# Patient Record
Sex: MALE | Race: WHITE | NOT HISPANIC OR LATINO | Employment: OTHER | URBAN - METROPOLITAN AREA
[De-identification: names, ages, dates, MRNs, and addresses within clinical notes are randomized per-mention and may not be internally consistent; named-entity substitution may affect disease eponyms.]

---

## 2017-03-08 ENCOUNTER — TELEPHONE (OUTPATIENT)
Dept: INTERNAL MEDICINE | Facility: CLINIC | Age: 62
End: 2017-03-08

## 2017-03-08 NOTE — TELEPHONE ENCOUNTER
----- Message from Kerri Wilson sent at 3/7/2017  3:18 PM EST -----  pts wife was diagnosed with type A flu today and pt would like an rx sent for Tamiflu. He is not currently having symptoms but would like to have it anyway. Pharmacy: [facility].       [phone], pt

## 2017-04-24 ENCOUNTER — OFFICE VISIT (OUTPATIENT)
Dept: INTERNAL MEDICINE | Facility: CLINIC | Age: 62
End: 2017-04-24

## 2017-04-24 VITALS
DIASTOLIC BLOOD PRESSURE: 70 MMHG | RESPIRATION RATE: 17 BRPM | HEART RATE: 64 BPM | WEIGHT: 152 LBS | SYSTOLIC BLOOD PRESSURE: 126 MMHG | BODY MASS INDEX: 21.81 KG/M2

## 2017-04-24 DIAGNOSIS — E78.2 MIXED HYPERLIPIDEMIA: Primary | ICD-10-CM

## 2017-04-24 DIAGNOSIS — Z79.899 DRUG THERAPY: ICD-10-CM

## 2017-04-24 DIAGNOSIS — Z00.00 HEALTH MAINTENANCE EXAMINATION: ICD-10-CM

## 2017-04-24 DIAGNOSIS — E11.9 CONTROLLED TYPE 2 DIABETES MELLITUS WITHOUT COMPLICATION, WITHOUT LONG-TERM CURRENT USE OF INSULIN (HCC): ICD-10-CM

## 2017-04-24 DIAGNOSIS — Z12.5 SCREENING FOR PROSTATE CANCER: ICD-10-CM

## 2017-04-24 LAB
A/C: NORMAL
ALBUMIN SERPL-MCNC: 4.4 G/DL (ref 3.2–4.8)
ALBUMIN/GLOB SERPL: 1.8 G/DL (ref 1.5–2.5)
ALP SERPL-CCNC: 45 U/L (ref 25–100)
ALT SERPL W P-5'-P-CCNC: 17 U/L (ref 7–40)
ANION GAP SERPL CALCULATED.3IONS-SCNC: 6 MMOL/L (ref 3–11)
ARTICHOKE IGE QN: 77 MG/DL (ref 0–130)
AST SERPL-CCNC: 28 U/L (ref 0–33)
BILIRUB SERPL-MCNC: 0.7 MG/DL (ref 0.3–1.2)
BUN BLD-MCNC: 20 MG/DL (ref 9–23)
BUN/CREAT SERPL: 14.3 (ref 7–25)
CALCIUM SPEC-SCNC: 9.9 MG/DL (ref 8.7–10.4)
CHLORIDE SERPL-SCNC: 102 MMOL/L (ref 99–109)
CHOLEST SERPL-MCNC: 165 MG/DL (ref 0–200)
CO2 SERPL-SCNC: 31 MMOL/L (ref 20–31)
CREAT BLD-MCNC: 1.4 MG/DL (ref 0.6–1.3)
DEPRECATED RDW RBC AUTO: 46.7 FL (ref 37–54)
ERYTHROCYTE [DISTWIDTH] IN BLOOD BY AUTOMATED COUNT: 13.6 % (ref 11.3–14.5)
EXPIRATION DATE: NORMAL
EXPIRATION DATE: NORMAL
GFR SERPL CREATININE-BSD FRML MDRD: 51 ML/MIN/1.73
GLOBULIN UR ELPH-MCNC: 2.5 GM/DL
GLUCOSE BLD-MCNC: 113 MG/DL (ref 70–100)
HBA1C MFR BLD: 6.1 %
HCT VFR BLD AUTO: 41.5 % (ref 38.9–50.9)
HDLC SERPL-MCNC: 66 MG/DL (ref 40–60)
HGB BLD-MCNC: 13.6 G/DL (ref 13.1–17.5)
Lab: NORMAL
Lab: NORMAL
MCH RBC QN AUTO: 30.9 PG (ref 27–31)
MCHC RBC AUTO-ENTMCNC: 32.8 G/DL (ref 32–36)
MCV RBC AUTO: 94.3 FL (ref 80–99)
PLATELET # BLD AUTO: 257 10*3/MM3 (ref 150–450)
PMV BLD AUTO: 10.7 FL (ref 6–12)
POC CREATININE URINE: 50
POC MICROALBUMIN URINE: 10
POTASSIUM BLD-SCNC: 4.1 MMOL/L (ref 3.5–5.5)
PROT SERPL-MCNC: 6.9 G/DL (ref 5.7–8.2)
PSA SERPL-MCNC: 1.69 NG/ML (ref 0–4)
RBC # BLD AUTO: 4.4 10*6/MM3 (ref 4.2–5.76)
SODIUM BLD-SCNC: 139 MMOL/L (ref 132–146)
TRIGL SERPL-MCNC: 73 MG/DL (ref 0–150)
WBC NRBC COR # BLD: 5.87 10*3/MM3 (ref 3.5–10.8)

## 2017-04-24 PROCEDURE — 83036 HEMOGLOBIN GLYCOSYLATED A1C: CPT | Performed by: INTERNAL MEDICINE

## 2017-04-24 PROCEDURE — 82044 UR ALBUMIN SEMIQUANTITATIVE: CPT | Performed by: INTERNAL MEDICINE

## 2017-04-24 PROCEDURE — 36415 COLL VENOUS BLD VENIPUNCTURE: CPT | Performed by: INTERNAL MEDICINE

## 2017-04-24 PROCEDURE — 80053 COMPREHEN METABOLIC PANEL: CPT | Performed by: INTERNAL MEDICINE

## 2017-04-24 PROCEDURE — 99396 PREV VISIT EST AGE 40-64: CPT | Performed by: INTERNAL MEDICINE

## 2017-04-24 PROCEDURE — 80061 LIPID PANEL: CPT | Performed by: INTERNAL MEDICINE

## 2017-04-24 PROCEDURE — 84153 ASSAY OF PSA TOTAL: CPT | Performed by: INTERNAL MEDICINE

## 2017-04-24 PROCEDURE — 85027 COMPLETE CBC AUTOMATED: CPT | Performed by: INTERNAL MEDICINE

## 2017-04-24 NOTE — PROGRESS NOTES
Subjective   Chente Rich is a 62 y.o. male.     History of Present Illness   For annual physical and follow up of  NIDDM no new sx.  Hyperlipidemia on meds no muscle aches.    The following portions of the patient's history were reviewed and updated as appropriate: allergies, current medications, past family history, past medical history, past social history, past surgical history and problem list.    Review of Systems   Constitutional: Negative for activity change, appetite change, fatigue and fever.   HENT: Negative for dental problem, ear pain, hearing loss, postnasal drip, rhinorrhea, sinus pressure, sore throat, trouble swallowing and voice change.    Eyes: Negative.    Respiratory: Negative for cough, chest tightness, shortness of breath and wheezing.    Cardiovascular: Negative for chest pain and leg swelling.   Gastrointestinal: Negative for abdominal distention, abdominal pain, blood in stool, constipation, diarrhea and nausea.   Endocrine: Negative for polydipsia and polyuria.   Genitourinary: Negative for decreased urine volume, dysuria, hematuria and urgency.   Musculoskeletal: Negative for arthralgias, back pain and neck pain.   Skin: Negative for pallor and rash.   Allergic/Immunologic: Negative.    Neurological: Negative for dizziness, tremors, speech difficulty, weakness, numbness and headaches.   Hematological: Negative for adenopathy.   Psychiatric/Behavioral: Negative for agitation, behavioral problems, confusion, dysphoric mood and sleep disturbance. The patient is not nervous/anxious and is not hyperactive.        Objective   Physical Exam   Constitutional: He is oriented to person, place, and time. He appears well-developed and well-nourished.   HENT:   Head: Normocephalic and atraumatic.   Right Ear: External ear normal.   Left Ear: External ear normal.   Nose: Nose normal.   Mouth/Throat: Oropharynx is clear and moist.   Eyes: Conjunctivae and EOM are normal. Pupils are equal, round, and  reactive to light.   Fundoscopic exam:       The right eye shows no AV nicking and no hemorrhage.        The left eye shows no AV nicking and no hemorrhage.   Neck: Normal range of motion. Neck supple. No thyromegaly present.   Cardiovascular: Normal rate, regular rhythm, normal heart sounds and intact distal pulses.    No murmur heard.  Carotids normal.   Pulmonary/Chest: Effort normal and breath sounds normal.   Abdominal: Soft. Bowel sounds are normal. He exhibits no distension and no mass. There is no tenderness. No hernia.   Genitourinary: Rectum normal, prostate normal, testes normal and penis normal.   Musculoskeletal: Normal range of motion.   Lymphadenopathy:     He has no cervical adenopathy.   Neurological: He is alert and oriented to person, place, and time. He has normal reflexes. No cranial nerve deficit.   Skin: Skin is warm and dry.   Psychiatric: He has a normal mood and affect. His behavior is normal. Judgment and thought content normal.   Nursing note and vitals reviewed.      Assessment/Plan   Chente was seen today for annual exam.    Diagnoses and all orders for this visit:    Mixed hyperlipidemia  -     Comprehensive Metabolic Panel  -     Lipid Panel    Controlled type 2 diabetes mellitus without complication, without long-term current use of insulin  -     POC Glycosylated Hemoglobin (Hb A1C)  -     POC Microalbumin    Screening for prostate cancer  -     PSA    Drug therapy  -     CBC (No Diff)    Health maintenance examination  -     Ambulatory Referral For Screening Colonoscopy    All problems are stable.  Labs as noted.  Counseled on diet and immunizations.  Health maintenance is up to date.  Same meds.  Recheck 6 months.

## 2017-05-18 RX ORDER — SIMVASTATIN 20 MG
TABLET ORAL
Qty: 30 TABLET | Refills: 8 | Status: SHIPPED | OUTPATIENT
Start: 2017-05-18 | End: 2018-02-11 | Stop reason: SDUPTHER

## 2017-07-19 ENCOUNTER — LAB REQUISITION (OUTPATIENT)
Dept: LAB | Facility: HOSPITAL | Age: 62
End: 2017-07-19

## 2017-07-19 ENCOUNTER — OUTSIDE FACILITY SERVICE (OUTPATIENT)
Dept: GASTROENTEROLOGY | Facility: CLINIC | Age: 62
End: 2017-07-19

## 2017-07-19 DIAGNOSIS — K63.9 DISEASE OF INTESTINE: ICD-10-CM

## 2017-07-19 PROCEDURE — 88305 TISSUE EXAM BY PATHOLOGIST: CPT | Performed by: INTERNAL MEDICINE

## 2017-07-19 PROCEDURE — 45385 COLONOSCOPY W/LESION REMOVAL: CPT | Performed by: INTERNAL MEDICINE

## 2017-07-20 LAB
CYTO UR: NORMAL
LAB AP CASE REPORT: NORMAL
LAB AP CLINICAL INFORMATION: NORMAL
Lab: NORMAL
PATH REPORT.FINAL DX SPEC: NORMAL
PATH REPORT.GROSS SPEC: NORMAL

## 2017-09-14 RX ORDER — METFORMIN HYDROCHLORIDE 500 MG/1
TABLET, EXTENDED RELEASE ORAL
Qty: 30 TABLET | Refills: 9 | Status: SHIPPED | OUTPATIENT
Start: 2017-09-14 | End: 2018-07-12 | Stop reason: SDUPTHER

## 2017-09-25 ENCOUNTER — TELEPHONE (OUTPATIENT)
Dept: INTERNAL MEDICINE | Facility: CLINIC | Age: 62
End: 2017-09-25

## 2017-09-25 DIAGNOSIS — E78.2 MIXED HYPERLIPIDEMIA: Primary | ICD-10-CM

## 2017-09-25 NOTE — TELEPHONE ENCOUNTER
----- Message from Kerri Wilson sent at 9/25/2017 11:27 AM EDT -----  Pt needs referral for Cardiac Calcium Score.     Patient: [phone]

## 2017-10-12 ENCOUNTER — HOSPITAL ENCOUNTER (OUTPATIENT)
Dept: CT IMAGING | Facility: HOSPITAL | Age: 62
Discharge: HOME | End: 2017-10-12
Attending: INTERNAL MEDICINE | Admitting: INTERNAL MEDICINE

## 2017-10-12 DIAGNOSIS — E78.2 MIXED HYPERLIPIDEMIA: ICD-10-CM

## 2017-10-12 PROCEDURE — 75571 CT HRT W/O DYE W/CA TEST: CPT

## 2017-10-30 ENCOUNTER — OFFICE VISIT (OUTPATIENT)
Dept: INTERNAL MEDICINE | Facility: CLINIC | Age: 62
End: 2017-10-30

## 2017-10-30 VITALS
BODY MASS INDEX: 22.38 KG/M2 | RESPIRATION RATE: 17 BRPM | SYSTOLIC BLOOD PRESSURE: 122 MMHG | DIASTOLIC BLOOD PRESSURE: 74 MMHG | HEART RATE: 62 BPM | WEIGHT: 156 LBS

## 2017-10-30 DIAGNOSIS — I25.10 CORONARY ARTERY DISEASE INVOLVING NATIVE CORONARY ARTERY OF NATIVE HEART WITHOUT ANGINA PECTORIS: ICD-10-CM

## 2017-10-30 DIAGNOSIS — E11.9 CONTROLLED TYPE 2 DIABETES MELLITUS WITHOUT COMPLICATION, WITHOUT LONG-TERM CURRENT USE OF INSULIN (HCC): ICD-10-CM

## 2017-10-30 DIAGNOSIS — Z23 NEED FOR INFLUENZA VACCINATION: Primary | ICD-10-CM

## 2017-10-30 DIAGNOSIS — E78.2 MIXED HYPERLIPIDEMIA: ICD-10-CM

## 2017-10-30 LAB
EXPIRATION DATE: NORMAL
HBA1C MFR BLD: 6.1 %
Lab: NORMAL

## 2017-10-30 PROCEDURE — 90471 IMMUNIZATION ADMIN: CPT | Performed by: INTERNAL MEDICINE

## 2017-10-30 PROCEDURE — 90686 IIV4 VACC NO PRSV 0.5 ML IM: CPT | Performed by: INTERNAL MEDICINE

## 2017-10-30 PROCEDURE — 83036 HEMOGLOBIN GLYCOSYLATED A1C: CPT | Performed by: INTERNAL MEDICINE

## 2017-10-30 PROCEDURE — 99214 OFFICE O/P EST MOD 30 MIN: CPT | Performed by: INTERNAL MEDICINE

## 2017-10-30 NOTE — PROGRESS NOTES
Subjective   Chente Rich is a 62 y.o. male.     History of Present Illness   For follow up of  NIDDM on metformin no new sx.  Hyperlipidemia on meds, no muscle aches.  CAD via scan no chest pain or sob.    The following portions of the patient's history were reviewed and updated as appropriate: allergies, current medications, past medical history and problem list.    Review of Systems   Constitutional: Negative.    Eyes: Negative.    Respiratory: Negative.  Negative for cough, chest tightness, shortness of breath and wheezing.    Cardiovascular: Negative.  Negative for chest pain, palpitations and leg swelling.   Gastrointestinal: Negative.  Negative for abdominal distention and abdominal pain.       Objective   Physical Exam   Constitutional: He appears well-developed.   Neck: Normal range of motion. Neck supple. No thyromegaly present.   Cardiovascular: Normal rate, regular rhythm and normal heart sounds.  Exam reveals no gallop and no friction rub.    No murmur heard.  Pulmonary/Chest: Effort normal and breath sounds normal. No respiratory distress. He has no wheezes. He has no rales. He exhibits no tenderness.   Abdominal: Soft. Bowel sounds are normal. He exhibits no distension and no mass. There is no tenderness.   Nursing note and vitals reviewed.      Assessment/Plan   Chente was seen today for hyperlipidemia.    Diagnoses and all orders for this visit:    Need for influenza vaccination  -     Flu Vaccine Quad PF 3YR+    Controlled type 2 diabetes mellitus without complication, without long-term current use of insulin  -     POC Glycosylated Hemoglobin (Hb A1C)    Mixed hyperlipidemia    Coronary artery disease involving native coronary artery of native heart without angina pectoris    A1C = 6.1.   All problems are stable.  Same meds.  Recheck 6 months.

## 2018-02-12 RX ORDER — SIMVASTATIN 20 MG
TABLET ORAL
Qty: 30 TABLET | Refills: 7 | Status: SHIPPED | OUTPATIENT
Start: 2018-02-12 | End: 2018-10-11 | Stop reason: SDUPTHER

## 2018-05-08 ENCOUNTER — OFFICE VISIT (OUTPATIENT)
Dept: INTERNAL MEDICINE | Facility: CLINIC | Age: 63
End: 2018-05-08

## 2018-05-08 VITALS
SYSTOLIC BLOOD PRESSURE: 120 MMHG | WEIGHT: 150 LBS | OXYGEN SATURATION: 99 % | HEART RATE: 54 BPM | BODY MASS INDEX: 21.47 KG/M2 | HEIGHT: 70 IN | DIASTOLIC BLOOD PRESSURE: 60 MMHG

## 2018-05-08 DIAGNOSIS — Z23 NEED FOR VACCINATION AGAINST STREPTOCOCCUS PNEUMONIAE: ICD-10-CM

## 2018-05-08 DIAGNOSIS — Z00.00 HEALTH MAINTENANCE EXAMINATION: ICD-10-CM

## 2018-05-08 DIAGNOSIS — I25.10 CORONARY ARTERY DISEASE INVOLVING NATIVE CORONARY ARTERY OF NATIVE HEART WITHOUT ANGINA PECTORIS: Primary | ICD-10-CM

## 2018-05-08 DIAGNOSIS — E11.9 CONTROLLED TYPE 2 DIABETES MELLITUS WITHOUT COMPLICATION, WITHOUT LONG-TERM CURRENT USE OF INSULIN (HCC): ICD-10-CM

## 2018-05-08 DIAGNOSIS — E78.2 MIXED HYPERLIPIDEMIA: ICD-10-CM

## 2018-05-08 LAB
ALBUMIN SERPL-MCNC: 4.4 G/DL (ref 3.2–4.8)
ALBUMIN/GLOB SERPL: 1.8 G/DL (ref 1.5–2.5)
ALP SERPL-CCNC: 44 U/L (ref 25–100)
ALT SERPL W P-5'-P-CCNC: 18 U/L (ref 7–40)
ANION GAP SERPL CALCULATED.3IONS-SCNC: 4 MMOL/L (ref 3–11)
ARTICHOKE IGE QN: 60 MG/DL (ref 0–130)
AST SERPL-CCNC: 24 U/L (ref 0–33)
BILIRUB SERPL-MCNC: 0.5 MG/DL (ref 0.3–1.2)
BUN BLD-MCNC: 20 MG/DL (ref 9–23)
BUN/CREAT SERPL: 16.7 (ref 7–25)
CALCIUM SPEC-SCNC: 9.2 MG/DL (ref 8.7–10.4)
CHLORIDE SERPL-SCNC: 102 MMOL/L (ref 99–109)
CHOLEST SERPL-MCNC: 144 MG/DL (ref 0–200)
CO2 SERPL-SCNC: 31 MMOL/L (ref 20–31)
CREAT BLD-MCNC: 1.2 MG/DL (ref 0.6–1.3)
DEPRECATED RDW RBC AUTO: 47.9 FL (ref 37–54)
ERYTHROCYTE [DISTWIDTH] IN BLOOD BY AUTOMATED COUNT: 13.9 % (ref 11.3–14.5)
GFR SERPL CREATININE-BSD FRML MDRD: 61 ML/MIN/1.73
GLOBULIN UR ELPH-MCNC: 2.4 GM/DL
GLUCOSE BLD-MCNC: 114 MG/DL (ref 70–100)
HBA1C MFR BLD: 6.7 % (ref 4.8–5.6)
HCT VFR BLD AUTO: 41.3 % (ref 38.9–50.9)
HDLC SERPL-MCNC: 63 MG/DL (ref 40–60)
HGB BLD-MCNC: 13.5 G/DL (ref 13.1–17.5)
MCH RBC QN AUTO: 30.5 PG (ref 27–31)
MCHC RBC AUTO-ENTMCNC: 32.7 G/DL (ref 32–36)
MCV RBC AUTO: 93.4 FL (ref 80–99)
PLATELET # BLD AUTO: 243 10*3/MM3 (ref 150–450)
PMV BLD AUTO: 10.8 FL (ref 6–12)
POTASSIUM BLD-SCNC: 4.1 MMOL/L (ref 3.5–5.5)
PROT SERPL-MCNC: 6.8 G/DL (ref 5.7–8.2)
PSA SERPL-MCNC: 1.9 NG/ML (ref 0–4)
RBC # BLD AUTO: 4.42 10*6/MM3 (ref 4.2–5.76)
SODIUM BLD-SCNC: 137 MMOL/L (ref 132–146)
TRIGL SERPL-MCNC: 82 MG/DL (ref 0–150)
WBC NRBC COR # BLD: 5.57 10*3/MM3 (ref 3.5–10.8)

## 2018-05-08 PROCEDURE — G0103 PSA SCREENING: HCPCS | Performed by: INTERNAL MEDICINE

## 2018-05-08 PROCEDURE — 36415 COLL VENOUS BLD VENIPUNCTURE: CPT | Performed by: INTERNAL MEDICINE

## 2018-05-08 PROCEDURE — 90471 IMMUNIZATION ADMIN: CPT | Performed by: INTERNAL MEDICINE

## 2018-05-08 PROCEDURE — 80061 LIPID PANEL: CPT | Performed by: INTERNAL MEDICINE

## 2018-05-08 PROCEDURE — 83036 HEMOGLOBIN GLYCOSYLATED A1C: CPT | Performed by: INTERNAL MEDICINE

## 2018-05-08 PROCEDURE — 80053 COMPREHEN METABOLIC PANEL: CPT | Performed by: INTERNAL MEDICINE

## 2018-05-08 PROCEDURE — 99397 PER PM REEVAL EST PAT 65+ YR: CPT | Performed by: INTERNAL MEDICINE

## 2018-05-08 PROCEDURE — 90670 PCV13 VACCINE IM: CPT | Performed by: INTERNAL MEDICINE

## 2018-05-08 PROCEDURE — 85027 COMPLETE CBC AUTOMATED: CPT | Performed by: INTERNAL MEDICINE

## 2018-05-08 RX ORDER — TRIAMCINOLONE ACETONIDE 1 MG/G
CREAM TOPICAL
COMMUNITY
Start: 2018-04-09 | End: 2020-05-28

## 2018-05-08 NOTE — PROGRESS NOTES
Subjective   Chente Rihc is a 63 y.o. male.     History of Present Illness   For annual physical and follow up of  NIDDM on meds, no new sx.  Hyperlipidemia on meds, no muscle aches.  CAD via calcium scan, no sx.    The following portions of the patient's history were reviewed and updated as appropriate: allergies, current medications, past family history, past medical history, past social history, past surgical history and problem list.    Review of Systems   Constitutional: Negative.  Negative for activity change, appetite change, fatigue and fever.   HENT: Negative.  Negative for dental problem, ear pain, hearing loss, postnasal drip, rhinorrhea, sinus pressure, sore throat, trouble swallowing and voice change.    Eyes: Negative.  Negative for redness and visual disturbance.   Respiratory: Negative.  Negative for cough, chest tightness, shortness of breath and wheezing.    Cardiovascular: Negative.  Negative for chest pain, palpitations and leg swelling.   Gastrointestinal: Negative.  Negative for abdominal distention, abdominal pain, blood in stool, constipation, diarrhea and nausea.   Endocrine: Negative.  Negative for polydipsia and polyuria.   Genitourinary: Negative.  Negative for decreased urine volume, dysuria, hematuria and urgency.   Musculoskeletal: Negative.  Negative for arthralgias, back pain and neck pain.   Skin: Negative.  Negative for pallor and rash.   Allergic/Immunologic: Negative.    Neurological: Negative.  Negative for dizziness, tremors, speech difficulty, weakness, numbness and confusion.   Hematological: Negative.  Negative for adenopathy.   Psychiatric/Behavioral: Negative.  Negative for agitation, behavioral problems, dysphoric mood, sleep disturbance, negative for hyperactivity and depressed mood. The patient is not nervous/anxious.        Objective   Physical Exam   Constitutional: He is oriented to person, place, and time. He appears well-developed and well-nourished.   HENT:    Head: Normocephalic and atraumatic.   Right Ear: External ear normal.   Left Ear: External ear normal.   Nose: Nose normal.   Mouth/Throat: Oropharynx is clear and moist.   Eyes: Conjunctivae and EOM are normal. Pupils are equal, round, and reactive to light.   Fundoscopic exam:       The right eye shows no AV nicking and no hemorrhage.        The left eye shows no AV nicking and no hemorrhage.   Neck: Normal range of motion. Neck supple. No thyromegaly present.   Cardiovascular: Normal rate, regular rhythm, normal heart sounds and intact distal pulses.  Exam reveals no gallop and no friction rub.    No murmur heard.  Carotids normal.   Pulmonary/Chest: Effort normal and breath sounds normal. No respiratory distress. He has no wheezes. He has no rales. He exhibits no tenderness.   Abdominal: Soft. Bowel sounds are normal. He exhibits no distension and no mass. There is no tenderness. No hernia.   Genitourinary: Rectum normal, prostate normal, testes normal and penis normal.   Musculoskeletal: Normal range of motion. He exhibits no edema.   Lymphadenopathy:     He has no cervical adenopathy.   Neurological: He is alert and oriented to person, place, and time. He has normal reflexes. No cranial nerve deficit.   Skin: Skin is warm and dry.   Psychiatric: He has a normal mood and affect. His behavior is normal. Judgment and thought content normal.   Nursing note and vitals reviewed.        Assessment/Plan   Chente was seen today for annual exam.    Diagnoses and all orders for this visit:    Coronary artery disease involving native coronary artery of native heart without angina pectoris    Mixed hyperlipidemia  -     Comprehensive Metabolic Panel  -     Cancel: Lithium Level  -     Lipid Panel    Controlled type 2 diabetes mellitus without complication, without long-term current use of insulin  -     Hemoglobin A1c    Health maintenance examination  -     PSA Screen  -     CBC (No Diff)    Need for vaccination against  Streptococcus pneumoniae  -     Pneumococcal Conjugate Vaccine 13-Valent All    All problems are stable.  Lab as ordered.  Counseled on diet and immunizations.  Health maintenance is up to date.  Same meds.  Recheck 6 months.

## 2018-07-12 RX ORDER — METFORMIN HYDROCHLORIDE 500 MG/1
TABLET, EXTENDED RELEASE ORAL
Qty: 90 TABLET | Refills: 2 | Status: SHIPPED | OUTPATIENT
Start: 2018-07-12 | End: 2019-04-11 | Stop reason: SDUPTHER

## 2018-10-11 RX ORDER — SIMVASTATIN 20 MG
TABLET ORAL
Qty: 30 TABLET | Refills: 6 | Status: SHIPPED | OUTPATIENT
Start: 2018-10-11 | End: 2019-05-12 | Stop reason: SDUPTHER

## 2018-11-08 ENCOUNTER — OFFICE VISIT (OUTPATIENT)
Dept: INTERNAL MEDICINE | Facility: CLINIC | Age: 63
End: 2018-11-08

## 2018-11-08 VITALS
WEIGHT: 151 LBS | DIASTOLIC BLOOD PRESSURE: 64 MMHG | BODY MASS INDEX: 21.67 KG/M2 | RESPIRATION RATE: 17 BRPM | HEART RATE: 64 BPM | SYSTOLIC BLOOD PRESSURE: 128 MMHG

## 2018-11-08 DIAGNOSIS — I25.10 CORONARY ARTERY DISEASE INVOLVING NATIVE CORONARY ARTERY OF NATIVE HEART WITHOUT ANGINA PECTORIS: ICD-10-CM

## 2018-11-08 DIAGNOSIS — Z91.89 AT RISK FOR HEPATITIS: ICD-10-CM

## 2018-11-08 DIAGNOSIS — E11.9 CONTROLLED TYPE 2 DIABETES MELLITUS WITHOUT COMPLICATION, WITHOUT LONG-TERM CURRENT USE OF INSULIN (HCC): ICD-10-CM

## 2018-11-08 DIAGNOSIS — E78.2 MIXED HYPERLIPIDEMIA: ICD-10-CM

## 2018-11-08 DIAGNOSIS — Z23 NEED FOR INFLUENZA VACCINATION: Primary | ICD-10-CM

## 2018-11-08 LAB
EXPIRATION DATE: NORMAL
HBA1C MFR BLD: 6.4 %
Lab: NORMAL

## 2018-11-08 PROCEDURE — 90662 IIV NO PRSV INCREASED AG IM: CPT | Performed by: INTERNAL MEDICINE

## 2018-11-08 PROCEDURE — 90632 HEPA VACCINE ADULT IM: CPT | Performed by: INTERNAL MEDICINE

## 2018-11-08 PROCEDURE — 83036 HEMOGLOBIN GLYCOSYLATED A1C: CPT | Performed by: INTERNAL MEDICINE

## 2018-11-08 PROCEDURE — 99214 OFFICE O/P EST MOD 30 MIN: CPT | Performed by: INTERNAL MEDICINE

## 2018-11-08 PROCEDURE — 90471 IMMUNIZATION ADMIN: CPT | Performed by: INTERNAL MEDICINE

## 2018-11-08 PROCEDURE — 90472 IMMUNIZATION ADMIN EACH ADD: CPT | Performed by: INTERNAL MEDICINE

## 2018-11-08 NOTE — PROGRESS NOTES
Subjective   Chente Rich is a 63 y.o. male.     History of Present Illness   For follow up of  CAD via scan, no chest pain, sob or palpitations.  Hyperlipidemia on meds,no muscle aches.  NIDDM on meds, no new sx.    The following portions of the patient's history were reviewed and updated as appropriate: allergies, current medications, past medical history and problem list.    Review of Systems   Constitutional: Negative.    Eyes: Negative.    Respiratory: Negative.  Negative for cough, chest tightness, shortness of breath and wheezing.    Cardiovascular: Negative.  Negative for chest pain, palpitations and leg swelling.   Gastrointestinal: Negative.  Negative for abdominal distention and abdominal pain.   Genitourinary: Negative.    Musculoskeletal: Negative.        Objective   Physical Exam   Constitutional: He appears well-developed and well-nourished.   Neck: Normal range of motion. Neck supple.   Cardiovascular: Normal rate, regular rhythm and normal heart sounds.  Exam reveals no gallop and no friction rub.    No murmur heard.  Pulmonary/Chest: Effort normal and breath sounds normal. No respiratory distress. He has no wheezes. He has no rales.   Abdominal: Soft. Bowel sounds are normal. He exhibits no distension. There is no tenderness.   Musculoskeletal: He exhibits no edema.   Nursing note and vitals reviewed.        Assessment/Plan   Chente was seen today for mixed hyperlipidemia.    Diagnoses and all orders for this visit:    Need for influenza vaccination  -     Fluzone High Dose =>65Years    Coronary artery disease involving native coronary artery of native heart without angina pectoris    Mixed hyperlipidemia    Controlled type 2 diabetes mellitus without complication, without long-term current use of insulin (CMS/[facility])  -     POC Glycosylated Hemoglobin (Hb A1C)    At risk for hepatitis  -     Hepatitis A Vaccine Adult IM    A1C = 6.4.  All problems are stable.  Same meds.  Recheck 6 months.

## 2019-04-11 RX ORDER — METFORMIN HYDROCHLORIDE 500 MG/1
TABLET, EXTENDED RELEASE ORAL
Qty: 30 TABLET | Refills: 1 | Status: SHIPPED | OUTPATIENT
Start: 2019-04-11 | End: 2019-06-11 | Stop reason: SDUPTHER

## 2019-04-22 ENCOUNTER — TELEPHONE (OUTPATIENT)
Dept: INTERNAL MEDICINE | Facility: CLINIC | Age: 64
End: 2019-04-22

## 2019-04-22 DIAGNOSIS — Z00.00 HEALTH MAINTENANCE EXAMINATION: ICD-10-CM

## 2019-04-22 DIAGNOSIS — E78.2 MIXED HYPERLIPIDEMIA: Primary | ICD-10-CM

## 2019-04-22 DIAGNOSIS — E11.9 CONTROLLED TYPE 2 DIABETES MELLITUS WITHOUT COMPLICATION, WITHOUT LONG-TERM CURRENT USE OF INSULIN (HCC): ICD-10-CM

## 2019-04-22 NOTE — TELEPHONE ENCOUNTER
----- Message from Francoise Beyer sent at 4/22/2019  9:01 AM EDT -----  Contact: SELF  JUAN M ROSA IS SCHEDULED TO COME IN FOR A PHYSICAL TOMORROW AT 1:45 AND WANTS TO KNOW IF HE CAN GET THE LABS ORDERS IN EARLIER SO HE CAN COME IN IN THE MORNING TO HAVE THAT DONE. JUAN M CAN BE REACHED AT  [phone]

## 2019-04-23 ENCOUNTER — OFFICE VISIT (OUTPATIENT)
Dept: INTERNAL MEDICINE | Facility: CLINIC | Age: 64
End: 2019-04-23

## 2019-04-23 VITALS
RESPIRATION RATE: 16 BRPM | SYSTOLIC BLOOD PRESSURE: 128 MMHG | WEIGHT: 150 LBS | HEART RATE: 66 BPM | HEIGHT: 70 IN | BODY MASS INDEX: 21.47 KG/M2 | DIASTOLIC BLOOD PRESSURE: 76 MMHG

## 2019-04-23 DIAGNOSIS — Z00.00 HEALTH MAINTENANCE EXAMINATION: ICD-10-CM

## 2019-04-23 DIAGNOSIS — E78.2 MIXED HYPERLIPIDEMIA: ICD-10-CM

## 2019-04-23 DIAGNOSIS — E11.9 CONTROLLED TYPE 2 DIABETES MELLITUS WITHOUT COMPLICATION, WITHOUT LONG-TERM CURRENT USE OF INSULIN (HCC): ICD-10-CM

## 2019-04-23 DIAGNOSIS — Z91.89 AT RISK FOR HEPATITIS: ICD-10-CM

## 2019-04-23 DIAGNOSIS — I25.10 CORONARY ARTERY DISEASE INVOLVING NATIVE CORONARY ARTERY OF NATIVE HEART WITHOUT ANGINA PECTORIS: Primary | ICD-10-CM

## 2019-04-23 LAB
ALBUMIN SERPL-MCNC: 4.2 G/DL (ref 3.5–5.2)
ALBUMIN/GLOB SERPL: 1.4 G/DL
ALP SERPL-CCNC: 43 U/L (ref 39–117)
ALT SERPL W P-5'-P-CCNC: 16 U/L (ref 1–41)
ANION GAP SERPL CALCULATED.3IONS-SCNC: 11.4 MMOL/L
AST SERPL-CCNC: 23 U/L (ref 1–40)
BILIRUB SERPL-MCNC: 0.5 MG/DL (ref 0.2–1.2)
BUN BLD-MCNC: 17 MG/DL (ref 8–23)
BUN/CREAT SERPL: 12.3 (ref 7–25)
CALCIUM SPEC-SCNC: 9.5 MG/DL (ref 8.6–10.5)
CHLORIDE SERPL-SCNC: 95 MMOL/L (ref 98–107)
CHOLEST SERPL-MCNC: 156 MG/DL (ref 0–200)
CO2 SERPL-SCNC: 28.6 MMOL/L (ref 22–29)
CREAT BLD-MCNC: 1.38 MG/DL (ref 0.76–1.27)
DEPRECATED RDW RBC AUTO: 53.8 FL (ref 37–54)
ERYTHROCYTE [DISTWIDTH] IN BLOOD BY AUTOMATED COUNT: 14.9 % (ref 12.3–15.4)
GFR SERPL CREATININE-BSD FRML MDRD: 52 ML/MIN/1.73
GLOBULIN UR ELPH-MCNC: 2.9 GM/DL
GLUCOSE BLD-MCNC: 114 MG/DL (ref 65–99)
HBA1C MFR BLD: 6.1 % (ref 4.8–5.6)
HCT VFR BLD AUTO: 41.9 % (ref 37.5–51)
HDLC SERPL-MCNC: 71 MG/DL (ref 40–60)
HGB BLD-MCNC: 13.1 G/DL (ref 13–17.7)
LDLC SERPL CALC-MCNC: 73 MG/DL (ref 0–100)
LDLC/HDLC SERPL: 1.03 {RATIO}
MCH RBC QN AUTO: 30.7 PG (ref 26.6–33)
MCHC RBC AUTO-ENTMCNC: 31.3 G/DL (ref 31.5–35.7)
MCV RBC AUTO: 98.1 FL (ref 79–97)
PLATELET # BLD AUTO: 249 10*3/MM3 (ref 140–450)
PMV BLD AUTO: 11.3 FL (ref 6–12)
POTASSIUM BLD-SCNC: 4.4 MMOL/L (ref 3.5–5.2)
PROT SERPL-MCNC: 7.1 G/DL (ref 6–8.5)
PSA SERPL-MCNC: 2.13 NG/ML (ref 0–4)
RBC # BLD AUTO: 4.27 10*6/MM3 (ref 4.14–5.8)
SODIUM BLD-SCNC: 135 MMOL/L (ref 136–145)
TRIGL SERPL-MCNC: 59 MG/DL (ref 0–150)
VLDLC SERPL-MCNC: 11.8 MG/DL (ref 5–40)
WBC NRBC COR # BLD: 5.75 10*3/MM3 (ref 3.4–10.8)

## 2019-04-23 PROCEDURE — 80053 COMPREHEN METABOLIC PANEL: CPT | Performed by: INTERNAL MEDICINE

## 2019-04-23 PROCEDURE — 80061 LIPID PANEL: CPT | Performed by: INTERNAL MEDICINE

## 2019-04-23 PROCEDURE — 99397 PER PM REEVAL EST PAT 65+ YR: CPT | Performed by: INTERNAL MEDICINE

## 2019-04-23 PROCEDURE — 83036 HEMOGLOBIN GLYCOSYLATED A1C: CPT | Performed by: INTERNAL MEDICINE

## 2019-04-23 PROCEDURE — 85027 COMPLETE CBC AUTOMATED: CPT | Performed by: INTERNAL MEDICINE

## 2019-04-23 PROCEDURE — G0103 PSA SCREENING: HCPCS | Performed by: INTERNAL MEDICINE

## 2019-04-23 NOTE — PROGRESS NOTES
Subjective   Chente Rich is a 64 y.o. male.     History of Present Illness   For annual physical and follow up of  NIDDM on meds, doing well.  CAD via scan no sx.  Hyperlipidemia on meds, no muscle aches.    The following portions of the patient's history were reviewed and updated as appropriate: allergies, current medications, past family history, past medical history, past social history, past surgical history and problem list.    Review of Systems   Constitutional: Negative.  Negative for activity change, appetite change, fatigue and fever.   HENT: Negative.  Negative for dental problem, ear pain, hearing loss, postnasal drip, rhinorrhea, sinus pressure, sore throat, trouble swallowing and voice change.    Eyes: Negative.  Negative for redness and visual disturbance.        Has cataracts.   Respiratory: Negative.  Negative for cough, chest tightness, shortness of breath and wheezing.    Cardiovascular: Negative.  Negative for chest pain, palpitations and leg swelling.   Gastrointestinal: Negative.  Negative for abdominal distention, abdominal pain, blood in stool, constipation, diarrhea and nausea.   Endocrine: Negative.  Negative for polydipsia and polyuria.   Genitourinary: Negative.  Negative for decreased urine volume, dysuria, hematuria and urgency.   Musculoskeletal: Negative.  Negative for arthralgias, back pain and neck pain.   Skin: Negative.  Negative for pallor and rash.   Allergic/Immunologic: Negative.    Neurological: Negative.  Negative for dizziness, tremors, speech difficulty, weakness, numbness and confusion.   Hematological: Negative.  Negative for adenopathy.   Psychiatric/Behavioral: Negative.  Negative for agitation, behavioral problems, dysphoric mood, sleep disturbance, negative for hyperactivity and depressed mood. The patient is not nervous/anxious.        Objective   Physical Exam   Constitutional: He is oriented to person, place, and time. He appears well-developed and  well-nourished.   HENT:   Head: Normocephalic and atraumatic.   Right Ear: External ear normal.   Left Ear: External ear normal.   Nose: Nose normal.   Mouth/Throat: Oropharynx is clear and moist.   Eyes: Conjunctivae and EOM are normal. Pupils are equal, round, and reactive to light.   Fundoscopic exam:       The right eye shows no AV nicking and no hemorrhage.        The left eye shows no AV nicking and no hemorrhage.   Neck: Normal range of motion. Neck supple. No thyromegaly present.   Cardiovascular: Normal rate, regular rhythm, normal heart sounds and intact distal pulses. Exam reveals no gallop and no friction rub.   No murmur heard.  Carotids normal.   Pulmonary/Chest: Effort normal and breath sounds normal. No respiratory distress. He has no wheezes. He has no rales. He exhibits no tenderness.   Abdominal: Soft. Bowel sounds are normal. He exhibits no distension and no mass. There is no tenderness. No hernia.   Genitourinary: Rectum normal, prostate normal, testes normal and penis normal.   Musculoskeletal: Normal range of motion. He exhibits no edema.   Lymphadenopathy:     He has no cervical adenopathy.   Neurological: He is alert and oriented to person, place, and time. He has normal reflexes. No cranial nerve deficit.   Skin: Skin is warm and dry.   Psychiatric: He has a normal mood and affect. His behavior is normal. Judgment and thought content normal.   Nursing note and vitals reviewed.        Assessment/Plan   Chente was seen today for coronary artery disease involving native coronary artery of .    Diagnoses and all orders for this visit:    Coronary artery disease involving native coronary artery of native heart without angina pectoris  Stable, no change.    Mixed hyperlipidemia  Stable no change.    Controlled type 2 diabetes mellitus without complication, without long-term current use of insulin (CMS/[facility])  Stable, no change.    Health maintenance examination  Doing well.    At risk for  hepatitis  -     Hepatitis A Vaccine Adult IM  All problems are stable.  Labs as ordered.  Counseled on diet and immunizations.  Health maintenance is up to date .  Same meds.  Recheck 6 months.

## 2019-05-13 RX ORDER — SIMVASTATIN 20 MG
TABLET ORAL
Qty: 30 TABLET | Refills: 5 | Status: SHIPPED | OUTPATIENT
Start: 2019-05-13 | End: 2019-11-08 | Stop reason: SDUPTHER

## 2019-06-11 RX ORDER — METFORMIN HYDROCHLORIDE 500 MG/1
TABLET, EXTENDED RELEASE ORAL
Qty: 30 TABLET | Refills: 0 | Status: SHIPPED | OUTPATIENT
Start: 2019-06-11 | End: 2019-07-10 | Stop reason: SDUPTHER

## 2019-07-10 RX ORDER — METFORMIN HYDROCHLORIDE 500 MG/1
TABLET, EXTENDED RELEASE ORAL
Qty: 30 TABLET | Refills: 0 | Status: SHIPPED | OUTPATIENT
Start: 2019-07-10 | End: 2019-08-08 | Stop reason: SDUPTHER

## 2019-08-08 RX ORDER — METFORMIN HYDROCHLORIDE 500 MG/1
TABLET, EXTENDED RELEASE ORAL
Qty: 30 TABLET | Refills: 0 | Status: SHIPPED | OUTPATIENT
Start: 2019-08-08 | End: 2019-09-07 | Stop reason: SDUPTHER

## 2019-09-09 RX ORDER — METFORMIN HYDROCHLORIDE 500 MG/1
TABLET, EXTENDED RELEASE ORAL
Qty: 30 TABLET | Refills: 0 | Status: SHIPPED | OUTPATIENT
Start: 2019-09-09 | End: 2019-10-09 | Stop reason: SDUPTHER

## 2019-10-09 RX ORDER — METFORMIN HYDROCHLORIDE 500 MG/1
TABLET, EXTENDED RELEASE ORAL
Qty: 30 TABLET | Refills: 0 | Status: SHIPPED | OUTPATIENT
Start: 2019-10-09 | End: 2019-11-06 | Stop reason: SDUPTHER

## 2019-10-28 ENCOUNTER — OFFICE VISIT (OUTPATIENT)
Dept: INTERNAL MEDICINE | Facility: CLINIC | Age: 64
End: 2019-10-28

## 2019-10-28 VITALS
RESPIRATION RATE: 18 BRPM | TEMPERATURE: 97.7 F | HEIGHT: 70 IN | DIASTOLIC BLOOD PRESSURE: 82 MMHG | SYSTOLIC BLOOD PRESSURE: 120 MMHG | BODY MASS INDEX: 21.47 KG/M2 | HEART RATE: 95 BPM | OXYGEN SATURATION: 96 % | WEIGHT: 150 LBS

## 2019-10-28 DIAGNOSIS — E11.9 CONTROLLED TYPE 2 DIABETES MELLITUS WITHOUT COMPLICATION, WITHOUT LONG-TERM CURRENT USE OF INSULIN (HCC): ICD-10-CM

## 2019-10-28 DIAGNOSIS — E78.2 MIXED HYPERLIPIDEMIA: ICD-10-CM

## 2019-10-28 DIAGNOSIS — I25.10 CORONARY ARTERY DISEASE INVOLVING NATIVE CORONARY ARTERY OF NATIVE HEART WITHOUT ANGINA PECTORIS: Primary | ICD-10-CM

## 2019-10-28 LAB
EXPIRATION DATE: NORMAL
HBA1C MFR BLD: 6.3 %
Lab: NORMAL

## 2019-10-28 PROCEDURE — 99214 OFFICE O/P EST MOD 30 MIN: CPT | Performed by: INTERNAL MEDICINE

## 2019-10-28 PROCEDURE — 83036 HEMOGLOBIN GLYCOSYLATED A1C: CPT | Performed by: INTERNAL MEDICINE

## 2019-10-28 NOTE — PROGRESS NOTES
Subjective   Chente Rich is a 64 y.o. male.     History of Present Illness   For follow up of  CAD via scan, no chest pain, sob or palpitations.  NIDDM on meds, no new sx.  Hyperlipidemia on meds, no muscle aches.    The following portions of the patient's history were reviewed and updated as appropriate: allergies, current medications, past medical history, past social history and problem list.    Review of Systems   Constitutional: Negative.  Negative for fatigue and fever.   HENT: Negative.  Negative for congestion.    Eyes: Negative.  Negative for visual disturbance.   Respiratory: Negative.  Negative for cough, chest tightness, shortness of breath and wheezing.    Cardiovascular: Negative.  Negative for chest pain, palpitations and leg swelling.   Gastrointestinal: Negative.  Negative for abdominal distention and abdominal pain.   Genitourinary: Negative.        Objective   Physical Exam   Constitutional: He appears well-developed and well-nourished.   Cardiovascular: Normal rate, regular rhythm and normal heart sounds. Exam reveals no gallop and no friction rub.   No murmur heard.  Pulmonary/Chest: Effort normal and breath sounds normal. No respiratory distress. He has no wheezes. He has no rales. He exhibits no tenderness.   Abdominal: Soft. Bowel sounds are normal. He exhibits no distension and no mass. There is no tenderness. There is no guarding.   Musculoskeletal: He exhibits no edema.   Neurological: He is alert.   Nursing note and vitals reviewed.        Assessment/Plan   Chente was seen today for follow-up.    Diagnoses and all orders for this visit:    Coronary artery disease involving native coronary artery of native heart without angina pectoris  Stable, no change.    Mixed hyperlipidemia  Stable, no change.    Controlled type 2 diabetes mellitus without complication, without long-term current use of insulin (CMS/[facility])  -     POC Glycosylated Hemoglobin (Hb A1C) = 6.3.  Stable, no change.    All  problems are stable.  Same meds.  Recheck 6 months.

## 2019-11-06 RX ORDER — METFORMIN HYDROCHLORIDE 500 MG/1
TABLET, EXTENDED RELEASE ORAL
Qty: 30 TABLET | Refills: 0 | Status: SHIPPED | OUTPATIENT
Start: 2019-11-06 | End: 2019-12-09 | Stop reason: SDUPTHER

## 2019-11-08 RX ORDER — SIMVASTATIN 20 MG
TABLET ORAL
Qty: 30 TABLET | Refills: 4 | Status: SHIPPED | OUTPATIENT
Start: 2019-11-08 | End: 2020-04-07

## 2019-11-21 ENCOUNTER — OFFICE VISIT (OUTPATIENT)
Dept: INTERNAL MEDICINE | Facility: CLINIC | Age: 64
End: 2019-11-21

## 2019-11-21 VITALS
BODY MASS INDEX: 21.3 KG/M2 | HEIGHT: 70 IN | RESPIRATION RATE: 18 BRPM | SYSTOLIC BLOOD PRESSURE: 110 MMHG | OXYGEN SATURATION: 95 % | HEART RATE: 115 BPM | DIASTOLIC BLOOD PRESSURE: 74 MMHG | TEMPERATURE: 101.5 F | WEIGHT: 148.8 LBS

## 2019-11-21 DIAGNOSIS — R52 BODY ACHES: ICD-10-CM

## 2019-11-21 DIAGNOSIS — J40 BRONCHITIS: Primary | ICD-10-CM

## 2019-11-21 LAB
EXPIRATION DATE: NORMAL
FLUAV AG NPH QL: NEGATIVE
FLUBV AG NPH QL: NEGATIVE
INTERNAL CONTROL: NORMAL
Lab: NORMAL

## 2019-11-21 PROCEDURE — 99214 OFFICE O/P EST MOD 30 MIN: CPT | Performed by: INTERNAL MEDICINE

## 2019-11-21 PROCEDURE — 87804 INFLUENZA ASSAY W/OPTIC: CPT | Performed by: INTERNAL MEDICINE

## 2019-11-21 RX ORDER — AZITHROMYCIN 250 MG/1
TABLET, FILM COATED ORAL
Qty: 6 TABLET | Refills: 0 | Status: SHIPPED | OUTPATIENT
Start: 2019-11-21 | End: 2020-05-28

## 2019-11-21 NOTE — PROGRESS NOTES
Subjective   Chente Rich is a 64 y.o. male.     History of Present Illness   One week history of headache, fever and aches. Now has a sore throat and is coughing up colored sputum.  Some decreased appetite and nausea.    The following portions of the patient's history were reviewed and updated as appropriate: allergies, current medications, past medical history, past social history and problem list.    Review of Systems   Constitutional: Positive for fatigue and fever.   HENT: Positive for congestion and sore throat.    Eyes: Negative.    Respiratory: Positive for cough. Negative for chest tightness, shortness of breath and wheezing.    Cardiovascular: Negative.  Negative for chest pain, palpitations and leg swelling.   Gastrointestinal: Negative.  Negative for abdominal distention and abdominal pain.   Genitourinary: Negative.        Objective   Physical Exam   Constitutional: He appears well-developed and well-nourished.   HENT:   Mouth/Throat: Oropharynx is clear and moist.   Cardiovascular: Normal rate, regular rhythm and normal heart sounds. Exam reveals no gallop and no friction rub.   No murmur heard.  Pulmonary/Chest: Effort normal. No respiratory distress. He has no wheezes. He has no rales. He exhibits no tenderness.   Few rhonchi.   Abdominal: Soft.   Lymphadenopathy:     He has no cervical adenopathy.   Nursing note and vitals reviewed.        Assessment/Plan   Chente was seen today for uri.    Diagnoses and all orders for this visit:    Bronchitis  -     azithromycin (ZITHROMAX Z-NIGEL) 250 MG tablet; Take 2 tablets the first day, then 1 tablet daily for 4 days.    Body aches  -     POCT Influenza A/B    His flu screen was negative.  Will treat for bronchitis with a zpac.  Will use tylenol or ibuprofen and otc cough meds.  He will call if not better.

## 2019-12-09 RX ORDER — METFORMIN HYDROCHLORIDE 500 MG/1
TABLET, EXTENDED RELEASE ORAL
Qty: 30 TABLET | Refills: 0 | Status: SHIPPED | OUTPATIENT
Start: 2019-12-09 | End: 2020-01-07

## 2020-01-07 RX ORDER — METFORMIN HYDROCHLORIDE 500 MG/1
TABLET, EXTENDED RELEASE ORAL
Qty: 30 TABLET | Refills: 5 | Status: SHIPPED | OUTPATIENT
Start: 2020-01-07 | End: 2020-07-06

## 2020-04-07 RX ORDER — SIMVASTATIN 20 MG
TABLET ORAL
Qty: 30 TABLET | Refills: 3 | Status: SHIPPED | OUTPATIENT
Start: 2020-04-07 | End: 2020-08-06

## 2020-05-28 ENCOUNTER — HOSPITAL ENCOUNTER (OUTPATIENT)
Dept: GENERAL RADIOLOGY | Facility: HOSPITAL | Age: 65
Discharge: HOME | End: 2020-05-28
Admitting: INTERNAL MEDICINE

## 2020-05-28 ENCOUNTER — OFFICE VISIT (OUTPATIENT)
Dept: INTERNAL MEDICINE | Facility: CLINIC | Age: 65
End: 2020-05-28

## 2020-05-28 VITALS
SYSTOLIC BLOOD PRESSURE: 124 MMHG | DIASTOLIC BLOOD PRESSURE: 70 MMHG | RESPIRATION RATE: 20 BRPM | BODY MASS INDEX: 21.62 KG/M2 | WEIGHT: 151 LBS | HEART RATE: 64 BPM | TEMPERATURE: 98.5 F | HEIGHT: 70 IN

## 2020-05-28 DIAGNOSIS — Z00.00 ENCOUNTER FOR HEALTH MAINTENANCE EXAMINATION IN ADULT: ICD-10-CM

## 2020-05-28 DIAGNOSIS — E78.49 OTHER HYPERLIPIDEMIA: ICD-10-CM

## 2020-05-28 DIAGNOSIS — I25.84 CORONARY ARTERY CALCIFICATION: ICD-10-CM

## 2020-05-28 DIAGNOSIS — E11.9 CONTROLLED TYPE 2 DIABETES MELLITUS WITHOUT COMPLICATION, WITHOUT LONG-TERM CURRENT USE OF INSULIN (HCC): ICD-10-CM

## 2020-05-28 DIAGNOSIS — M25.562 ACUTE PAIN OF LEFT KNEE: ICD-10-CM

## 2020-05-28 DIAGNOSIS — I25.10 CORONARY ARTERY CALCIFICATION: ICD-10-CM

## 2020-05-28 DIAGNOSIS — Z23 NEED FOR PNEUMOCOCCAL VACCINATION: ICD-10-CM

## 2020-05-28 DIAGNOSIS — Z00.00 MEDICARE ANNUAL WELLNESS VISIT, SUBSEQUENT: Primary | ICD-10-CM

## 2020-05-28 DIAGNOSIS — Z11.59 NEED FOR HEPATITIS C SCREENING TEST: ICD-10-CM

## 2020-05-28 DIAGNOSIS — Z12.5 SCREENING FOR PROSTATE CANCER: ICD-10-CM

## 2020-05-28 DIAGNOSIS — Z82.49 FAMILY HISTORY OF CORONARY ARTERY DISEASE IN FATHER: ICD-10-CM

## 2020-05-28 PROBLEM — K63.5 COLON POLYP: Status: ACTIVE | Noted: 2020-05-28

## 2020-05-28 PROBLEM — K57.90 DIVERTICULOSIS: Status: ACTIVE | Noted: 2020-05-28

## 2020-05-28 PROBLEM — C44.91 BCC (BASAL CELL CARCINOMA OF SKIN): Status: ACTIVE | Noted: 2020-05-28

## 2020-05-28 LAB
A/C: NORMAL
ALBUMIN SERPL-MCNC: 4.6 G/DL (ref 3.5–5.2)
ALBUMIN/GLOB SERPL: 1.8 G/DL
ALP SERPL-CCNC: 49 U/L (ref 39–117)
ALT SERPL W P-5'-P-CCNC: 22 U/L (ref 1–41)
ANION GAP SERPL CALCULATED.3IONS-SCNC: 12.8 MMOL/L (ref 5–15)
AST SERPL-CCNC: 31 U/L (ref 1–40)
BASOPHILS # BLD AUTO: 0.04 10*3/MM3 (ref 0–0.2)
BASOPHILS NFR BLD AUTO: 0.6 % (ref 0–1.5)
BILIRUB SERPL-MCNC: 0.5 MG/DL (ref 0.2–1.2)
BUN BLD-MCNC: 17 MG/DL (ref 8–23)
BUN/CREAT SERPL: 13.1 (ref 7–25)
CALCIUM SPEC-SCNC: 9.9 MG/DL (ref 8.6–10.5)
CHLORIDE SERPL-SCNC: 99 MMOL/L (ref 98–107)
CHOLEST SERPL-MCNC: 174 MG/DL (ref 0–200)
CLARITY, POC: CLEAR
CO2 SERPL-SCNC: 28.2 MMOL/L (ref 22–29)
COLOR UR: YELLOW
CREAT BLD-MCNC: 1.3 MG/DL (ref 0.76–1.27)
DEPRECATED RDW RBC AUTO: 43.4 FL (ref 37–54)
EOSINOPHIL # BLD AUTO: 0.26 10*3/MM3 (ref 0–0.4)
EOSINOPHIL NFR BLD AUTO: 4 % (ref 0.3–6.2)
ERYTHROCYTE [DISTWIDTH] IN BLOOD BY AUTOMATED COUNT: 12.6 % (ref 12.3–15.4)
EXPIRATION DATE: NORMAL
GFR SERPL CREATININE-BSD FRML MDRD: 55 ML/MIN/1.73
GLOBULIN UR ELPH-MCNC: 2.6 GM/DL
GLUCOSE BLD-MCNC: 112 MG/DL (ref 65–99)
GLUCOSE UR STRIP-MCNC: NEGATIVE MG/DL
HBA1C MFR BLD: 6.4 %
HCT VFR BLD AUTO: 41.5 % (ref 37.5–51)
HCV AB SER DONR QL: NORMAL
HDLC SERPL-MCNC: 64 MG/DL (ref 40–60)
HGB BLD-MCNC: 13.7 G/DL (ref 13–17.7)
IMM GRANULOCYTES # BLD AUTO: 0.01 10*3/MM3 (ref 0–0.05)
IMM GRANULOCYTES NFR BLD AUTO: 0.2 % (ref 0–0.5)
KETONES UR QL: NEGATIVE
LDLC SERPL CALC-MCNC: 87 MG/DL (ref 0–100)
LDLC/HDLC SERPL: 1.37 {RATIO}
LEUKOCYTE EST, POC: NEGATIVE
LYMPHOCYTES # BLD AUTO: 1.84 10*3/MM3 (ref 0.7–3.1)
LYMPHOCYTES NFR BLD AUTO: 28.3 % (ref 19.6–45.3)
Lab: 1051
Lab: NORMAL
Lab: NORMAL
MCH RBC QN AUTO: 30.9 PG (ref 26.6–33)
MCHC RBC AUTO-ENTMCNC: 33 G/DL (ref 31.5–35.7)
MCV RBC AUTO: 93.5 FL (ref 79–97)
MONOCYTES # BLD AUTO: 0.45 10*3/MM3 (ref 0.1–0.9)
MONOCYTES NFR BLD AUTO: 6.9 % (ref 5–12)
NEUTROPHILS # BLD AUTO: 3.91 10*3/MM3 (ref 1.7–7)
NEUTROPHILS NFR BLD AUTO: 60 % (ref 42.7–76)
NITRITE UR-MCNC: NEGATIVE MG/ML
NRBC BLD AUTO-RTO: 0 /100 WBC (ref 0–0.2)
PH UR: 6 [PH] (ref 5–8)
PLATELET # BLD AUTO: 265 10*3/MM3 (ref 140–450)
PMV BLD AUTO: 10.8 FL (ref 6–12)
POC CREATININE URINE: 50
POC MICROALBUMIN URINE: 10
POTASSIUM BLD-SCNC: 4.4 MMOL/L (ref 3.5–5.2)
PROT SERPL-MCNC: 7.2 G/DL (ref 6–8.5)
PROT UR STRIP-MCNC: NEGATIVE MG/DL
PROT/CREAT UR: 10 MG/G CREA (ref 0–200)
PSA SERPL-MCNC: 2.35 NG/ML (ref 0–4)
RBC # BLD AUTO: 4.44 10*6/MM3 (ref 4.14–5.8)
RBC # UR STRIP: NEGATIVE /UL
SODIUM BLD-SCNC: 140 MMOL/L (ref 136–145)
SP GR UR: 1.01 (ref 1–1.03)
TRIGL SERPL-MCNC: 113 MG/DL (ref 0–150)
TSH SERPL DL<=0.05 MIU/L-ACNC: 1.63 UIU/ML (ref 0.27–4.2)
VLDLC SERPL-MCNC: 22.6 MG/DL (ref 5–40)
WBC NRBC COR # BLD: 6.51 10*3/MM3 (ref 3.4–10.8)

## 2020-05-28 PROCEDURE — 36415 COLL VENOUS BLD VENIPUNCTURE: CPT | Performed by: INTERNAL MEDICINE

## 2020-05-28 PROCEDURE — 73560 X-RAY EXAM OF KNEE 1 OR 2: CPT

## 2020-05-28 PROCEDURE — 86803 HEPATITIS C AB TEST: CPT | Performed by: INTERNAL MEDICINE

## 2020-05-28 PROCEDURE — 82570 ASSAY OF URINE CREATININE: CPT | Performed by: INTERNAL MEDICINE

## 2020-05-28 PROCEDURE — 36416 COLLJ CAPILLARY BLOOD SPEC: CPT | Performed by: INTERNAL MEDICINE

## 2020-05-28 PROCEDURE — 80061 LIPID PANEL: CPT | Performed by: INTERNAL MEDICINE

## 2020-05-28 PROCEDURE — G0009 ADMIN PNEUMOCOCCAL VACCINE: HCPCS | Performed by: INTERNAL MEDICINE

## 2020-05-28 PROCEDURE — 83036 HEMOGLOBIN GLYCOSYLATED A1C: CPT | Performed by: INTERNAL MEDICINE

## 2020-05-28 PROCEDURE — G0103 PSA SCREENING: HCPCS | Performed by: INTERNAL MEDICINE

## 2020-05-28 PROCEDURE — G0439 PPPS, SUBSEQ VISIT: HCPCS | Performed by: INTERNAL MEDICINE

## 2020-05-28 PROCEDURE — 90732 PPSV23 VACC 2 YRS+ SUBQ/IM: CPT | Performed by: INTERNAL MEDICINE

## 2020-05-28 PROCEDURE — 80053 COMPREHEN METABOLIC PANEL: CPT | Performed by: INTERNAL MEDICINE

## 2020-05-28 PROCEDURE — 99397 PER PM REEVAL EST PAT 65+ YR: CPT | Performed by: INTERNAL MEDICINE

## 2020-05-28 PROCEDURE — 85025 COMPLETE CBC W/AUTO DIFF WBC: CPT | Performed by: INTERNAL MEDICINE

## 2020-05-28 PROCEDURE — 82044 UR ALBUMIN SEMIQUANTITATIVE: CPT | Performed by: INTERNAL MEDICINE

## 2020-05-28 PROCEDURE — 81003 URINALYSIS AUTO W/O SCOPE: CPT | Performed by: INTERNAL MEDICINE

## 2020-05-28 PROCEDURE — 84443 ASSAY THYROID STIM HORMONE: CPT | Performed by: INTERNAL MEDICINE

## 2020-05-28 RX ORDER — IBUPROFEN 200 MG
200 TABLET ORAL EVERY 6 HOURS PRN
COMMUNITY
End: 2020-12-02

## 2020-05-28 NOTE — PROGRESS NOTES
Subjective     Chief Complaint:  Physical Exam.    History of Present Illness    History obtained from the patient.    The patient is establishing [facility], former patient of Dr. Morales.    The patient is here for a follow-up visit.    The patient is complaining of left knee pain for the past 6 weeks.  This is a new problem.  There is no swelling or stiffness.  He denies injury or trauma.  However, he states he was doing a lot of squatting while pulling weeds in his yard.  He started takingIibuprofen last week, and it has helped.  He also has a history of right Plantar Fasciitis since January 2020.  He states inserts have helped, as has the Ibuprofen.    Cardiac Follow-Up:   His Diabetes has been stable.  Medication: Metformin.  His Hyperlipidemia has been stable.  LDL goal < 70.  Last on 4/22/2019, LDL 73  Medication: Simvastatin.  Medication side effects: None.    Interval Events: Last Hemoglobin A1c on 10/28/2019 was 6.3.  He does not check his blood sugar or blood pressure at home.  His last ophthalmology visit was on 1/29/2020, no retinopathy.  He does not check his feet daily.  The patient denies a history of Coronary Artery Disease, but he did have a Cardiac CT Scan on 10/12/17.  This showed a Calcium Score of 133.    Symptoms: Denies chest pain, shortness of breath, OLIVEIRA, orthopnea, PND, palpitations, syncope, lower extremity edema, claudication, lightheadedness, and dizziness.    Associated Symptoms: No significant weight change recently.  Denies fatigue, headache, polydipsia, polyuria, myalgias, arthralgias, visual impairment, memory loss, concentration issues, and focal neurological deficit.    Lifestyle: The patient follows a diverse and healthy diet.  Exercise: 5 times per week (running, pickleball, golf, and tennis).  Tobacco Use: Never a smoker.    Colon Polyp Follow-Up: The patient is here for follow-up of colon polyps, which have been stable.  Interval Events: Last Colonoscopy on 7/24/2017  showed an ascending colon tubular adenoma and pandiverticulosis.  Per report, preceding colonoscopies had been clear.    Symptoms: Denies abdominal pain, diarrhea, constipation, hematochezia, melena, and changes in stool.    Medication: None.    Chente Rich is a 65 y.o. male who presents for an Annual Physical.      PMH, PSH, SocHx, FamHx, Allergies, and Medications: Reviewed and updated.    Outpatient Medications Prior to Visit   Medication Sig Dispense Refill   • ibuprofen (ADVIL,MOTRIN) 200 MG tablet Take 200 mg by mouth Every 6 (Six) Hours As Needed for Mild Pain .     • metFORMIN ER (GLUCOPHAGE-XR) 500 MG 24 hr tablet TAKE ONE TABLET BY MOUTH DAILY 30 tablet 5   • simvastatin (ZOCOR) 20 MG tablet TAKE ONE TABLET BY MOUTH DAILY 30 tablet 3   • azithromycin (ZITHROMAX Z-NIGEL) 250 MG tablet Take 2 tablets the first day, then 1 tablet daily for 4 days. 6 tablet 0   • sildenafil (VIAGRA) 100 MG tablet Take  by mouth.     • triamcinolone (KENALOG) 0.1 % cream        No facility-administered medications prior to visit.        Immunization History   Administered Date(s) Administered   • Flu Vaccine Quad PF >18YRS 10/25/2016   • Flu Vaccine Quad PF >36MO 10/30/2017   • Fluzone High Dose =>65 Years ([employer] ONLY) 11/08/2018   • Hepatitis A 11/08/2018   • Pneumococcal Conjugate 13-Valent (PCV13) 05/08/2018   • Tdap 10/20/2017         Patient Active Problem List   Diagnosis   • Hyperlipidemia   • Controlled type 2 diabetes mellitus without complication, without long-term current use of insulin (CMS/HCC)   • Coronary artery calcification   • BCC (basal cell carcinoma of skin)   • Diverticulosis   • Colon polyp       Health Habits:  Dental Exam. up to date  Eye Exam. up to date  Hearing Loss:  No  Exercise: 5 times/week.  Current exercise activities include: running/ jogging and tennis, golf, and Pickleball  Diet: Healthy  Multivitamin: No    Safe Driving:  Yes  Seat Belt:  Yes  Bike Helmet:  N/A  Skin Screening:   Yes  Sunscreen: Yes  SBE / ARVIN: Yes  Sexual Activity:  Yes  Birth Control:  N/A  STD Prevention:  N/A    Last Pap: N/A  Last Mammogram:  N/A  Last DEXA Scan: N/A  Last Colonoscopy: 10/24/17-ascending colon polyp  Last PSA: 4/23/19 (2.13)     Social:    Social History     Socioeconomic History   • Marital status:      Spouse name: Not on file   • Number of children: 2   • Years of education: Not on file   • Highest education level: Not on file   Occupational History   • Occupation: Retired   Tobacco Use   • Smoking status: Never Smoker   • Smokeless tobacco: Never Used   Substance and Sexual Activity   • Alcohol use: Yes     Comment: 1 drink (beer) daily   • Drug use: Never   • Sexual activity: Yes     Partners: Female         Current Medical Providers:    Millie Loya MD (Internal Medicine / Pediatrics)    The [facility] providers who are involved in the care of this patient are listed above.         Review of Systems   Constitutional: Negative for appetite change, chills, fatigue, fever and unexpected weight change.         No night sweats.     HENT: Positive for postnasal drip, rhinorrhea (clear) and sneezing. Negative for congestion, ear pain, facial swelling, hearing loss, nosebleeds, sinus pressure, sinus pain, sore throat, tinnitus, trouble swallowing and voice change.         Denies snoring.   Eyes: Positive for discharge (watery) and itching. Negative for photophobia, pain, redness and visual disturbance.   Respiratory: Negative for cough, chest tightness, shortness of breath and wheezing.         No chest congestion.  No hemoptysis.    Cardiovascular: Negative for chest pain, palpitations and leg swelling.        No orthopnea, OLIVEIRA, or PND.  No claudication or syncope.   Gastrointestinal: Negative for abdominal distention, abdominal pain, blood in stool, constipation, diarrhea, nausea and vomiting.        No melena, heartburn, odynophagia, dysphagia, early satiety, belching, or bloating.  "  Endocrine: Negative for cold intolerance, heat intolerance, polydipsia, polyphagia and polyuria.        No hair loss or dry skin.    Genitourinary: Negative for decreased urine volume, difficulty urinating, discharge, dysuria, flank pain, frequency, hematuria, scrotal swelling, testicular pain and urgency.        No nocturia, incomplete emptying, or incontinence.  No erectile dysfunction.   Musculoskeletal: Negative for arthralgias, back pain, gait problem, joint swelling, myalgias, neck pain and neck stiffness.        No joint stiffness.   Skin: Negative for rash.        No new skin lesions or changes in skin lesions.     Allergic/Immunologic: Positive for environmental allergies.   Neurological: Negative for dizziness, tremors, speech difficulty, weakness, light-headedness, numbness and headaches.        No tingling. No memory loss. No decreased concentration.   Hematological: Negative for adenopathy. Does not bruise/bleed easily.   Psychiatric/Behavioral: Negative for confusion, sleep disturbance and suicidal ideas. The patient is not nervous/anxious.         No depression.           Objective     Vitals:    05/28/20 1036   BP: 124/70   BP Location: Right arm   Pulse: 64   Resp: 20   Temp: 98.5 °F (36.9 °C)   TempSrc: Temporal   Weight: 68.5 kg (151 lb)   Height: 176.5 cm (69.5\")   PainSc:   2   PainLoc: Knee  Comment: Left    RT foot pain       Body mass index is 21.98 kg/m².    Physical Exam   Constitutional: He appears well-developed and well-nourished.   HENT:   Head: Normocephalic and atraumatic.   Right Ear: Tympanic membrane, external ear and ear canal normal.   Left Ear: Tympanic membrane, external ear and ear canal normal.   Mouth/Throat: Oropharynx is clear and moist. No oral lesions.   Tonsils normal.   Eyes: Pupils are equal, round, and reactive to light. Conjunctivae and EOM are normal.   Neck: Normal range of motion. Neck supple. Carotid bruit is not present. No thyroid mass and no thyromegaly " present.   Cardiovascular: Normal rate, regular rhythm and intact distal pulses. Exam reveals no gallop and no friction rub.   No murmur heard.  No peripheral edema.   Pulmonary/Chest: Effort normal and breath sounds normal.   Abdominal: Soft. Bowel sounds are normal. He exhibits no distension, no abdominal bruit and no mass. There is no hepatosplenomegaly. There is no tenderness.   Genitourinary: Rectum normal, prostate normal and penis normal. Right testis shows no mass, no swelling and no tenderness. Left testis shows no mass, no swelling and no tenderness.   Musculoskeletal: Normal range of motion.   No erythema, edema, or warmth of the left knee.  No tenderness to palpation.  No instability.  There is no pain with flexion and extension of the left knee.    Chente had a diabetic foot exam performed today.   During the foot exam he had a monofilament test performed (see form).  Vascular Status -  His right foot exhibits normal foot vasculature . His left foot exhibits normal foot vasculature .  Skin Integrity  -  His right foot skin is intact.His left foot skin is intact..  Lymphadenopathy:     He has no cervical adenopathy.        Right: No inguinal and no supraclavicular adenopathy present.        Left: No inguinal and no supraclavicular adenopathy present.   Neurological: He is alert. He has normal reflexes. No cranial nerve deficit. Coordination and gait normal.   Skin: No lesion and no rash noted.   No atypical skin lesions.   Psychiatric: He has a normal mood and affect.   Nursing note and vitals reviewed.      PHQ-2 Depression Screening  Little interest or pleasure in doing things? 0   Feeling down, depressed, or hopeless? 0   PHQ-2 Total Score 0         Counseling was given to patient for the following topics:  appropriate exercise, healthy eating habits, disease prevention, risk factors for cancer, importance of self testicular exam, importance of immunizations, including risks and benefits, sun safety,  seatbelt use and safe driving. Also discussed the importance of regular dental and vision care, as well recommendation for a yearly screening skin exam after age 40.  Written information provided to patient on these topics and other health maintenance issues.    Results for orders placed or performed in visit on 05/28/20   POC Urinalysis Dipstick, Multipro   Result Value Ref Range    Color Yellow Yellow, Straw, Dark Yellow, Kerri    Clarity, UA Clear Clear    Glucose, UA Negative Negative, 1000 mg/dL (3+) mg/dL    Ketones, UA Negative Negative    Specific Gravity  1.010 1.005 - 1.030    Blood, UA Negative Negative    pH, Urine 6.0 5.0 - 8.0    Protein, POC Negative Negative mg/dL    Nitrite, UA Negative Negative    Leukocytes Negative Negative    Protein/Creatinine Ratio, Urine 10.0 0.0 - 200.0 mg/G Crea    Lot Number [id number]     Expiration Date 4-30-21    POC Microalbumin   Result Value Ref Range    Microalbumin, Urine 10     Creatinine, Urine 50     A/C 30-300mg/g ABNORMAL     Lot Number [id number]     Expiration Date 11-30-20    POC Glycosylated Hemoglobin (Hb A1C)   Result Value Ref Range    Hemoglobin A1C 6.4 %    Lot Number [id number]     Expiration Date 1-13-22        Assessment/Plan       Chente was seen today for medicare wellness-subsequent.    Diagnoses and all orders for this visit:    Medicare annual wellness visit, subsequent    Encounter for health maintenance examination in adult  -     Lipid Panel  -     Hepatitis C Antibody  -     PSA Screen  -     Comprehensive Metabolic Panel  -     TSH  -     CBC & Differential  -     POC Urinalysis Dipstick, Multipro  -     POC Microalbumin  -     POC Glycosylated Hemoglobin (Hb A1C)  -     CBC Auto Differential    Controlled type 2 diabetes mellitus without complication, without long-term current use of insulin (CMS/[facility])  -     Lipid Panel  -     Comprehensive Metabolic Panel  -     TSH  -     CBC & Differential  -     POC Urinalysis Dipstick, Multipro  -     POC  Microalbumin  -     POC Glycosylated Hemoglobin (Hb A1C)  -     CBC Auto Differential   Continue current medication(s) as noted in the history of present illness.    Other hyperlipidemia  -     Lipid Panel  -     Comprehensive Metabolic Panel  -     TSH  -     CBC & Differential  -     CBC Auto Differential   Continue current medication(s) as noted in the history of present illness.    Coronary artery calcification  -     Adult Stress Echo W/ Cont or Stress Agent if Necessary Per Protocol; Future    Acute pain of left knee  -     XR Knee 1 or 2 View Left; Future   Continue current medication(s) as noted in the history of present illness.    Screening for prostate cancer  -     PSA Screen    Family history of coronary artery disease in father  -     Adult Stress Echo W/ Cont or Stress Agent if Necessary Per Protocol; Future    Need for hepatitis C screening test  -     Hepatitis C Antibody    Need for pneumococcal vaccination  -     Pneumococcal Polysaccharide Vaccine 23-Valent Greater Than or Equal To 3yo Subcutaneous / IM          Return in about 6 months (around 11/28/2020) for Recheck Diabetes, fasting.

## 2020-05-28 NOTE — PATIENT INSTRUCTIONS
I recommend he start a daily aspirin 81 milligrams.      I recommend Shingrix (new Shingles vaccine) at the pharmacy.      Advance Care Planning and Advance Directives     You make decisions on a daily basis - decisions about where you want to live, your career, your home, your life. Perhaps one of the most important decisions you face is your choice for future medical care. Take time to talk with your family and your healthcare team and start planning today.  Advance Care Planning is a process that can help you:  · Understand possible future healthcare decisions in light of your own experiences  · Reflect on those decision in light of your goals and values  · Discuss your decisions with those closest to you and the healthcare professionals that care for you  · Make a plan by creating a document that reflects your wishes    Surrogate Decision Maker  In the event of a medical emergency, which has left you unable to communicate or to make your own decisions, you would need someone to make decisions for you.  It is important to discuss your preferences for medical treatment with this person while you are in good health.     Qualities of a surrogate decision maker:  • Willing to take on this role and responsibility  • Knows what you want for future medical care  • Willing to follow your wishes even if they don't agree with them  • Able to make difficult medical decisions under stressful circumstances    Advance Directives  These are legal documents you can create that will guide your healthcare team and decision maker(s) when needed. These documents can be stored in the electronic medical record.    · Living Will - a legal document to guide your care if you have a terminal condition or a serious illness and are unable to communicate. States vary by statute in document names/types, but most forms may include one or more of the following:        -  Directions regarding life-prolonging treatments        -  Directions  regarding artificially provided nutrition/hydration        -  Choosing a healthcare decision maker        -  Direction regarding organ/tissue donation    · Durable Power of  for Healthcare - this document names an -in-fact to make medical decisions for you, but it may also allow this person to make personal and financial decisions for you. Please seek the advice of an  if you need this type of document.    **Advance Directives are not required and no one may discriminate against you if you do not sign one.    Medical Orders  Many states allow specific forms/orders signed by your physician to record your wishes for medical treatment in your current state of health. This form, signed in personal communication with your physician, addresses resuscitation and other medical interventions that you may or may not want.      For more information or to schedule a time with a [facility] Advance Care Planning Facilitator contact: [email or web address] or call [phone] and someone will contact you directly.

## 2020-05-28 NOTE — PROGRESS NOTES
The ABCs of the Annual Wellness Visit  Subsequent Medicare Wellness Visit    Chief Complaint   Patient presents with   • Medicare Wellness-subsequent     physical  establish care  fasting        Subjective   History of Present Illness:  Chente Rich is a 65 y.o. male who presents for a Subsequent Medicare Wellness Visit.    HEALTH RISK ASSESSMENT    Recent Hospitalizations:  No hospitalization(s) within the last year.    Current Medical Providers:  Patient Care Team:  Millie Loya MD as PCP - General (Internal Medicine)  Tru Pop MD as Consulting Physician (Ophthalmology)  Shelley Soliman MD as Consulting Physician (Dermatology)    Smoking Status:  Social History     Tobacco Use   Smoking Status Never Smoker   Smokeless Tobacco Never Used       Alcohol Consumption:  Social History     Substance and Sexual Activity   Alcohol Use Yes    Comment: 1 drink (beer) daily       Depression Screen:   PHQ-2/PHQ-9 Depression Screening 5/28/2020   Little interest or pleasure in doing things 0   Feeling down, depressed, or hopeless 0   Total Score 0       Fall Risk Screen:  ARVINADI Fall Risk Assessment was completed, and patient is at LOW risk for falls.Assessment completed on:5/28/2020    Health Habits and Functional and Cognitive Screening:  Functional & Cognitive Status 5/28/2020   Do you have difficulty preparing food and eating? No   Do you have difficulty bathing yourself, getting dressed or grooming yourself? No   Do you have difficulty using the toilet? No   Do you have difficulty moving around from place to place? No   Do you have trouble with steps or getting out of a bed or a chair? No   Current Diet Well Balanced Diet   Dental Exam Up to date   Eye Exam Up to date   Exercise (times per week) 5 times per week   Current Exercise Activities Include Walking   Do you need help using the phone?  No   Are you deaf or do you have serious difficulty hearing?  No   Do you need help with  transportation? No   Do you need help shopping? No   Do you need help preparing meals?  No   Do you need help with housework?  No   Do you need help with laundry? No   Do you need help taking your medications? No   Do you need help managing money? No   Do you ever drive or ride in a car without wearing a seat belt? No   Have you felt unusual stress, anger or loneliness in the last month? No   Who do you live with? Spouse   If you need help, do you have trouble finding someone available to you? No   Have you been bothered in the last four weeks by sexual problems? No   Do you have difficulty concentrating, remembering or making decisions? No         Does the patient have evidence of cognitive impairment? No    Asprin use counseling:Start ASA 81 mg daily     Age-appropriate Screening Schedule:  Refer to the list below for future screening recommendations based on patient's age, sex and/or medical conditions. Orders for these recommended tests are listed in the plan section. The patient has been provided with a written plan.    Health Maintenance   Topic Date Due   • ZOSTER VACCINE (2 of 2) 02/26/2012   • URINE MICROALBUMIN  04/24/2018   • DIABETIC FOOT EXAM  04/23/2020   • LIPID PANEL  04/23/2020   • HEMOGLOBIN A1C  04/28/2020   • PROSTATE CANCER SCREENING  05/28/2020   • INFLUENZA VACCINE  08/01/2020   • DIABETIC EYE EXAM  01/29/2021   • COLONOSCOPY  07/24/2027   • TDAP/TD VACCINES (2 - Td) 10/20/2027          The following portions of the patient's history were reviewed and updated as appropriate: allergies, current medications, past family history, past medical history, past social history, past surgical history and problem list.    Outpatient Medications Prior to Visit   Medication Sig Dispense Refill   • ibuprofen (ADVIL,MOTRIN) 200 MG tablet Take 200 mg by mouth Every 6 (Six) Hours As Needed for Mild Pain .     • metFORMIN ER (GLUCOPHAGE-XR) 500 MG 24 hr tablet TAKE ONE TABLET BY MOUTH DAILY 30 tablet 5   •  "simvastatin (ZOCOR) 20 MG tablet TAKE ONE TABLET BY MOUTH DAILY 30 tablet 3   • azithromycin (ZITHROMAX Z-NIGEL) 250 MG tablet Take 2 tablets the first day, then 1 tablet daily for 4 days. 6 tablet 0   • sildenafil (VIAGRA) 100 MG tablet Take  by mouth.     • triamcinolone (KENALOG) 0.1 % cream        No facility-administered medications prior to visit.        Patient Active Problem List   Diagnosis   • Hyperlipidemia   • Controlled type 2 diabetes mellitus without complication, without long-term current use of insulin (CMS/[facility])   • Coronary artery calcification   • BCC (basal cell carcinoma of skin)   • Diverticulosis   • Colon polyp       Advanced Care Planning:  ACP discussion was held with the patient during this visit. Patient has an advance directive (not in EMR), copy requested.    Review of Systems    Compared to one year ago, the patient feels his physical health is the same.  Compared to one year ago, the patient feels his mental health is better.    Reviewed chart for potential of high risk medication in the elderly: yes  Reviewed chart for potential of harmful drug interactions in the elderly:yes    Objective         Vitals:    05/28/20 1036   BP: 124/70   BP Location: Right arm   Pulse: 64   Resp: 20   Temp: 98.5 °F (36.9 °C)   TempSrc: Temporal   Weight: 68.5 kg (151 lb)   Height: 176.5 cm (69.5\")   PainSc:   2   PainLoc: Knee  Comment: Left    RT foot pain       Body mass index is 21.98 kg/m².  Discussed the patient's BMI with him. The BMI is in the acceptable range.  Finger Rub Hearing{Test (right ear):passed  Finger Rub Hearing{Test (left ear):passed      Physical Exam    Lab Results   Component Value Date    HGBA1C 6.4 05/28/2020        Assessment/Plan   Medicare Risks and Personalized Health Plan  CMS Preventative Services Quick Reference  Advance Directive Discussion  Cardiovascular risk  Depression/Dysphoria  Diabetic Lab Screening   Fall Risk  Immunizations Discussed/Encouraged (specific " immunizations; Pneumococcal 23 and Shingrix )    The above risks/problems have been discussed with the patient.  Pertinent information has been shared with the patient in the After Visit Summary.  Follow up plans and orders are seen below in the Assessment/Plan Section.    Diagnoses and all orders for this visit:    1. Medicare annual wellness visit, subsequent (Primary)    2. Encounter for health maintenance examination in adult  -     Lipid Panel  -     Hepatitis C Antibody  -     PSA Screen  -     Comprehensive Metabolic Panel  -     TSH  -     CBC & Differential  -     POC Urinalysis Dipstick, Multipro  -     POC Microalbumin  -     POC Glycosylated Hemoglobin (Hb A1C)  -     CBC Auto Differential    3. Controlled type 2 diabetes mellitus without complication, without long-term current use of insulin (CMS/[facility])  -     Lipid Panel  -     Comprehensive Metabolic Panel  -     TSH  -     CBC & Differential  -     POC Urinalysis Dipstick, Multipro  -     POC Microalbumin  -     POC Glycosylated Hemoglobin (Hb A1C)  -     CBC Auto Differential    4. Other hyperlipidemia  -     Lipid Panel  -     Comprehensive Metabolic Panel  -     TSH  -     CBC & Differential  -     CBC Auto Differential    5. Coronary artery calcification  -     Adult Stress Echo W/ Cont or Stress Agent if Necessary Per Protocol; Future    6. Acute pain of left knee  -     XR Knee 1 or 2 View Left; Future    7. Screening for prostate cancer  -     PSA Screen    8. Family history of coronary artery disease in father  -     Adult Stress Echo W/ Cont or Stress Agent if Necessary Per Protocol; Future    9. Need for hepatitis C screening test  -     Hepatitis C Antibody    10. Need for pneumococcal vaccination  -     Pneumococcal Polysaccharide Vaccine 23-Valent Greater Than or Equal To 1yo Subcutaneous / IM      Follow Up:  Return in about 6 months (around 11/28/2020) for Recheck Diabetes, fasting.     An After Visit Summary and PPPS were given to the  patient.

## 2020-06-19 ENCOUNTER — HOSPITAL ENCOUNTER (OUTPATIENT)
Dept: CARDIOLOGY | Facility: HOSPITAL | Age: 65
Discharge: HOME | End: 2020-06-19
Admitting: INTERNAL MEDICINE

## 2020-06-19 VITALS
BODY MASS INDEX: 22.2 KG/M2 | HEIGHT: 69 IN | HEART RATE: 60 BPM | SYSTOLIC BLOOD PRESSURE: 154 MMHG | DIASTOLIC BLOOD PRESSURE: 80 MMHG | RESPIRATION RATE: 18 BRPM | WEIGHT: 149.91 LBS

## 2020-06-19 DIAGNOSIS — I25.10 CORONARY ARTERY CALCIFICATION: ICD-10-CM

## 2020-06-19 DIAGNOSIS — Z82.49 FAMILY HISTORY OF CORONARY ARTERY DISEASE IN FATHER: ICD-10-CM

## 2020-06-19 DIAGNOSIS — I25.84 CORONARY ARTERY CALCIFICATION: ICD-10-CM

## 2020-06-19 LAB
BH CV ECHO MEAS - AO MAX PG: 6.4 MMHG
BH CV ECHO MEAS - AO MEAN PG: 3 MMHG
BH CV ECHO MEAS - AO ROOT AREA (BSA CORRECTED): 1.7
BH CV ECHO MEAS - AO ROOT AREA: 8 CM^2
BH CV ECHO MEAS - AO ROOT DIAM: 3.2 CM
BH CV ECHO MEAS - AO V2 MAX: 126 CM/SEC
BH CV ECHO MEAS - AO V2 MEAN: 87.7 CM/SEC
BH CV ECHO MEAS - AO V2 VTI: 33.1 CM
BH CV ECHO MEAS - BSA(HAYCOCK): 1.8 M^2
BH CV ECHO MEAS - BSA: 1.9 M^2
BH CV ECHO MEAS - BZI_BMI: 21.7 KILOGRAMS/M^2
BH CV ECHO MEAS - BZI_METRIC_HEIGHT: 177.8 CM
BH CV ECHO MEAS - BZI_METRIC_WEIGHT: 68.5 KG
BH CV ECHO MEAS - EDV(CUBED): 94.2 ML
BH CV ECHO MEAS - EDV(MOD-SP2): 94 ML
BH CV ECHO MEAS - EDV(MOD-SP4): 118 ML
BH CV ECHO MEAS - EDV(TEICH): 94.9 ML
BH CV ECHO MEAS - EF(CUBED): 71.6 %
BH CV ECHO MEAS - EF(MOD-BP): 64 %
BH CV ECHO MEAS - EF(MOD-SP2): 61.7 %
BH CV ECHO MEAS - EF(MOD-SP4): 64.4 %
BH CV ECHO MEAS - EF(TEICH): 63.4 %
BH CV ECHO MEAS - EF{MOD-BP}: 63 %
BH CV ECHO MEAS - ESV(CUBED): 26.7 ML
BH CV ECHO MEAS - ESV(MOD-SP2): 36 ML
BH CV ECHO MEAS - ESV(MOD-SP4): 42 ML
BH CV ECHO MEAS - ESV(TEICH): 34.7 ML
BH CV ECHO MEAS - FS: 34.3 %
BH CV ECHO MEAS - IVS/LVPW: 1.1
BH CV ECHO MEAS - IVSD: 0.77 CM
BH CV ECHO MEAS - LA DIMENSION: 3.8 CM
BH CV ECHO MEAS - LA/AO: 1.2
BH CV ECHO MEAS - LV DIASTOLIC VOL/BSA (35-75): 63.7 ML/M^2
BH CV ECHO MEAS - LV MASS(C)D: 102.6 GRAMS
BH CV ECHO MEAS - LV MASS(C)DI: 55.4 GRAMS/M^2
BH CV ECHO MEAS - LV SYSTOLIC VOL/BSA (12-30): 22.7 ML/M^2
BH CV ECHO MEAS - LVIDD: 4.6 CM
BH CV ECHO MEAS - LVIDS: 3 CM
BH CV ECHO MEAS - LVLD AP2: 7.9 CM
BH CV ECHO MEAS - LVLD AP4: 7.9 CM
BH CV ECHO MEAS - LVLS AP2: 6.3 CM
BH CV ECHO MEAS - LVLS AP4: 6.4 CM
BH CV ECHO MEAS - LVOT AREA (M): 3.1 CM^2
BH CV ECHO MEAS - LVOT AREA: 3.1 CM^2
BH CV ECHO MEAS - LVOT DIAM: 2 CM
BH CV ECHO MEAS - LVPWD: 0.69 CM
BH CV ECHO MEAS - MV A MAX VEL: 64.1 CM/SEC
BH CV ECHO MEAS - MV DEC TIME: 0.25 SEC
BH CV ECHO MEAS - MV E MAX VEL: 70.6 CM/SEC
BH CV ECHO MEAS - MV E/A: 1.1
BH CV ECHO MEAS - RAP SYSTOLE: 3 MMHG
BH CV ECHO MEAS - RVSP: 21 MMHG
BH CV ECHO MEAS - SI(AO): 143.7 ML/M^2
BH CV ECHO MEAS - SI(CUBED): 36.4 ML/M^2
BH CV ECHO MEAS - SI(MOD-SP2): 31.3 ML/M^2
BH CV ECHO MEAS - SI(MOD-SP4): 41 ML/M^2
BH CV ECHO MEAS - SI(TEICH): 32.5 ML/M^2
BH CV ECHO MEAS - SV(AO): 266.2 ML
BH CV ECHO MEAS - SV(CUBED): 67.5 ML
BH CV ECHO MEAS - SV(MOD-SP2): 58 ML
BH CV ECHO MEAS - SV(MOD-SP4): 76 ML
BH CV ECHO MEAS - SV(TEICH): 60.2 ML
BH CV ECHO MEAS - TR MAX PG: 18 MMHG
BH CV ECHO MEAS - TR MAX VEL: 213 CM/SEC
BH CV STRESS BP STAGE 1: NORMAL
BH CV STRESS BP STAGE 2: NORMAL
BH CV STRESS BP STAGE 3: NORMAL
BH CV STRESS DURATION MIN STAGE 1: 3
BH CV STRESS DURATION MIN STAGE 2: 3
BH CV STRESS DURATION MIN STAGE 3: 3
BH CV STRESS DURATION MIN STAGE 4: 2
BH CV STRESS DURATION SEC STAGE 4: 32
BH CV STRESS GRADE STAGE 1: 10
BH CV STRESS GRADE STAGE 2: 12
BH CV STRESS GRADE STAGE 3: 14
BH CV STRESS GRADE STAGE 4: 16
BH CV STRESS HR STAGE 1: 83
BH CV STRESS HR STAGE 2: 103
BH CV STRESS HR STAGE 3: 136
BH CV STRESS HR STAGE 4: 142
BH CV STRESS METS STAGE 1: 5
BH CV STRESS METS STAGE 2: 7.5
BH CV STRESS METS STAGE 3: 10
BH CV STRESS METS STAGE 4: 13.5
BH CV STRESS O2 STAGE 1: 99
BH CV STRESS O2 STAGE 2: 100
BH CV STRESS O2 STAGE 3: 100
BH CV STRESS O2 STAGE 4: 98
BH CV STRESS PROTOCOL 1: NORMAL
BH CV STRESS RECOVERY BP: NORMAL MMHG
BH CV STRESS RECOVERY HR: 81 BPM
BH CV STRESS RECOVERY O2: 98 %
BH CV STRESS SPEED STAGE 1: 1.7
BH CV STRESS SPEED STAGE 2: 2.5
BH CV STRESS SPEED STAGE 3: 3.4
BH CV STRESS SPEED STAGE 4: 4.2
BH CV STRESS STAGE 1: 1
BH CV STRESS STAGE 2: 2
BH CV STRESS STAGE 3: 3
BH CV STRESS STAGE 4: 4
BH CV VAS BP RIGHT ARM: NORMAL MMHG
BH CV XLRA - RV BASE: 4.6 CM
BH CV XLRA - RV LENGTH: 6.5 CM
BH CV XLRA - RV MID: 2.8 CM
LV EF 2D ECHO EST: 60 %
PERCENT MAX PREDICTED HR: 98.04 %
STRESS BASELINE BP: NORMAL MMHG
STRESS BASELINE HR: 78 BPM
STRESS O2 SAT REST: 99 %
STRESS PERCENT HR: 115 %
STRESS POST ESTIMATED WORKLOAD: 13.4 METS
STRESS POST EXERCISE DUR MIN: 11 MIN
STRESS POST EXERCISE DUR SEC: 32 SEC
STRESS POST O2 SAT PEAK: 92 %
STRESS POST PEAK BP: NORMAL MMHG
STRESS POST PEAK HR: 150 BPM

## 2020-06-19 PROCEDURE — 93350 STRESS TTE ONLY: CPT | Performed by: INTERNAL MEDICINE

## 2020-06-19 PROCEDURE — 93350 STRESS TTE ONLY: CPT

## 2020-06-19 PROCEDURE — 93325 DOPPLER ECHO COLOR FLOW MAPG: CPT

## 2020-06-19 PROCEDURE — 93320 DOPPLER ECHO COMPLETE: CPT

## 2020-06-19 PROCEDURE — 93018 CV STRESS TEST I&R ONLY: CPT | Performed by: INTERNAL MEDICINE

## 2020-06-19 PROCEDURE — 93017 CV STRESS TEST TRACING ONLY: CPT

## 2020-06-19 RX ORDER — ASPIRIN 81 MG/1
81 TABLET ORAL DAILY
COMMUNITY
End: 2021-06-02

## 2020-07-06 RX ORDER — METFORMIN HYDROCHLORIDE 500 MG/1
TABLET, EXTENDED RELEASE ORAL
Qty: 30 TABLET | Refills: 3 | Status: SHIPPED | OUTPATIENT
Start: 2020-07-06 | End: 2020-11-10 | Stop reason: SDUPTHER

## 2020-08-06 RX ORDER — SIMVASTATIN 20 MG
TABLET ORAL
Qty: 30 TABLET | Refills: 2 | Status: SHIPPED | OUTPATIENT
Start: 2020-08-06 | End: 2020-09-17 | Stop reason: SDUPTHER

## 2020-08-12 ENCOUNTER — OFFICE VISIT (OUTPATIENT)
Dept: ORTHOPEDIC SURGERY | Facility: CLINIC | Age: 65
End: 2020-08-12

## 2020-08-12 VITALS — HEIGHT: 69 IN | HEART RATE: 57 BPM | OXYGEN SATURATION: 98 % | BODY MASS INDEX: 21.62 KG/M2 | WEIGHT: 146 LBS

## 2020-08-12 DIAGNOSIS — M76.61 ACHILLES TENDINITIS OF RIGHT LOWER EXTREMITY: ICD-10-CM

## 2020-08-12 DIAGNOSIS — M79.671 RIGHT FOOT PAIN: Primary | ICD-10-CM

## 2020-08-12 DIAGNOSIS — E11.9 CONTROLLED TYPE 2 DIABETES MELLITUS WITHOUT COMPLICATION, WITHOUT LONG-TERM CURRENT USE OF INSULIN (HCC): ICD-10-CM

## 2020-08-12 PROCEDURE — 99203 OFFICE O/P NEW LOW 30 MIN: CPT | Performed by: ORTHOPAEDIC SURGERY

## 2020-08-12 NOTE — PROGRESS NOTES
NEW PATIENT    Patient: Chente Rich  : 1955    Primary Care Provider: Millie Loya MD    Requesting Provider: As above    Pain of the Right Foot      History    Chief Complaint: right heel pain     History of Present Illness: This is an extremely pleasant 65-year-old gentleman who has had right heel pain since 2020.  He did not have any specific injury.  He is very active, he has been a runner, he did a lot of hiking in the winter and went to [employer] in February.  He has tried to stay very active he walks 6 miles yesterday.  He has had persistent pain in the right Achilles insertion, with swelling.  It has waxed and waned but has not resolved.  He has tried to do some stretching but the stretching is done take the heel below the plane of the foot and that is actually a stretch we do not recommend.  He rates the pain is 3 out of 10, worse with increased activity.  He has noted some swelling.  He has only been able to run 3 times since February.  He has a 10-year history of prediabetes but currently is on medication.  His most recent hemoglobin A1c was 6.3 or 6.4.  He does not smoke.  He is retired.    Current Outpatient Medications on File Prior to Visit   Medication Sig Dispense Refill   • aspirin 81 MG EC tablet Take 81 mg by mouth Daily.     • ibuprofen (ADVIL,MOTRIN) 200 MG tablet Take 200 mg by mouth Every 6 (Six) Hours As Needed for Mild Pain .     • metFORMIN ER (GLUCOPHAGE-XR) 500 MG 24 hr tablet TAKE ONE TABLET BY MOUTH DAILY 30 tablet 3   • simvastatin (ZOCOR) 20 MG tablet TAKE ONE TABLET BY MOUTH DAILY 30 tablet 2     No current facility-administered medications on file prior to visit.       Allergies   Allergen Reactions   • Penicillins       Past Medical History:   Diagnosis Date   • Abnormal screening cardiac CT 10/12/2017    Calcium Score 133   • BCC (basal cell carcinoma of skin)     Dx (initially)-.  Has occurred on head, chest, back, and right leg.   • Colon polyp   "   Dx 10/17- tubular adenoma ascending colon, no dysplasia   • Diabetes mellitus (CMS/HCC)    • Diverticulosis     Dx 10/17 by Colonoscopy- pancolonic   • History of cardiovascular stress test 2020    negative, EF 60%   • Hyperlipidemia      Past Surgical History:   Procedure Laterality Date   • WISDOM TOOTH EXTRACTION       Family History   Problem Relation Age of Onset   • Breast cancer Mother          from sepsis   • Hypertension Father    • Hyperlipidemia Father    • Prostate cancer Father    • Coronary artery disease Father         Angioplasty/CABG, no MI   • Tremor Sister    • Mental illness Paternal Grandmother    • Peripheral vascular disease Paternal Grandfather    • Dementia Paternal Grandfather         \"Vascular\"      Social History     Socioeconomic History   • Marital status:      Spouse name: Not on file   • Number of children: 2   • Years of education: Not on file   • Highest education level: Not on file   Occupational History   • Occupation: Retired   Tobacco Use   • Smoking status: Never Smoker   • Smokeless tobacco: Never Used   Substance and Sexual Activity   • Alcohol use: Yes     Comment: 1 drink (beer) daily   • Drug use: Never   • Sexual activity: Yes     Partners: Female        Review of Systems   Constitutional: Negative.    HENT: Negative.    Eyes: Negative.    Respiratory: Negative.    Cardiovascular: Negative.    Gastrointestinal: Negative.    Endocrine: Negative.    Genitourinary: Negative.    Musculoskeletal: Positive for arthralgias.   Skin: Negative.    Allergic/Immunologic: Negative.    Neurological: Negative.    Hematological: Negative.    Psychiatric/Behavioral: Negative.        The following portions of the patient's history were reviewed and updated as appropriate: allergies, current medications, past family history, past medical history, past social history, past surgical history and problem list.    Physical Exam:   Pulse 57   Ht 176 cm (69.29\")   Wt 66.2 kg " (146 lb)   SpO2 98%   BMI 21.38 kg/m²   GENERAL: Body habitus: normal weight for height    Lower extremity edema: Right: none; Left: none    Varicose veins:  Right: none; Left: none    Gait: antalgic with the first few steps     Mental Status:  awake and alert; oriented to person, place, and time    Voice:  clear  SKIN:  Lower extremity: Normal    Hair Growth(lower extremity):  Right:normal; Left:  normal  NAILS: Toenails: normal  HEENT: Head: Normocephalic, atraumatic,  without obvious abnormality.  eye: normal external eye, no icterus  ears:normal external ears  PULM:  Repiratory effort normal  CV:  Dorsalis Pedis:  Right: 2+; Left:2+    Posterior Tibial: Right:2+; Left:2+    Capillary Refill:  Brisk  MSK:  Hand:mild arthritis, Heberden's nodes      Tibia:  Right:  non tender; Left:  non tender      Ankle:  Right: Tender at the insertion of the Achilles with inflammation of the bursa, otherwise nontender, range of motion is full with pain at the extreme of dorsiflexion, the tendon is intact; Left:  non tender, ROM  normal and symmetric and motor function  normal      Foot:  Right:  non tender; Left:  non tender      NEURO: Heel Walking:  Right:  painful; Left:  normal    Toe Walking:  Right:  normal; Left:  normal     [place] 5.07 monofilament test: normal    Lower extremity sensation: intact     Reflexes:  Biceps:  Right:  not tested; Left:  not tested           Quads:  Right:  not tested; Left:  not tested           Ankle:  Right:  not tested; Left:  not tested      Calf Atrophy:none    Motor Function: all 5/5         Medical Decision Making    Data Review:   ordered and reviewed x-rays today    Assessment and Plan/ Diagnosis/Treatment options:   1. Achilles tendinitis of right lower extremity  He has insertional Achilles tendinitis, retrocalcaneal bursitis. I have explained the problem to the patient in detail.  It is generally thought to be an overuse syndrome or due to chronic aging change.  I  explained that it is NOT due to a “spur”.  The osteophyte (“spur”) often visible on x-ray is not the source of the problem: Many, many people have the same x-ray finding and do not have Achilles pain.  The problem causing the pain is the chronic tendinitis, inflammation, wear and tear of the tendon where it goes into the bone.  I explained it is very common in overweight people over 40.  I explained that if you do an MRI on everyone with this problem we will see tears and inflammation in the tendon, and likely some edema in the bone, this does not change treatment.    The bump on the heel NOT go away, the goal of the treatment is to have pain resolve.    Literature shows it is best treated with a 12 week course of physical therapy and night splinting.  I have shown the patient the 2 stretches to do at home (in addition to what PT shows them), and recommend they do them 5 reps, 6-8 times per day.  I explained that injections and orthotics will not help.  I wrote a prescription for physical therapy, and we explained where to obtain a night splint..He wants to continue to walk long distances and run, but I recommend he not do this right now.  I explained that he needs to decrease his activity now so that he can get back to activity after therapy.  We discussed low impact activity.  He wants to use a stair stepper and I would definitely not recommend doing that.      Follow-up in 4 months if not improved.    If not improved, I explained the next step is a short leg walking cast for 6 weeks.    Surgery is a last resort as it is only helpful in about 60% of the time in improving the pain from this problem.        - Ambulatory Referral to Physical Therapy    2. Controlled type 2 diabetes mellitus without complication, without long-term current use of insulin (CMS/[facility])  No signs of neuropathy              Radiology Ordered []  Radiology Reports Reviewed []      Radiology Images Reviewed []   Labs Reviewed []    Labs Ordered  []   PCP Records Reviewed []    Provider Records Reviewed []    ER Records Reviewed []    Hospital Records Reviewed []    History Obtained From Family []    Phone conversation with Provider []    Records Requested []

## 2020-08-13 ENCOUNTER — TELEPHONE (OUTPATIENT)
Dept: ORTHOPEDICS | Facility: OTHER | Age: 65
End: 2020-08-13

## 2020-08-13 ENCOUNTER — TREATMENT (OUTPATIENT)
Dept: PHYSICAL THERAPY | Facility: CLINIC | Age: 65
End: 2020-08-13

## 2020-08-13 DIAGNOSIS — M76.61 ACHILLES TENDINITIS OF RIGHT LOWER EXTREMITY: ICD-10-CM

## 2020-08-13 DIAGNOSIS — M79.671 RIGHT FOOT PAIN: ICD-10-CM

## 2020-08-13 PROCEDURE — 97161 PT EVAL LOW COMPLEX 20 MIN: CPT | Performed by: PHYSICAL THERAPIST

## 2020-08-13 PROCEDURE — 97110 THERAPEUTIC EXERCISES: CPT | Performed by: PHYSICAL THERAPIST

## 2020-08-13 NOTE — PROGRESS NOTES
Physical Therapy Initial Evaluation and Plan of Care      Patient: Chente Rich   : 1955  Diagnosis/ICD-10 Code:  There were no encounter diagnoses.   Referring practitioner: Yancy Lee MD  Date of Initial Visit: Type: THERAPY  Noted: 2020  Today's Date: 2020  Patient seen for 1 sessions         Chente Rich reports:  R heel pain at Achilles insertion  Subjective Questionnaire: LEFS: 67/80    Subjective Evaluation    History of Present Illness  Onset date: 2020.  Mechanism of injury: Pt recalls having done a lot of walking/hiking in [place], then went to [employer] and did a lot of standing and walking.      Subjective comment: R heel pain  Patient Occupation: Retired Pain  Current pain ratin  At best pain ratin  At worst pain ratin  Location: R Achilles insertion  Quality: sharp, needle-like and tight (R foot feels warmer)  Relieving factors: rest (eases as he moves about)  Aggravating factors: stairs and ambulation (running, tennis)  Progression: no change    Social Support  Lives in: multiple-level home    Treatments  No previous or current treatments  Patient Goals  Patient goals for therapy: decreased pain           Treatment                Objective          Observations     Additional Ankle/Foot Observation Details  Increased bony prominence at R Achilles insertion.    Tenderness     Right Ankle/Foot   Tenderness in the Achilles insertion.     Neurological Testing     Sensation     Ankle/Foot   Left Ankle/Foot   Intact: light touch    Right Ankle/Foot   Intact: light touch     Active Range of Motion   Left Ankle/Foot   Normal active range of motion    Right Ankle/Foot   Normal active range of motion    Strength/Myotome Testing     Left Ankle/Foot   Normal strength    Right Ankle/Foot   Normal strength     General Comments     Ankle/Foot Comments   Gastroc flexibility to approximately neutral bilaterally.  R side feels subjectively tighter to the patient.          Assessment & Plan     Assessment  Impairments: activity intolerance, lacks appropriate home exercise program and pain with function  Assessment details: Clinical presentation is consistent with insertional Achilles tendinitis.  Prognosis: good  Functional Limitations: walking and uncomfortable because of pain  Goals  Plan Goals: Pt. demonstrates independence and compliance with initial HEP.  Pt. reports reduction in pain intensity to no worse than 3/10 on NPRS.  Flexibility of R Achilles shows improvement over baseline measures.      Pt. demonstrates independence in advanced HEP for ongoing improvement.         Plan  Therapy options: will be seen for skilled physical therapy services  Planned modality interventions: cryotherapy and iontophoresis  Planned therapy interventions: manual therapy, soft tissue mobilization, stretching, strengthening, therapeutic activities, joint mobilization, home exercise program, functional ROM exercises and flexibility  Frequency: 2x week  Duration in weeks: 12  Treatment plan discussed with: patient  Plan details: MD artis recommends treatment 2x/week for 12 weeks.  No strengthening for the first 6 weeks.  Pt is to obtain night splint and avoid impact activities.        Timed:  Manual Therapy:         mins  74684;  Therapeutic Exercise:    10     mins  01397;     Neuromuscular Koffi:        mins  05772;    Therapeutic Activity:          mins  79400;     Gait Training:           mins  58967;     Ultrasound:          mins  80723;    Electrical Stimulation:         mins  70679 ( );    Untimed:  Electrical Stimulation:         mins  06437 ( );  Mechanical Traction:         mins  42075;     Timed Treatment:   10   mins   Total Treatment:     45   mins    PT SIGNATURE: Rochelle Otto, PT   DATE TREATMENT INITIATED: 8/13/2020    Initial Certification  Certification Period: 11/11/2020  I certify that the therapy services are furnished while this patient is under  my care.  The services outlined above are required by this patient, and will be reviewed every 90 days.     PHYSICIAN: Yancy Lee MD      DATE:     Please sign and return via fax to [phone].. Thank you, [facility].

## 2020-08-19 ENCOUNTER — TREATMENT (OUTPATIENT)
Dept: PHYSICAL THERAPY | Facility: CLINIC | Age: 65
End: 2020-08-19

## 2020-08-19 DIAGNOSIS — M76.61 ACHILLES TENDINITIS OF RIGHT LOWER EXTREMITY: ICD-10-CM

## 2020-08-19 DIAGNOSIS — M79.671 RIGHT FOOT PAIN: Primary | ICD-10-CM

## 2020-08-19 PROCEDURE — 97140 MANUAL THERAPY 1/> REGIONS: CPT | Performed by: PHYSICAL THERAPIST

## 2020-08-19 PROCEDURE — 97110 THERAPEUTIC EXERCISES: CPT | Performed by: PHYSICAL THERAPIST

## 2020-08-19 NOTE — PROGRESS NOTES
Physical Therapy Daily Progress Note  VISIT: 2      Chente Rich reports: he has gotten the night splint.  He wears it in the evenings, but has not slept in it yet.  He thinks he stretches are helpful.  They are not painful.  Going up stairs is easier.  Going down stairs is still uncomfortable.  He thinks he is having more time without pain, and it takes less time for pain to go away when he starts walking.    Subjective     Treatment  Pre-treatment pain:  0 (at rest)  Post-treatment pain:  0  Exercise 1  Exercise Name 1: HEP review  Exercise 2  Exercise Name 2: soleus stretch facing wall, knee toward wall, to assist with descending stairs    Manual Rx 1  Manual Rx 1 Location: R Achilles/calf region  Manual Rx 1 Type: Astym trial        Objective     Assessment & Plan     Assessment  Assessment details: Pt notices improvement since modifying activity and beginning prescribed stretches and management strategies.    Plan  Plan details: Continue PT.  Assess response to Astym.  Progress stretches as tolerated.               Timed:  Manual Therapy:    10     mins  37331;  Therapeutic Exercise:    15     mins  07780;     Neuromuscular Koffi:        mins  46310;    Therapeutic Activity:          mins  20606;     Gait Training:           mins  81659;     Ultrasound:          mins  82226;    Electrical Stimulation:         mins  91688 ( );    Untimed:  Electrical Stimulation:         mins  90024 ( );  Mechanical Traction:         mins  28094;     Timed Treatment:   25   mins   Total Treatment:     25   mins      Rochelle Otto, PT  Physical Therapist

## 2020-08-26 ENCOUNTER — TREATMENT (OUTPATIENT)
Dept: PHYSICAL THERAPY | Facility: CLINIC | Age: 65
End: 2020-08-26

## 2020-08-26 DIAGNOSIS — M76.61 ACHILLES TENDINITIS OF RIGHT LOWER EXTREMITY: Primary | ICD-10-CM

## 2020-08-26 PROCEDURE — 97110 THERAPEUTIC EXERCISES: CPT | Performed by: PHYSICAL THERAPIST

## 2020-08-26 PROCEDURE — 97140 MANUAL THERAPY 1/> REGIONS: CPT | Performed by: PHYSICAL THERAPIST

## 2020-08-26 NOTE — PROGRESS NOTES
Physical Therapy Daily Progress Note  VISIT: 3      Chente Rich reports: improvement in symptom intensity, compliant with HEP and activity modification.    Subjective     Treatment  Pre-treatment pain:  0  Post-treatment pain:  0  Exercise 1  Exercise Name 1: Active warm up prior to Astym  Exercise 2  Exercise Name 2: Active stretching after Astym    Manual Rx 1  Manual Rx 1 Location: R Achilles/calf region  Manual Rx 1 Type: Astym to R calf and Achilles in prone        Objective     Assessment & Plan     Assessment  Assessment details: Pt is pleased with improvement in symptoms.      Plan  Plan details: Continue per MD recommendations.               Timed:  Manual Therapy:    15     mins  74487;  Therapeutic Exercise:    10     mins  83436;     Neuromuscular Koffi:        mins  01383;    Therapeutic Activity:          mins  71925;     Gait Training:           mins  16941;     Ultrasound:          mins  57829;    Electrical Stimulation:         mins  26767 ( );    Untimed:  Electrical Stimulation:         mins  47739 ( );  Mechanical Traction:         mins  10614;     Timed Treatment:   25   mins   Total Treatment:     25   mins      Rochelle Otto, MAE  Physical Therapist

## 2020-08-31 ENCOUNTER — TREATMENT (OUTPATIENT)
Dept: PHYSICAL THERAPY | Facility: CLINIC | Age: 65
End: 2020-08-31

## 2020-08-31 DIAGNOSIS — M76.61 ACHILLES TENDINITIS OF RIGHT LOWER EXTREMITY: Primary | ICD-10-CM

## 2020-08-31 PROCEDURE — 97110 THERAPEUTIC EXERCISES: CPT | Performed by: PHYSICAL THERAPIST

## 2020-08-31 NOTE — PROGRESS NOTES
Physical Therapy Daily Progress Note  VISIT: 4      Chente Rich reports: no pain at time of treatment today, but says he is not feeling as well as he did at last session.  He mowed for about 20 minutes and walked for about 7 minutes this weekend.  He notices random episodes of pain about 2 days after Astym treatment.  He is not sure if there is any connection.    Subjective     Treatment  Pre-treatment pain:  0  Post-treatment pain:  0  Exercise 1  Exercise Name 1: Deferred Astym today.  Exercise 2  Exercise Name 2: Reviewed MD protocol.  Exercise 3  Exercise Name 3: Reviewed stretching.         Iontophoresis 14479  Dexamethasone used: Yes  Patch Type: (Activa long-wearing)  Other Treatment Provided  Dexamethasone used: Yes  Patch Type: (Activa long-wearing)   Objective     Assessment & Plan     Assessment  Assessment details: Pt indicates improvement with increased ease of descending steps and less pain when first getting up in the morning.    Plan  Plan details: Continue per MD protocol.               Timed:  Manual Therapy:         mins  15874;  Therapeutic Exercise:    15     mins  85849;     Neuromuscular Koffi:        mins  11566;    Therapeutic Activity:          mins  31599;     Gait Training:           mins  30571;     Ultrasound:          mins  06521;    Electrical Stimulation:         mins  13983 ( );  Iontophoresis   5  Untimed:  Electrical Stimulation:         mins  07006 ( );  Mechanical Traction:         mins  81927;     Timed Treatment:   20   mins   Total Treatment:     20   mins      Rochelle Otto PT  Physical Therapist

## 2020-09-03 ENCOUNTER — TREATMENT (OUTPATIENT)
Dept: PHYSICAL THERAPY | Facility: CLINIC | Age: 65
End: 2020-09-03

## 2020-09-03 DIAGNOSIS — M76.61 ACHILLES TENDINITIS OF RIGHT LOWER EXTREMITY: Primary | ICD-10-CM

## 2020-09-03 PROCEDURE — 97110 THERAPEUTIC EXERCISES: CPT | Performed by: PHYSICAL THERAPIST

## 2020-09-03 NOTE — PROGRESS NOTES
Physical Therapy Daily Progress Note  VISIT: 5      Chente Rich reports: no pain at time of treatment today.  He thought the patch (iontophoresis) was very helpful.  He says he is not getting much out of the stretch where he pulls his toes back.    Subjective     Treatment  Pre-treatment pain:  0  Post-treatment pain:  0  Exercise 1  Exercise Name 1: Progressed toe stretch by having patient gently distract calcaneus.  Exercise 2  Exercise Name 2: Plantar fascia stretch with towel under calf  and covering sole of foot and toes         Iontophoresis 56056  Dexamethasone used: Yes  Patch Type: (Activa long-wearing)  Other Treatment Provided  Dexamethasone used: Yes  Patch Type: (Activa long-wearing)   Objective     Assessment & Plan     Assessment  Assessment details: Good response to iontophoresis.  Symptoms overall remain mild.    Plan  Plan details: Continue per MD protocol.               Timed:  Manual Therapy:         mins  93919;  Therapeutic Exercise:    10     mins  99310;     Neuromuscular Koffi:        mins  05988;    Therapeutic Activity:          mins  19496;     Gait Training:           mins  22219;     Ultrasound:          mins  99767;    Electrical Stimulation:         mins  76037 ( );  Iontophoresis  5  Untimed:  Electrical Stimulation:         mins  72041 ( );  Mechanical Traction:         mins  11366;     Timed Treatment:   15   mins   Total Treatment:     15   mins      Rochelle Otot PT  Physical Therapist

## 2020-09-08 ENCOUNTER — TREATMENT (OUTPATIENT)
Dept: PHYSICAL THERAPY | Facility: CLINIC | Age: 65
End: 2020-09-08

## 2020-09-08 DIAGNOSIS — M76.61 ACHILLES TENDINITIS OF RIGHT LOWER EXTREMITY: Primary | ICD-10-CM

## 2020-09-08 PROCEDURE — 97110 THERAPEUTIC EXERCISES: CPT | Performed by: PHYSICAL THERAPIST

## 2020-09-08 NOTE — PROGRESS NOTES
Physical Therapy Daily Progress Note  VISIT: 6      Chente Rich reports: no pain upon arrival today.  He had some discomfort on Sunday after mowing on Saturday, but not as much as the last time he mowed.  He likes the new towel stretch.  Overall, he notices improvement.    Subjective     Treatment  Pre-treatment pain:  0  Post-treatment pain:  0  Exercise 1  Exercise Name 1: Review of symptoms.  Reinforcement of MD recommended protocol.         Iontophoresis 14558  Dexamethasone used: Yes  Patch Type: (Activa long-wearing)  Other Treatment Provided  Dexamethasone used: Yes  Patch Type: (Activa long-wearing)   Objective     Assessment & Plan     Assessment  Assessment details: Symptoms decreasing in frequency and intensity.  Pt has localized tenderness at medial Achilles insertion.    Plan  Plan details: Continue PT per MD protocol.  Stretching and modalities only for 2 more weeks, then progress to strengthening.               Timed:  Manual Therapy:         mins  99831;  Therapeutic Exercise:    8     mins  55314;     Neuromuscular Koffi:        mins  14297;    Therapeutic Activity:          mins  01449;     Gait Training:           mins  84558;     Ultrasound:          mins  57842;    Electrical Stimulation:         mins  02984 ( );  Iontophoresis  2  Untimed:  Electrical Stimulation:         mins  79021 ( );  Mechanical Traction:         mins  97551;     Timed Treatment:   10   mins   Total Treatment:     10   mins      Rochelle Otto PT  Physical Therapist

## 2020-09-10 ENCOUNTER — TREATMENT (OUTPATIENT)
Dept: PHYSICAL THERAPY | Facility: CLINIC | Age: 65
End: 2020-09-10

## 2020-09-10 DIAGNOSIS — M76.61 ACHILLES TENDINITIS OF RIGHT LOWER EXTREMITY: Primary | ICD-10-CM

## 2020-09-10 PROCEDURE — 97110 THERAPEUTIC EXERCISES: CPT | Performed by: PHYSICAL THERAPIST

## 2020-09-10 PROCEDURE — 97140 MANUAL THERAPY 1/> REGIONS: CPT | Performed by: PHYSICAL THERAPIST

## 2020-09-10 NOTE — PROGRESS NOTES
Physical Therapy Daily Progress Note  VISIT: 7      Chente Rich reports: no pain upon arrival today.  Moderate pain yesterday while doing errands.  He did not notice a difference with the iontophoresis patch the last time it was applied.    Subjective     Treatment  Pre-treatment pain:  0  Post-treatment pain:  0  Exercise 1  Exercise Name 1: Review of stretches, including weight-bearing stretches.  Cautioned against over-stretching.  Discussed possibility of using heel lifts in shoes to reduce Achilles strain.  We decided not to use this approach at this time.    Manual Rx 1  Manual Rx 1 Location: R Achilles/calf region  Manual Rx 1 Type: Astym to R calf and Achilles in prone        Objective     Assessment & Plan     Assessment  Assessment details: Pt notices overall improvement.  Symptoms fluctuate with activity level.    Plan  Plan details: Continue per MD protocol including no strengthening for 2 more weeks.               Timed:  Manual Therapy:    15     mins  37513;  Therapeutic Exercise:    8     mins  26036;     Neuromuscular Koffi:        mins  60916;    Therapeutic Activity:          mins  63674;     Gait Training:           mins  52087;     Ultrasound:          mins  82479;    Electrical Stimulation:         mins  78484 ( );    Untimed:  Electrical Stimulation:         mins  38043 ( );  Mechanical Traction:         mins  88848;     Timed Treatment:   23   mins   Total Treatment:     23   mins      Rochelle Otto, MAE  Physical Therapist

## 2020-09-14 ENCOUNTER — TREATMENT (OUTPATIENT)
Dept: PHYSICAL THERAPY | Facility: CLINIC | Age: 65
End: 2020-09-14

## 2020-09-14 DIAGNOSIS — M76.61 ACHILLES TENDINITIS OF RIGHT LOWER EXTREMITY: Primary | ICD-10-CM

## 2020-09-14 PROCEDURE — 97140 MANUAL THERAPY 1/> REGIONS: CPT | Performed by: PHYSICAL THERAPIST

## 2020-09-14 NOTE — PROGRESS NOTES
Physical Therapy Daily Progress Note  VISIT: 8      Chente Rich reports: no pain upon arrival, only a little discomfort early this morning.  He felt well during the weekend and he attended a soccer game, involving walking on uneven ground.  He thought the Astym treatment was helpful last time and would like to repeat it.    Subjective     Treatment  Pre-treatment pain:  0  Post-treatment pain:  0       Manual Rx 1  Manual Rx 1 Location: R Achilles/calf region  Manual Rx 1 Type: Astym and STM to R calf and Achilles in prone        Objective     Assessment & Plan     Assessment  Assessment details: Pt is responding to treatment and activity modification.      Plan  Plan details: Continue PT.  Introduce strengthening next week per MD protocol after 6 weeks of stretching and other modalities.               Timed:  Manual Therapy:    15     mins  52845;  Therapeutic Exercise:         mins  08751;     Neuromuscular Koffi:        mins  46448;    Therapeutic Activity:          mins  70766;     Gait Training:           mins  86797;     Ultrasound:          mins  76178;    Electrical Stimulation:         mins  25487 ( );    Untimed:  Electrical Stimulation:         mins  75621 ( );  Mechanical Traction:         mins  19836;     Timed Treatment:   15   mins   Total Treatment:     15   mins      Rochelle Otto, MAE  Physical Therapist

## 2020-09-17 ENCOUNTER — TREATMENT (OUTPATIENT)
Dept: PHYSICAL THERAPY | Facility: CLINIC | Age: 65
End: 2020-09-17

## 2020-09-17 DIAGNOSIS — M76.61 ACHILLES TENDINITIS OF RIGHT LOWER EXTREMITY: Primary | ICD-10-CM

## 2020-09-17 PROCEDURE — 97110 THERAPEUTIC EXERCISES: CPT | Performed by: PHYSICAL THERAPIST

## 2020-09-17 RX ORDER — SIMVASTATIN 20 MG
20 TABLET ORAL DAILY
Qty: 30 TABLET | Refills: 2 | Status: SHIPPED | OUTPATIENT
Start: 2020-09-17 | End: 2020-12-28 | Stop reason: SDUPTHER

## 2020-09-17 NOTE — PROGRESS NOTES
Physical Therapy Daily Progress Note  VISIT: 9      Chente Rich reports: symptoms have been more irritated the past couple of days.  He does not know if it was the Astym treatment, or mowing, or shopping, or a combination of all of them.    Subjective     Treatment  Pre-treatment pain:  0 at rest.  3-4 with walking.  Post-treatment pain:  0 at rest. 3-4 with walking.  Exercise 1  Exercise Name 1: HEP is going well.  Pt is independent in calf stretches.  Reinforced importance of balancing activity and rest, trying to avoid symptom irritation.         Iontophoresis 55562  Dexamethasone used: Yes  Patch Type: (Activa long-wearing)  Other Treatment Provided  Dexamethasone used: Yes  Patch Type: (Activa long-wearing)   Objective     Assessment & Plan     Assessment  Assessment details: Deferred Astym today.  Symptoms irritation is more likely due to mowing which has been consistently irritating.    Plan  Plan details: Continue next week.  Initiate calf strengthening at end of next week per MD protocol.               Timed:  Manual Therapy:         mins  67752;  Therapeutic Exercise:    8   mins  21619;     Neuromuscular Koffi:        mins  57488;    Therapeutic Activity:          mins  78245;     Gait Training:           mins  05572;     Ultrasound:          mins  90461;    Electrical Stimulation:         mins  63477 ( );  Iontophoresis  5  Untimed:  Electrical Stimulation:         mins  69909 ( );  Mechanical Traction:         mins  56355;     Timed Treatment:   13   mins   Total Treatment:     13   mins      Rochelle Otto PT  Physical Therapist

## 2020-09-21 ENCOUNTER — TREATMENT (OUTPATIENT)
Dept: PHYSICAL THERAPY | Facility: CLINIC | Age: 65
End: 2020-09-21

## 2020-09-21 DIAGNOSIS — M76.61 ACHILLES TENDINITIS OF RIGHT LOWER EXTREMITY: Primary | ICD-10-CM

## 2020-09-21 PROCEDURE — 97110 THERAPEUTIC EXERCISES: CPT | Performed by: PHYSICAL THERAPIST

## 2020-09-21 NOTE — PROGRESS NOTES
Physical Therapy Daily Progress Note  VISIT: 10      Chente Rich reports: no pain at rest, 0-2/10 pain with walking.  He says he had a good weekend without significant symptoms, but he also didn't do much.  Last week he tried ifrared therapy at a facility somewhere in the community.  He says that when he walked out he had no pain at all.    Subjective     Treatment  Pre-treatment pain:  0  Post-treatment pain:  0  Exercise 1  Exercise Name 1: Pt continues with stretching as prescribed.  He asked about whether he could do squats.  Advised shallow wall slide squats for now, if no aggravation of symptoms.           Iontophoresis 89968  Dexamethasone used: Yes  Patch Type: (Activa long-wearing)  Other Treatment Provided  Dexamethasone used: Yes  Patch Type: (Activa long-wearing)   Objective     Assessment & Plan     Assessment  Assessment details: Symptoms are very mild and often not present at all.      Plan  Plan details: Pt is allowed to begin strengthening at his next appointment per MD protocol.               Timed:  Manual Therapy:         mins  19603;  Therapeutic Exercise:    8     mins  47088;     Neuromuscular Koffi:        mins  54924;    Therapeutic Activity:          mins  94049;     Gait Training:           mins  67860;     Ultrasound:          mins  86678;    Electrical Stimulation:         mins  46540 ( );  Iontophoresis  2  Untimed:  Electrical Stimulation:         mins  31297 ( );  Mechanical Traction:         mins  24617;     Timed Treatment:  10    mins   Total Treatment:     10   mins      Rochelle Otto PT  Physical Therapist

## 2020-09-24 ENCOUNTER — TREATMENT (OUTPATIENT)
Dept: PHYSICAL THERAPY | Facility: CLINIC | Age: 65
End: 2020-09-24

## 2020-09-24 DIAGNOSIS — M76.61 ACHILLES TENDINITIS OF RIGHT LOWER EXTREMITY: Primary | ICD-10-CM

## 2020-09-24 PROCEDURE — 97110 THERAPEUTIC EXERCISES: CPT | Performed by: PHYSICAL THERAPIST

## 2020-09-24 NOTE — PROGRESS NOTES
Physical Therapy Daily Progress Note  VISIT: 11      Chente Rich reports: he is feeling well.  He is eager to begin strengthening exercises.    Subjective     Treatment  Pre-treatment pain:  0  Post-treatment pain:  0  Exercise 1  Exercise Name 1: Ankle inversion  Equipment/Resistance 1: red band  Exercise 2  Exercise Name 2: ankle eversion  Equipment/Resistance 2: red band  Exercise 3  Exercise Name 3: ankle plantar flexion  Equipment/Resistance 3: red band  Exercise 4  Exercise Name 4: single leg balance  Exercise 5  Exercise Name 5: reinforced stretching to be performed after strengthening exercises             Objective     Assessment & Plan     Assessment  Assessment details: Pt demonstrated understanding of new exercises.    Plan  Plan details: Continue weekly PT for 6 weeks to progress calf strengthening.               Timed:  Manual Therapy:         mins  53289;  Therapeutic Exercise:    25     mins  77605;     Neuromuscular Koffi:        mins  47438;    Therapeutic Activity:          mins  18675;     Gait Training:           mins  84990;     Ultrasound:          mins  27664;    Electrical Stimulation:         mins  42860 ( );    Untimed:  Electrical Stimulation:         mins  11081 ( );  Mechanical Traction:         mins  10286;     Timed Treatment:   25   mins   Total Treatment:     25   mins      Rochelle Otto, PT  Physical Therapist

## 2020-09-26 ENCOUNTER — FLU SHOT (OUTPATIENT)
Dept: INTERNAL MEDICINE | Facility: CLINIC | Age: 65
End: 2020-09-26

## 2020-09-26 DIAGNOSIS — Z23 NEED FOR INFLUENZA VACCINATION: ICD-10-CM

## 2020-09-26 PROCEDURE — G0008 ADMIN INFLUENZA VIRUS VAC: HCPCS | Performed by: INTERNAL MEDICINE

## 2020-09-26 PROCEDURE — 90694 VACC AIIV4 NO PRSRV 0.5ML IM: CPT | Performed by: INTERNAL MEDICINE

## 2020-09-28 ENCOUNTER — TREATMENT (OUTPATIENT)
Dept: PHYSICAL THERAPY | Facility: CLINIC | Age: 65
End: 2020-09-28

## 2020-09-28 DIAGNOSIS — M76.61 ACHILLES TENDINITIS OF RIGHT LOWER EXTREMITY: Primary | ICD-10-CM

## 2020-09-28 PROCEDURE — 97110 THERAPEUTIC EXERCISES: CPT | Performed by: PHYSICAL THERAPIST

## 2020-09-28 NOTE — PROGRESS NOTES
Physical Therapy Daily Progress Note  VISIT: 12      Chente Rich reports: mild R Achilles discomfort beginning late yesterday and continuing today.  Pt says he may have tried to play catch up with his stretches.  He does not have pain during performance of new stretching exercises.  He does have discomfort during stretching.    Subjective     Treatment  Pre-treatment pain:  2  Post-treatment pain:  2  Exercise 1  Exercise Name 1: HEP review  Exercise 2  Exercise Name 2: Exercise advice provided.  Pt is cautioned against progressing exercises too quickly.  He is cautioned against overstretching.         Iontophoresis 94306  Dexamethasone used: Yes  Patch Type: (Activa long-wearing)  Other Treatment Provided  Dexamethasone used: Yes  Patch Type: (Activa long-wearing)   Objective     Assessment & Plan     Assessment  Assessment details: Pt is discouraged that symptoms are noticeable today, whereas they had not been noticeable at rest last week.  He may have overstretched in an effort to make up for missed stretching sessions.    Plan  Plan details: Continue PT.  Progress calf strengthening gradually as tolerated.               Timed:  Manual Therapy:         mins  63440;  Therapeutic Exercise:    10     mins  37393;     Neuromuscular Koffi:        mins  24929;    Therapeutic Activity:          mins  78635;     Gait Training:           mins  16898;     Ultrasound:          mins  95991;    Electrical Stimulation:         mins  57729 ( );  Iontophoresis  5  Untimed:  Electrical Stimulation:         mins  24760 ( );  Mechanical Traction:         mins  88692;     Timed Treatment:   15   mins   Total Treatment:     15   mins      Rochelle Otto, PT  Physical Therapist

## 2020-10-05 ENCOUNTER — TREATMENT (OUTPATIENT)
Dept: PHYSICAL THERAPY | Facility: CLINIC | Age: 65
End: 2020-10-05

## 2020-10-05 DIAGNOSIS — M76.61 ACHILLES TENDINITIS OF RIGHT LOWER EXTREMITY: Primary | ICD-10-CM

## 2020-10-05 PROCEDURE — 97110 THERAPEUTIC EXERCISES: CPT | Performed by: PHYSICAL THERAPIST

## 2020-10-05 NOTE — PROGRESS NOTES
Physical Therapy Daily Progress Note  VISIT: 13      Chente Rich reports: no pain today.  He was active during the weekend.    Subjective     Treatment  Pre-treatment pain:  0  Post-treatment pain:  0  Exercise 1  Exercise Name 1: Provided next level of theraband (green) to progress HEP  Exercise 2  Exercise Name 2: Progressed to closed chain calf stretching with knee extended and with knee flexed.  Exercise 3  Exercise Name 3: Initiated eccentric calf strengthening in weight-bearing today.             Objective     Assessment & Plan     Assessment  Assessment details: Pt is tolerating exercise progression without increase in symptoms.      Plan  Plan details: Continue strengthening per MD protocol.               Timed:  Manual Therapy:         mins  93849;  Therapeutic Exercise:    20     mins  97830;     Neuromuscular Koffi:        mins  39307;    Therapeutic Activity:          mins  94397;     Gait Training:           mins  57899;     Ultrasound:          mins  41948;    Electrical Stimulation:         mins  10186 ( );    Untimed:  Electrical Stimulation:         mins  39706 ( );  Mechanical Traction:         mins  89469;     Timed Treatment:   20   mins   Total Treatment:     20   mins      Rochelle Otto, PT  Physical Therapist

## 2020-10-12 ENCOUNTER — TREATMENT (OUTPATIENT)
Dept: PHYSICAL THERAPY | Facility: CLINIC | Age: 65
End: 2020-10-12

## 2020-10-12 DIAGNOSIS — M76.61 ACHILLES TENDINITIS OF RIGHT LOWER EXTREMITY: Primary | ICD-10-CM

## 2020-10-12 PROCEDURE — 97110 THERAPEUTIC EXERCISES: CPT | Performed by: PHYSICAL THERAPIST

## 2020-10-12 NOTE — PROGRESS NOTES
Physical Therapy Daily Progress Note  VISIT: 14      Chente Rich reports: no pain upon arrival today.  He is up to 30 reps of band exercises.  He is ready to progress level of resistance.    Subjective     Treatment  Pre-treatment pain:  0  Post-treatment pain:  0  Exercise 1  Exercise Name 1: Provided next level of theraband (blue) to progress HEP  Exercise 2  Exercise Name 2: elliptical  Equipment/Resistance 2: L1  Time 2: 5'  Exercise 3  Exercise Name 3: foam square balance  Exercise 4  Exercise Name 4: standing gastroc and soleus stretch  Exercise 5  Exercise Name 5: mini-trampoline low impact heel raises-fast  Exercise 6  Exercise Name 6: Addressed patient's questions regarding resuming walking.             Objective     Assessment & Plan     Assessment  Assessment details: Pt demonstrates steady progress without exacerbation of symptoms.    Plan  Plan details: Continue PT per MD protocol, progressing exercises as tolerated.               Timed:  Manual Therapy:         mins  46615;  Therapeutic Exercise:    24     mins  46178;     Neuromuscular Koffi:        mins  36367;    Therapeutic Activity:          mins  99184;     Gait Training:           mins  87954;     Ultrasound:          mins  04577;    Electrical Stimulation:         mins  08874 ( );    Untimed:  Electrical Stimulation:         mins  83635 ( );  Mechanical Traction:         mins  20682;     Timed Treatment:  24    mins   Total Treatment: 24        mins      Rochelle Otto PT  Physical Therapist

## 2020-10-21 NOTE — PROGRESS NOTES
"    [facility] Note        Subjective     Pain of the Left Knee      HPI    Chente Rich is a 65 y.o. male who presents with new problem of: left knee arthritis.  Onset: atraumatic and gradual in nature. The issue has been ongoing for 7 month(s). Pain is a 4/10 on the pain scale. Pain is described as stabbing. Associated symptoms include pain and popping. The pain is worse with walking and climbing stairs; resting improve the pain. Previous treatments have included: nothing.    I have reviewed the following portions of the patient's history:History of Present Illnessand review of systems.    Patient is here today for left knee pain that began approximately 2020.  He was doing a lot of walking and exercising.  He has been seeing Dr. Lee for a right foot issue.  He has not exercised in 2 months.  He has medial sided knee pain.  He has difficulty going up and down steps.          Past Medical History:   Diagnosis Date   • Abnormal screening cardiac CT 10/12/2017    Calcium Score 133   • BCC (basal cell carcinoma of skin)     Dx (initially)-.  Has occurred on head, chest, back, and right leg.   • Colon polyp     Dx 10/17- tubular adenoma ascending colon, no dysplasia   • Diabetes mellitus (CMS/HCC)    • Diverticulosis     Dx 10/17 by Colonoscopy- pancolonic   • History of cardiovascular stress test 2020    negative, EF 60%   • Hyperlipidemia       Past Surgical History:   Procedure Laterality Date   • WISDOM TOOTH EXTRACTION        Family History   Problem Relation Age of Onset   • Breast cancer Mother          from sepsis   • Hypertension Father    • Hyperlipidemia Father    • Prostate cancer Father    • Coronary artery disease Father         Angioplasty/CABG, no MI   • Tremor Sister    • Mental illness Paternal Grandmother    • Peripheral vascular disease Paternal Grandfather    • Dementia Paternal Grandfather         \"Vascular\"     Social History     Socioeconomic " "History   • Marital status:      Spouse name: Not on file   • Number of children: 2   • Years of education: Not on file   • Highest education level: Not on file   Occupational History   • Occupation: Retired   Tobacco Use   • Smoking status: Never Smoker   • Smokeless tobacco: Never Used   Substance and Sexual Activity   • Alcohol use: Yes     Comment: 1 drink (beer) daily   • Drug use: Never   • Sexual activity: Yes     Partners: Female      Current Outpatient Medications on File Prior to Visit   Medication Sig Dispense Refill   • aspirin 81 MG EC tablet Take 81 mg by mouth Daily.     • ibuprofen (ADVIL,MOTRIN) 200 MG tablet Take 200 mg by mouth Every 6 (Six) Hours As Needed for Mild Pain .     • metFORMIN ER (GLUCOPHAGE-XR) 500 MG 24 hr tablet TAKE ONE TABLET BY MOUTH DAILY 30 tablet 3   • simvastatin (ZOCOR) 20 MG tablet Take 1 tablet by mouth Daily. 30 tablet 2     No current facility-administered medications on file prior to visit.       Allergies   Allergen Reactions   • Penicillins           Review of Systems   Constitutional: Negative.    HENT: Negative.    Eyes: Negative.    Respiratory: Negative.    Cardiovascular: Negative.    Gastrointestinal: Negative.    Endocrine: Negative.    Genitourinary: Negative.    Musculoskeletal: Positive for arthralgias.   Skin: Negative.    Allergic/Immunologic: Negative.    Neurological: Negative.    Hematological: Negative.    Psychiatric/Behavioral: Negative.         I reviewed the patient's chief complaint, history of present illness, review of systems, past medical history, surgical history, family history, social history, medications and allergy list.        Objective      Physical Exam  Pulse 69   Ht 176 cm (69.29\")   Wt 68.9 kg (152 lb)   SpO2 99%   BMI 22.26 kg/m²     Body mass index is 22.26 kg/m².    General  Mental Status - alert  General Appearance - cooperative, well groomed, not in acute distress  Orientation - Oriented X3  Build & Nutrition - well " developed and well nourished  Posture - normal posture  Gait - normal gait     Integumentary  Global Assessment  Examination of related systems reveals - no lymphadenopathy  Ears:  No abnormality  Nose:  No mucous drainage  General Characteristics  Overall examination of the patient's skin reveals - no rashes, no evidence of scars, no suspicious lesions and no bruises.  Color - normal coloration of skin.  Vascular: Brisk capillary refill in all extremities    Ortho Exam  Peripheral Vascular:    Upper Extremity:   Inspection:  Left--no cyanotic nail beds Right--no cyanotic nail beds   Bilateral:  Pink nail beds with brisk capillary refill   Palpation:  Bilateral radial pulse normal    Musculoskeletal:  Global Assessment:  Overall assessment of Lower Extremity Muscle Strength and Tone:  Left quadriceps--5/5   Left hamstrings--5/5       Left tibialis anterior--5/5  Left gastroc-soleus--5/5  Left EHL--5/5      Lower Extremity:  Knee/Patella:  No digital clubbing or cyanosis.    Examination of left knee reveals:  Normal deep tendon reflexes, coordination, strength, tone, sensation.  No known fractures or deformities.    Inspection and Palpation:    Left knee:  Tenderness:  Over medial joint line  Effusion:  1+  Crepitus:  none  Pulses:  2+  Ecchymosis:  None  Warmth:  None       ROM:  Right:  Extension:0    Flexion:135  Left:  Extension:0     Flexion:135    Instability:    Left:  Lachman Test:  Negative, Varus stress test negative, Valgus stress test negative   Anterior Drawer Test:  Negative, Posterior Drawer Test:  Negative      Deformities/Malalignments/Discrepancies:    Left:  none  Right:  none    Functional Testing:    Left:  Orlando's test:  Positive  Patella grind test:  Negative  Q-angle:  Normal  Apprehension Sign:  Negative      Imaging/Studies  Imaging Results (Last 24 Hours)     ** No results found for the last 24 hours. **      Reviewed an x-ray of the patient's left knee from 5/28/2020 from [facility].  We have  reviewed the report as well.  Patient appears to have only mild degenerative changes in the medial, lateral, and patellofemoral compartments.      Assessment    Assessment:  1. Chronic pain of left knee    2. Primary osteoarthritis of left knee        Plan:  1. Continue over-the-counter medication as needed for discomfort  2. Chronic left knee pain in the face of only mild arthritis--patient has slight genu varum.  He does have direct medial joint line tenderness.  I am concerned about a meniscal tear.  His x-rays look better than anticipated for someone 65.  Plan will be for an MRI of his knee to look for meniscal pathology and to further evaluate the condition of the articular cartilage in the medial compartment.  I will see him back to review that study.        Rey Worthy MD  10/22/20  08:48 EDT    Dragon disclaimer:  Much of this encounter note is an electronic transcription/translation of spoken language to printed text. The electronic translation of spoken language may permit erroneous, or at times, nonsensical words or phrases to be inadvertently transcribed; Although I have reviewed the note for such errors, some may still exist.

## 2020-10-22 ENCOUNTER — OFFICE VISIT (OUTPATIENT)
Dept: ORTHOPEDIC SURGERY | Facility: CLINIC | Age: 65
End: 2020-10-22

## 2020-10-22 VITALS — BODY MASS INDEX: 22.51 KG/M2 | OXYGEN SATURATION: 99 % | WEIGHT: 152 LBS | HEIGHT: 69 IN | HEART RATE: 69 BPM

## 2020-10-22 DIAGNOSIS — G89.29 CHRONIC PAIN OF LEFT KNEE: Primary | ICD-10-CM

## 2020-10-22 DIAGNOSIS — M25.562 CHRONIC PAIN OF LEFT KNEE: Primary | ICD-10-CM

## 2020-10-22 DIAGNOSIS — M17.12 PRIMARY OSTEOARTHRITIS OF LEFT KNEE: ICD-10-CM

## 2020-10-22 PROCEDURE — 99214 OFFICE O/P EST MOD 30 MIN: CPT | Performed by: ORTHOPAEDIC SURGERY

## 2020-10-26 ENCOUNTER — TREATMENT (OUTPATIENT)
Dept: PHYSICAL THERAPY | Facility: CLINIC | Age: 65
End: 2020-10-26

## 2020-10-26 DIAGNOSIS — M76.61 ACHILLES TENDINITIS OF RIGHT LOWER EXTREMITY: Primary | ICD-10-CM

## 2020-10-26 PROCEDURE — 97110 THERAPEUTIC EXERCISES: CPT | Performed by: PHYSICAL THERAPIST

## 2020-10-26 NOTE — PROGRESS NOTES
Physical Therapy Daily Progress Note  VISIT: 15      Chente PRESSLEY Rich reports: no pain at time of treatment today.  Mild discomfort when mowing uphill and after stretching.  Walking tolerance is improving.    Subjective     Treatment  Pre-treatment pain:  0  Post-treatment pain:  0  Exercise 1  Exercise Name 1: Progressed calf strengthening to single leg heel raises.  Exercise 2  Exercise Name 2: Modified mountain climbers added to HEP.  Exercise 3  Exercise Name 3: Advised against overstretching calf mm.       R calf flexibility:  Gastroc +10 degrees  Soleus +15 degrees      Objective     Assessment & Plan     Assessment  Assessment details: Pt continues to progress well with increasing activity tolerance reported.    Plan  Plan details: Pt will continue to progress strengthening exercises and will stretch to tolerance, but will avoid pain when stretching.               Timed:  Manual Therapy:         mins  00948;  Therapeutic Exercise:    25     mins  93202;     Neuromuscular Koffi:        mins  92258;    Therapeutic Activity:          mins  20962;     Gait Training:           mins  22191;     Ultrasound:          mins  55391;    Electrical Stimulation:         mins  75886 ( );    Untimed:  Electrical Stimulation:         mins  70768 ( );  Mechanical Traction:         mins  94023;     Timed Treatment:   25   mins   Total Treatment:     25   mins      Rochelle Otto, PT  Physical Therapist

## 2020-10-27 DIAGNOSIS — M25.562 CHRONIC PAIN OF LEFT KNEE: Primary | ICD-10-CM

## 2020-10-27 DIAGNOSIS — M17.12 PRIMARY OSTEOARTHRITIS OF LEFT KNEE: ICD-10-CM

## 2020-10-27 DIAGNOSIS — G89.29 CHRONIC PAIN OF LEFT KNEE: Primary | ICD-10-CM

## 2020-11-09 ENCOUNTER — TREATMENT (OUTPATIENT)
Dept: PHYSICAL THERAPY | Facility: CLINIC | Age: 65
End: 2020-11-09

## 2020-11-09 DIAGNOSIS — M76.61 ACHILLES TENDINITIS OF RIGHT LOWER EXTREMITY: Primary | ICD-10-CM

## 2020-11-09 PROCEDURE — 97110 THERAPEUTIC EXERCISES: CPT | Performed by: PHYSICAL THERAPIST

## 2020-11-09 NOTE — PROGRESS NOTES
"   Physical Therapy Daily Progress Note  VISIT: 16      Chente Rich reports: he is doing well.  He went to the beach last week.  He was able to play golf and walk on the beach.  He has figured out how to mow his lawn without hurting his Achilles tendon.    Subjective     Treatment  Pre-treatment pain:  0  Post-treatment pain:  0    Exercise 1  Exercise Name 1: mini-trampoline light bounce with feet remaining on surface  Exercise 2  Exercise Name 2: mini-trampoline weight shifting, low impact \"jogging\" motion with feet staying on surface  Exercise 3  Exercise Name 3: foam square single leg balance             Objective     Assessment & Plan     Assessment  Assessment details: All goals have been met.    Plan  Plan details: Pt will see MD next week.  No additional PT visits are planned at this time for this diagnosis.  Await MD follow up before closing this episode.    Pt. demonstrates independence and compliance with initial HEP.  Pt. reports reduction in pain intensity to no worse than 3/10 on NPRS.  Flexibility of R Achilles shows improvement over baseline measures.      Pt. demonstrates independence in advanced HEP for ongoing improvement.       Timed:  Manual Therapy:         mins  34978;  Therapeutic Exercise:    20     mins  78518;     Neuromuscular Koffi:        mins  70697;    Therapeutic Activity:          mins  14594;     Gait Training:           mins  01024;     Ultrasound:          mins  56384;    Electrical Stimulation:         mins  70638 ( );    Untimed:  Electrical Stimulation:         mins  48696 ( );  Mechanical Traction:         mins  31482;     Timed Treatment:   20   mins   Total Treatment:     20   mins      Rochelle Otto, PT  Physical Therapist                    "

## 2020-11-10 ENCOUNTER — HOSPITAL ENCOUNTER (OUTPATIENT)
Dept: MRI IMAGING | Facility: HOSPITAL | Age: 65
Discharge: HOME | End: 2020-11-10
Admitting: ORTHOPAEDIC SURGERY

## 2020-11-10 DIAGNOSIS — G89.29 CHRONIC PAIN OF LEFT KNEE: ICD-10-CM

## 2020-11-10 DIAGNOSIS — M25.562 CHRONIC PAIN OF LEFT KNEE: ICD-10-CM

## 2020-11-10 DIAGNOSIS — M17.12 PRIMARY OSTEOARTHRITIS OF LEFT KNEE: ICD-10-CM

## 2020-11-10 DIAGNOSIS — E11.9 CONTROLLED TYPE 2 DIABETES MELLITUS WITHOUT COMPLICATION, WITHOUT LONG-TERM CURRENT USE OF INSULIN (HCC): Primary | ICD-10-CM

## 2020-11-10 PROCEDURE — 73721 MRI JNT OF LWR EXTRE W/O DYE: CPT

## 2020-11-10 RX ORDER — METFORMIN HYDROCHLORIDE 500 MG/1
500 TABLET, EXTENDED RELEASE ORAL DAILY
Qty: 30 TABLET | Refills: 5 | Status: SHIPPED | OUTPATIENT
Start: 2020-11-10 | End: 2021-05-10

## 2020-11-10 NOTE — TELEPHONE ENCOUNTER
Caller: Chente Rich    Relationship: Self    Best call back number: [phone]    Medication needed:   Requested Prescriptions     Pending Prescriptions Disp Refills   • metFORMIN ER (GLUCOPHAGE-XR) 500 MG 24 hr tablet 30 tablet 3     Sig: Take 1 tablet by mouth Daily.       When do you need the refill by: TODAY    What details did the patient provide when requesting the medication: HE IS COMPLETELY OUT OF MEDICATION    Does the patient have less than a 3 day supply:  [x] Yes  [] No    What is the patient's preferred pharmacy: [facility] [address] [place] [address] DRIVE AT [facility] [address] - [phone]  - [phone] FX

## 2020-11-11 NOTE — PROGRESS NOTES
"    [facility] Note        Subjective     CC: Follow-up (Post MRI 11/10/2020 - Chronic pain of left knee )      AIMEE Rich is a 65 y.o. male.  Patient returns for follow-up of the MRI of his left knee from 11/10/2020.    Overall, patient's symptoms are under control currently.    ROS:    Constiutional:Pt denies fever, chills, nausea, or vomiting.  MSK:as above        Objective      Past Medical History  Past Medical History:   Diagnosis Date   • Abnormal screening cardiac CT 10/12/2017    Calcium Score 133   • BCC (basal cell carcinoma of skin)     Dx (initially)-2010.  Has occurred on head, chest, back, and right leg.   • Colon polyp     Dx 10/17- tubular adenoma ascending colon, no dysplasia   • Diabetes mellitus (CMS/HCC)    • Diverticulosis     Dx 10/17 by Colonoscopy- pancolonic   • History of cardiovascular stress test 06/19/2020    negative, EF 60%   • Hyperlipidemia          Physical Exam  Pulse 57   Ht 177.8 cm (70\")   Wt 67.1 kg (147 lb 14.9 oz)   SpO2 99%   BMI 21.23 kg/m²     Body mass index is 21.23 kg/m².    Patient is well nourished and well developed.        Ortho Exam  Direct medial joint line tenderness  Negative [place]  No effusion  Mild genu varum    Imaging/Labs/EMG Reviewed:  Imaging Results (Last 24 Hours)     ** No results found for the last 24 hours. **          MRI Knee Left Without Contrast  Narrative: EXAMINATION: MRI KNEE LEFT  WO CONTRAST-      INDICATION: Mechanical knee symptoms: locking, catching, snapping,  crepitus; M25.562-Pain in left knee; G89.29-Other chronic pain;  M17.12-Unilateral primary osteoarthritis, left knee     TECHNIQUE: Multiplanar multisequence MRI of the left knee performed  without IV contrast     COMPARISON: NONE     FINDINGS: The extensor mechanism is intact with normal-appearing  quadriceps and patellar tendons. There is some minimal marrow edema  involving the inferior patellar pole with some adjacent edema of " Hoffa's  fat pad. No additional T1 marrow replacing lesion or evidence of acute  fracture. ACL and PCL are intact. The medial and lateral collateral  ligamentous complexes are intact. The patella is well-seated in the  trochlear groove with intact retinacula bilaterally. Grade 2 medial  compartment chondromalacia is present, without acute meniscus tear. No  significant joint effusion.     Impression: Medial compartment degenerative change with grade II  chondromalacia.     Possible component of patellofemoral friction syndrome with some edema  of the inferior patellar pole and adjacent Hoffa's fat pad.     Otherwise no acute internal derangement.        This report was finalized on 11/10/2020 12:50 PM by Robles Melendez.     We reviewed the images and report as well.  Patient does not have clear evidence of any meniscal tear.  There is an irregularity there.  He has some edema at the posterior aspect of the medial femoral condyle as well.    Assessment    Assessment:  1. Chronic pain of left knee    2. Primary osteoarthritis of left knee        Plan:  1. Recommend over the counter anti-inflammatories for pain and/or swelling  2. Chronic pain left knee in the face of mild medial compartment osteoarthritis--patient may have an irregular medial meniscus.  There is no obvious evidence on the MRI today.  His symptoms are under control currently.  I recommended he continue exercising and we have given him some home exercises to specifically strengthen his quadriceps.  I will see him back as needed going forward.      Rey Worthy MD  11/12/20  09:28 EST      Dragon disclaimer:  Much of this encounter note is an electronic transcription/translation of spoken language to printed text. The electronic translation of spoken language may permit erroneous, or at times, nonsensical words or phrases to be inadvertently transcribed; Although I have reviewed the note for such errors, some may still exist.

## 2020-11-12 ENCOUNTER — OFFICE VISIT (OUTPATIENT)
Dept: ORTHOPEDIC SURGERY | Facility: CLINIC | Age: 65
End: 2020-11-12

## 2020-11-12 VITALS — HEART RATE: 57 BPM | WEIGHT: 147.93 LBS | BODY MASS INDEX: 21.18 KG/M2 | OXYGEN SATURATION: 99 % | HEIGHT: 70 IN

## 2020-11-12 DIAGNOSIS — M17.12 PRIMARY OSTEOARTHRITIS OF LEFT KNEE: ICD-10-CM

## 2020-11-12 DIAGNOSIS — M25.562 CHRONIC PAIN OF LEFT KNEE: Primary | ICD-10-CM

## 2020-11-12 DIAGNOSIS — G89.29 CHRONIC PAIN OF LEFT KNEE: Primary | ICD-10-CM

## 2020-11-12 PROCEDURE — 99213 OFFICE O/P EST LOW 20 MIN: CPT | Performed by: ORTHOPAEDIC SURGERY

## 2020-11-18 ENCOUNTER — DOCUMENTATION (OUTPATIENT)
Dept: PHYSICAL THERAPY | Facility: CLINIC | Age: 65
End: 2020-11-18

## 2020-11-18 ENCOUNTER — OFFICE VISIT (OUTPATIENT)
Dept: ORTHOPEDIC SURGERY | Facility: CLINIC | Age: 65
End: 2020-11-18

## 2020-11-18 VITALS — BODY MASS INDEX: 21.18 KG/M2 | HEART RATE: 58 BPM | HEIGHT: 70 IN | WEIGHT: 147.93 LBS | OXYGEN SATURATION: 98 %

## 2020-11-18 DIAGNOSIS — M76.61 ACHILLES TENDINITIS OF RIGHT LOWER EXTREMITY: Primary | ICD-10-CM

## 2020-11-18 DIAGNOSIS — E11.9 CONTROLLED TYPE 2 DIABETES MELLITUS WITHOUT COMPLICATION, WITHOUT LONG-TERM CURRENT USE OF INSULIN (HCC): ICD-10-CM

## 2020-11-18 PROCEDURE — 99213 OFFICE O/P EST LOW 20 MIN: CPT | Performed by: ORTHOPAEDIC SURGERY

## 2020-11-18 NOTE — PROGRESS NOTES
ESTABLISHED PATIENT    Patient: Chente Rich  : 1955    Primary Care Provider: Millie Loya MD    Requesting Provider: As above    Follow-up (Achilles tendinitis of right lower extremity follow up from 20)      History    Chief Complaint: Right Achilles tendinitis    History of Present Illness: He returns for follow-up of his right Achilles tendinitis, well-controlled diabetes.  He reports he is significantly improved.  He has not had an A1c since I last saw him, he reports he is going to have one done in December.  The Achilles tendinitis is almost completely resolved.  He notes only occasional soreness.  He thinks therapy has helped significantly.    Current Outpatient Medications on File Prior to Visit   Medication Sig Dispense Refill   • aspirin 81 MG EC tablet Take 81 mg by mouth Daily.     • ibuprofen (ADVIL,MOTRIN) 200 MG tablet Take 200 mg by mouth Every 6 (Six) Hours As Needed for Mild Pain .     • metFORMIN ER (GLUCOPHAGE-XR) 500 MG 24 hr tablet Take 1 tablet by mouth Daily. 30 tablet 5   • simvastatin (ZOCOR) 20 MG tablet Take 1 tablet by mouth Daily. 30 tablet 2     No current facility-administered medications on file prior to visit.       Allergies   Allergen Reactions   • Penicillins       Past Medical History:   Diagnosis Date   • Abnormal screening cardiac CT 10/12/2017    Calcium Score 133   • BCC (basal cell carcinoma of skin)     Dx (initially)-.  Has occurred on head, chest, back, and right leg.   • Colon polyp     Dx 10/17- tubular adenoma ascending colon, no dysplasia   • Diabetes mellitus (CMS/HCC)    • Diverticulosis     Dx 10/17 by Colonoscopy- pancolonic   • History of cardiovascular stress test 2020    negative, EF 60%   • Hyperlipidemia      Past Surgical History:   Procedure Laterality Date   • WISDOM TOOTH EXTRACTION       Family History   Problem Relation Age of Onset   • Breast cancer Mother          from sepsis   • Hypertension Father    •  "Hyperlipidemia Father    • Prostate cancer Father    • Coronary artery disease Father         Angioplasty/CABG, no MI   • Tremor Sister    • Mental illness Paternal Grandmother    • Peripheral vascular disease Paternal Grandfather    • Dementia Paternal Grandfather         \"Vascular\"      Social History     Socioeconomic History   • Marital status:      Spouse name: Not on file   • Number of children: 2   • Years of education: Not on file   • Highest education level: Not on file   Occupational History   • Occupation: Retired   Tobacco Use   • Smoking status: Never Smoker   • Smokeless tobacco: Never Used   Substance and Sexual Activity   • Alcohol use: Yes     Comment: 1 drink (beer) daily   • Drug use: Never   • Sexual activity: Yes     Partners: Female        Review of Systems   Constitutional: Negative.    HENT: Negative.    Eyes: Negative.    Respiratory: Negative.    Cardiovascular: Negative.    Gastrointestinal: Negative.    Endocrine: Negative.    Genitourinary: Negative.    Musculoskeletal: Positive for arthralgias.   Skin: Negative.    Allergic/Immunologic: Negative.    Neurological: Negative.    Hematological: Negative.    Psychiatric/Behavioral: Negative.        The following portions of the patient's history were reviewed and updated as appropriate: allergies, current medications, past family history, past medical history, past social history, past surgical history and problem list.    Physical Exam:   Pulse 58   Ht 177.8 cm (70\")   Wt 67.1 kg (147 lb 14.9 oz)   SpO2 98%   BMI 21.23 kg/m²   GENERAL: Body habitus: normal weight for height    Lower extremity edema: Left: none; Right: none      Gait: normal     Mental Status:  awake and alert; oriented to person, place, and time      MSK:  Tibia:  Right:  non tender;           Ankle:  Right: Nontender at the insertion of the Achilles only very slightly tender more proximally, no swelling;          Foot:  Right:  non tender;       Medical Decision " Making    Data Review:   none    Assessment/Plan/Diagnosis/Treatment Options:   1. Achilles tendinitis of right lower extremity  He is significantly improved.  He should continue his stretching and eccentric strengthening.  He wants to run again and we talked about how to start very slowly and how to cross train.  I explained that he needs to manage the Achilles, he needs to not cause more damage by overuse.  I will be happy to see him anytime    2. Controlled type 2 diabetes mellitus without complication, without long-term current use of insulin (CMS/[facility])  As above he reports he is going to have an A1c in December

## 2020-11-18 NOTE — PROGRESS NOTES
Discharge Summary  Discharge Summary from Physical Therapy Report      Patient: Chente Rich   : 1955  Diagnosis/ICD-10 Code:  There were no encounter diagnoses.   Referring practitioner: No ref. provider found  Date of Initial Visit: No linked episodes  Today's Date: 2020  Date of Last Visit: 2020     Number of Visits: 15    Discharge Status of Patient: See MD Note dated: 2020    Goals: All Met    Discharge Plan: Continue with current home exercise program as instructed      Date of Discharge: 2020        Rochelle Otto, PT

## 2020-12-02 ENCOUNTER — OFFICE VISIT (OUTPATIENT)
Dept: INTERNAL MEDICINE | Facility: CLINIC | Age: 65
End: 2020-12-02

## 2020-12-02 VITALS
TEMPERATURE: 98.7 F | DIASTOLIC BLOOD PRESSURE: 70 MMHG | WEIGHT: 154.13 LBS | BODY MASS INDEX: 22.11 KG/M2 | SYSTOLIC BLOOD PRESSURE: 132 MMHG | RESPIRATION RATE: 20 BRPM | HEART RATE: 72 BPM

## 2020-12-02 DIAGNOSIS — E78.49 OTHER HYPERLIPIDEMIA: ICD-10-CM

## 2020-12-02 DIAGNOSIS — K63.5 POLYP OF COLON, UNSPECIFIED PART OF COLON, UNSPECIFIED TYPE: ICD-10-CM

## 2020-12-02 DIAGNOSIS — E11.9 CONTROLLED TYPE 2 DIABETES MELLITUS WITHOUT COMPLICATION, WITHOUT LONG-TERM CURRENT USE OF INSULIN (HCC): Primary | ICD-10-CM

## 2020-12-02 LAB
BASOPHILS # BLD AUTO: 0.04 10*3/MM3 (ref 0–0.2)
BASOPHILS NFR BLD AUTO: 0.6 % (ref 0–1.5)
DEPRECATED RDW RBC AUTO: 44.5 FL (ref 37–54)
EOSINOPHIL # BLD AUTO: 0.43 10*3/MM3 (ref 0–0.4)
EOSINOPHIL NFR BLD AUTO: 6.9 % (ref 0.3–6.2)
ERYTHROCYTE [DISTWIDTH] IN BLOOD BY AUTOMATED COUNT: 12.9 % (ref 12.3–15.4)
EXPIRATION DATE: NORMAL
HBA1C MFR BLD: 6 %
HCT VFR BLD AUTO: 40.3 % (ref 37.5–51)
HGB BLD-MCNC: 13.6 G/DL (ref 13–17.7)
IMM GRANULOCYTES # BLD AUTO: 0.02 10*3/MM3 (ref 0–0.05)
IMM GRANULOCYTES NFR BLD AUTO: 0.3 % (ref 0–0.5)
LYMPHOCYTES # BLD AUTO: 1.73 10*3/MM3 (ref 0.7–3.1)
LYMPHOCYTES NFR BLD AUTO: 27.9 % (ref 19.6–45.3)
Lab: NORMAL
MCH RBC QN AUTO: 31.7 PG (ref 26.6–33)
MCHC RBC AUTO-ENTMCNC: 33.7 G/DL (ref 31.5–35.7)
MCV RBC AUTO: 93.9 FL (ref 79–97)
MONOCYTES # BLD AUTO: 0.61 10*3/MM3 (ref 0.1–0.9)
MONOCYTES NFR BLD AUTO: 9.9 % (ref 5–12)
NEUTROPHILS NFR BLD AUTO: 3.36 10*3/MM3 (ref 1.7–7)
NEUTROPHILS NFR BLD AUTO: 54.4 % (ref 42.7–76)
NRBC BLD AUTO-RTO: 0 /100 WBC (ref 0–0.2)
PLATELET # BLD AUTO: 277 10*3/MM3 (ref 140–450)
PMV BLD AUTO: 10.5 FL (ref 6–12)
RBC # BLD AUTO: 4.29 10*6/MM3 (ref 4.14–5.8)
WBC # BLD AUTO: 6.19 10*3/MM3 (ref 3.4–10.8)

## 2020-12-02 PROCEDURE — 80061 LIPID PANEL: CPT | Performed by: INTERNAL MEDICINE

## 2020-12-02 PROCEDURE — 83036 HEMOGLOBIN GLYCOSYLATED A1C: CPT | Performed by: INTERNAL MEDICINE

## 2020-12-02 PROCEDURE — 99214 OFFICE O/P EST MOD 30 MIN: CPT | Performed by: INTERNAL MEDICINE

## 2020-12-02 PROCEDURE — 80053 COMPREHEN METABOLIC PANEL: CPT | Performed by: INTERNAL MEDICINE

## 2020-12-02 PROCEDURE — 85025 COMPLETE CBC W/AUTO DIFF WBC: CPT | Performed by: INTERNAL MEDICINE

## 2020-12-02 PROCEDURE — 36415 COLL VENOUS BLD VENIPUNCTURE: CPT | Performed by: INTERNAL MEDICINE

## 2020-12-02 NOTE — PROGRESS NOTES
Subjective       Chente Rich is a 65 y.o. male.     Chief Complaint   Patient presents with   • Diabetes     6 month follow up  non fasting        History obtained from the patient.      History of Present Illness     Patient has been seeing Orthopedics (Dr. Ma for left knee pain and Dr. Lee  for right Achilles tendinitis), with improvement.  He is off ibuprofen.    Cardiac Follow-Up:  The patient is here for a follow-up visit.  He is non-fasting.  His Diabetes has been stable.  Medication: Metformin and Simvastatin.  His Hyperlipidemia has been stable.  LDL goal < 70.  Last on 5/28/2020 was LDL 87.  Medication: Simvastatin.  Medication side effects: None.    Comorbid illness: Coronary Artery Calcification.  Takes Aspirin 81 mg daily.    Procedures: On 10/12/2017, Cardiac CT scan showed a Calcium Score of 133.    Interval Events: Last Hemoglobin A1c on 5/20/2020 was 6.4.  He does not check his blood sugar or blood pressure at home.  His last Ophthalmology visit was on 1/29/2020, no retinopathy.  He does not check his feet daily.       Symptoms: Denies chest pain, shortness of breath, OLIVEIRA, orthopnea, PND, palpitations, syncope, lower extremity edema, claudication, lightheadedness, and dizziness.    Associated Symptoms: Weight up 3 pounds since last visit.  He is having some blurred vision due to cataract issues.  He is also complaining of fatigue approximately 30 minutes after eating dinner every night for the past few months.   Denies  headache, polydipsia, polyuria, myalgias, arthralgias,  memory loss, concentration issues, and focal neurological deficit.     Lifestyle: The patient follows a diverse and healthy diet.  Exercise: He has not been exercising for the past 3 months due to his knee and ankle pain.  For the past 3 weeks he has walked approximately 30 minutes/day on average.    Tobacco Use: Never a smoker.     Colon Polyp Follow-Up: The patient is here for follow-up of colon polyps, which  have been stable.    Interval Events: Last Colonoscopy on 7/24/2017 showed an ascending colon tubular adenoma and pandiverticulosis.  Per report, preceding colonoscopies had been clear.    Symptoms: Denies abdominal pain, diarrhea, constipation, hematochezia, melena, and changes in stool.    Medication: None.    Current Outpatient Medications on File Prior to Visit   Medication Sig Dispense Refill   • aspirin 81 MG EC tablet Take 81 mg by mouth Daily.     • metFORMIN ER (GLUCOPHAGE-XR) 500 MG 24 hr tablet Take 1 tablet by mouth Daily. 30 tablet 5   • simvastatin (ZOCOR) 20 MG tablet Take 1 tablet by mouth Daily. 30 tablet 2     No current facility-administered medications on file prior to visit.        Current outpatient and discharge medications have been reconciled for the patient.  Reviewed by: Millie Loya MD        The following portions of the patient's history were reviewed and updated as appropriate: allergies, current medications, past family history, past medical history, past social history, past surgical history and problem list.    Review of Systems   Constitutional: Positive for fatigue. Negative for unexpected weight change.   Eyes: Positive for visual disturbance.   Respiratory: Negative for cough, shortness of breath and wheezing.    Cardiovascular: Negative for chest pain, palpitations and leg swelling.        No OLIVEIRA, orthopnea, or claudication.   Gastrointestinal: Negative for abdominal pain, blood in stool, constipation, diarrhea, nausea and vomiting.        Denies melena.   Endocrine: Negative for polydipsia and polyuria.   Musculoskeletal: Negative for arthralgias and myalgias.   Neurological: Negative for dizziness, syncope, light-headedness and headaches.        No memory issues.   Psychiatric/Behavioral: Negative for decreased concentration.         Objective       Blood pressure 132/70, pulse 72, temperature 98.7 °F (37.1 °C), temperature source Temporal, resp. rate 20, weight 69.9 kg  (154 lb 2 oz).      Physical Exam  Vitals signs and nursing note reviewed.   Constitutional:       Appearance: He is well-developed and normal weight.   Neck:      Musculoskeletal: Normal range of motion and neck supple.      Thyroid: No thyroid mass or thyromegaly.      Vascular: No carotid bruit.   Cardiovascular:      Rate and Rhythm: Normal rate and regular rhythm.      Pulses: Normal pulses.      Heart sounds: Normal heart sounds. No murmur. No friction rub. No gallop.    Pulmonary:      Effort: Pulmonary effort is normal.      Breath sounds: Normal breath sounds.   Abdominal:      General: Bowel sounds are normal. There is no distension or abdominal bruit.      Palpations: Abdomen is soft. There is no hepatomegaly, splenomegaly or mass.      Tenderness: There is no abdominal tenderness.   Musculoskeletal:      Right lower leg: No edema.      Left lower leg: No edema.   Neurological:      Mental Status: He is alert.   Psychiatric:         Mood and Affect: Mood normal.       Results for orders placed or performed in visit on 12/02/20   POC Glycosylated Hemoglobin (Hb A1C)    Specimen: Blood   Result Value Ref Range    Hemoglobin A1C 6.0 %    Lot Number [id number]     Expiration Date 9-1-22        Assessment / Plan:  Diagnoses and all orders for this visit:    1. Controlled type 2 diabetes mellitus without complication, without long-term current use of insulin (CMS/[facility]) (Primary)  -     Lipid Panel  -     Comprehensive Metabolic Panel  -     CBC & Differential  -     POC Glycosylated Hemoglobin (Hb A1C)  -     CBC Auto Differential   Continue current medication(s) as noted in the history of present illness.    2. Other hyperlipidemia  -     Lipid Panel  -     Comprehensive Metabolic Panel  -     CBC & Differential  -     CBC Auto Differential   Continue current medication(s) as noted in the history of present illness.    3. Polyp of colon, unspecified part of colon, unspecified type    Colonoscopy  up-to-date.      Recommended Shingrix (new Shingles vaccine) at the pharmacy.      Return in about 6 months (around 6/2/2021) for Annual physical, fasting, after 5/28/21, and schedule subseq Medicare Wellness Exam same appt.

## 2020-12-03 LAB
ALBUMIN SERPL-MCNC: 4.4 G/DL (ref 3.5–5.2)
ALBUMIN/GLOB SERPL: 1.6 G/DL
ALP SERPL-CCNC: 55 U/L (ref 39–117)
ALT SERPL W P-5'-P-CCNC: 18 U/L (ref 1–41)
ANION GAP SERPL CALCULATED.3IONS-SCNC: 6.1 MMOL/L (ref 5–15)
AST SERPL-CCNC: 24 U/L (ref 1–40)
BILIRUB SERPL-MCNC: 0.3 MG/DL (ref 0–1.2)
BUN SERPL-MCNC: 21 MG/DL (ref 8–23)
BUN/CREAT SERPL: 17.9 (ref 7–25)
CALCIUM SPEC-SCNC: 9.7 MG/DL (ref 8.6–10.5)
CHLORIDE SERPL-SCNC: 100 MMOL/L (ref 98–107)
CHOLEST SERPL-MCNC: 172 MG/DL (ref 0–200)
CO2 SERPL-SCNC: 31.9 MMOL/L (ref 22–29)
CREAT SERPL-MCNC: 1.17 MG/DL (ref 0.76–1.27)
GFR SERPL CREATININE-BSD FRML MDRD: 62 ML/MIN/1.73
GLOBULIN UR ELPH-MCNC: 2.7 GM/DL
GLUCOSE SERPL-MCNC: 113 MG/DL (ref 65–99)
HDLC SERPL-MCNC: 68 MG/DL (ref 40–60)
LDLC SERPL CALC-MCNC: 77 MG/DL (ref 0–100)
LDLC/HDLC SERPL: 1.06 {RATIO}
POTASSIUM SERPL-SCNC: 4.3 MMOL/L (ref 3.5–5.2)
PROT SERPL-MCNC: 7.1 G/DL (ref 6–8.5)
SODIUM SERPL-SCNC: 138 MMOL/L (ref 136–145)
TRIGL SERPL-MCNC: 159 MG/DL (ref 0–150)
VLDLC SERPL-MCNC: 27 MG/DL (ref 5–40)

## 2020-12-31 RX ORDER — SIMVASTATIN 20 MG
20 TABLET ORAL DAILY
Qty: 90 TABLET | Refills: 3 | Status: SHIPPED | OUTPATIENT
Start: 2020-12-31 | End: 2022-01-03

## 2021-02-24 ENCOUNTER — IMMUNIZATION (OUTPATIENT)
Dept: VACCINE CLINIC | Facility: HOSPITAL | Age: 66
End: 2021-02-24

## 2021-02-24 PROCEDURE — 91300 HC SARSCOV02 VAC 30MCG/0.3ML IM: CPT | Performed by: INTERNAL MEDICINE

## 2021-02-24 PROCEDURE — 0001A: CPT | Performed by: INTERNAL MEDICINE

## 2021-03-17 ENCOUNTER — IMMUNIZATION (OUTPATIENT)
Dept: VACCINE CLINIC | Facility: HOSPITAL | Age: 66
End: 2021-03-17

## 2021-03-17 PROCEDURE — 91300 HC SARSCOV02 VAC 30MCG/0.3ML IM: CPT | Performed by: INTERNAL MEDICINE

## 2021-03-17 PROCEDURE — 0002A: CPT | Performed by: INTERNAL MEDICINE

## 2021-05-08 DIAGNOSIS — E11.9 CONTROLLED TYPE 2 DIABETES MELLITUS WITHOUT COMPLICATION, WITHOUT LONG-TERM CURRENT USE OF INSULIN (HCC): ICD-10-CM

## 2021-05-10 RX ORDER — METFORMIN HYDROCHLORIDE 500 MG/1
TABLET, EXTENDED RELEASE ORAL
Qty: 30 TABLET | Refills: 5 | Status: SHIPPED | OUTPATIENT
Start: 2021-05-10 | End: 2021-07-23

## 2021-06-02 ENCOUNTER — OFFICE VISIT (OUTPATIENT)
Dept: INTERNAL MEDICINE | Facility: CLINIC | Age: 66
End: 2021-06-02

## 2021-06-02 VITALS
DIASTOLIC BLOOD PRESSURE: 73 MMHG | HEIGHT: 70 IN | RESPIRATION RATE: 20 BRPM | HEART RATE: 49 BPM | WEIGHT: 152 LBS | BODY MASS INDEX: 21.76 KG/M2 | TEMPERATURE: 97.1 F | SYSTOLIC BLOOD PRESSURE: 141 MMHG

## 2021-06-02 DIAGNOSIS — Z00.00 MEDICARE ANNUAL WELLNESS VISIT, SUBSEQUENT: Primary | ICD-10-CM

## 2021-06-02 DIAGNOSIS — I25.10 CORONARY ARTERY CALCIFICATION: ICD-10-CM

## 2021-06-02 DIAGNOSIS — E78.49 OTHER HYPERLIPIDEMIA: ICD-10-CM

## 2021-06-02 DIAGNOSIS — Z00.00 ENCOUNTER FOR HEALTH MAINTENANCE EXAMINATION IN ADULT: ICD-10-CM

## 2021-06-02 DIAGNOSIS — E11.9 CONTROLLED TYPE 2 DIABETES MELLITUS WITHOUT COMPLICATION, WITHOUT LONG-TERM CURRENT USE OF INSULIN (HCC): ICD-10-CM

## 2021-06-02 DIAGNOSIS — Z12.5 SCREENING FOR PROSTATE CANCER: ICD-10-CM

## 2021-06-02 DIAGNOSIS — I25.84 CORONARY ARTERY CALCIFICATION: ICD-10-CM

## 2021-06-02 DIAGNOSIS — K63.5 POLYP OF COLON, UNSPECIFIED PART OF COLON, UNSPECIFIED TYPE: ICD-10-CM

## 2021-06-02 LAB
A/C: NORMAL
ALBUMIN SERPL-MCNC: 4.4 G/DL (ref 3.5–5.2)
ALBUMIN/GLOB SERPL: 1.8 G/DL
ALP SERPL-CCNC: 53 U/L (ref 39–117)
ALT SERPL W P-5'-P-CCNC: 14 U/L (ref 1–41)
ANION GAP SERPL CALCULATED.3IONS-SCNC: 7.8 MMOL/L (ref 5–15)
AST SERPL-CCNC: 19 U/L (ref 1–40)
BASOPHILS # BLD AUTO: 0.05 10*3/MM3 (ref 0–0.2)
BASOPHILS NFR BLD AUTO: 0.8 % (ref 0–1.5)
BILIRUB BLD-MCNC: NEGATIVE MG/DL
BILIRUB SERPL-MCNC: 0.2 MG/DL (ref 0–1.2)
BUN SERPL-MCNC: 23 MG/DL (ref 8–23)
BUN/CREAT SERPL: 16.3 (ref 7–25)
CALCIUM SPEC-SCNC: 9.2 MG/DL (ref 8.6–10.5)
CHLORIDE SERPL-SCNC: 101 MMOL/L (ref 98–107)
CHOLEST SERPL-MCNC: 169 MG/DL (ref 0–200)
CLARITY, POC: CLEAR
CO2 SERPL-SCNC: 28.2 MMOL/L (ref 22–29)
COLOR UR: YELLOW
CREAT SERPL-MCNC: 1.41 MG/DL (ref 0.76–1.27)
DEPRECATED RDW RBC AUTO: 46 FL (ref 37–54)
EOSINOPHIL # BLD AUTO: 0.45 10*3/MM3 (ref 0–0.4)
EOSINOPHIL NFR BLD AUTO: 7 % (ref 0.3–6.2)
ERYTHROCYTE [DISTWIDTH] IN BLOOD BY AUTOMATED COUNT: 12.8 % (ref 12.3–15.4)
EXPIRATION DATE: NORMAL
GFR SERPL CREATININE-BSD FRML MDRD: 50 ML/MIN/1.73
GLOBULIN UR ELPH-MCNC: 2.4 GM/DL
GLUCOSE SERPL-MCNC: 118 MG/DL (ref 65–99)
GLUCOSE UR STRIP-MCNC: NEGATIVE MG/DL
HBA1C MFR BLD: 6 %
HCT VFR BLD AUTO: 43 % (ref 37.5–51)
HDLC SERPL-MCNC: 77 MG/DL (ref 40–60)
HGB BLD-MCNC: 14.3 G/DL (ref 13–17.7)
IMM GRANULOCYTES # BLD AUTO: 0.01 10*3/MM3 (ref 0–0.05)
IMM GRANULOCYTES NFR BLD AUTO: 0.2 % (ref 0–0.5)
KETONES UR QL: NEGATIVE
LDLC SERPL CALC-MCNC: 80 MG/DL (ref 0–100)
LDLC/HDLC SERPL: 1.03 {RATIO}
LEUKOCYTE EST, POC: NEGATIVE
LYMPHOCYTES # BLD AUTO: 2.12 10*3/MM3 (ref 0.7–3.1)
LYMPHOCYTES NFR BLD AUTO: 32.9 % (ref 19.6–45.3)
Lab: NORMAL
MCH RBC QN AUTO: 32 PG (ref 26.6–33)
MCHC RBC AUTO-ENTMCNC: 33.3 G/DL (ref 31.5–35.7)
MCV RBC AUTO: 96.2 FL (ref 79–97)
MONOCYTES # BLD AUTO: 0.55 10*3/MM3 (ref 0.1–0.9)
MONOCYTES NFR BLD AUTO: 8.5 % (ref 5–12)
NEUTROPHILS NFR BLD AUTO: 3.26 10*3/MM3 (ref 1.7–7)
NEUTROPHILS NFR BLD AUTO: 50.6 % (ref 42.7–76)
NITRITE UR-MCNC: NEGATIVE MG/ML
NRBC BLD AUTO-RTO: 0 /100 WBC (ref 0–0.2)
PH UR: 5 [PH] (ref 5–8)
PLATELET # BLD AUTO: 276 10*3/MM3 (ref 140–450)
PMV BLD AUTO: 10.6 FL (ref 6–12)
POC CREATININE URINE: 10
POC MICROALBUMIN URINE: 10
POTASSIUM SERPL-SCNC: 4.1 MMOL/L (ref 3.5–5.2)
PROT SERPL-MCNC: 6.8 G/DL (ref 6–8.5)
PROT UR STRIP-MCNC: NEGATIVE MG/DL
PSA SERPL-MCNC: 2.35 NG/ML (ref 0–4)
RBC # BLD AUTO: 4.47 10*6/MM3 (ref 4.14–5.8)
RBC # UR STRIP: NEGATIVE /UL
SODIUM SERPL-SCNC: 137 MMOL/L (ref 136–145)
SP GR UR: 1 (ref 1–1.03)
TRIGL SERPL-MCNC: 63 MG/DL (ref 0–150)
TSH SERPL DL<=0.05 MIU/L-ACNC: 1.71 UIU/ML (ref 0.27–4.2)
UROBILINOGEN UR QL: NORMAL
VLDLC SERPL-MCNC: 12 MG/DL (ref 5–40)
WBC # BLD AUTO: 6.44 10*3/MM3 (ref 3.4–10.8)

## 2021-06-02 PROCEDURE — 99214 OFFICE O/P EST MOD 30 MIN: CPT | Performed by: INTERNAL MEDICINE

## 2021-06-02 PROCEDURE — 81003 URINALYSIS AUTO W/O SCOPE: CPT | Performed by: INTERNAL MEDICINE

## 2021-06-02 PROCEDURE — 36415 COLL VENOUS BLD VENIPUNCTURE: CPT | Performed by: INTERNAL MEDICINE

## 2021-06-02 PROCEDURE — 82044 UR ALBUMIN SEMIQUANTITATIVE: CPT | Performed by: INTERNAL MEDICINE

## 2021-06-02 PROCEDURE — 83036 HEMOGLOBIN GLYCOSYLATED A1C: CPT | Performed by: INTERNAL MEDICINE

## 2021-06-02 PROCEDURE — 80053 COMPREHEN METABOLIC PANEL: CPT | Performed by: INTERNAL MEDICINE

## 2021-06-02 PROCEDURE — G0103 PSA SCREENING: HCPCS | Performed by: INTERNAL MEDICINE

## 2021-06-02 PROCEDURE — 84443 ASSAY THYROID STIM HORMONE: CPT | Performed by: INTERNAL MEDICINE

## 2021-06-02 PROCEDURE — G0439 PPPS, SUBSEQ VISIT: HCPCS | Performed by: INTERNAL MEDICINE

## 2021-06-02 PROCEDURE — 1159F MED LIST DOCD IN RCRD: CPT | Performed by: INTERNAL MEDICINE

## 2021-06-02 PROCEDURE — 85025 COMPLETE CBC W/AUTO DIFF WBC: CPT | Performed by: INTERNAL MEDICINE

## 2021-06-02 PROCEDURE — 80061 LIPID PANEL: CPT | Performed by: INTERNAL MEDICINE

## 2021-06-02 RX ORDER — ASPIRIN 81 MG/1
81 TABLET ORAL DAILY
Start: 2021-06-02

## 2021-06-02 NOTE — PROGRESS NOTES
Subjective     Chief Complaint:  Physical Exam.    History of Present Illness    History obtained from the patient.    The patient had right cataract laser surgery in March 2021.    The patient had been seeing Orthopedics (Dr. Ma for left knee pain and Dr. Lee  for right Achilles tendinitis), with resolution.     Cardiac Follow-Up:  The patient is here for a follow-up visit.  His Diabetes has been stable.  Medication: Metformin and Simvastatin.  His Hyperlipidemia has been stable.  LDL goal < 70.  On 12/2/2020,  LDL was  77, .  Medication: Simvastatin.  Side Effects: None.     Comorbid illness: Coronary Artery Calcification.  Takes Aspirin 81 mg daily (stopped recently, not sure why).      Procedures: On 10/12/2017, Cardiac CT scan showed a Calcium Score of 133. On 6/19/20, he had a negative stress echo, EF 60%     Interval Events: Last Hemoglobin A1c on 12/2/2020 was 6.0.  He does not check his blood sugar or blood pressure at home.  His last Ophthalmology visit was on January 2021, no retinopathy per patient report.  He does check his feet daily.       Symptoms: Denies chest pain, shortness of breath, OLIVEIRA, orthopnea, PND, palpitations, syncope, lower extremity edema, claudication, lightheadedness, and dizziness.    Associated Symptoms: Weight down 2 pounds since last visit. Denies fatigue,  headache, polydipsia, polyuria, myalgias, arthralgias, visual impairment,  memory loss, concentration issues, and focal neurological deficit. No foot pain, numbness, tingling, or ulcer.     Lifestyle: The patient follows a diverse and healthy diet.  Exercise: He walks or runs, and lifts weights 5 times per week.  Tobacco Use: Never a smoker.     Colon Polyp Follow-Up: The patient is here for follow-up of colon polyps, which have been stable.    Interval Events: Last Colonoscopy on 7/24/2017 showed an ascending colon tubular adenoma and pandiverticulosis.  Per report, preceding colonoscopies had been  clear.    Symptoms: Denies abdominal pain, diarrhea, constipation, hematochezia, melena, and changes in stool.    Medication: None.         Chente Rich is a 66 y.o. male who presents for an Annual Physical.      PMH, PSH, SocHx, FamHx, Allergies, and Medications: Reviewed and updated.    Outpatient Medications Prior to Visit   Medication Sig Dispense Refill   • metFORMIN ER (GLUCOPHAGE-XR) 500 MG 24 hr tablet TAKE ONE TABLET BY MOUTH DAILY 30 tablet 5   • simvastatin (ZOCOR) 20 MG tablet Take 1 tablet by mouth Daily. 90 tablet 3   • aspirin 81 MG EC tablet Take 81 mg by mouth Daily.       No facility-administered medications prior to visit.       Immunization History   Administered Date(s) Administered   • COVID-19 (PFIZER) 02/24/2021, 03/17/2021   • Flu Vaccine Quad PF >18YRS 10/25/2016   • Flu Vaccine Quad PF >36MO 10/30/2017   • Fluad Quad 65+ 09/26/2020   • Fluzone High Dose =>65 Years ([employer] ONLY) 11/08/2018, 10/15/2020   • Hepatitis A 11/08/2018, 11/15/2019   • Pneumococcal Conjugate 13-Valent (PCV13) 05/08/2018   • Pneumococcal Polysaccharide (PPSV23) 05/28/2020   • Tdap 10/20/2017         Patient Active Problem List   Diagnosis   • Hyperlipidemia   • Controlled type 2 diabetes mellitus without complication, without long-term current use of insulin (CMS/HCC)   • Coronary artery calcification   • BCC (basal cell carcinoma of skin)   • Diverticulosis   • Colon polyp       Health Habits:  Dental Exam. up to date  Eye Exam. up to date  Hearing Loss:  No  Exercise: 5 times/week.  Current exercise activities include: running/ jogging, walking and weightlifting  Diet: Healthy  Multivitamin: No    Safe Driving:  Yes  Seat Belt:  Yes  Bike Helmet:  N/A  Skin Screening:  Yes  Sunscreen: Yes  SBE / ARVIN: Yes  Sexual Activity:  Yes  Birth Control:  Menopause  STD Prevention:  N/A    Last Pap: N/A  Last Mammogram:  N/A  Last DEXA Scan: N/A  Last Colonoscopy:  7/24/2017,  showed an ascending colon tubular adenoma and  pandiverticulosis.  Last PSA: 5/28/20 (2.35)    Social:    Social History     Socioeconomic History   • Marital status:      Spouse name: Not on file   • Number of children: 2   • Years of education: Not on file   • Highest education level: Not on file   Tobacco Use   • Smoking status: Never Smoker   • Smokeless tobacco: Never Used   Vaping Use   • Vaping Use: Never used   Substance and Sexual Activity   • Alcohol use: Yes     Comment: 1 drink (beer) daily   • Drug use: Never   • Sexual activity: Yes     Partners: Female         Current Medical Providers:    Millie Loya MD (Internal Medicine / Pediatrics)    The [facility] providers who are involved in the care of this patient are listed above.         Review of Systems   Constitutional: Negative for appetite change, chills, fatigue, fever and unexpected weight change.         No night sweats.     HENT: Positive for congestion (w/ allergies) and rhinorrhea (clear w/ allergies). Negative for ear discharge, ear pain, facial swelling, hearing loss, nosebleeds, postnasal drip, sinus pressure, sinus pain, sneezing, sore throat, tinnitus, trouble swallowing and voice change.         Denies snoring.   Eyes: Positive for discharge (watery w/ allergies) and itching (w/ allergies). Negative for photophobia, pain, redness and visual disturbance.   Respiratory: Negative for cough, chest tightness, shortness of breath and wheezing.         No chest congestion.  No hemoptysis.    Cardiovascular: Negative for chest pain, palpitations and leg swelling.        No orthopnea, OLIVEIRA, or PND.  No claudication or syncope.   Gastrointestinal: Negative for abdominal distention, abdominal pain, blood in stool, constipation, diarrhea, nausea and vomiting.        No melena, heartburn, odynophagia, dysphagia, early satiety, belching, or bloating.   Endocrine: Negative for cold intolerance, heat intolerance, polydipsia, polyphagia and polyuria.        No hair loss or dry skin.   "  Genitourinary: Negative for decreased urine volume, difficulty urinating, discharge, dysuria, flank pain, frequency, hematuria, scrotal swelling, testicular pain and urgency.        No nocturia, incomplete emptying, or incontinence.  No erectile dysfunction.   Musculoskeletal: Negative for arthralgias, back pain, gait problem, joint swelling, myalgias, neck pain and neck stiffness.        No joint stiffness.   Skin: Negative for rash.        No new skin lesions or changes in skin lesions.     Allergic/Immunologic: Positive for environmental allergies.   Neurological: Negative for dizziness, tremors, speech difficulty, weakness, light-headedness, numbness and headaches.        No tingling. No memory loss. No decreased concentration.   Hematological: Negative for adenopathy. Does not bruise/bleed easily.   Psychiatric/Behavioral: Negative for confusion, sleep disturbance and suicidal ideas. The patient is not nervous/anxious.         No depression.           Objective     Vitals:    06/02/21 0912   BP: 141/73   BP Location: Right arm   Pulse: (!) 49   Resp: 20   Temp: 97.1 °F (36.2 °C)   TempSrc: Temporal   Weight: 68.9 kg (152 lb)   Height: 177.2 cm (69.75\")       Body mass index is 21.97 kg/m².    Physical Exam  Vitals and nursing note reviewed.   Constitutional:       Appearance: Normal appearance. He is well-developed and normal weight.   HENT:      Head: Normocephalic and atraumatic.      Right Ear: Tympanic membrane, ear canal and external ear normal.      Left Ear: Tympanic membrane, ear canal and external ear normal.      Mouth/Throat:      Mouth: Mucous membranes are moist. No oral lesions.      Pharynx: Oropharynx is clear.      Comments: Tonsils normal.  Eyes:      Extraocular Movements: Extraocular movements intact.      Conjunctiva/sclera: Conjunctivae normal.      Pupils: Pupils are equal, round, and reactive to light.   Neck:      Thyroid: No thyroid mass or thyromegaly.      Vascular: No carotid " bruit.   Cardiovascular:      Rate and Rhythm: Normal rate and regular rhythm.      Pulses: Normal pulses.      Heart sounds: No murmur heard.   No friction rub. No gallop.    Pulmonary:      Effort: Pulmonary effort is normal.      Breath sounds: Normal breath sounds.   Abdominal:      General: Bowel sounds are normal. There is no distension or abdominal bruit.      Palpations: Abdomen is soft. There is no hepatomegaly, splenomegaly or mass.      Tenderness: There is no abdominal tenderness.   Genitourinary:     Penis: Normal.       Testes:         Right: Mass, tenderness or swelling not present.         Left: Mass, tenderness or swelling not present.      Prostate: Normal.      Rectum: Normal.   Musculoskeletal:         General: Normal range of motion.      Cervical back: Normal range of motion and neck supple.      Right lower leg: No edema.      Left lower leg: No edema.   Lymphadenopathy:      Cervical: No cervical adenopathy.      Upper Body:      Right upper body: No supraclavicular adenopathy.      Left upper body: No supraclavicular adenopathy.      Lower Body: No right inguinal adenopathy. No left inguinal adenopathy.   Skin:     Findings: No lesion or rash.      Comments: No atypical skin lesions.   Neurological:      Mental Status: He is alert.      Cranial Nerves: Cranial nerves are intact.      Motor: Motor function is intact. No abnormal muscle tone.      Coordination: Coordination normal.      Gait: Gait normal.      Deep Tendon Reflexes: Reflexes are normal and symmetric.   Psychiatric:         Mood and Affect: Mood normal.     Diabetic Foot Exam Performed  Monofilament Test Performed      PHQ-2 Depression Screening  Little interest or pleasure in doing things? 0   Feeling down, depressed, or hopeless? 0   PHQ-2 Total Score 0         Counseling was given to patient for the following topics:  appropriate exercise, healthy eating habits, disease prevention, risk factors for cancer, importance of self  testicular exam, importance of immunizations, including risks and benefits, sun safety, seatbelt use and safe driving. Also discussed the importance of regular dental and vision care, as well recommendation for a yearly screening skin exam after age 40.  Written information provided to patient on these topics and other health maintenance issues.    Results for orders placed or performed in visit on 06/02/21   POC Glycosylated Hemoglobin (Hb A1C)    Specimen: Blood   Result Value Ref Range    Hemoglobin A1C 6.0 %    Lot Number [id number]     Expiration Date 3-5-23    POC Microalbumin    Specimen: Urine   Result Value Ref Range    Microalbumin, Urine 10     Creatinine, Urine 10     A/C 30-300mg/g ABNORMAL     Lot Number [id number]     Expiration Date 7-31-22    POC Urinalysis Dipstick, Automated    Specimen: Urine   Result Value Ref Range    Color Yellow Yellow, Straw, Dark Yellow, Kerri    Clarity, UA Clear Clear    Specific Gravity  1.005 1.005 - 1.030    pH, Urine 5.0 5.0 - 8.0    Leukocytes Negative Negative    Nitrite, UA Negative Negative    Protein, POC Negative Negative mg/dL    Glucose, UA Negative Negative, 1000 mg/dL (3+) mg/dL    Ketones, UA Negative Negative    Urobilinogen, UA Normal Normal    Bilirubin Negative Negative    Blood, UA Negative Negative    Lot Number [id number]     Expiration Date 11-30-21        Assessment/Plan       Diagnoses and all orders for this visit:    1. Medicare annual wellness visit, subsequent (Primary)    2. Encounter for health maintenance examination in adult  -     POC Glycosylated Hemoglobin (Hb A1C)  -     POC Microalbumin  -     POC Urinalysis Dipstick, Automated  -     Lipid Panel  -     Comprehensive Metabolic Panel  -     CBC & Differential  -     TSH    3. Controlled type 2 diabetes mellitus without complication, without long-term current use of insulin (CMS/[facility])  -     POC Glycosylated Hemoglobin (Hb A1C)  -     POC Microalbumin  -     POC Urinalysis Dipstick,  Automated  -     Lipid Panel  -     Comprehensive Metabolic Panel  -     CBC & Differential  -     TSH   Continue current medication(s) as noted in the history of present illness.    4. Other hyperlipidemia  -     Lipid Panel  -     Comprehensive Metabolic Panel  -     CBC & Differential  -     TSH   Continue current medication(s) as noted in the history of present illness.    5. Coronary artery calcification  -     aspirin 81 MG EC tablet; Take 1 tablet by mouth Daily- RE-START.    6. Polyp of colon, unspecified part of colon, unspecified type   Colonoscopy up-to-date.    7. Screening for prostate cancer  -     PSA Screen        Patient's Body mass index is 21.97 kg/m². indicating that he is within normal range (BMI 18.5-24.9). No BMI management plan needed..      Recommended Shingrix (new Shingles vaccine) at the pharmacy.      Return in about 6 months (around 12/2/2021) for Recheck  Diabetes, fasting.

## 2021-06-02 NOTE — PATIENT INSTRUCTIONS
I recommend Shingrix (new Shingles vaccine) at the pharmacy.      Health Maintenance, Male  Adopting a healthy lifestyle and getting preventive care are important in promoting health and wellness. Ask your health care provider about:  · The right schedule for you to have regular tests and exams.  · Things you can do on your own to prevent diseases and keep yourself healthy.  What should I know about diet, weight, and exercise?  Eat a healthy diet    · Eat a diet that includes plenty of vegetables, fruits, low-fat dairy products, and lean protein.  · Do not eat a lot of foods that are high in solid fats, added sugars, or sodium.  Maintain a healthy weight  Body mass index (BMI) is a measurement that can be used to identify possible weight problems. It estimates body fat based on height and weight. Your health care provider can help determine your BMI and help you achieve or maintain a healthy weight.  Get regular exercise  Get regular exercise. This is one of the most important things you can do for your health. Most adults should:  · Exercise for at least 150 minutes each week. The exercise should increase your heart rate and make you sweat (moderate-intensity exercise).  · Do strengthening exercises at least twice a week. This is in addition to the moderate-intensity exercise.  · Spend less time sitting. Even light physical activity can be beneficial.  Watch cholesterol and blood lipids  Have your blood tested for lipids and cholesterol at 20 years of age, then have this test every 5 years.  You may need to have your cholesterol levels checked more often if:  · Your lipid or cholesterol levels are high.  · You are older than 40 years of age.  · You are at high risk for heart disease.  What should I know about cancer screening?  Many types of cancers can be detected early and may often be prevented. Depending on your health history and family history, you may need to have cancer screening at various ages. This may  include screening for:  · Colorectal cancer.  · Prostate cancer.  · Skin cancer.  · Lung cancer.  What should I know about heart disease, diabetes, and high blood pressure?  Blood pressure and heart disease  · High blood pressure causes heart disease and increases the risk of stroke. This is more likely to develop in people who have high blood pressure readings, are of  descent, or are overweight.  · Talk with your health care provider about your target blood pressure readings.  · Have your blood pressure checked:  ? Every 3-5 years if you are 18-39 years of age.  ? Every year if you are 40 years old or older.  · If you are between the ages of 65 and 75 and are a current or former smoker, ask your health care provider if you should have a one-time screening for abdominal aortic aneurysm (AAA).  Diabetes  Have regular diabetes screenings. This checks your fasting blood sugar level. Have the screening done:  · Once every three years after age 45 if you are at a normal weight and have a low risk for diabetes.  · More often and at a younger age if you are overweight or have a high risk for diabetes.  What should I know about preventing infection?  Hepatitis B  If you have a higher risk for hepatitis B, you should be screened for this virus. Talk with your health care provider to find out if you are at risk for hepatitis B infection.  Hepatitis C  Blood testing is recommended for:  · Everyone born from 1945 through 1965.  · Anyone with known risk factors for hepatitis C.  Sexually transmitted infections (STIs)  · You should be screened each year for STIs, including gonorrhea and chlamydia, if:  ? You are sexually active and are younger than 24 years of age.  ? You are older than 24 years of age and your health care provider tells you that you are at risk for this type of infection.  ? Your sexual activity has changed since you were last screened, and you are at increased risk for chlamydia or gonorrhea. Ask your  health care provider if you are at risk.  · Ask your health care provider about whether you are at high risk for HIV. Your health care provider may recommend a prescription medicine to help prevent HIV infection. If you choose to take medicine to prevent HIV, you should first get tested for HIV. You should then be tested every 3 months for as long as you are taking the medicine.  Follow these instructions at home:  Lifestyle  · Do not use any products that contain nicotine or tobacco, such as cigarettes, e-cigarettes, and chewing tobacco. If you need help quitting, ask your health care provider.  · Do not use street drugs.  · Do not share needles.  · Ask your health care provider for help if you need support or information about quitting drugs.  Alcohol use  · Do not drink alcohol if your health care provider tells you not to drink.  · If you drink alcohol:  ? Limit how much you have to 0-2 drinks a day.  ? Be aware of how much alcohol is in your drink. In the [place] one drink equals one 12 oz bottle of beer (355 mL), one 5 oz glass of wine (148 mL), or one 1½ oz glass of hard liquor (44 mL).  General instructions  · Schedule regular health, dental, and eye exams.  · Stay current with your vaccines.  · Tell your health care provider if:  ? You often feel depressed.  ? You have ever been abused or do not feel safe at home.  Summary  · Adopting a healthy lifestyle and getting preventive care are important in promoting health and wellness.  · Follow your health care provider's instructions about healthy diet, exercising, and getting tested or screened for diseases.  · Follow your health care provider's instructions on monitoring your cholesterol and blood pressure.  This information is not intended to replace advice given to you by your health care provider. Make sure you discuss any questions you have with your health care provider.  Document Revised: 12/11/2019 Document Reviewed: 12/11/2019  [employer] Patient Education  © 2021 [employer]

## 2021-06-02 NOTE — PROGRESS NOTES
The ABCs of the Annual Wellness Visit  Subsequent Medicare Wellness Visit    Chief Complaint   Patient presents with   • Medicare Wellness-subsequent       Subjective   History of Present Illness:  Chente Rich is a 66 y.o. male who presents for a Subsequent Medicare Wellness Visit.    HEALTH RISK ASSESSMENT    Recent Hospitalizations:  No hospitalization(s) within the last year.    Current Medical Providers:  Patient Care Team:  Millie Loya MD as PCP - General (Internal Medicine)  Tru Pop MD as Consulting Physician (Ophthalmology)  Shelley Soliman MD as Consulting Physician (Dermatology)    Smoking Status:  Social History     Tobacco Use   Smoking Status Never Smoker   Smokeless Tobacco Never Used       Alcohol Consumption:  Social History     Substance and Sexual Activity   Alcohol Use Yes    Comment: 1 drink (beer) daily       Depression Screen:   PHQ-2/PHQ-9 Depression Screening 6/2/2021   Little interest or pleasure in doing things 0   Feeling down, depressed, or hopeless 0   Total Score 0       Fall Risk Screen:  SARITHA Fall Risk Assessment was completed, and patient is at LOW risk for falls.Assessment completed on:6/2/2021    Health Habits and Functional and Cognitive Screening:  Functional & Cognitive Status 6/2/2021   Do you have difficulty preparing food and eating? No   Do you have difficulty bathing yourself, getting dressed or grooming yourself? No   Do you have difficulty using the toilet? No   Do you have difficulty moving around from place to place? No   Do you have trouble with steps or getting out of a bed or a chair? -   Current Diet Well Balanced Diet   Dental Exam Up to date   Eye Exam Up to date   Exercise (times per week) 5 times per week   Current Exercise Activities Include Weightlifting   Do you need help using the phone?  No   Are you deaf or do you have serious difficulty hearing?  No   Do you need help with transportation? No   Do you need help shopping? No    Do you need help preparing meals?  No   Do you need help with housework?  No   Do you need help with laundry? No   Do you need help taking your medications? No   Do you need help managing money? No   Do you ever drive or ride in a car without wearing a seat belt? No   Have you felt unusual stress, anger or loneliness in the last month? No   Who do you live with? Spouse   If you need help, do you have trouble finding someone available to you? No   Have you been bothered in the last four weeks by sexual problems? No   Do you have difficulty concentrating, remembering or making decisions? -         Does the patient have evidence of cognitive impairment? No    Asprin use counseling:Taking ASA appropriately as indicated, stopped recently    Age-appropriate Screening Schedule:  Refer to the list below for future screening recommendations based on patient's age, sex and/or medical conditions. Orders for these recommended tests are listed in the plan section. The patient has been provided with a written plan.    Health Maintenance   Topic Date Due   • ZOSTER VACCINE (2 of 2) 02/26/2012   • INFLUENZA VACCINE  08/01/2021   • HEMOGLOBIN A1C  12/02/2021   • DIABETIC EYE EXAM  01/01/2022   • PROSTATE CANCER SCREENING  06/02/2022   • LIPID PANEL  06/02/2022   • URINE MICROALBUMIN  06/02/2022   • DIABETIC FOOT EXAM  06/03/2022   • TDAP/TD VACCINES (2 - Td or Tdap) 10/20/2027          The following portions of the patient's history were reviewed and updated as appropriate: allergies, current medications, past family history, past medical history, past social history, past surgical history and problem list.    Outpatient Medications Prior to Visit   Medication Sig Dispense Refill   • metFORMIN ER (GLUCOPHAGE-XR) 500 MG 24 hr tablet TAKE ONE TABLET BY MOUTH DAILY 30 tablet 5   • simvastatin (ZOCOR) 20 MG tablet Take 1 tablet by mouth Daily. 90 tablet 3   • aspirin 81 MG EC tablet Take 81 mg by mouth Daily.       No  "facility-administered medications prior to visit.       Patient Active Problem List   Diagnosis   • Hyperlipidemia   • Controlled type 2 diabetes mellitus without complication, without long-term current use of insulin (CMS/[facility])   • Coronary artery calcification   • BCC (basal cell carcinoma of skin)   • Diverticulosis   • Colon polyp       Advanced Care Planning:  ACP discussion was held with the patient during this visit. Patient has an advance directive (not in EMR), copy requested.  ACP information provided on the AVS.    Review of Systems    Compared to one year ago, the patient feels his physical health is better.  Compared to one year ago, the patient feels his mental health is the same.    Reviewed chart for potential of high risk medication in the elderly: yes  Reviewed chart for potential of harmful drug interactions in the elderly:yes    Objective         Vitals:    06/02/21 0912   BP: 141/73   BP Location: Right arm   Pulse: (!) 49   Resp: 20   Temp: 97.1 °F (36.2 °C)   TempSrc: Temporal   Weight: 68.9 kg (152 lb)   Height: 177.2 cm (69.75\")     Finger Rub Hearing{Test (right ear):passed  Finger Rub Hearing{Test (left ear):passed    Body mass index is 21.97 kg/m².  Patient's Body mass index is 21.97 kg/m². indicating that he is within normal range (BMI 18.5-24.9). No BMI management plan needed..      Physical Exam    Lab Results   Component Value Date    TRIG 63 06/02/2021    HDL 77 (H) 06/02/2021    LDL 80 06/02/2021    VLDL 12 06/02/2021    HGBA1C 6.0 06/02/2021        Assessment/Plan   Medicare Risks and Personalized Health Plan  CMS Preventative Services Quick Reference  Advance Directive Discussion  Cardiovascular risk- Re-start Aspirin  Depression/Dysphoria- screening  Fall Risk- screening  Immunizations Discussed/Encouraged (specific immunizations; Shingrix )  Prostate Cancer Screening     The above risks/problems have been discussed with the patient.  Pertinent information has been shared with the " patient in the After Visit Summary.  Follow up plans and orders are seen below in the Assessment/Plan Section.    Diagnoses and all orders for this visit:    1. Medicare annual wellness visit, subsequent (Primary)    2. Encounter for health maintenance examination in adult  -     POC Glycosylated Hemoglobin (Hb A1C)  -     POC Microalbumin  -     POC Urinalysis Dipstick, Automated  -     Lipid Panel  -     Comprehensive Metabolic Panel  -     CBC & Differential  -     TSH    3. Controlled type 2 diabetes mellitus without complication, without long-term current use of insulin (CMS/[facility])  -     POC Glycosylated Hemoglobin (Hb A1C)  -     POC Microalbumin  -     POC Urinalysis Dipstick, Automated  -     Lipid Panel  -     Comprehensive Metabolic Panel  -     CBC & Differential  -     TSH    4. Other hyperlipidemia  -     Lipid Panel  -     Comprehensive Metabolic Panel  -     CBC & Differential  -     TSH    5. Coronary artery calcification  -     aspirin 81 MG EC tablet; Take 1 tablet by mouth Daily.    6. Polyp of colon, unspecified part of colon, unspecified type    7. Screening for prostate cancer  -     PSA Screen      Follow Up:  Return in about 6 months (around 12/2/2021) for Recheck  Diabetes, fasting.     An After Visit Summary and PPPS were given to the patient.

## 2021-06-06 DIAGNOSIS — R79.89 ELEVATED SERUM CREATININE: Primary | ICD-10-CM

## 2021-06-29 ENCOUNTER — LAB (OUTPATIENT)
Dept: INTERNAL MEDICINE | Facility: CLINIC | Age: 66
End: 2021-06-29

## 2021-06-29 DIAGNOSIS — R79.89 ELEVATED SERUM CREATININE: ICD-10-CM

## 2021-06-29 PROCEDURE — 82565 ASSAY OF CREATININE: CPT | Performed by: INTERNAL MEDICINE

## 2021-06-30 LAB
CREAT SERPL-MCNC: 1.55 MG/DL (ref 0.76–1.27)
GFR SERPL CREATININE-BSD FRML MDRD: 45 ML/MIN/1.73

## 2021-07-20 ENCOUNTER — TELEPHONE (OUTPATIENT)
Dept: INTERNAL MEDICINE | Facility: CLINIC | Age: 66
End: 2021-07-20

## 2021-07-20 NOTE — TELEPHONE ENCOUNTER
Would recommend he try Allegra and Flonase, or other similar allergy medications for 1 to 2 days.  If no better, he should be seen.  Please put him on the schedule with me this Thursday or Friday, and he can call and cancel the appointment if his symptoms resolved.

## 2021-07-20 NOTE — TELEPHONE ENCOUNTER
He states he started having dizziness 4 days ago after going for a walk .   He mowed the grass Saturday as he felt a little better and thought he may be dehydrated so he increased his fluids. Later that day  the dizziness got worse  he thought he was  going to fall down.  Today  the dizziness has got much worse   He has popping in his ears when he lays down.

## 2021-07-20 NOTE — TELEPHONE ENCOUNTER
Caller: Chente Rich    Relationship to patient: Self    Best call back number: [phone]    Chief complaint: DIZZINESS    Type of visit: ACUTE    Requested date: TODAY    Additional notes: PATIENT STATES THAT THE DIZZINESS HAS GONE ON FOR A FEW DAYS.

## 2021-07-20 NOTE — TELEPHONE ENCOUNTER
Pt notified of message from Dr. Loya. Would recommend he try Allegra and Flonase, or other similar allergy medications for 1 to 2 days.  If no better, he should be seen.  Please put him on the schedule with me this Thursday or Friday, and he can call and cancel the appointment if his symptoms resolved. Pt is scheduled for 07/23/2021

## 2021-07-23 ENCOUNTER — OFFICE VISIT (OUTPATIENT)
Dept: INTERNAL MEDICINE | Facility: CLINIC | Age: 66
End: 2021-07-23

## 2021-07-23 VITALS
HEART RATE: 71 BPM | BODY MASS INDEX: 21.39 KG/M2 | WEIGHT: 148 LBS | DIASTOLIC BLOOD PRESSURE: 80 MMHG | RESPIRATION RATE: 20 BRPM | TEMPERATURE: 97.2 F | SYSTOLIC BLOOD PRESSURE: 158 MMHG

## 2021-07-23 DIAGNOSIS — R42 DIZZINESS: Primary | ICD-10-CM

## 2021-07-23 PROCEDURE — 99214 OFFICE O/P EST MOD 30 MIN: CPT | Performed by: INTERNAL MEDICINE

## 2021-07-23 RX ORDER — METHYLPREDNISOLONE 4 MG/1
TABLET ORAL
Qty: 1 EACH | Refills: 0 | Status: SHIPPED | OUTPATIENT
Start: 2021-07-23 | End: 2021-09-07

## 2021-07-23 NOTE — PROGRESS NOTES
Subjective       Chente Rich is a 66 y.o. male.     Chief Complaint   Patient presents with   • Dizziness     improved with Flonase        History obtained from the patient.      Dizziness  This is a new problem. Episode onset: 7/16/21. The problem occurs intermittently. The problem has been gradually improving. Associated symptoms include congestion and fatigue (from Zyrtec). Pertinent negatives include no abdominal pain, arthralgias, chest pain, chills, coughing, fever, headaches, myalgias, nausea, numbness (and no tingling), rash, sore throat, swollen glands, urinary symptoms, vertigo, visual change, vomiting or weakness. Exacerbated by: certain movements. Treatments tried: Zyrtec and Flonase. The treatment provided significant relief.        The following portions of the patient's history were reviewed and updated as appropriate: allergies, current medications, past family history, past medical history, past social history, past surgical history and problem list.      Review of Systems   Constitutional: Positive for fatigue (from Zyrtec). Negative for chills and fever.   HENT: Positive for congestion and sinus pressure. Negative for ear pain, postnasal drip, rhinorrhea and sore throat.    Respiratory: Negative for cough and shortness of breath.    Cardiovascular: Negative for chest pain.   Gastrointestinal: Negative for abdominal pain, nausea and vomiting.   Musculoskeletal: Negative for arthralgias and myalgias.   Skin: Negative for rash.   Neurological: Positive for dizziness and light-headedness. Negative for vertigo, weakness, numbness (and no tingling) and headaches.   Hematological: Negative for adenopathy.           Objective     Blood pressure 158/80, pulse 71, temperature 97.2 °F (36.2 °C), temperature source Temporal, resp. rate 20, weight 67.1 kg (148 lb).    Physical Exam  Vitals and nursing note reviewed.   Constitutional:       Appearance: He is well-developed and normal weight.   HENT:       Head: Normocephalic and atraumatic.      Right Ear: Tympanic membrane, ear canal and external ear normal.      Left Ear: Tympanic membrane, ear canal and external ear normal.      Mouth/Throat:      Mouth: Mucous membranes are moist.      Pharynx: Oropharynx is clear.   Eyes:      Extraocular Movements: Extraocular movements intact.      Conjunctiva/sclera: Conjunctivae normal.      Pupils: Pupils are equal, round, and reactive to light.   Neck:      Vascular: No carotid bruit.   Cardiovascular:      Rate and Rhythm: Normal rate.      Heart sounds: Normal heart sounds. No murmur heard.     Pulmonary:      Effort: Pulmonary effort is normal.      Breath sounds: Normal breath sounds.   Musculoskeletal:         General: Normal range of motion.      Cervical back: Normal range of motion and neck supple.   Skin:     Findings: No rash.   Neurological:      Mental Status: He is alert and oriented to person, place, and time.      Cranial Nerves: Cranial nerves are intact.      Motor: Motor function is intact.      Gait: Gait normal.      Deep Tendon Reflexes: Reflexes are normal and symmetric.   Psychiatric:         Mood and Affect: Mood normal.           Assessment/Plan   Diagnoses and all orders for this visit:    1. Dizziness (Primary)  -     methylPREDNISolone (MEDROL) 4 MG dose pack; Take as directed on package instructions.  Dispense: 1 each; Refill: 0     Continue Zyrtec and Flonase.      Return for Next scheduled follow up.

## 2021-09-07 ENCOUNTER — OFFICE VISIT (OUTPATIENT)
Dept: INTERNAL MEDICINE | Facility: CLINIC | Age: 66
End: 2021-09-07

## 2021-09-07 ENCOUNTER — LAB (OUTPATIENT)
Dept: LAB | Facility: HOSPITAL | Age: 66
End: 2021-09-07

## 2021-09-07 VITALS
BODY MASS INDEX: 21.82 KG/M2 | WEIGHT: 151 LBS | TEMPERATURE: 97 F | HEART RATE: 74 BPM | SYSTOLIC BLOOD PRESSURE: 132 MMHG | DIASTOLIC BLOOD PRESSURE: 72 MMHG | RESPIRATION RATE: 20 BRPM

## 2021-09-07 DIAGNOSIS — E11.9 CONTROLLED TYPE 2 DIABETES MELLITUS WITHOUT COMPLICATION, WITHOUT LONG-TERM CURRENT USE OF INSULIN (HCC): Primary | ICD-10-CM

## 2021-09-07 LAB
ANION GAP SERPL CALCULATED.3IONS-SCNC: 10.1 MMOL/L (ref 5–15)
BUN SERPL-MCNC: 17 MG/DL (ref 8–23)
BUN/CREAT SERPL: 12.5 (ref 7–25)
CALCIUM SPEC-SCNC: 9.4 MG/DL (ref 8.6–10.5)
CHLORIDE SERPL-SCNC: 100 MMOL/L (ref 98–107)
CO2 SERPL-SCNC: 28.9 MMOL/L (ref 22–29)
CREAT SERPL-MCNC: 1.36 MG/DL (ref 0.76–1.27)
EXPIRATION DATE: NORMAL
GFR SERPL CREATININE-BSD FRML MDRD: 52 ML/MIN/1.73
GLUCOSE SERPL-MCNC: 114 MG/DL (ref 65–99)
HBA1C MFR BLD: 6 %
Lab: NORMAL
POTASSIUM SERPL-SCNC: 4.1 MMOL/L (ref 3.5–5.2)
SODIUM SERPL-SCNC: 139 MMOL/L (ref 136–145)

## 2021-09-07 PROCEDURE — 36415 COLL VENOUS BLD VENIPUNCTURE: CPT | Performed by: INTERNAL MEDICINE

## 2021-09-07 PROCEDURE — 99213 OFFICE O/P EST LOW 20 MIN: CPT | Performed by: INTERNAL MEDICINE

## 2021-09-07 PROCEDURE — 80048 BASIC METABOLIC PNL TOTAL CA: CPT | Performed by: INTERNAL MEDICINE

## 2021-09-07 NOTE — PROGRESS NOTES
Subjective       Chente Rich is a 66 y.o. male.     Chief Complaint   Patient presents with   • Diabetes       History obtained from the patient.      History of Present Illness     Cardiac Follow-Up:  The patient is here for a follow-up visit.  His Diabetes has been stable.  Medication: Simvastatin.  His Hyperlipidemia has been stable.  LDL goal < 70. Last  LDL was  80, TG 63.  Medication: Simvastatin.  Side Effects: None.     Comorbid Illness: Coronary Artery Calcification.  Takes Aspirin 81 mg daily.      Procedures: On 10/12/2017, Cardiac CT scan showed a Calcium Score of 133. On 6/19/20, he had a negative stress echo, EF 60%     Interval Events: Last Hemoglobin A1c on 6/2/2021 was 6.0.  Creatinine was 1.41.  He has been off his Metformin since 6/20/2021 due to a persistently  elevated Creatinine (1.55).  He does not check his blood sugar or blood pressure at home.  His last Ophthalmology visit was on January 2021, no retinopathy per patient report.  He does check his feet daily.       Symptoms: Denies chest pain, shortness of breath, OLIVEIRA, orthopnea, PND, palpitations, syncope, lower extremity edema, claudication, lightheadedness, and dizziness.    Associated Symptoms: No significant weight change since last visit. Denies fatigue, headache, polydipsia, polyuria, myalgias, arthralgias, visual impairment,  memory loss, concentration issues, and focal neurological deficit. No foot pain, numbness, tingling, or ulcer.     Lifestyle: The patient follows a diverse and healthy diet.  Exercise: He walks or runs, and lifts weights 5 times per week.  Tobacco Use: Never a smoker.    Current Outpatient Medications on File Prior to Visit   Medication Sig Dispense Refill   • aspirin 81 MG EC tablet Take 1 tablet by mouth Daily.     • simvastatin (ZOCOR) 20 MG tablet Take 1 tablet by mouth Daily. 90 tablet 3   • [DISCONTINUED] methylPREDNISolone (MEDROL) 4 MG dose pack Take as directed on package instructions. 1 each 0      No current facility-administered medications on file prior to visit.       Current outpatient and discharge medications have been reconciled for the patient.  Reviewed by: Millie Loya MD        The following portions of the patient's history were reviewed and updated as appropriate: allergies, current medications, past family history, past medical history, past social history, past surgical history and problem list.    Review of Systems   Constitutional: Negative for fatigue and unexpected weight change.   Eyes: Negative for visual disturbance.   Respiratory: Negative for cough, shortness of breath and wheezing.    Cardiovascular: Negative for chest pain, palpitations and leg swelling.        No OLIVEIRA, orthopnea, or claudication.   Gastrointestinal: Negative for abdominal pain, blood in stool, constipation, diarrhea, nausea and vomiting.        Denies melena.   Endocrine: Negative for polydipsia and polyuria.   Musculoskeletal: Negative for arthralgias and myalgias.   Neurological: Negative for dizziness, syncope, light-headedness and headaches.        No memory issues.   Psychiatric/Behavioral: Negative for decreased concentration.         Objective       Blood pressure 132/72, pulse 74, temperature 97 °F (36.1 °C), resp. rate 20, weight 68.5 kg (151 lb).  Body mass index is 21.82 kg/m².      Physical Exam  Vitals and nursing note reviewed.   Constitutional:       Appearance: He is normal weight.   Neck:      Vascular: No carotid bruit.   Cardiovascular:      Rate and Rhythm: Normal rate and regular rhythm.      Heart sounds: Normal heart sounds. No murmur heard.     Pulmonary:      Effort: Pulmonary effort is normal.      Breath sounds: Normal breath sounds.   Neurological:      Mental Status: He is alert.   Psychiatric:         Mood and Affect: Mood normal.       Results for orders placed or performed in visit on 09/07/21   POC Glycosylated Hemoglobin (Hb A1C)    Specimen: Blood   Result Value Ref Range     Hemoglobin A1C 6.0 %    Lot Number [id number]     Expiration Date 04/21/2023        Assessment / Plan:  Diagnoses and all orders for this visit:    1. Controlled type 2 diabetes mellitus without complication, without long-term current use of insulin (CMS/[facility]) (Primary)  -     Basic Metabolic Panel  -     POC Glycosylated Hemoglobin (Hb A1C)          Return in about 3 months (around 12/7/2021) for Recheck Diabetes, fasting.

## 2021-12-08 ENCOUNTER — LAB (OUTPATIENT)
Dept: LAB | Facility: HOSPITAL | Age: 66
End: 2021-12-08

## 2021-12-08 ENCOUNTER — OFFICE VISIT (OUTPATIENT)
Dept: INTERNAL MEDICINE | Facility: CLINIC | Age: 66
End: 2021-12-08

## 2021-12-08 VITALS
BODY MASS INDEX: 22.27 KG/M2 | TEMPERATURE: 96.9 F | WEIGHT: 154.13 LBS | HEART RATE: 56 BPM | DIASTOLIC BLOOD PRESSURE: 76 MMHG | RESPIRATION RATE: 20 BRPM | SYSTOLIC BLOOD PRESSURE: 143 MMHG

## 2021-12-08 DIAGNOSIS — E11.9 CONTROLLED TYPE 2 DIABETES MELLITUS WITHOUT COMPLICATION, WITHOUT LONG-TERM CURRENT USE OF INSULIN (HCC): Primary | ICD-10-CM

## 2021-12-08 DIAGNOSIS — E11.9 CONTROLLED TYPE 2 DIABETES MELLITUS WITHOUT COMPLICATION, WITHOUT LONG-TERM CURRENT USE OF INSULIN (HCC): ICD-10-CM

## 2021-12-08 DIAGNOSIS — K63.5 POLYP OF COLON, UNSPECIFIED PART OF COLON, UNSPECIFIED TYPE: ICD-10-CM

## 2021-12-08 DIAGNOSIS — E78.49 OTHER HYPERLIPIDEMIA: ICD-10-CM

## 2021-12-08 LAB
BASOPHILS # BLD AUTO: 0.05 10*3/MM3 (ref 0–0.2)
BASOPHILS NFR BLD AUTO: 0.8 % (ref 0–1.5)
DEPRECATED RDW RBC AUTO: 44.9 FL (ref 37–54)
EOSINOPHIL # BLD AUTO: 0.4 10*3/MM3 (ref 0–0.4)
EOSINOPHIL NFR BLD AUTO: 6.2 % (ref 0.3–6.2)
ERYTHROCYTE [DISTWIDTH] IN BLOOD BY AUTOMATED COUNT: 12.7 % (ref 12.3–15.4)
HBA1C MFR BLD: 6.57 % (ref 4.8–5.6)
HCT VFR BLD AUTO: 44.9 % (ref 37.5–51)
HGB BLD-MCNC: 14.8 G/DL (ref 13–17.7)
IMM GRANULOCYTES # BLD AUTO: 0.02 10*3/MM3 (ref 0–0.05)
IMM GRANULOCYTES NFR BLD AUTO: 0.3 % (ref 0–0.5)
LYMPHOCYTES # BLD AUTO: 2.14 10*3/MM3 (ref 0.7–3.1)
LYMPHOCYTES NFR BLD AUTO: 32.9 % (ref 19.6–45.3)
MCH RBC QN AUTO: 31.6 PG (ref 26.6–33)
MCHC RBC AUTO-ENTMCNC: 33 G/DL (ref 31.5–35.7)
MCV RBC AUTO: 95.9 FL (ref 79–97)
MONOCYTES # BLD AUTO: 0.68 10*3/MM3 (ref 0.1–0.9)
MONOCYTES NFR BLD AUTO: 10.5 % (ref 5–12)
NEUTROPHILS NFR BLD AUTO: 3.21 10*3/MM3 (ref 1.7–7)
NEUTROPHILS NFR BLD AUTO: 49.3 % (ref 42.7–76)
NRBC BLD AUTO-RTO: 0 /100 WBC (ref 0–0.2)
PLATELET # BLD AUTO: 283 10*3/MM3 (ref 140–450)
PMV BLD AUTO: 10.2 FL (ref 6–12)
RBC # BLD AUTO: 4.68 10*6/MM3 (ref 4.14–5.8)
WBC NRBC COR # BLD: 6.5 10*3/MM3 (ref 3.4–10.8)

## 2021-12-08 PROCEDURE — 85025 COMPLETE CBC W/AUTO DIFF WBC: CPT

## 2021-12-08 PROCEDURE — 99214 OFFICE O/P EST MOD 30 MIN: CPT | Performed by: INTERNAL MEDICINE

## 2021-12-08 PROCEDURE — 80061 LIPID PANEL: CPT

## 2021-12-08 PROCEDURE — 83036 HEMOGLOBIN GLYCOSYLATED A1C: CPT

## 2021-12-08 PROCEDURE — 80053 COMPREHEN METABOLIC PANEL: CPT

## 2021-12-08 NOTE — PROGRESS NOTES
Subjective       Chente Rich is a 66 y.o. male.     Chief Complaint   Patient presents with   • Diabetes     3 month follow up fasting       History obtained from the patient.      History of Present Illness        Cardiac Follow-Up:  The patient is here for a follow-up visit.  His Diabetes has been stable.  Medication: Simvastatin.  His Hyperlipidemia has been stable.  LDL goal < 70. Last  LDL was 80, TG 63.  Medication: Simvastatin.  Side Effects: None.     Comorbid Illness: Coronary Artery Calcification.  Takes Aspirin 81 mg daily.      Procedures: On 10/12/2017, Cardiac CT scan showed a Calcium Score of 133. On 6/19/20, he had a negative stress echo, EF 60%     Interval Events: Last Hemoglobin A1c on 9/7/2021 was 6.0.  Creatinine was 1.36.  He has been off his Metformin since 6/20/2021 due to a persistently elevated Creatinine (1.55).  He does not check his blood sugar or blood pressure at home.  His last Ophthalmology visit was in October 2021, no retinopathy per patient report.  He does check his feet daily.       Symptoms: Denies chest pain, shortness of breath, OLIVEIRA, orthopnea, PND, palpitations, syncope, lower extremity edema, claudication, lightheadedness, and dizziness.    Associated Symptoms: Weight up 3 pounds since last visit.  Reports fatigue for 15 minutes after eating dinner.  Denies headache, polydipsia, polyuria, myalgias, arthralgias, visual impairment, memory loss, concentration issues, and focal neurological deficit. No foot pain, numbness, tingling, or ulcer.     Lifestyle: The patient follows a diverse and healthy diet. Exercise: He walks 3-4 times per week and and does strength training to times per week.  Tobacco Use: Never a smoker.    Colon Polyp Follow-Up: The patient is here for follow-up of colon polyps, which have been stable.    Interval Events: Last Colonoscopy on 7/24/2017 showed an ascending colon tubular adenoma and pandiverticulosis.  Per report, preceding colonoscopies had  been clear.    Symptoms: Denies abdominal pain, diarrhea, constipation, hematochezia, melena, and changes in stool.    Medication: None.    Current Outpatient Medications on File Prior to Visit   Medication Sig Dispense Refill   • aspirin 81 MG EC tablet Take 1 tablet by mouth Daily.     • simvastatin (ZOCOR) 20 MG tablet Take 1 tablet by mouth Daily. 90 tablet 3     No current facility-administered medications on file prior to visit.       Current outpatient and discharge medications have been reconciled for the patient.  Reviewed by: Millie Loya MD        The following portions of the patient's history were reviewed and updated as appropriate: allergies, current medications, past family history, past medical history, past social history, past surgical history and problem list.    Review of Systems   Constitutional: Positive for fatigue. Negative for unexpected weight change.   Eyes: Negative for visual disturbance.   Respiratory: Negative for cough, shortness of breath and wheezing.    Cardiovascular: Negative for chest pain, palpitations and leg swelling.        No OLIVEIRA, orthopnea, or claudication.   Gastrointestinal: Negative for abdominal pain, blood in stool, constipation, diarrhea, nausea and vomiting.        Denies melena.   Endocrine: Negative for polydipsia and polyuria.   Musculoskeletal: Negative for arthralgias and myalgias.   Neurological: Negative for dizziness, syncope, light-headedness and headaches.        No memory issues.   Psychiatric/Behavioral: Negative for decreased concentration.         Objective       Blood pressure 143/76, pulse 56, temperature 96.9 °F (36.1 °C), temperature source Temporal, resp. rate 20, weight 69.9 kg (154 lb 2 oz).  Body mass index is 22.27 kg/m².      Physical Exam  Vitals and nursing note reviewed.   Constitutional:       Appearance: He is well-developed and normal weight.   Neck:      Thyroid: No thyroid mass or thyromegaly.      Vascular: No carotid bruit.    Cardiovascular:      Rate and Rhythm: Normal rate and regular rhythm.      Pulses: Normal pulses.      Heart sounds: Normal heart sounds. No murmur heard.  No friction rub. No gallop.    Pulmonary:      Effort: Pulmonary effort is normal.      Breath sounds: Normal breath sounds.   Musculoskeletal:      Right lower leg: No edema.      Left lower leg: No edema.   Neurological:      Mental Status: He is alert.   Psychiatric:         Mood and Affect: Mood normal.         Assessment / Plan:  Diagnoses and all orders for this visit:    1. Controlled type 2 diabetes mellitus without complication, without long-term current use of insulin (HCC) (Primary)  -     Lipid Panel; Future  -     Comprehensive Metabolic Panel; Future  -     Hemoglobin A1c; Future  -     CBC & Differential; Future    Continue current medication(s) as noted in the history of present illness.    2. Other hyperlipidemia  -     Lipid Panel; Future  -     Comprehensive Metabolic Panel; Future  -     CBC & Differential; Future   Continue current medication(s) as noted in the history of present illness.    3. Polyp of colon, unspecified part of colon, unspecified type   Colonoscopy up-to-date.      Return in about 6 months (around 6/8/2022) for Annual physical, fasting, and schedule subseq Medicare Wellness Exam (same appt)- after 6/2/22.

## 2021-12-09 LAB
ALBUMIN SERPL-MCNC: 4.7 G/DL (ref 3.5–5.2)
ALBUMIN/GLOB SERPL: 2 G/DL
ALP SERPL-CCNC: 53 U/L (ref 39–117)
ALT SERPL W P-5'-P-CCNC: 14 U/L (ref 1–41)
ANION GAP SERPL CALCULATED.3IONS-SCNC: 8.8 MMOL/L (ref 5–15)
AST SERPL-CCNC: 27 U/L (ref 1–40)
BILIRUB SERPL-MCNC: 0.5 MG/DL (ref 0–1.2)
BUN SERPL-MCNC: 19 MG/DL (ref 8–23)
BUN/CREAT SERPL: 14.8 (ref 7–25)
CALCIUM SPEC-SCNC: 10 MG/DL (ref 8.6–10.5)
CHLORIDE SERPL-SCNC: 100 MMOL/L (ref 98–107)
CHOLEST SERPL-MCNC: 176 MG/DL (ref 0–200)
CO2 SERPL-SCNC: 29.2 MMOL/L (ref 22–29)
CREAT SERPL-MCNC: 1.28 MG/DL (ref 0.76–1.27)
GFR SERPL CREATININE-BSD FRML MDRD: 56 ML/MIN/1.73
GLOBULIN UR ELPH-MCNC: 2.4 GM/DL
GLUCOSE SERPL-MCNC: 118 MG/DL (ref 65–99)
HDLC SERPL-MCNC: 77 MG/DL (ref 40–60)
LDLC SERPL CALC-MCNC: 86 MG/DL (ref 0–100)
LDLC/HDLC SERPL: 1.11 {RATIO}
POTASSIUM SERPL-SCNC: 4.8 MMOL/L (ref 3.5–5.2)
PROT SERPL-MCNC: 7.1 G/DL (ref 6–8.5)
SODIUM SERPL-SCNC: 138 MMOL/L (ref 136–145)
TRIGL SERPL-MCNC: 68 MG/DL (ref 0–150)
VLDLC SERPL-MCNC: 13 MG/DL (ref 5–40)

## 2021-12-27 ENCOUNTER — TELEPHONE (OUTPATIENT)
Dept: INTERNAL MEDICINE | Facility: CLINIC | Age: 66
End: 2021-12-27

## 2021-12-27 NOTE — TELEPHONE ENCOUNTER
Spoke to patient he stated that his medication was 500 dollars and that the farxiga is covered on his insurance. Patient is leaving out of town in one month and wont be back til March 1st.

## 2021-12-27 NOTE — TELEPHONE ENCOUNTER
I would prefer not to start any medication while he is out of town.  His Hemoglobin A1c is still reasonable, so we can hold off on medication for now.  I would like him to schedule a follow-up appointment with me after 3/8/2022 to recheck the hemoglobin A1c.  He does not need to fast.

## 2021-12-27 NOTE — TELEPHONE ENCOUNTER
Call patient please.  His kidney function continues to improve off the Metformin, but his Hemoglobin A1c has gone up to 6.57.  I would like to start him on Jardiance and see him back in 1 months, non-fasting.  If he is agreeable, I will send a prescription to the pharmacy.  I will also send a lab letter.

## 2021-12-27 NOTE — TELEPHONE ENCOUNTER
His kidney function continues to improve off the Metformin, but his Hemoglobin A1c has gone up to 6.57.  I would like to start him on Jardiance and see him back in 1 months, non-fasting.  If he is agreeable, I will send a prescription to the pharmacy.  I will also send a lab letter    HUB RELAYED MESSAGE.     PATIENT WOULD LIKE TO DISCUSS THE JARDIANCE, DOESN'T KNOW ANYTHING ABOUT IT.     PLEASE CALL [phone]

## 2022-01-03 RX ORDER — SIMVASTATIN 20 MG
TABLET ORAL
Qty: 30 TABLET | Refills: 0 | Status: SHIPPED | OUTPATIENT
Start: 2022-01-03 | End: 2022-01-10

## 2022-01-10 RX ORDER — SIMVASTATIN 20 MG
TABLET ORAL
Qty: 30 TABLET | Refills: 3 | Status: SHIPPED | OUTPATIENT
Start: 2022-01-10 | End: 2022-06-01

## 2022-03-16 ENCOUNTER — LAB (OUTPATIENT)
Dept: LAB | Facility: HOSPITAL | Age: 67
End: 2022-03-16

## 2022-03-16 ENCOUNTER — OFFICE VISIT (OUTPATIENT)
Dept: INTERNAL MEDICINE | Facility: CLINIC | Age: 67
End: 2022-03-16

## 2022-03-16 VITALS
DIASTOLIC BLOOD PRESSURE: 66 MMHG | HEART RATE: 50 BPM | BODY MASS INDEX: 22.13 KG/M2 | RESPIRATION RATE: 20 BRPM | TEMPERATURE: 97.3 F | WEIGHT: 153.13 LBS | SYSTOLIC BLOOD PRESSURE: 140 MMHG

## 2022-03-16 DIAGNOSIS — E78.49 OTHER HYPERLIPIDEMIA: ICD-10-CM

## 2022-03-16 DIAGNOSIS — E11.9 CONTROLLED TYPE 2 DIABETES MELLITUS WITHOUT COMPLICATION, WITHOUT LONG-TERM CURRENT USE OF INSULIN: Primary | ICD-10-CM

## 2022-03-16 LAB
ANION GAP SERPL CALCULATED.3IONS-SCNC: 10 MMOL/L (ref 5–15)
BUN SERPL-MCNC: 12 MG/DL (ref 8–23)
BUN/CREAT SERPL: 9.4 (ref 7–25)
CALCIUM SPEC-SCNC: 9.4 MG/DL (ref 8.6–10.5)
CHLORIDE SERPL-SCNC: 100 MMOL/L (ref 98–107)
CO2 SERPL-SCNC: 27 MMOL/L (ref 22–29)
CREAT SERPL-MCNC: 1.28 MG/DL (ref 0.76–1.27)
EGFRCR SERPLBLD CKD-EPI 2021: 60.6 ML/MIN/1.73
EXPIRATION DATE: NORMAL
GLUCOSE SERPL-MCNC: 125 MG/DL (ref 65–99)
HBA1C MFR BLD: 6.2 %
Lab: NORMAL
POTASSIUM SERPL-SCNC: 4.1 MMOL/L (ref 3.5–5.2)
SODIUM SERPL-SCNC: 137 MMOL/L (ref 136–145)

## 2022-03-16 PROCEDURE — 99214 OFFICE O/P EST MOD 30 MIN: CPT | Performed by: INTERNAL MEDICINE

## 2022-03-16 PROCEDURE — 3044F HG A1C LEVEL LT 7.0%: CPT | Performed by: INTERNAL MEDICINE

## 2022-03-16 PROCEDURE — 80048 BASIC METABOLIC PNL TOTAL CA: CPT | Performed by: INTERNAL MEDICINE

## 2022-03-16 PROCEDURE — 83036 HEMOGLOBIN GLYCOSYLATED A1C: CPT | Performed by: INTERNAL MEDICINE

## 2022-03-16 NOTE — PROGRESS NOTES
"Subjective       Chente Rich is a 67 y.o. male.     Chief Complaint   Patient presents with   • Diabetes     Diabetes fasting          History obtained from the patient.    The patient reports that while walking 1.5 hours 3 weeks ago he developed a \"buzzing bottom\".  He felt like there was a pulsing in his rectum, and actually looked up the name.  This sensation lasted 2 days and then resolved completely without recurrence.      History of Present Illness       Cardiac Follow-Up:  The patient is here for a follow-up visit.  His Diabetes has been stable.  Medication: Simvastatin.  His Hyperlipidemia has been stable.  LDL goal < 70. Last  LDL was 86, TG 68.  Medication: Simvastatin.  Side Effects: None.     Comorbid Illness: Coronary Artery Calcification.  Takes Aspirin 81 mg daily.      Procedures: On 10/12/2017, Cardiac CT scan showed a Calcium Score of 133. On 6/19/20, he had a negative stress echo, EF 60%     Interval Events: Last Hemoglobin A1c on 12/18/2021 was 6.57.  Creatinine was 1.  2 8 (improved.  He has been off his Metformin since 6/20/2021 due to the elevated Creatinine (initially 1.55).  He does not check his blood sugar or blood pressure at home.  His last Ophthalmology visit was in October 2021, no retinopathy per patient report.  He does check his feet daily.       Symptoms: Denies chest pain, shortness of breath, OLIVEIRA, orthopnea, PND, palpitations, syncope, lower extremity edema, claudication, lightheadedness, and dizziness.    Associated Symptoms: No significant weight change in the past 3 months.   Denies fatigue, headache, polydipsia, polyuria, myalgias, arthralgias, visual impairment, memory loss, concentration issues, and focal neurological deficit. No foot pain, numbness, tingling, or ulcer.     Lifestyle: The patient follows a diverse and healthy diet. Exercise: He exercises 5 times per week (walks, does strength training, and lifts weights).  Tobacco Use: Never a smoker.       Current " Outpatient Medications on File Prior to Visit   Medication Sig Dispense Refill   • aspirin 81 MG EC tablet Take 1 tablet by mouth Daily.     • simvastatin (ZOCOR) 20 MG tablet TAKE ONE TABLET BY MOUTH DAILY 30 tablet 3     No current facility-administered medications on file prior to visit.       Current outpatient and discharge medications have been reconciled for the patient.  Reviewed by: Millie Loya MD        The following portions of the patient's history were reviewed and updated as appropriate: allergies, current medications, past family history, past medical history, past social history, past surgical history and problem list.    Review of Systems   Constitutional: Negative for fatigue and unexpected weight change.   Eyes: Negative for visual disturbance.   Respiratory: Negative for cough, shortness of breath and wheezing.    Cardiovascular: Negative for chest pain, palpitations and leg swelling.        No OLIVEIRA, orthopnea, or claudication.   Gastrointestinal: Negative for abdominal pain, blood in stool, constipation, diarrhea, nausea and vomiting.        Denies melena.   Endocrine: Negative for polydipsia and polyuria.   Musculoskeletal: Negative for arthralgias and myalgias.   Neurological: Negative for dizziness, syncope, light-headedness and headaches.        No memory issues.   Psychiatric/Behavioral: Negative for decreased concentration.         Objective       Blood pressure 140/66, pulse 50, temperature 97.3 °F (36.3 °C), temperature source Temporal, resp. rate 20, weight 69.5 kg (153 lb 2 oz).  Body mass index is 22.13 kg/m².      Physical Exam  Vitals and nursing note reviewed.   Constitutional:       Appearance: He is well-developed and normal weight.   Neck:      Thyroid: No thyroid mass or thyromegaly.      Vascular: No carotid bruit.   Cardiovascular:      Rate and Rhythm: Normal rate and regular rhythm.      Pulses: Normal pulses.      Heart sounds: Normal heart sounds. No murmur heard.    No  friction rub. No gallop.   Pulmonary:      Effort: Pulmonary effort is normal.      Breath sounds: Normal breath sounds.   Musculoskeletal:      Right lower leg: No edema.      Left lower leg: No edema.   Neurological:      Mental Status: He is alert.   Psychiatric:         Mood and Affect: Mood normal.       Results for orders placed or performed in visit on 03/16/22   POC Glycosylated Hemoglobin (Hb A1C)    Specimen: Blood   Result Value Ref Range    Hemoglobin A1C 6.2 %    Lot Number [id number]     Expiration Date 10/19/2023        Assessment / Plan:  Diagnoses and all orders for this visit:    1. Controlled type 2 diabetes mellitus without complication, without long-term current use of insulin (HCC) (Primary)  -     POC Glycosylated Hemoglobin (Hb A1C)  -     Basic Metabolic Panel   Continue current medication(s) as noted in the history of present illness.    2. Other hyperlipidemia  -     Basic Metabolic Panel   Continue current medication(s) as noted in the history of present illness.      Return for Next scheduled follow up.

## 2022-04-19 ENCOUNTER — OFFICE VISIT (OUTPATIENT)
Dept: ORTHOPEDIC SURGERY | Facility: CLINIC | Age: 67
End: 2022-04-19

## 2022-04-19 VITALS
DIASTOLIC BLOOD PRESSURE: 64 MMHG | WEIGHT: 150 LBS | HEIGHT: 70 IN | SYSTOLIC BLOOD PRESSURE: 120 MMHG | BODY MASS INDEX: 21.47 KG/M2

## 2022-04-19 DIAGNOSIS — M17.12 PRIMARY OSTEOARTHRITIS OF LEFT KNEE: ICD-10-CM

## 2022-04-19 DIAGNOSIS — S89.92XA INJURY OF LEFT KNEE, INITIAL ENCOUNTER: Primary | ICD-10-CM

## 2022-04-19 DIAGNOSIS — S83.242D TEAR OF MEDIAL MENISCUS OF LEFT KNEE, CURRENT, UNSPECIFIED TEAR TYPE, SUBSEQUENT ENCOUNTER: ICD-10-CM

## 2022-04-19 PROCEDURE — 99213 OFFICE O/P EST LOW 20 MIN: CPT | Performed by: ORTHOPAEDIC SURGERY

## 2022-04-19 NOTE — PROGRESS NOTES
"    [facility] Note        Subjective     CC: Pain of the Left Knee      HPI    Chente Rich is a 67 y.o. male.  Patient here for a 1 to 2-week history of worsening left knee pain.  He was running and felt a pop in the back of his knee.  Since that time, he has not tried to run.  Denies locking.  Had no swelling at the time of injury.  He is hesitant about injections.  Avid athlete who runs and plays tennis.    Overall, patient's symptoms are as above.    ROS:    Constiutional:Pt denies fever, chills, nausea, or vomiting.  MSK:as above        Objective      Past Medical History  Past Medical History:   Diagnosis Date   • Abnormal screening cardiac CT 10/12/2017    Calcium Score 133   • BCC (basal cell carcinoma of skin)     Dx (initially)-2010.  Has occurred on head, chest, back, and right leg.   • Colon polyp     Dx 10/17- tubular adenoma ascending colon, no dysplasia   • Diabetes mellitus (HCC)    • Diverticulosis     Dx 10/17 by Colonoscopy- pancolonic   • History of cardiovascular stress test 06/19/2020    negative stress echo, EF 60%   • Hyperlipidemia          Physical Exam  /64   Ht 177.2 cm (69.76\")   Wt 68 kg (150 lb)   BMI 21.67 kg/m²     Body mass index is 21.67 kg/m².    Patient is well nourished and well developed.        Ortho Exam      Musculoskeletal:  Global Assessment:  Overall assessment of Lower Extremity Muscle Strength and Tone:  Left quadriceps--5/5   Left hamstrings--5/5       Left tibialis anterior--5/5  Left gastroc-soleus--5/5  Left EHL--5/5      Inspection and Palpation:    Left knee:  Tenderness:  Over medial joint line  Effusion:  1+  Crepitus:  none  Pulses:  2+  Ecchymosis:  None  Warmth:  None       ROM:  Left:  Extension:0     Flexion:130    Instability:    Left:    Lachman Test:  Negative  Varus stress test negative  Valgus stress test negative   Anterior Drawer Test:  Negative  Posterior Drawer Test:  Negative    Functional " Testing:    Left:  Orlando's test:  Positive  Patella grind test:  Negative  Q-angle:  Normal  Apprehension Sign:  Negative      Imaging/Labs/EMG Reviewed:  Imaging Results (Last 24 Hours)     Procedure Component Value Units Date/Time    XR Knee 4+ View Left [[id number]] Resulted: 04/19/22 1002     Updated: 04/19/22 1003    Narrative:      Knee X-Ray    Indication: Pain    Study:  Upright AP, Skiers, Lateral, and Sunrise views of Left knee(s)    Comparison: None    Findings:    Patient appears to have moderate degenerative changes in the medial   compartment.    There are mild degenerative changes in the lateral compartment.    There are moderate changes in the patellofemoral compartment.    Patient has overall varus alignment.    Kellgren-Capo stGstrstastdstest:st st1st Impression:   Moderate medial compartment and patellofemoral compartment degnerative   changes of the knee           We have also gone back and looked at the patient's MRI from November 2020 which shows a horizontal tear of the medial meniscus and an articular cartilage defect at the posterior aspect of the medial femoral condyle    Assessment    Assessment:  1. Injury of left knee, initial encounter    2. Primary osteoarthritis of left knee    3. Tear of medial meniscus of left knee, current, unspecified tear type, subsequent encounter        Plan:  1. Recommend over the counter anti-inflammatories for pain and/or swelling  2. Left knee arthritis and medial meniscal tear in the face of a recent left knee injury--we discussed treatment options and alternatives with the patient.  At this point, he would like to try a course of physical therapy.  We will send him to the [facility] facility with Delfino.  I will see him back in 4 to 6 weeks and if he is not a lot better, consider advanced imaging.  Again, he is not a fan of injections.      Rey Worthy MD  04/19/22  10:14 EDT      Dictated Utilizing Dragon Dictation.

## 2022-04-20 ENCOUNTER — TREATMENT (OUTPATIENT)
Dept: PHYSICAL THERAPY | Facility: CLINIC | Age: 67
End: 2022-04-20

## 2022-04-20 DIAGNOSIS — M25.562 ACUTE PAIN OF LEFT KNEE: Primary | ICD-10-CM

## 2022-04-20 PROCEDURE — 97110 THERAPEUTIC EXERCISES: CPT | Performed by: PHYSICAL THERAPIST

## 2022-04-20 PROCEDURE — 97161 PT EVAL LOW COMPLEX 20 MIN: CPT | Performed by: PHYSICAL THERAPIST

## 2022-04-20 NOTE — PROGRESS NOTES
Send me the name of your office mgr and I will start CC'ing her.  I do this for Gunnar at [place] and it seems to work well.      Greg tovar

## 2022-04-20 NOTE — PROGRESS NOTES
Physical Therapy Initial Evaluation and Plan of Care    Patient: Chente Rich   : 1955  Diagnosis/ICD-10 Code:  Acute pain of left knee [M25.562]  Referring practitioner: Rey Worthy,*  Date of Initial Visit: 2022  Today's Date: 2022  Patient seen for 1 session         Visit Diagnoses:    ICD-10-CM ICD-9-CM   1. Acute pain of left knee  M25.562 719.46         Subjective Questionnaire: LEFS: 60      Subjective Evaluation    History of Present Illness  Mechanism of injury: The pt stated that he was running last  when he felt a pop in the back of the L knee. He discontinued running and has not attempted it since. He denied swelling or instability. He has a 1 year history of pain in the anteromedial L knee but felt this was unrelated. He has withheld from long walks, jogging, or tennis, but has continued to exercise at the [employer] center.      Currently, pain is both in the anteromedial and posteromedial L knee and is worsened with hip abduction at the gym, walking, and weight bearing pivoting. Symptoms are improved with rest.     Pain  Current pain ratin  At best pain ratin  At worst pain ratin  Location: L knee  Quality: dull ache  Relieving factors: rest  Aggravating factors: stairs, squatting and ambulation  Progression: improved    Social Support  Lives in: multiple-level home  Lives with: spouse    Diagnostic Tests  X-ray: abnormal (mod medial compartment knee OA)    Treatments  No previous or current treatments  Patient Goals  Patient goals for therapy: decreased pain, increased strength and return to sport/leisure activities             Objective          Palpation   Left   No palpable tenderness to the distal biceps femoris, lateral gastrocnemius and medial gastrocnemius.   Tenderness of the distal semimembranosus and distal semitendinosus.     Right   No palpable tenderness to the distal biceps femoris, distal semimembranosus, distal semitendinosus, lateral  gastrocnemius and medial gastrocnemius.     Tenderness   Left Knee   Tenderness in the medial joint line (mild). No tenderness in the MCL (distal), MCL (proximal), medial patella, pes anserinus and popliteal fossa.     Active Range of Motion   Left Knee   Flexion: 130 degrees   Extension: 0 degrees     Right Knee   Flexion: 135 degrees   Extension: Right knee active extension: -7.     Passive Range of Motion   Left Knee   Flexion: 135 degrees with pain  Extension: Left knee passive extension: -5.     Strength/Myotome Testing     Left Hip   Planes of Motion   Flexion: 5  Extension: 5  Abduction: 5  External rotation: 5    Right Hip   Planes of Motion   Flexion: 5  Extension: 5  Abduction: 5  External rotation: 5    Left Knee   Flexion: 5  Prone flexion: 4  Extension: 5  Quadriceps contraction: good    Right Knee   Flexion: 5  Prone flexion: 5  Extension: 5  Quadriceps contraction: good    Tests     Left Knee   Negative anterior drawer, bounce home, lateral Orlando, medial Orlando, spring, Thessaly's test at 5 degrees, Thessaly's test at 20 degrees, valgus stress test at 0 degrees, valgus stress test at 30 degrees, varus stress test at 0 degrees and varus stress test at 30 degrees.     Ambulation     Comments   Normal gait with 80 foot assessment    Functional Assessment     Comments  Weight shift to R LE with squat past 90 degrees          Assessment & Plan     Assessment  Impairments: abnormal gait, abnormal muscle firing, abnormal or restricted ROM, activity intolerance, impaired balance, impaired physical strength, lacks appropriate home exercise program and pain with function  Functional Limitations: lifting, walking, uncomfortable because of pain, standing, stooping and unable to perform repetitive tasks  Assessment details: The patient is a 68 yo male who presented to PT with evolving characteristics of acute L knee pain with low complexity. Signs and symptoms are consistent with a hamstring strain with  potential medial meniscal irritation. Meniscal tests were largely negative but he did display TTP over the medial jt line and some discomfort with forced flexion. Hamstring strength was limited in prone flexion and reproduced mild pain. I prescribed isometric hamstring strengthening along with light stretching, as the L hamstrings were severely tight. He was advised to d/c running, deep squatting, and repeated stair use for the time being. He was encouraged to return to walking as tolerated. He seems to be doing better already and I expect the patient to make a timely recovery with skilled PT intervention.     Prognosis: good    Goals  Plan Goals: Short Term Goals (4 weeks):     1. The patient will be independent and compliant with initial HEP.     2. The patient will report pain at rest 0/10 or less and worst pain 3/10 or less.    3. The patient will display decreased TTP in the medial knee jt line and medial hamstring tendons and dec mm tension in the surrounding musculature.    4.  L knee AROM will improve to ext/flex -5/135 deg.    5. The patient will demonstrate inc strength evidenced by MMT knee flexion 5/5 in prone.    6. LEFS will improve by 9 points or more.         Long Term Goals (8 weeks):     1. The patient will be appropriate for independent management and compliant with progressed HEP.     2. The patient will report pain at rest 0/10 or less and worst pain 1/10 or less.    3. The patient will display L knee AROM -7/135.    4. The patient will demonstrate good VMO activation evidenced by performance of a SLR with 3# and no extensor lag.    5. The patient will return to work duties and/or ADLs with no limitations due to knee pain or dysfunction.    6. The patient will return to recreational and community activities with no limitations due to knee pain or dysfunction.      Plan  Therapy options: will be seen for skilled therapy services  Planned modality interventions: cryotherapy, electrical  stimulation/Russian stimulation, iontophoresis, TENS and thermotherapy (hydrocollator packs)  Planned therapy interventions: ADL retraining, body mechanics training, balance/weight-bearing training, flexibility, functional ROM exercises, gait training, home exercise program, joint mobilization, manual therapy, neuromuscular re-education, postural training, soft tissue mobilization, strengthening, stretching, therapeutic activities and transfer training  Frequency: 1x week  Duration in visits: 8  Duration in weeks: 8  Treatment plan discussed with: patient  Plan details: The pt will likely benefit from TE/TA/NMED to improve strength of the hips, quads, and hamstrings, to improve balance, and to restore normal proprioception. MT will be utilized to improve ROM and jt mobility. Modalities will be used as needed for pain modulation. Dry needling as indicated.        History # of Personal Factors and/or Comorbidities: LOW (0)  Examination of Body System(s): # of elements: LOW (1-2)  Clinical Presentation: EVOLVING  Clinical Decision Making: LOW       Timed:         Manual Therapy:    0     mins  92377;     Therapeutic Exercise:    25     mins  20463;     Neuromuscular Koffi:    0    mins  43078;    Therapeutic Activity:     0     mins  03625;     Gait Trainin     mins  56943;     Ultrasound:     0     mins  14417;    Ionto                               0    mins   75426  Self Care                       0     mins   86313  Canalith Repos    0     mins 59405      Un-Timed:  Electrical Stimulation:    0     mins  75491 ( );  Dry Needling     0     mins self-pay  Traction     0     mins 35535  Low Eval     20     Mins  60075  Mod Eval     0     Mins  12214  High Eval                       0     Mins  88305        Timed Treatment:   25   mins   Total Treatment:     45   mins          PT: Delfino Amin PT     License Number: [id number]  Electronically signed by Delfino Amin PT, 22, 3:08 PM EDT    Certification  Period: 4/20/2022 thru 7/18/2022  I certify that the therapy services are furnished while this patient is under my care.  The services outlined above are required by this patient, and will be reviewed every 90 days.         Physician Signature:__________________________________________________    PHYSICIAN: Rey Worthy MD  NPI: [id number]                                      DATE:      Please sign and return via fax to [email or web address] . Thank you, [facility].

## 2022-04-26 ENCOUNTER — TREATMENT (OUTPATIENT)
Dept: PHYSICAL THERAPY | Facility: CLINIC | Age: 67
End: 2022-04-26

## 2022-04-26 DIAGNOSIS — M79.671 RIGHT FOOT PAIN: ICD-10-CM

## 2022-04-26 DIAGNOSIS — M76.61 ACHILLES TENDINITIS OF RIGHT LOWER EXTREMITY: ICD-10-CM

## 2022-04-26 DIAGNOSIS — M25.562 ACUTE PAIN OF LEFT KNEE: Primary | ICD-10-CM

## 2022-04-26 PROCEDURE — 97110 THERAPEUTIC EXERCISES: CPT | Performed by: PHYSICAL THERAPIST

## 2022-04-26 PROCEDURE — 97112 NEUROMUSCULAR REEDUCATION: CPT | Performed by: PHYSICAL THERAPIST

## 2022-04-26 NOTE — PROGRESS NOTES
Physical Therapy Daily Treatment Note      Patient: Chente Rich   : 1955  Referring practitioner: Rey Worthy,*  Date of Initial Visit: Type: THERAPY  Noted: 2022  Today's Date: 2022  Patient seen for 2 sessions       Visit Diagnoses:    ICD-10-CM ICD-9-CM   1. Acute pain of left knee  M25.562 719.46   2. Achilles tendinitis of right lower extremity  M76.61 726.71   3. Right foot pain  M79.671 729.5       Subjective Evaluation    History of Present Illness  Mechanism of injury: The patient stated he was able to take a 30 minute walk on Friday without pain but had a large increase in pain on Saturday after walking 45 minutes, performing yardwork, and going to a festival. Pain reached 7/10 and he stated he was unable to walk up and down stairs. He has tried to take it easy since Saturday but stated he has remained compliant with his home exercise program with no increases in pain as a result.    Pain  Current pain ratin  Location: L knee           Objective          Tenderness   Left Knee   Tenderness in the medial joint line.     Tests     Left Knee   Positive bounce home and medial Orlando.       See Exercise, Manual, and Modality Logs for complete treatment.       Assessment & Plan     Assessment    Assessment details: The patient experienced an increase in medial knee pain with increased activity over the weekend. Several of the meniscal special tests were positive today and it is likely he irritated the meniscus with overuse. He was encouraged to rest for a few days and allow the inflammation to subside. He was educated on use of ice and NSAIDs as methods for reducing irritation. Quad setting activities and glute strengthening exercises were added to his HEP and were performed with no pain in the clinic. Following exercise performance he reported reduced pain and irritation.    Plan  Plan details: Assess response to rest and new H-E-P in progress exercises  accordingly.          Timed:         Manual Therapy:    0     mins  19530;     Therapeutic Exercise:    25     mins  07252;     Neuromuscular Koffi:    23  mins  52182;    Therapeutic Activity:     0     mins  62335;     Gait Trainin     mins  52700;     Ultrasound:     0     mins  58375;    Ionto                               0    mins   16249  Self Care                       0     mins   19713  Canalith Repos    0     mins 51490      Un-Timed:  Electrical Stimulation:    0     mins  97331 ( );  Dry Needling     0     mins self-pay  Traction     0     mins 16100      Timed Treatment:   48   mins   Total Treatment:     62   mins    Delfino Amin, PT  [place] License: [id number]

## 2022-05-03 ENCOUNTER — TREATMENT (OUTPATIENT)
Dept: PHYSICAL THERAPY | Facility: CLINIC | Age: 67
End: 2022-05-03

## 2022-05-03 DIAGNOSIS — M25.562 ACUTE PAIN OF LEFT KNEE: Primary | ICD-10-CM

## 2022-05-03 PROCEDURE — 97110 THERAPEUTIC EXERCISES: CPT | Performed by: PHYSICAL THERAPIST

## 2022-05-03 PROCEDURE — 97530 THERAPEUTIC ACTIVITIES: CPT | Performed by: PHYSICAL THERAPIST

## 2022-05-03 NOTE — PROGRESS NOTES
Physical Therapy Daily Treatment Note      Patient: Chente Rich   : 1955  Referring practitioner: Rey Worthy,*  Date of Initial Visit: Type: THERAPY  Noted: 2022  Today's Date: 5/3/2022  Patient seen for 3 sessions       Visit Diagnoses:    ICD-10-CM ICD-9-CM   1. Acute pain of left knee  M25.562 719.46       Subjective Evaluation    History of Present Illness  Mechanism of injury: The pt stated that his knee pain has been reduced this week but he stated that he has been far less active. He has been able to navigate the stairs reciprocally this week but notes pain with descent. He has taken 10-15 minute walks this week and has tolerated them well.     Pain  Current pain ratin  At worst pain ratin  Location: L knee           Objective   See Exercise, Manual, and Modality Logs for complete treatment.       Assessment & Plan     Assessment    Assessment details: Knee pain was reduced this week as compared to last and is likely attributable to activity modification and reduced stress to the meniscus. He demonstrated significantly improved quad activation today but required cuing for SLR to reduce lag. He continues to experience discomfort with prolonged activity and with end range motions and was advised avoid any activity that caused sharp or severe pain for the next week. He has been able to bike without pain so he was advised to continue this as well.     Plan  Plan details: Continue quad strengthening and stabilization exercises as tolerated. Resume functional exercises as able.           Timed:         Manual Therapy:    0     mins  13883;     Therapeutic Exercise:    50     mins  81275;     Neuromuscular Koffi:    0    mins  68434;    Therapeutic Activity:     10     mins  63821;     Gait Trainin     mins  33077;     Ultrasound:     0     mins  28942;    Ionto                               0    mins   68279  Self Care                       0     mins   35405  oRbbin  Repos    0     mins 19492      Un-Timed:  Electrical Stimulation:    0     mins  14897 ( );  Dry Needling     0     mins self-pay  Traction     0     mins 87088      Timed Treatment:   60   mins   Total Treatment:     60   mins    Delfino Amin, PT  [place] License: [id number]

## 2022-05-10 ENCOUNTER — TREATMENT (OUTPATIENT)
Dept: PHYSICAL THERAPY | Facility: CLINIC | Age: 67
End: 2022-05-10

## 2022-05-10 DIAGNOSIS — M25.562 ACUTE PAIN OF LEFT KNEE: Primary | ICD-10-CM

## 2022-05-10 PROCEDURE — 97112 NEUROMUSCULAR REEDUCATION: CPT | Performed by: PHYSICAL THERAPIST

## 2022-05-10 PROCEDURE — 97110 THERAPEUTIC EXERCISES: CPT | Performed by: PHYSICAL THERAPIST

## 2022-05-10 NOTE — PROGRESS NOTES
Physical Therapy Daily Treatment Note      Patient: Chente Rich   : 1955  Referring practitioner: Rey Worthy,*  Date of Initial Visit: Type: THERAPY  Noted: 2022  Today's Date: 5/10/2022  Patient seen for 4 sessions       Visit Diagnoses:    ICD-10-CM ICD-9-CM   1. Acute pain of left knee  M25.562 719.46       Subjective Evaluation    History of Present Illness  Mechanism of injury: The pt stated that his knee has been feeling much better and reported he was able to take several 1 hour walks, totaling about 4 miles each time. He has seen reduced pain with stair use and with walking up and down hills.     Pain  Current pain ratin  At worst pain rating: 3  Location: L knee           Objective   See Exercise, Manual, and Modality Logs for complete treatment.       Assessment & Plan     Assessment    Assessment details: The pt continues to respond well to PT interventions and saw a large reduction in pain this week and improved tolerance to activity. He was challenged with stabilization exercises for the L LE today and tolerated them well, though they proved very fatiguing in comparison to his previous groups of exercises. He was able to perform a lateral step down without pain for the first time, although he described it as very challenging. He was encouraged to increase reps of familiar exercises this week and was given permission to lunge and squat within his tolerance.     Plan  Plan details: Assess response to HEP progressions and consider adding lunge matrix and lateral step down.          Timed:         Manual Therapy:    0     mins  96686;     Therapeutic Exercise:    45     mins  63900;     Neuromuscular Koffi:    15    mins  21354;    Therapeutic Activity:     0     mins  65827;     Gait Trainin     mins  60590;     Ultrasound:     0     mins  51928;    Ionto                               0    mins   86963  Self Care                       0     mins   29228  Robbin  Repos    0     mins 95853      Un-Timed:  Electrical Stimulation:    0     mins  65134 ( );  Dry Needling     0     mins self-pay  Traction     0     mins 54749      Timed Treatment:   60   mins   Total Treatment:     60   mins    Delfino Amin, PT  [place] License: [id number]

## 2022-05-16 ENCOUNTER — TREATMENT (OUTPATIENT)
Dept: PHYSICAL THERAPY | Facility: CLINIC | Age: 67
End: 2022-05-16

## 2022-05-16 DIAGNOSIS — M25.562 ACUTE PAIN OF LEFT KNEE: Primary | ICD-10-CM

## 2022-05-16 PROCEDURE — 97110 THERAPEUTIC EXERCISES: CPT | Performed by: PHYSICAL THERAPIST

## 2022-05-16 NOTE — PROGRESS NOTES
Physical Therapy Daily Treatment Note      Patient: Chente Rich   : 1955  Referring practitioner: Rey Worthy,*  Date of Initial Visit: Type: THERAPY  Noted: 2022  Today's Date: 2022  Patient seen for 5 sessions       Visit Diagnoses:    ICD-10-CM ICD-9-CM   1. Acute pain of left knee  M25.562 719.46       Subjective Evaluation    History of Present Illness  Mechanism of injury: The pt stated that his knee was largely pain-free until yesterday, when he took a 3 hour walk and began to have medial knee joint line pain again, particularly with hills. He managed with ice and NSAIDs but continued to report soreness today. He was able to return to his recreational exercise class and reported that he tolerated squats and lunges well. He has been compliant with his HEP and feels it is appropriate. He is interested in returning to pickleball.     Pain  Current pain ratin  Location: L knee           Objective          Tenderness   Left Knee   Tenderness in the medial joint line. No tenderness in the lateral joint line.     Active Range of Motion   Left Knee   Flexion: 135 degrees   Extension: Left knee active extension: -5.     Strength/Myotome Testing     Left Knee   Prone flexion: 4+      See Exercise, Manual, and Modality Logs for complete treatment.       Assessment & Plan     Assessment    Assessment details: The pt continues to do well with rehab for what appears to be a meniscus injury, but he is very active and has had a few small setbacks due to overuse each week. Fortunately, his AROM is improving and his hamstring strength was normal today. Lateral step downs remain irritating and were withheld again today, but a lunge matrix and step matrix were each tolerated well. He will need to continue building local strength for stability but it will be important to reduce repeated irritation at the knee. His HEP frequency was reduced and he was advised to take shorter walks. He is not  ready to return to [facility].     Plan  Plan details: Continue quad and glute strengthening, stabilization training, and eccentric hamstring activity.          Timed:         Manual Therapy:    0     mins  79553;     Therapeutic Exercise:    53     mins  35424;     Neuromuscular Koffi:    0    mins  75598;    Therapeutic Activity:     0     mins  69215;     Gait Trainin     mins  21339;     Ultrasound:     0     mins  02336;    Ionto                               0    mins   22600  Self Care                       0     mins   85351  Canalith Repos    0     mins 32732      Un-Timed:  Electrical Stimulation:    0     mins  48552 ( );  Dry Needling     0     mins self-pay  Traction     0     mins 12596      Timed Treatment:   53   mins   Total Treatment:     63   mins    Delfino Amin, PT  [place] License: [id number]

## 2022-05-19 ENCOUNTER — OFFICE VISIT (OUTPATIENT)
Dept: ORTHOPEDIC SURGERY | Facility: CLINIC | Age: 67
End: 2022-05-19

## 2022-05-19 VITALS
SYSTOLIC BLOOD PRESSURE: 122 MMHG | DIASTOLIC BLOOD PRESSURE: 72 MMHG | WEIGHT: 154.8 LBS | BODY MASS INDEX: 22.16 KG/M2 | HEIGHT: 70 IN

## 2022-05-19 DIAGNOSIS — M17.12 PRIMARY OSTEOARTHRITIS OF LEFT KNEE: Primary | ICD-10-CM

## 2022-05-19 DIAGNOSIS — S83.242D TEAR OF MEDIAL MENISCUS OF LEFT KNEE, CURRENT, UNSPECIFIED TEAR TYPE, SUBSEQUENT ENCOUNTER: ICD-10-CM

## 2022-05-19 PROCEDURE — 99213 OFFICE O/P EST LOW 20 MIN: CPT | Performed by: ORTHOPAEDIC SURGERY

## 2022-05-19 NOTE — PROGRESS NOTES
"    [facility] Note        Subjective     CC: Follow-up (4 week f/u Injury of left knee)      HPI    Chente Rich is a 67 y.o. male.  Patient here today for follow-up of his left knee injury.  He has not yet gone back to every activity he would like to be doing.  He says overall he is getting better.  Ibuprofen is helping him.    Overall, patient's symptoms are improving overall    ROS:    Constiutional:Pt denies fever, chills, nausea, or vomiting.  MSK:as above        Objective      Past Medical History  Past Medical History:   Diagnosis Date   • Abnormal screening cardiac CT 10/12/2017    Calcium Score 133   • BCC (basal cell carcinoma of skin)     Dx (initially)-2010.  Has occurred on head, chest, back, and right leg.   • Colon polyp     Dx 10/17- tubular adenoma ascending colon, no dysplasia   • Diabetes mellitus (HCC)    • Diverticulosis     Dx 10/17 by Colonoscopy- pancolonic   • History of cardiovascular stress test 06/19/2020    negative stress echo, EF 60%   • Hyperlipidemia          Physical Exam  /72   Ht 177.2 cm (69.76\")   Wt 70.2 kg (154 lb 12.8 oz)   BMI 22.36 kg/m²     Body mass index is 22.36 kg/m².    Patient is well nourished and well developed.        Ortho Exam      Musculoskeletal:  Global Assessment:  Overall assessment of Lower Extremity Muscle Strength and Tone:  Left quadriceps--5/5   Left hamstrings--5/5       Left tibialis anterior--5/5  Left gastroc-soleus--5/5  Left EHL--5/5      Inspection and Palpation:    Left knee:  Tenderness:  Over medial joint line  Effusion:  1+  Crepitus:  none  Pulses:  2+  Ecchymosis:  None  Warmth:  None       ROM:  Left:  Extension:0     Flexion:130    Instability:    Left:    Lachman Test:  Negative  Varus stress test negative  Valgus stress test negative   Anterior Drawer Test:  Negative  Posterior Drawer Test:  Negative    Functional Testing:    Left:  Orlando's test:  Positive  Patella grind test:  Negative  Q-angle:  " Normal  Apprehension Sign:  Negative      Imaging/Labs/EMG Reviewed:  Imaging Results (Last 24 Hours)     ** No results found for the last 24 hours. **            Assessment    Assessment:  1. Primary osteoarthritis of left knee    2. Tear of medial meniscus of left knee, current, unspecified tear type, subsequent encounter        Plan:  1. Recommend over the counter anti-inflammatories for pain and/or swelling  2. Left knee arthritis and medial meniscal tear--at this point, patient has not had any type of intervention or modality.  I have told him to call back if he seeks that and told him to ease back into the activities he likes doing and see how his knee reacts.  Follow-up as needed going forward with me      Rey Worthy MD  05/19/22  11:50 EDT      Dictated Utilizing Dragon Dictation.

## 2022-05-23 ENCOUNTER — TREATMENT (OUTPATIENT)
Dept: PHYSICAL THERAPY | Facility: CLINIC | Age: 67
End: 2022-05-23

## 2022-05-23 DIAGNOSIS — M25.562 ACUTE PAIN OF LEFT KNEE: Primary | ICD-10-CM

## 2022-05-23 PROCEDURE — 97110 THERAPEUTIC EXERCISES: CPT | Performed by: PHYSICAL THERAPIST

## 2022-05-23 PROCEDURE — 97112 NEUROMUSCULAR REEDUCATION: CPT | Performed by: PHYSICAL THERAPIST

## 2022-05-23 NOTE — PROGRESS NOTES
Physical Therapy Re Certification Of Plan of Care  Patient: Chente Rich   : 1955  Diagnosis/ICD-10 Code:  Acute pain of left knee [M25.562]  Referring practitioner: Rey Worthy,*  Date of Initial Visit: Type: THERAPY  Noted: 2022  Today's Date: 2022  Patient seen for 6 sessions         Visit Diagnoses:    ICD-10-CM ICD-9-CM   1. Acute pain of left knee  M25.562 719.46       Subjective Questionnaire: LEFS: 56  Clinical Progress: improved  Home Program Compliance: Yes  Treatment has included: therapeutic exercise, neuromuscular re-education, manual therapy and therapeutic activity      Subjective Evaluation    History of Present Illness  Mechanism of injury: The pt stated that his knee was not bothering him when he saw Dr. Worthy and decided not to discuss an injection as a result. He was feeling well until Saturday when he walked 4 miles and began experiencing knee pain. He put on a compression sleeve and attempted to walk again but stated this increased pain. He had more constant pain and increased irritability of the joint yesterday and started a routine dose of NSAIDs. He attempted to walk on a treadmill this morning but stated he did not tolerate more than 10 minutes.    Pain  Current pain ratin  At worst pain ratin  Location: L knee             Objective          Palpation   Left   No palpable tenderness to the distal biceps femoris, distal semimembranosus, distal semitendinosus, lateral gastrocnemius and medial gastrocnemius.     Right   No palpable tenderness to the distal biceps femoris, distal semimembranosus, distal semitendinosus, lateral gastrocnemius and medial gastrocnemius.     Tenderness   Left Knee   Tenderness in the medial joint line (mild). No tenderness in the MCL (distal), MCL (proximal), medial patella, pes anserinus and popliteal fossa.     Active Range of Motion   Left Knee   Flexion: 135 degrees   Extension: Left knee active extension: -5.      Right Knee   Flexion: 135 degrees   Extension: Right knee active extension: -7.     Passive Range of Motion   Left Knee   Flexion: 135 degrees with pain  Extension: Left knee passive extension: -5.     Strength/Myotome Testing     Left Hip   Planes of Motion   Flexion: 5  Extension: 5  Abduction: 5  External rotation: 5    Right Hip   Planes of Motion   Flexion: 5  Extension: 5  Abduction: 5  External rotation: 5    Left Knee   Flexion: 5  Prone flexion: 4+  Extension: 5  Quadriceps contraction: good    Right Knee   Flexion: 5  Prone flexion: 5  Extension: 5  Quadriceps contraction: good    Tests     Left Knee   Negative anterior drawer, bounce home, lateral Orlando, medial Orlando, spring, Thessaly's test at 5 degrees, Thessaly's test at 20 degrees, valgus stress test at 0 degrees, valgus stress test at 30 degrees, varus stress test at 0 degrees and varus stress test at 30 degrees.     Ambulation     Comments   Normal gait with 80 foot assessment    Functional Assessment     Comments  Weight shift to R LE with squat past 90 degrees          Assessment & Plan     Assessment    Assessment details: The pt continues to experience fluctuating levels of knee pain that appears to be directly related to activity. He was advised to use his knee compression sleeve prophylactically rather than as a response to pain, as this can irritate an already inflamed knee. Quad stabilization exercises and high rep glute strengthening was continued today and was tolerated well with no complaints of pain. He was encouraged to remain active but to d/c painful activities.    Plan  Plan details: Continue high rep stabilization exercises for the quads and glutes.           Recommendations: Continue as planned  Timeframe: 1 month  Prognosis to achieve goals: good      Timed:         Manual Therapy:    0     mins  35244;     Therapeutic Exercise:    30     mins  60493;     Neuromuscular Koffi:    15    mins  13013;    Therapeutic Activity:      0     mins  52949;     Gait Trainin     mins  08009;     Ultrasound:     0     mins  74988;    Ionto                               0    mins   26718  Self Care                       0     mins   74783  Canalith Repos    0     mins 48540      Un-Timed:  Electrical Stimulation:    0     mins  83159 ( );  Dry Needling     0     mins self-pay  Traction     0     mins 69413  Re-Eval                           0    mins  26260      Timed Treatment:   45   mins   Total Treatment:     60   mins          PT: Delfino Amin PT     [place] License:  [id number]    Electronically signed by Delfino Amin PT, 22, 1:58 PM EDT    Certification Period: 2022 thru 2022  I certify that the therapy services are furnished while this patient is under my care.  The services outlined above are required by this patient, and will be reviewed every 90 days.         Physician Signature:__________________________________________________    PHYSICIAN: Rey Worthy MD  NPI: [id number]                                      DATE:  :     Please sign and return via fax to [email or web address] . Thank you, [facility]

## 2022-05-31 ENCOUNTER — TREATMENT (OUTPATIENT)
Dept: PHYSICAL THERAPY | Facility: CLINIC | Age: 67
End: 2022-05-31

## 2022-05-31 DIAGNOSIS — M25.562 ACUTE PAIN OF LEFT KNEE: Primary | ICD-10-CM

## 2022-05-31 PROCEDURE — 97110 THERAPEUTIC EXERCISES: CPT | Performed by: PHYSICAL THERAPIST

## 2022-06-01 RX ORDER — SIMVASTATIN 20 MG
TABLET ORAL
Qty: 30 TABLET | Refills: 3 | Status: SHIPPED | OUTPATIENT
Start: 2022-06-01 | End: 2022-09-28

## 2022-06-08 ENCOUNTER — OFFICE VISIT (OUTPATIENT)
Dept: INTERNAL MEDICINE | Facility: CLINIC | Age: 67
End: 2022-06-08

## 2022-06-08 VITALS
HEART RATE: 58 BPM | WEIGHT: 152.38 LBS | BODY MASS INDEX: 21.82 KG/M2 | HEIGHT: 70 IN | RESPIRATION RATE: 18 BRPM | DIASTOLIC BLOOD PRESSURE: 80 MMHG | TEMPERATURE: 97.5 F | SYSTOLIC BLOOD PRESSURE: 138 MMHG

## 2022-06-08 DIAGNOSIS — E78.49 OTHER HYPERLIPIDEMIA: ICD-10-CM

## 2022-06-08 DIAGNOSIS — E11.9 CONTROLLED TYPE 2 DIABETES MELLITUS WITHOUT COMPLICATION, WITHOUT LONG-TERM CURRENT USE OF INSULIN: ICD-10-CM

## 2022-06-08 DIAGNOSIS — I25.84 CORONARY ARTERY CALCIFICATION: ICD-10-CM

## 2022-06-08 DIAGNOSIS — Z00.00 MEDICARE ANNUAL WELLNESS VISIT, SUBSEQUENT: Primary | ICD-10-CM

## 2022-06-08 DIAGNOSIS — K63.5 POLYP OF COLON, UNSPECIFIED PART OF COLON, UNSPECIFIED TYPE: ICD-10-CM

## 2022-06-08 DIAGNOSIS — Z12.11 SCREENING FOR COLON CANCER: ICD-10-CM

## 2022-06-08 DIAGNOSIS — Z00.00 ENCOUNTER FOR HEALTH MAINTENANCE EXAMINATION IN ADULT: ICD-10-CM

## 2022-06-08 DIAGNOSIS — I25.10 CORONARY ARTERY CALCIFICATION: ICD-10-CM

## 2022-06-08 DIAGNOSIS — Z12.5 SCREENING FOR PROSTATE CANCER: ICD-10-CM

## 2022-06-08 PROCEDURE — 1170F FXNL STATUS ASSESSED: CPT | Performed by: INTERNAL MEDICINE

## 2022-06-08 PROCEDURE — 1159F MED LIST DOCD IN RCRD: CPT | Performed by: INTERNAL MEDICINE

## 2022-06-08 PROCEDURE — 99397 PER PM REEVAL EST PAT 65+ YR: CPT | Performed by: INTERNAL MEDICINE

## 2022-06-08 PROCEDURE — G0439 PPPS, SUBSEQ VISIT: HCPCS | Performed by: INTERNAL MEDICINE

## 2022-06-08 NOTE — PROGRESS NOTES
Subjective     Chief Complaint:  Physical Exam.    History of Present Illness    History obtained from the patient.    Left knee pain.  He saw Dr. Ma and he is going to PT.  He has had 1 episode of swelling, which resolved, and no stiffness.    Cardiac Follow-Up:  The patient is here for a follow-up visit.  His Diabetes has been stable.  Medication: Simvastatin.  His Hyperlipidemia has been stable.  LDL goal < 70. Last  LDL was 86, TG 68.  Medication: Simvastatin.  Side Effects: None.     Comorbid Illness: Coronary Artery Calcification.  Takes Aspirin 81 mg daily.      Procedures: On 10/12/2017, Cardiac CT scan showed a Calcium Score of 133. On 6/19/20, he had a negative stress echo, EF 60%     Interval Events: Last Hemoglobin A1c on 3/16/22  was 6.2  Creatinine was 1.28 (stable).  He has been off his Metformin since 6/20/2021 due to the elevated Creatinine (initially 1.55).  He does not check his blood sugar or blood pressure at home.  His last Ophthalmology visit was 5/3/22, no retinopathy.  He does check his feet daily.       Symptoms: Denies chest pain, shortness of breath, OLIVEIRA, orthopnea, PND, palpitations, syncope, lower extremity edema, claudication, lightheadedness, and dizziness.    Associated Symptoms: Weight decreased 3 pounds in the past 3 months.   Denies fatigue, headache, polydipsia, polyuria, myalgias, arthralgias, visual impairment, memory loss, concentration issues, and focal neurological deficit. No foot pain, numbness, tingling, or ulcer.     Lifestyle: The patient follows a diverse and healthy diet.  He exercises 5 times per week (walks, does strength training, and lifts weights).  Tobacco Use: Never a smoker.      Colon Polyp Follow-Up: The patient is here for follow-up of colon polyps, which have been stable.    Comorbid Illness: Diverticulosis.  Interval Events: Last Colonoscopy on 7/24/2017 showed an ascending colon tubular adenoma and pandiverticulosis.  Per report, preceding  colonoscopies had been clear.    Symptoms: Denies abdominal pain, diarrhea, constipation, hematochezia, melena, and changes in stool.    Medication: None.      Chente Rich is a 67 y.o. male who presents for an Annual Physical.      PMH, PSH, SocHx, FamHx, Allergies, and Medications: Reviewed and updated.    Outpatient Medications Prior to Visit   Medication Sig Dispense Refill   • aspirin 81 MG EC tablet Take 1 tablet by mouth Daily.     • simvastatin (ZOCOR) 20 MG tablet TAKE ONE TABLET BY MOUTH DAILY 30 tablet 3     No facility-administered medications prior to visit.       Immunization History   Administered Date(s) Administered   • COVID-19 (PFIZER) PURPLE CAP 02/24/2021, 03/17/2021, 09/18/2021   • Flu Vaccine Quad PF >36MO 10/30/2017   • Flu Vaccine Split Quad 09/27/2019   • Fluad Quad 65+ 09/26/2020   • Fluzone High Dose =>65 Years ([employer] ONLY) 11/08/2018, 10/15/2020   • Fluzone High-Dose 65+yrs 09/18/2021   • Fluzone Split Quad (Multi-dose) 10/25/2016   • Hepatitis A 11/08/2018, 11/15/2019   • Pneumococcal Conjugate 13-Valent (PCV13) 05/08/2018   • Pneumococcal Polysaccharide (PPSV23) 05/28/2020   • Shingrix 10/18/2021, 01/18/2022   • Tdap 10/20/2017         Patient Active Problem List   Diagnosis   • Hyperlipidemia   • Controlled type 2 diabetes mellitus without complication, without long-term current use of insulin (HCC)   • Coronary artery calcification   • BCC (basal cell carcinoma of skin)   • Diverticulosis   • Colon polyp       Health Habits:  Dental Exam. up to date  Eye Exam. up to date  Hearing Loss:  No  Exercise: 7 times/week.  Current exercise activities include: walking and strength training  Diet: Healthy  Multivitamin: No    Safe Driving:  Yes  Seat Belt:  Yes  Bike Helmet:  N/A  Skin Screening:  Yes  Sunscreen: Yes  SBE / ARVIN: Yes  Sexual Activity:  Yes  Birth Control:  Menopause  STD Prevention:  N/A    Last Pap: N/A  Last Mammogram:  N/A  Last DEXA Scan: N/A  Last Colonoscopy:  7/24/2017 showed an ascending colon tubular adenoma and pandiverticulosis.    Last PSA: 6/2/2021 (2.35)    Social:    Social History     Socioeconomic History   • Marital status:    • Number of children: 2   Tobacco Use   • Smoking status: Never Smoker   • Smokeless tobacco: Never Used   Vaping Use   • Vaping Use: Never used   Substance and Sexual Activity   • Alcohol use: Yes     Alcohol/week: 6.0 standard drinks     Types: 3 Cans of beer, 3 Drinks containing 0.5 oz of alcohol per week     Comment: 1 drink (beer) daily   • Drug use: Never   • Sexual activity: Yes     Partners: Female     Birth control/protection: Post-menopausal         Current Medical Providers:    Millie Loya MD (Internal Medicine / Pediatrics)    The [facility] providers who are involved in the care of this patient are listed above.         Review of Systems   Constitutional: Negative for appetite change, chills, fatigue, fever and unexpected weight change.         No night sweats.     HENT: Negative for congestion, ear discharge, ear pain, facial swelling, hearing loss, nosebleeds, postnasal drip, rhinorrhea, sinus pressure, sinus pain, sneezing, sore throat, tinnitus, trouble swallowing and voice change.         Denies snoring.   Eyes: Negative for photophobia, pain, discharge, redness, itching and visual disturbance.   Respiratory: Negative for cough, chest tightness, shortness of breath and wheezing.         No chest congestion.  No hemoptysis.    Cardiovascular: Negative for chest pain, palpitations and leg swelling.        No orthopnea, OLIVEIRA, or PND.  No claudication or syncope.   Gastrointestinal: Negative for abdominal distention, abdominal pain, blood in stool, constipation, diarrhea, nausea and vomiting.        No melena, heartburn, odynophagia, dysphagia, early satiety, belching, or bloating.   Endocrine: Negative for cold intolerance, heat intolerance, polydipsia, polyphagia and polyuria.        No hair loss or dry skin.  "   Genitourinary: Negative for decreased urine volume, difficulty urinating, dysuria, flank pain, frequency, hematuria, penile discharge, scrotal swelling, testicular pain and urgency.        No nocturia, incomplete emptying, or incontinence.  No erectile dysfunction.   Musculoskeletal: Positive for joint swelling. Negative for arthralgias, back pain, gait problem, myalgias, neck pain and neck stiffness.        No joint stiffness.   Skin: Negative for rash.        No new skin lesions or changes in skin lesions.     Neurological: Negative for dizziness, tremors, speech difficulty, weakness, light-headedness, numbness and headaches.        No tingling. No memory loss. No decreased concentration.   Hematological: Negative for adenopathy. Does not bruise/bleed easily.   Psychiatric/Behavioral: Negative for confusion, self-injury, sleep disturbance and suicidal ideas. The patient is not nervous/anxious.         No depression.           Objective     Vitals:    06/08/22 0837   BP: 138/80   BP Location: Right arm   Pulse: 58   Resp: 18   Temp: 97.5 °F (36.4 °C)   TempSrc: Temporal   Weight: 69.1 kg (152 lb 6 oz)   Height: 176.5 cm (69.5\")   PainSc: 0-No pain       Body mass index is 22.18 kg/m².    Physical Exam  Vitals and nursing note reviewed.   Constitutional:       Appearance: Normal appearance. He is well-developed and normal weight.   HENT:      Head: Normocephalic and atraumatic.      Right Ear: Tympanic membrane, ear canal and external ear normal.      Left Ear: Tympanic membrane, ear canal and external ear normal.      Mouth/Throat:      Mouth: Mucous membranes are moist. No oral lesions.      Pharynx: Oropharynx is clear.      Comments: Tonsils normal.  Eyes:      Extraocular Movements: Extraocular movements intact.      Conjunctiva/sclera: Conjunctivae normal.      Pupils: Pupils are equal, round, and reactive to light.   Neck:      Thyroid: No thyroid mass or thyromegaly.      Vascular: No carotid bruit. "   Cardiovascular:      Rate and Rhythm: Normal rate and regular rhythm.      Pulses: Normal pulses.      Heart sounds: No murmur heard.    No friction rub. No gallop.   Pulmonary:      Effort: Pulmonary effort is normal.      Breath sounds: Normal breath sounds.   Chest:   Breasts:      Right: No supraclavicular adenopathy.      Left: No supraclavicular adenopathy.       Abdominal:      General: Bowel sounds are normal. There is no distension or abdominal bruit.      Palpations: Abdomen is soft. There is no hepatomegaly, splenomegaly or mass.      Tenderness: There is no abdominal tenderness.   Genitourinary:     Penis: Normal.       Testes:         Right: Mass, tenderness or swelling not present.         Left: Mass, tenderness or swelling not present.      Prostate: Normal.      Rectum: Normal.   Musculoskeletal:         General: Normal range of motion.      Cervical back: Normal range of motion and neck supple.      Right lower leg: No edema.      Left lower leg: No edema.   Feet:      Right foot:      Skin integrity: No ulcer, blister, skin breakdown, callus or dry skin.      Left foot:      Skin integrity: No ulcer, blister, skin breakdown, callus or dry skin.   Lymphadenopathy:      Cervical: No cervical adenopathy.      Upper Body:      Right upper body: No supraclavicular adenopathy.      Left upper body: No supraclavicular adenopathy.      Lower Body: No right inguinal adenopathy. No left inguinal adenopathy.   Skin:     Findings: No lesion or rash.      Comments: No atypical skin lesions.   Neurological:      Mental Status: He is alert.      Cranial Nerves: Cranial nerves are intact.      Motor: Motor function is intact. No abnormal muscle tone.      Coordination: Coordination normal.      Gait: Gait normal.      Deep Tendon Reflexes: Reflexes are normal and symmetric.   Psychiatric:         Mood and Affect: Mood normal.     Diabetic Foot Exam Performed  Monofilament Test Performed      PHQ-2 Depression  Screening  Little interest or pleasure in doing things? 0-->not at all   Feeling down, depressed, or hopeless? 0-->not at all   PHQ-2 Total Score 0         Counseling was given to patient for the following topics: appropriate exercise, healthy eating habits, disease prevention, risk factors for cancer, importance of self testicular exam, importance of immunizations, including risks and benefits, sun safety, seatbelt use and safe driving. Also discussed the importance of regular dental and vision care, as well recommendation for a yearly screening skin exam after age 40.  Written information provided to patient on these topics and other health maintenance issues.        Diagnoses and all orders for this visit:    1. Medicare annual wellness visit, subsequent (Primary)    2. Encounter for health maintenance examination in adult  -     Lipid Panel; Future  -     Comprehensive Metabolic Panel; Future  -     TSH; Future  -     CBC & Differential; Future    3. Controlled type 2 diabetes mellitus without complication, without long-term current use of insulin (HCC)  -     Lipid Panel; Future  -     Comprehensive Metabolic Panel; Future  -     TSH; Future  -     CBC & Differential; Future  -     POC Glycosylated Hemoglobin (Hb A1C); Future  -     POC Microalbumin; Future  -     POC Urinalysis Dipstick, Automated; Future   Stable, no medication.    4. Other hyperlipidemia  -     Lipid Panel; Future  -     Comprehensive Metabolic Panel; Future  -     TSH; Future  -     CBC & Differential; Future   Continue current medication(s) as noted in the history of present illness.    5. Coronary artery calcification   Continue current medication(s) as noted in the history of present illness.    6. Polyp of colon, unspecified part of colon, unspecified type  -     Ambulatory Referral For Screening Colonoscopy    7. Screening for colon cancer  -     Ambulatory Referral For Screening Colonoscopy    8. Screening for prostate cancer  -      PSA Screen; Future      The patient agrees to return for fasting labs, after 6/16/2022 (too early today for A1c).    I recommended the COVID-19 second booster, but patient declined today preferring to wait until Fall.  Did discuss recent surge in COVID-19 cases.    BMI is within normal parameters. No other follow-up for BMI required.            Return in about 6 months (around 12/8/2022) for Recheck Diabetes, fasting .

## 2022-06-08 NOTE — PROGRESS NOTES
The ABCs of the Annual Wellness Visit  Subsequent Medicare Wellness Visit    Chief Complaint   Patient presents with   • Medicare Wellness-subsequent   • Annual Exam     Fasting       Subjective    History of Present Illness:  Chente Rich is a 67 y.o. male who presents for a Subsequent Medicare Wellness Visit.    The following portions of the patient's history were reviewed and   updated as appropriate: allergies, current medications, past family history, past medical history, past social history, past surgical history and problem list.    Compared to one year ago, the patient feels his physical   health is the same.    Compared to one year ago, the patient feels his mental   health is better.    Recent Hospitalizations:  He was not admitted to the hospital during the last year.       Current Medical Providers:  Patient Care Team:  Millie Loya MD as PCP - General (Internal Medicine)  Tru Pop MD as Consulting Physician (Ophthalmology)  Shelley Soliman MD as Consulting Physician (Dermatology)    Outpatient Medications Prior to Visit   Medication Sig Dispense Refill   • aspirin 81 MG EC tablet Take 1 tablet by mouth Daily.     • simvastatin (ZOCOR) 20 MG tablet TAKE ONE TABLET BY MOUTH DAILY 30 tablet 3     No facility-administered medications prior to visit.       No opioid medication identified on active medication list. I have reviewed chart for other potential  high risk medication/s and harmful drug interactions in the elderly.          Aspirin is on active medication list. Aspirin use is indicated based on review of current medical condition/s. Pros and cons of this therapy have been discussed today. Benefits of this medication outweigh potential harm.  Patient has been encouraged to continue taking this medication.  .      Patient Active Problem List   Diagnosis   • Hyperlipidemia   • Controlled type 2 diabetes mellitus without complication, without long-term current use of insulin  "(HCC)   • Coronary artery calcification   • BCC (basal cell carcinoma of skin)   • Diverticulosis   • Colon polyp     Advance Care Planning  Advance Directive is on file.  ACP discussion was held with the patient during this visit. Patient has an advance directive in EMR which is still valid.           Objective    Vitals:    06/08/22 0837   BP: 138/80   BP Location: Right arm   Pulse: 58   Resp: 18   Temp: 97.5 °F (36.4 °C)   TempSrc: Temporal   Weight: 69.1 kg (152 lb 6 oz)   Height: 176.5 cm (69.5\")   PainSc: 0-No pain     Estimated body mass index is 22.18 kg/m² as calculated from the following:    Height as of this encounter: 176.5 cm (69.5\").    Weight as of this encounter: 69.1 kg (152 lb 6 oz).  Finger Rub Hearing{Test (right ear):passed  Finger Rub Hearing{Test (left ear):passed      BMI is within normal parameters. No other follow-up for BMI required.      Does the patient have evidence of cognitive impairment? No    Physical Exam  Lab Results   Component Value Date    TRIG 61 06/17/2022    HDL 72 (H) 06/17/2022    LDL 58 06/17/2022    VLDL 13 06/17/2022    HGBA1C 6.2 06/17/2022            HEALTH RISK ASSESSMENT    Smoking Status:  Social History     Tobacco Use   Smoking Status Never Smoker   Smokeless Tobacco Never Used     Alcohol Consumption:  Social History     Substance and Sexual Activity   Alcohol Use Yes   • Alcohol/week: 6.0 standard drinks   • Types: 3 Cans of beer, 3 Drinks containing 0.5 oz of alcohol per week    Comment: 1 drink (beer) daily     Fall Risk Screen:    STEADI Fall Risk Assessment was completed, and patient is at LOW risk for falls.Assessment completed on:6/8/2022    Depression Screening:  PHQ-2/PHQ-9 Depression Screening 6/8/2022   Retired PHQ-9 Total Score -   Retired Total Score -   Little Interest or Pleasure in Doing Things 0-->not at all   Feeling Down, Depressed or Hopeless 0-->not at all   PHQ-9: Brief Depression Severity Measure Score 0       Health Habits and Functional " and Cognitive Screening:  Functional & Cognitive Status 6/8/2022   Do you have difficulty preparing food and eating? No   Do you have difficulty bathing yourself, getting dressed or grooming yourself? No   Do you have difficulty using the toilet? No   Do you have difficulty moving around from place to place? No   Do you have trouble with steps or getting out of a bed or a chair? No   Current Diet Well Balanced Diet   Dental Exam Up to date   Eye Exam Up to date   Exercise (times per week) 5 times per week   Current Exercises Include Walking        Exercise Comment strength classes 2 x weeks    Current Exercise Activities Include -   Do you need help using the phone?  No   Are you deaf or do you have serious difficulty hearing?  No   Do you need help with transportation? No   Do you need help shopping? No   Do you need help preparing meals?  No   Do you need help with housework?  No   Do you need help with laundry? No   Do you need help taking your medications? No   Do you need help managing money? No   Do you ever drive or ride in a car without wearing a seat belt? No   Have you felt unusual stress, anger or loneliness in the last month? No   Who do you live with? Spouse   If you need help, do you have trouble finding someone available to you? No   Have you been bothered in the last four weeks by sexual problems? No   Do you have difficulty concentrating, remembering or making decisions? -       Age-appropriate Screening Schedule:  Refer to the list below for future screening recommendations based on patient's age, sex and/or medical conditions. Orders for these recommended tests are listed in the plan section. The patient has been provided with a written plan.    Health Maintenance   Topic Date Due   • INFLUENZA VACCINE  10/01/2022   • HEMOGLOBIN A1C  12/17/2022   • DIABETIC EYE EXAM  05/03/2023   • PROSTATE CANCER SCREENING  06/17/2023   • LIPID PANEL  06/17/2023   • URINE MICROALBUMIN  06/17/2023   • DIABETIC  FOOT EXAM  06/18/2023   • TDAP/TD VACCINES (2 - Td or Tdap) 10/20/2027   • ZOSTER VACCINE  Completed              Assessment & Plan   CMS Preventative Services Quick Reference  Risk Factors Identified During Encounter  Cardiovascular Disease  Immunizations Discussed/Encouraged (specific Immunizations; COVID19  The above risks/problems have been discussed with the patient.  Follow up actions/plans if indicated are seen below in the Assessment/Plan Section.  Pertinent information has been shared with the patient in the After Visit Summary.    Diagnoses and all orders for this visit:    1. Medicare annual wellness visit, subsequent (Primary)    2. Encounter for health maintenance examination in adult  -     Lipid Panel; Future  -     Comprehensive Metabolic Panel; Future  -     TSH; Future  -     CBC & Differential; Future    3. Controlled type 2 diabetes mellitus without complication, without long-term current use of insulin (HCC)  -     Lipid Panel; Future  -     Comprehensive Metabolic Panel; Future  -     TSH; Future  -     CBC & Differential; Future  -     POC Glycosylated Hemoglobin (Hb A1C); Future  -     POC Microalbumin; Future  -     POC Urinalysis Dipstick, Automated; Future    4. Other hyperlipidemia  -     Lipid Panel; Future  -     Comprehensive Metabolic Panel; Future  -     TSH; Future  -     CBC & Differential; Future    5. Coronary artery calcification    6. Polyp of colon, unspecified part of colon, unspecified type  -     Ambulatory Referral For Screening Colonoscopy    7. Screening for colon cancer  -     Ambulatory Referral For Screening Colonoscopy    8. Screening for prostate cancer  -     PSA Screen; Future        Follow Up:   Return in about 6 months (around 12/8/2022) for Recheck Diabetes, fasting .     An After Visit Summary and PPPS were made available to the patient.

## 2022-06-08 NOTE — PATIENT INSTRUCTIONS
Please return for fasting labs, after 6/16/2022.    I recommend the COVID-19 second booster, as we discussed.    Health Maintenance, Male  Adopting a healthy lifestyle and getting preventive care are important in promoting health and wellness. Ask your health care provider about:  The right schedule for you to have regular tests and exams.  Things you can do on your own to prevent diseases and keep yourself healthy.  What should I know about diet, weight, and exercise?  Eat a healthy diet    Eat a diet that includes plenty of vegetables, fruits, low-fat dairy products, and lean protein.  Do not eat a lot of foods that are high in solid fats, added sugars, or sodium.    Maintain a healthy weight  Body mass index (BMI) is a measurement that can be used to identify possible weight problems. It estimates body fat based on height and weight. Your health care provider can help determine your BMI and help you achieve or maintain a healthy weight.  Get regular exercise  Get regular exercise. This is one of the most important things you can do for your health. Most adults should:  Exercise for at least 150 minutes each week. The exercise should increase your heart rate and make you sweat (moderate-intensity exercise).  Do strengthening exercises at least twice a week. This is in addition to the moderate-intensity exercise.  Spend less time sitting. Even light physical activity can be beneficial.  Watch cholesterol and blood lipids  Have your blood tested for lipids and cholesterol at 20 years of age, then have this test every 5 years.  You may need to have your cholesterol levels checked more often if:  Your lipid or cholesterol levels are high.  You are older than 40 years of age.  You are at high risk for heart disease.  What should I know about cancer screening?  Many types of cancers can be detected early and may often be prevented. Depending on your health history and family history, you may need to have cancer  screening at various ages. This may include screening for:  Colorectal cancer.  Prostate cancer.  Skin cancer.  Lung cancer.  What should I know about heart disease, diabetes, and high blood pressure?  Blood pressure and heart disease  High blood pressure causes heart disease and increases the risk of stroke. This is more likely to develop in people who have high blood pressure readings, are of  descent, or are overweight.  Talk with your health care provider about your target blood pressure readings.  Have your blood pressure checked:  Every 3-5 years if you are 18-39 years of age.  Every year if you are 40 years old or older.  If you are between the ages of 65 and 75 and are a current or former smoker, ask your health care provider if you should have a one-time screening for abdominal aortic aneurysm (AAA).  Diabetes  Have regular diabetes screenings. This checks your fasting blood sugar level. Have the screening done:  Once every three years after age 45 if you are at a normal weight and have a low risk for diabetes.  More often and at a younger age if you are overweight or have a high risk for diabetes.  What should I know about preventing infection?  Hepatitis B  If you have a higher risk for hepatitis B, you should be screened for this virus. Talk with your health care provider to find out if you are at risk for hepatitis B infection.  Hepatitis C  Blood testing is recommended for:  Everyone born from 1945 through 1965.  Anyone with known risk factors for hepatitis C.  Sexually transmitted infections (STIs)  You should be screened each year for STIs, including gonorrhea and chlamydia, if:  You are sexually active and are younger than 24 years of age.  You are older than 24 years of age and your health care provider tells you that you are at risk for this type of infection.  Your sexual activity has changed since you were last screened, and you are at increased risk for chlamydia or gonorrhea. Ask your  health care provider if you are at risk.  Ask your health care provider about whether you are at high risk for HIV. Your health care provider may recommend a prescription medicine to help prevent HIV infection. If you choose to take medicine to prevent HIV, you should first get tested for HIV. You should then be tested every 3 months for as long as you are taking the medicine.  Follow these instructions at home:  Lifestyle  Do not use any products that contain nicotine or tobacco, such as cigarettes, e-cigarettes, and chewing tobacco. If you need help quitting, ask your health care provider.  Do not use street drugs.  Do not share needles.  Ask your health care provider for help if you need support or information about quitting drugs.  Alcohol use  Do not drink alcohol if your health care provider tells you not to drink.  If you drink alcohol:  Limit how much you have to 0-2 drinks a day.  Be aware of how much alcohol is in your drink. In the [place] one drink equals one 12 oz bottle of beer (355 mL), one 5 oz glass of wine (148 mL), or one 1½ oz glass of hard liquor (44 mL).  General instructions  Schedule regular health, dental, and eye exams.  Stay current with your vaccines.  Tell your health care provider if:  You often feel depressed.  You have ever been abused or do not feel safe at home.  Summary  Adopting a healthy lifestyle and getting preventive care are important in promoting health and wellness.  Follow your health care provider's instructions about healthy diet, exercising, and getting tested or screened for diseases.  Follow your health care provider's instructions on monitoring your cholesterol and blood pressure.  This information is not intended to replace advice given to you by your health care provider. Make sure you discuss any questions you have with your health care provider.  Document Revised: 12/11/2019 Document Reviewed: 12/11/2019  [employer] Patient Education © 2021 [employer]

## 2022-06-13 ENCOUNTER — TREATMENT (OUTPATIENT)
Dept: PHYSICAL THERAPY | Facility: CLINIC | Age: 67
End: 2022-06-13

## 2022-06-13 DIAGNOSIS — M25.562 ACUTE PAIN OF LEFT KNEE: Primary | ICD-10-CM

## 2022-06-13 PROCEDURE — 97110 THERAPEUTIC EXERCISES: CPT | Performed by: PHYSICAL THERAPIST

## 2022-06-13 NOTE — PROGRESS NOTES
Physical Therapy Daily Treatment Note      Patient: Chente Rich   : 1955  Referring practitioner: Rey Worthy,*  Date of Initial Visit: Type: THERAPY  Noted: 2022  Today's Date: 2022  Patient seen for 8 sessions       Visit Diagnoses:    ICD-10-CM ICD-9-CM   1. Acute pain of left knee  M25.562 719.46       Subjective Evaluation    History of Present Illness  Mechanism of injury: The pt stated that his knee has been getting better but he continues to report pain with prolonged walking and with directional change. He has tolerated tennis fairly well but has had some increased pain later in the day. He is able to walk about an hour before onset of pain.    Pain  Current pain ratin  Location: L knee           Objective   See Exercise, Manual, and Modality Logs for complete treatment.       Assessment & Plan     Assessment    Assessment details: The pt continues to improve his tolerance to activity through exercise and was able to tolerate tennis and long walks this week with some limitation. Directional changes remain an issue and cause intermittent sharp pain so several lateral exercises were introduced and added to his HEP. These caused some discomfort in the clinic but were ultimately tolerated well. Jumping was introduced in PWB and should lead to improved loading tolerance. He remains weak and easily fatiguable in the quads and was advised to target squatting activities at the gym.    Plan  Plan details: Continue quad strengthening and progress plyometrics as tolerated.          Timed:         Manual Therapy:    0     mins  34597;     Therapeutic Exercise:    40     mins  03468;     Neuromuscular Koffi:    0    mins  96165;    Therapeutic Activity:     0     mins  09090;     Gait Trainin     mins  96499;     Ultrasound:     0     mins  60837;    Ionto                               0    mins   79276  Self Care                       0     mins   57555  [facility]    0      mins 36290      Un-Timed:  Electrical Stimulation:    0     mins  60175 ( );  Dry Needling     0     mins self-pay  Traction     0     mins 69916      Timed Treatment:   40   mins   Total Treatment:     53   mins    Delfino Amin, PT  [place] License: [id number]

## 2022-06-17 ENCOUNTER — LAB (OUTPATIENT)
Dept: LAB | Facility: HOSPITAL | Age: 67
End: 2022-06-17

## 2022-06-17 ENCOUNTER — LAB (OUTPATIENT)
Dept: INTERNAL MEDICINE | Facility: CLINIC | Age: 67
End: 2022-06-17

## 2022-06-17 DIAGNOSIS — Z00.00 ENCOUNTER FOR HEALTH MAINTENANCE EXAMINATION IN ADULT: ICD-10-CM

## 2022-06-17 DIAGNOSIS — Z12.5 SCREENING FOR PROSTATE CANCER: ICD-10-CM

## 2022-06-17 DIAGNOSIS — E11.9 CONTROLLED TYPE 2 DIABETES MELLITUS WITHOUT COMPLICATION, WITHOUT LONG-TERM CURRENT USE OF INSULIN: ICD-10-CM

## 2022-06-17 DIAGNOSIS — E78.49 OTHER HYPERLIPIDEMIA: ICD-10-CM

## 2022-06-17 LAB
A/C: ABNORMAL
ALBUMIN SERPL-MCNC: 4.4 G/DL (ref 3.5–5.2)
ALBUMIN/GLOB SERPL: 2 G/DL
ALP SERPL-CCNC: 46 U/L (ref 39–117)
ALT SERPL W P-5'-P-CCNC: 17 U/L (ref 1–41)
ANION GAP SERPL CALCULATED.3IONS-SCNC: 14.1 MMOL/L (ref 5–15)
AST SERPL-CCNC: 30 U/L (ref 1–40)
BASOPHILS # BLD AUTO: 0.06 10*3/MM3 (ref 0–0.2)
BASOPHILS NFR BLD AUTO: 0.9 % (ref 0–1.5)
BILIRUB BLD-MCNC: NEGATIVE MG/DL
BILIRUB SERPL-MCNC: 0.5 MG/DL (ref 0–1.2)
BUN SERPL-MCNC: 11 MG/DL (ref 8–23)
BUN/CREAT SERPL: 8.6 (ref 7–25)
CALCIUM SPEC-SCNC: 9.2 MG/DL (ref 8.6–10.5)
CHLORIDE SERPL-SCNC: 98 MMOL/L (ref 98–107)
CHOLEST SERPL-MCNC: 143 MG/DL (ref 0–200)
CLARITY, POC: CLEAR
CO2 SERPL-SCNC: 24.9 MMOL/L (ref 22–29)
COLOR UR: YELLOW
CREAT SERPL-MCNC: 1.28 MG/DL (ref 0.76–1.27)
DEPRECATED RDW RBC AUTO: 42.6 FL (ref 37–54)
EGFRCR SERPLBLD CKD-EPI 2021: 60.6 ML/MIN/1.73
EOSINOPHIL # BLD AUTO: 0.26 10*3/MM3 (ref 0–0.4)
EOSINOPHIL NFR BLD AUTO: 4 % (ref 0.3–6.2)
ERYTHROCYTE [DISTWIDTH] IN BLOOD BY AUTOMATED COUNT: 12.5 % (ref 12.3–15.4)
EXPIRATION DATE: ABNORMAL
EXPIRATION DATE: NORMAL
EXPIRATION DATE: NORMAL
GLOBULIN UR ELPH-MCNC: 2.2 GM/DL
GLUCOSE SERPL-MCNC: 106 MG/DL (ref 65–99)
GLUCOSE UR STRIP-MCNC: NEGATIVE MG/DL
HBA1C MFR BLD: 6.2 %
HCT VFR BLD AUTO: 40.6 % (ref 37.5–51)
HDLC SERPL-MCNC: 72 MG/DL (ref 40–60)
HGB BLD-MCNC: 13.4 G/DL (ref 13–17.7)
IMM GRANULOCYTES # BLD AUTO: 0.01 10*3/MM3 (ref 0–0.05)
IMM GRANULOCYTES NFR BLD AUTO: 0.2 % (ref 0–0.5)
KETONES UR QL: NEGATIVE
LDLC SERPL CALC-MCNC: 58 MG/DL (ref 0–100)
LDLC/HDLC SERPL: 0.82 {RATIO}
LEUKOCYTE EST, POC: NEGATIVE
LYMPHOCYTES # BLD AUTO: 1.99 10*3/MM3 (ref 0.7–3.1)
LYMPHOCYTES NFR BLD AUTO: 30.7 % (ref 19.6–45.3)
Lab: ABNORMAL
Lab: NORMAL
Lab: NORMAL
MCH RBC QN AUTO: 31.2 PG (ref 26.6–33)
MCHC RBC AUTO-ENTMCNC: 33 G/DL (ref 31.5–35.7)
MCV RBC AUTO: 94.4 FL (ref 79–97)
MONOCYTES # BLD AUTO: 0.54 10*3/MM3 (ref 0.1–0.9)
MONOCYTES NFR BLD AUTO: 8.3 % (ref 5–12)
NEUTROPHILS NFR BLD AUTO: 3.62 10*3/MM3 (ref 1.7–7)
NEUTROPHILS NFR BLD AUTO: 55.9 % (ref 42.7–76)
NITRITE UR-MCNC: NEGATIVE MG/ML
NRBC BLD AUTO-RTO: 0 /100 WBC (ref 0–0.2)
PH UR: 6.5 [PH] (ref 5–8)
PLATELET # BLD AUTO: 265 10*3/MM3 (ref 140–450)
PMV BLD AUTO: 10 FL (ref 6–12)
POC CREATININE URINE: ABNORMAL
POC MICROALBUMIN URINE: ABNORMAL
POTASSIUM SERPL-SCNC: 4.2 MMOL/L (ref 3.5–5.2)
PROT SERPL-MCNC: 6.6 G/DL (ref 6–8.5)
PROT UR STRIP-MCNC: NEGATIVE MG/DL
PSA SERPL-MCNC: 2.42 NG/ML (ref 0–4)
RBC # BLD AUTO: 4.3 10*6/MM3 (ref 4.14–5.8)
RBC # UR STRIP: NEGATIVE /UL
SODIUM SERPL-SCNC: 137 MMOL/L (ref 136–145)
SP GR UR: 1 (ref 1–1.03)
TRIGL SERPL-MCNC: 61 MG/DL (ref 0–150)
TSH SERPL DL<=0.05 MIU/L-ACNC: 1.37 UIU/ML (ref 0.27–4.2)
UROBILINOGEN UR QL: NORMAL
VLDLC SERPL-MCNC: 13 MG/DL (ref 5–40)
WBC NRBC COR # BLD: 6.48 10*3/MM3 (ref 3.4–10.8)

## 2022-06-17 PROCEDURE — 83036 HEMOGLOBIN GLYCOSYLATED A1C: CPT | Performed by: INTERNAL MEDICINE

## 2022-06-17 PROCEDURE — G0103 PSA SCREENING: HCPCS | Performed by: INTERNAL MEDICINE

## 2022-06-17 PROCEDURE — 3062F POS MACROALBUMINURIA REV: CPT | Performed by: INTERNAL MEDICINE

## 2022-06-17 PROCEDURE — 81003 URINALYSIS AUTO W/O SCOPE: CPT | Performed by: INTERNAL MEDICINE

## 2022-06-17 PROCEDURE — 85025 COMPLETE CBC W/AUTO DIFF WBC: CPT | Performed by: INTERNAL MEDICINE

## 2022-06-17 PROCEDURE — 84443 ASSAY THYROID STIM HORMONE: CPT | Performed by: INTERNAL MEDICINE

## 2022-06-17 PROCEDURE — 82044 UR ALBUMIN SEMIQUANTITATIVE: CPT | Performed by: INTERNAL MEDICINE

## 2022-06-17 PROCEDURE — 80053 COMPREHEN METABOLIC PANEL: CPT | Performed by: INTERNAL MEDICINE

## 2022-06-17 PROCEDURE — 80061 LIPID PANEL: CPT | Performed by: INTERNAL MEDICINE

## 2022-06-27 ENCOUNTER — TREATMENT (OUTPATIENT)
Dept: PHYSICAL THERAPY | Facility: CLINIC | Age: 67
End: 2022-06-27

## 2022-06-27 DIAGNOSIS — M25.562 ACUTE PAIN OF LEFT KNEE: Primary | ICD-10-CM

## 2022-06-27 PROCEDURE — 97110 THERAPEUTIC EXERCISES: CPT | Performed by: PHYSICAL THERAPIST

## 2022-06-27 NOTE — PROGRESS NOTES
Physical Therapy Re Certification Of Plan of Care  Patient: Chente Rich   : 1955  Diagnosis/ICD-10 Code:  Acute pain of left knee [M25.562]  Referring practitioner: Rey Worthy,*  Date of Initial Visit: Type: THERAPY  Noted: 2022  Today's Date: 2022  Patient seen for 9 sessions         Visit Diagnoses:    ICD-10-CM ICD-9-CM   1. Acute pain of left knee  M25.562 719.46       Subjective Questionnaire: LEFS: 58  Clinical Progress: unchanged  Home Program Compliance: Yes  Treatment has included: therapeutic exercise, neuromuscular re-education, manual therapy and therapeutic activity      Subjective Evaluation    History of Present Illness  Mechanism of injury: The pt stated that his knee was feeling well until last Monday when he went for an 8 mile walk. He tolerated the walk well but experienced an increase in pain and swelling later that day. Pain was severe and did not fully improve for 4 days. He attempted to jog this weekend but stated he had sharp pain. He is interested in getting an MRI.    Pain  Current pain ratin  At worst pain ratin  Location: L knee              Objective          Palpation   Left   No palpable tenderness to the distal biceps femoris, distal semimembranosus, distal semitendinosus, lateral gastrocnemius and medial gastrocnemius.     Right   No palpable tenderness to the distal biceps femoris, distal semimembranosus, distal semitendinosus, lateral gastrocnemius and medial gastrocnemius.     Tenderness   Left Knee   No tenderness in the MCL (distal), MCL (proximal), medial joint line (mild), medial patella, pes anserinus and popliteal fossa.     Active Range of Motion   Left Knee   Flexion: 135 degrees with pain  Extension: Left knee active extension: -5. with pain    Right Knee   Flexion: 135 degrees   Extension: Right knee active extension: -7.     Passive Range of Motion   Left Knee   Flexion: 135 degrees with pain  Extension: Left knee passive  extension: -5.     Strength/Myotome Testing     Left Hip   Planes of Motion   Flexion: 5  Extension: 5  Abduction: 5  External rotation: 5    Right Hip   Planes of Motion   Flexion: 5  Extension: 5  Abduction: 5  External rotation: 5    Left Knee   Flexion: 5  Prone flexion: 4+  Extension: 5  Quadriceps contraction: good    Right Knee   Flexion: 5  Prone flexion: 5  Extension: 5  Quadriceps contraction: good    Tests     Left Knee   Positive medial Orlando.   Negative anterior drawer, bounce home, lateral Orlando, spring, Thessaly's test at 5 degrees, Thessaly's test at 20 degrees, valgus stress test at 0 degrees, valgus stress test at 30 degrees, varus stress test at 0 degrees and varus stress test at 30 degrees.     Ambulation     Comments   Normal gait with 80 foot assessment    Functional Assessment     Comments  Weight shift to R LE with squat past 90 degrees          Assessment & Plan     Assessment  Impairments: abnormal gait, abnormal muscle firing, abnormal or restricted ROM, activity intolerance, impaired balance, impaired physical strength, lacks appropriate home exercise program and pain with function  Functional Limitations: lifting, walking, uncomfortable because of pain, standing, stooping and unable to perform repetitive tasks  Assessment details: The pt initially responded well to PT interventions for acute knee pain but has unfortunately experienced multiple exacerbations in the last month as he has attempted to maintain an active lifestyle. Signs and symptoms are consistent with a meniscus injury and he has been unable to consistently reduce irritation with activity modification, medication, compression, and specific exercise. He has remained diligent with his HEP throughout treatment but is likely overstressing the knee with continued independent exercise and may benefit from a period of rest. I feel he would also benefit from return to ortho for further assessment including imaging and  discussion of more invasive interventions as needed.   Prognosis: fair    Goals  Plan Goals: Short Term Goals (4 weeks):     1. The patient will be independent and compliant with initial HEP. Met     2. The patient will report pain at rest 0/10 or less and worst pain 3/10 or less. Ongoing     3. The patient will display decreased TTP in the medial knee jt line and medial hamstring tendons and dec mm tension in the surrounding musculature. Ongoing     4.  L knee AROM will improve to ext/flex -5/135 deg. Ongoing     5. The patient will demonstrate inc strength evidenced by MMT knee flexion 5/5 in prone. Met    6. LEFS will improve by 9 points or more. Met - regressed        Long Term Goals (8 weeks):     1. The patient will be appropriate for independent management and compliant with progressed HEP. Ongoing     2. The patient will report pain at rest 0/10 or less and worst pain 1/10 or less. Ongoing     3. The patient will display L knee AROM -7/135. Ongoing     4. The patient will demonstrate good VMO activation evidenced by performance of a SLR with 3# and no extensor lag. Met    5. The patient will return to work duties and/or ADLs with no limitations due to knee pain or dysfunction. Ongoing     6. The patient will return to recreational and community activities with no limitations due to knee pain or dysfunction. Ongoing       Plan  Therapy options: will be seen for skilled therapy services  Planned modality interventions: cryotherapy, electrical stimulation/Russian stimulation, iontophoresis, TENS and thermotherapy (hydrocollator packs)  Planned therapy interventions: ADL retraining, body mechanics training, balance/weight-bearing training, flexibility, functional ROM exercises, gait training, home exercise program, joint mobilization, manual therapy, neuromuscular re-education, postural training, soft tissue mobilization, strengthening, stretching, therapeutic activities and transfer training  Frequency: 1x  week  Duration in visits: 8  Duration in weeks: 8  Treatment plan discussed with: patient  Plan details: The pt will benefit from return to ortho for additional evaluation. If no invasive interventions are warranted, he may benefit from a period of rest before returning to PT.           Recommendations: Continue with recommendations return to ortho  Timeframe: 6 weeks  Prognosis to achieve goals: fair      Timed:         Manual Therapy:    0     mins  19918;     Therapeutic Exercise:    25     mins  73382;     Neuromuscular Koffi:    0    mins  86082;    Therapeutic Activity:     0     mins  21186;     Gait Trainin     mins  64044;     Ultrasound:     0     mins  24751;    Ionto                               0    mins   16564  Self Care                       0     mins   70501  [facility]    0     mins 70955      Un-Timed:  Electrical Stimulation:    0     mins  88693 (MC );  Dry Needling     0     mins self-pay  Traction     0     mins 19080  Re-Eval                           0    mins  55842      Timed Treatment:   25   mins   Total Treatment:     25   mins          PT: Delfino Amin PT     [place] License:  [id number]    Electronically signed by Delfino Amin PT, 22, 9:34 AM EDT    Certification Period: 2022 thru 2022  I certify that the therapy services are furnished while this patient is under my care.  The services outlined above are required by this patient, and will be reviewed every 90 days.         Physician Signature:__________________________________________________    PHYSICIAN: Rey Worthy MD  NPI: [id number]                                      DATE:  :     Please sign and return via fax to [email or web address] . Thank you, [facility]

## 2022-06-28 ENCOUNTER — TELEPHONE (OUTPATIENT)
Dept: ORTHOPEDIC SURGERY | Facility: CLINIC | Age: 67
End: 2022-06-28

## 2022-06-28 DIAGNOSIS — M17.12 PRIMARY OSTEOARTHRITIS OF LEFT KNEE: Primary | ICD-10-CM

## 2022-06-28 DIAGNOSIS — S83.242D TEAR OF MEDIAL MENISCUS OF LEFT KNEE, CURRENT, UNSPECIFIED TEAR TYPE, SUBSEQUENT ENCOUNTER: ICD-10-CM

## 2022-06-28 DIAGNOSIS — S89.92XA INJURY OF LEFT KNEE, INITIAL ENCOUNTER: ICD-10-CM

## 2022-06-28 NOTE — TELEPHONE ENCOUNTER
Can you put in a referral for MRI on Left knee.    Selma Mcginnis  
MRI left knee ordered.      Persistent pain following injury.  Has failed conservative measures to include PT, NSAIDs.  Exam in May--positive Orlando's.    Follow-up with Dr. Worthy after MRI completed to discuss results and treatment options.  
Provider: BAY  Caller: JUAN M  Phone Number: [phone]  Reason for Call: PT IS READY TO GET THE ORDER FOR THE MRI FOR HIS LEFT KNEE.    
Shaye

## 2022-06-30 ENCOUNTER — HOSPITAL ENCOUNTER (OUTPATIENT)
Dept: MRI IMAGING | Facility: HOSPITAL | Age: 67
Discharge: HOME | End: 2022-06-30
Admitting: PHYSICIAN ASSISTANT

## 2022-06-30 DIAGNOSIS — S83.242D TEAR OF MEDIAL MENISCUS OF LEFT KNEE, CURRENT, UNSPECIFIED TEAR TYPE, SUBSEQUENT ENCOUNTER: ICD-10-CM

## 2022-06-30 DIAGNOSIS — M17.12 PRIMARY OSTEOARTHRITIS OF LEFT KNEE: ICD-10-CM

## 2022-06-30 DIAGNOSIS — S89.92XA INJURY OF LEFT KNEE, INITIAL ENCOUNTER: ICD-10-CM

## 2022-06-30 PROCEDURE — 73721 MRI JNT OF LWR EXTRE W/O DYE: CPT

## 2022-07-12 ENCOUNTER — OFFICE VISIT (OUTPATIENT)
Dept: ORTHOPEDIC SURGERY | Facility: CLINIC | Age: 67
End: 2022-07-12

## 2022-07-12 VITALS
SYSTOLIC BLOOD PRESSURE: 130 MMHG | BODY MASS INDEX: 21.33 KG/M2 | WEIGHT: 144 LBS | DIASTOLIC BLOOD PRESSURE: 72 MMHG | HEIGHT: 69 IN

## 2022-07-12 DIAGNOSIS — M17.12 PRIMARY OSTEOARTHRITIS OF LEFT KNEE: Primary | ICD-10-CM

## 2022-07-12 DIAGNOSIS — S83.242D TEAR OF MEDIAL MENISCUS OF LEFT KNEE, CURRENT, UNSPECIFIED TEAR TYPE, SUBSEQUENT ENCOUNTER: ICD-10-CM

## 2022-07-12 PROCEDURE — 99214 OFFICE O/P EST MOD 30 MIN: CPT | Performed by: ORTHOPAEDIC SURGERY

## 2022-07-12 NOTE — PROGRESS NOTES
"    [facility] [place] [facility] Note        Subjective     CC: Follow-up (MRI (6/30/22) follow up; Primary osteoarthritis of left knee )      HPI    Chente Rich is a 67 y.o. male.  Patient is here today for follow-up after the MRI of his left knee on 6/30/2022.  Continues to have medial sided knee pain.    Overall, patient's symptoms are as above.    ROS:    Constiutional:Pt denies fever, chills, nausea, or vomiting.  MSK:as above        Objective      Past Medical History  Past Medical History:   Diagnosis Date   • Abnormal screening cardiac CT 10/12/2017    Calcium Score 133   • BCC (basal cell carcinoma of skin)     Dx (initially)-2010.  Has occurred on head, chest, back, and right leg.   • Colon polyp     Dx 10/17- tubular adenoma ascending colon, no dysplasia   • Diabetes mellitus (HCC)    • Diverticulosis     Dx 10/17 by Colonoscopy- pancolonic   • History of cardiovascular stress test 06/19/2020    negative stress echo, EF 60%   • Hyperlipidemia          Physical Exam  /72   Ht 176.5 cm (69.49\")   Wt 65.3 kg (144 lb)   BMI 20.97 kg/m²     Body mass index is 20.97 kg/m².    Patient is well nourished and well developed.        Ortho Exam      Musculoskeletal:  Global Assessment:  Overall assessment of Lower Extremity Muscle Strength and Tone:  Left quadriceps--5/5   Left hamstrings--5/5       Left tibialis anterior--5/5  Left gastroc-soleus--5/5  Left EHL--5/5      Inspection and Palpation:    Left knee:  Tenderness:  Over medial joint line  Effusion:  1+  Crepitus:  none  Pulses:  2+  Ecchymosis:  None  Warmth:  None       ROM:  Left:  Extension:0     Flexion:130    Instability:    Left:    Lachman Test:  Negative  Varus stress test negative  Valgus stress test negative   Anterior Drawer Test:  Negative  Posterior Drawer Test:  Negative    Functional Testing:    Left:  Orlando's test:  Positive  Patella grind test:  Negative  Q-angle:  Normal  Apprehension Sign:  " Negative      Imaging/Labs/EMG Reviewed:  Imaging Results (Last 24 Hours)     ** No results found for the last 24 hours. **        MRI Knee Left Without Contrast  Narrative: PROCEDURE: MRI KNEE LEFT  WO CONTRAST-     HISTORY: suspected medial meniscus tear; M17.12-Unilateral primary  osteoarthritis, left knee; S83.242D-Other tear of medial meniscus,  current injury, left knee, subsequent encounter; S89.92XA-Unspecified  injury of left lower leg, initial encounter        FINDINGS: Multiplanar MR imaging of the left knee was performed without  contrast. There is a tear of the posterior horn of the medial meniscus  extending to the posterior root. The lateral meniscus is intact. The  anterior and posterior cruciate ligaments are intact. The medial  collateral ligament and lateral ligamentous complex are intact.  The  distal quadriceps tendon and the patellar tendon are intact. There is no  evidence of fracture. There is severe chondromalacia of the medial  femoral condyle with osteochondral lesions present measuring up to 8 mm  transverse. Small osteochondral lesions are also noted of the medial  tibial plateau. A moderate joint effusion is seen. The musculature is  intact. There is a small popliteal cyst.            Impression: Tear of the posterior horn of the medial meniscus extending  to the posterior root.     Severe medial compartment chondromalacia with multiple osteochondral  lesions.     Moderate joint effusion and small popliteal cyst.     This report was signed and finalized on 7/1/2022 9:41 AM by Mike Lr MD.    We reviewed images and report of the MRI above.  Patient has an irregular appearing medial meniscus consistent with a tear.  There is some articular cartilage irregularity on the medial femoral condyle but only a mild amount of edema.    Assessment    Assessment:  1. Primary osteoarthritis of left knee    2. Tear of medial meniscus of left knee, current, unspecified tear type, subsequent  encounter        Plan:  1. Recommend over the counter anti-inflammatories for pain and/or swelling  2. Osteoarthritis left knee with new MRI in that shows a medial meniscal tear--we discussed treatment options alternatives with the patient.  At this point, we have agreed to proceed with arthroscopy of the left knee with partial medial meniscectomy.  We have told him that the benefit of this will also be the diagnostic portion of the test which will show us the condition of the articular cartilage of the medial, patellofemoral, and lateral compartments.  The risk, benefits, potential hazards, typical recovery and rehab as well as reasonable turns were discussed with him.  He had the opportunity questions and wished to proceed with scheduling.  No history of DVT in him or any first-degree relatives.  He will start physical therapy at the [facility] on [facility] a few days after surgery.  That referral has been placed today.      Rey Worthy MD  07/12/22  08:38 EDT      Dictated Utilizing Dragon Dictation.

## 2022-07-13 ENCOUNTER — IMMUNIZATION (OUTPATIENT)
Dept: INTERNAL MEDICINE | Facility: CLINIC | Age: 67
End: 2022-07-13

## 2022-07-13 DIAGNOSIS — Z23 NEED FOR COVID-19 VACCINE: Primary | ICD-10-CM

## 2022-07-13 PROCEDURE — 0054A COVID-19 (PFIZER) 12+ YRS: CPT | Performed by: INTERNAL MEDICINE

## 2022-07-13 PROCEDURE — 91305 COVID-19 (PFIZER) 12+ YRS: CPT | Performed by: INTERNAL MEDICINE

## 2022-07-21 DIAGNOSIS — Z12.11 SCREENING FOR COLON CANCER: Primary | ICD-10-CM

## 2022-07-27 ENCOUNTER — PRE-ADMISSION TESTING (OUTPATIENT)
Dept: PREADMISSION TESTING | Facility: HOSPITAL | Age: 67
End: 2022-07-27

## 2022-07-27 DIAGNOSIS — S83.242D TEAR OF MEDIAL MENISCUS OF LEFT KNEE, CURRENT, UNSPECIFIED TEAR TYPE, SUBSEQUENT ENCOUNTER: ICD-10-CM

## 2022-07-27 LAB — SARS-COV-2 RNA PNL SPEC NAA+PROBE: NOT DETECTED

## 2022-07-27 PROCEDURE — U0004 COV-19 TEST NON-CDC HGH THRU: HCPCS

## 2022-07-27 PROCEDURE — C9803 HOPD COVID-19 SPEC COLLECT: HCPCS

## 2022-07-29 DIAGNOSIS — Z98.890 S/P ARTHROSCOPIC KNEE SURGERY: ICD-10-CM

## 2022-07-29 DIAGNOSIS — S83.242D TEAR OF MEDIAL MENISCUS OF LEFT KNEE, CURRENT, UNSPECIFIED TEAR TYPE, SUBSEQUENT ENCOUNTER: Primary | ICD-10-CM

## 2022-07-29 RX ORDER — DOCUSATE SODIUM 100 MG/1
100 CAPSULE, LIQUID FILLED ORAL 2 TIMES DAILY PRN
Qty: 62 CAPSULE | Refills: 0 | Status: SHIPPED | OUTPATIENT
Start: 2022-07-29

## 2022-07-29 RX ORDER — SENNOSIDES 8.6 MG
650 CAPSULE ORAL EVERY 8 HOURS PRN
Qty: 30 TABLET | Refills: 0 | Status: SHIPPED | OUTPATIENT
Start: 2022-07-29

## 2022-07-29 RX ORDER — HYDROCODONE BITARTRATE AND ACETAMINOPHEN 5; 325 MG/1; MG/1
1 TABLET ORAL EVERY 6 HOURS PRN
Qty: 15 TABLET | Refills: 0 | Status: SHIPPED | OUTPATIENT
Start: 2022-07-29 | End: 2022-09-08

## 2022-07-29 RX ORDER — ONDANSETRON 4 MG/1
4 TABLET, FILM COATED ORAL EVERY 8 HOURS PRN
Qty: 10 TABLET | Refills: 1 | Status: SHIPPED | OUTPATIENT
Start: 2022-07-29 | End: 2022-09-08

## 2022-07-29 RX ORDER — MELOXICAM 7.5 MG/1
7.5 TABLET ORAL DAILY
Qty: 30 TABLET | Refills: 0 | Status: SHIPPED | OUTPATIENT
Start: 2022-07-29 | End: 2022-08-24

## 2022-08-02 ENCOUNTER — TELEPHONE (OUTPATIENT)
Dept: ORTHOPEDIC SURGERY | Facility: CLINIC | Age: 67
End: 2022-08-02

## 2022-08-02 ENCOUNTER — TREATMENT (OUTPATIENT)
Dept: PHYSICAL THERAPY | Facility: CLINIC | Age: 67
End: 2022-08-02

## 2022-08-02 DIAGNOSIS — G89.29 CHRONIC PAIN OF LEFT KNEE: Primary | ICD-10-CM

## 2022-08-02 DIAGNOSIS — R53.1 WEAKNESS: ICD-10-CM

## 2022-08-02 DIAGNOSIS — M25.562 CHRONIC PAIN OF LEFT KNEE: Primary | ICD-10-CM

## 2022-08-02 DIAGNOSIS — Z98.890 S/P LEFT KNEE ARTHROSCOPY: ICD-10-CM

## 2022-08-02 PROCEDURE — 97161 PT EVAL LOW COMPLEX 20 MIN: CPT | Performed by: PHYSICAL THERAPIST

## 2022-08-02 PROCEDURE — 97110 THERAPEUTIC EXERCISES: CPT | Performed by: PHYSICAL THERAPIST

## 2022-08-02 NOTE — PROGRESS NOTES
We are going to send word to the patient that he can come by and get the stitch removed and the area Steri-Stripped if he would like.  An alternative would be to leave the sutures in until the end of the week and then have the suture removed and then a Band-Aid placed over the area.    Thank you    CORINNA

## 2022-08-02 NOTE — PROGRESS NOTES
Physical Therapy Initial Evaluation and Plan of Care    Patient: Chente Rich   : 1955  Diagnosis/ICD-10 Code:  S/P left knee arthroscopy [Z98.890]  Referring practitioner: Rey Worthy,*  Date of Initial Visit: 2022  Today's Date: 2022  Patient seen for 1 session         Visit Diagnoses:    ICD-10-CM ICD-9-CM   1. S/P left knee arthroscopy  Z98.890 V45.89         Subjective Questionnaire: LEFS: 42      Subjective Evaluation    History of Present Illness  Mechanism of injury: The pt underwent a L knee arthroscopy with medial meniscus shaving on 22. He stated he has been feeling much better since surgery and is pleased with his results so far. He has minimal pain but experiences discomfort with prolonged walking and with stair use. He has been icing for swelling and pain management. He discontinued Mobic use yesterday. He hopes to return to recreational walking and tennis.     Pain  Current pain ratin  At worst pain ratin  Location: L knee  Quality: dull ache  Relieving factors: ice, medications and rest  Aggravating factors: ambulation, squatting and stairs  Progression: improved    Social Support  Lives in: multiple-level home  Lives with: spouse    Hand dominance: right    Treatments  Previous treatment: physical therapy and medication  Patient Goals  Patient goals for therapy: decreased pain, increased motion, return to sport/leisure activities and increased strength             Objective          Observations     Additional Knee Observation Details  Medial incision site partially opened - no signs of infection  Mild-mod swelling at the L knee joint line  Mod atrophy of the L quads    Thigh girth (measured 10 cm superior to superior patella):  R 42 cm, L 39 cm       Palpation     Additional Palpation Details  No TTP in the L knee musculature    Tenderness   Left Knee   No tenderness in the lateral joint line, medial joint line, patellar tendon and quadriceps tendon.      Active Range of Motion   Left Knee   Flexion: 127 degrees with pain  Extension: Left knee active extension: -2. with pain  Extensor la degrees     Right Knee   Flexion: 135 degrees   Extension: Right knee active extension: -7.   Extensor la degrees     Patellar Mobility   Left Knee Patellar tendons within functional limits include the medial, lateral, superior and inferior.     Right Knee Patellar tendons within functional limits include the medial, lateral, superior and inferior.     Strength/Myotome Testing     Left Knee   Flexion: 4  Extension: 4  Quadriceps contraction: fair    Right Knee   Flexion: 5  Extension: 5  Quadriceps contraction: good          Assessment & Plan     Assessment  Impairments: abnormal gait, abnormal muscle firing, abnormal or restricted ROM, activity intolerance, impaired balance, impaired physical strength, lacks appropriate home exercise program and pain with function  Functional Limitations: lifting, walking, uncomfortable because of pain, standing, stooping and unable to perform repetitive tasks  Assessment details: The patient is a 68 yo male who presented to PT with evolving characteristics of L knee pain, ROM deficits, and weakness with low complexity, S/P arthroscopy performed 22 by Dr. Worthy. His pain has improved but he has ongoing discomfort with prolonged standing and walking that is consistent with the operation at this time frame. He has excellent knee extension but has pain at end range. Flexion is functional but limited compared to the non operative side. He has moderate quad atrophy and will need to build both quad and glute strength to return to normal function. He has an opening of his medial incision site and was advised to contact his surgeon for repeat stitching. There are no signs of infection at this site. He is a very active individual and was advised to limit his activity for the first couple of weeks to minimize irritation. I expect the  patient to make a timely recovery with skilled PT intervention.     Prognosis: good    Goals  Plan Goals: Short Term Goals (4 weeks):     1. The patient will be independent and compliant with initial HEP.     2. The patient will report pain at rest 0/10 or less and worst pain 1/10 or less.    3. The patient will display decreased TTP in the medial jt line and dec mm tension in the surrounding musculature.    4.  L knee AROM will improve to ext/flex -5/135 deg.    5. The patient will demonstrate inc strength evidenced by MMT as follows: hip abd 5/5, hip ext 5/5, knee ext 5/5, and knee flex 5/5.    6. LEFS will improve by 9 points or more.     7. The pt will ambulate 200 feet with no AD and gait pattern free of apparent deviations.        Long Term Goals (8 weeks):     1. The patient will be appropriate for independent management and compliant with progressed HEP.     2. The patient will report pain at rest 0/10 or less and worst pain 0/10 or less.    3. The patient will display L knee AROM -7/135.    4. The patient will demonstrate good VMO activation evidenced by performance of a SLR with 3# and no extensor lag.    5. The patient will return to work duties and/or ADLs with no limitations due to knee pain or dysfunction.    6. The patient will return to recreational and community activities with no limitations due to knee pain or dysfunction.      Plan  Therapy options: will be seen for skilled therapy services  Planned modality interventions: cryotherapy, electrical stimulation/Russian stimulation, iontophoresis, TENS and thermotherapy (hydrocollator packs)  Planned therapy interventions: ADL retraining, body mechanics training, balance/weight-bearing training, flexibility, functional ROM exercises, gait training, home exercise program, joint mobilization, manual therapy, neuromuscular re-education, postural training, soft tissue mobilization, strengthening, stretching, therapeutic activities and transfer  training  Frequency: 1x week  Duration in visits: 8  Duration in weeks: 8  Treatment plan discussed with: patient  Plan details: The pt will likely benefit from TE/TA/NMED to improve strength of the hips, quads, and hamstrings, to improve balance, and to restore normal proprioception. MT will be utilized to improve ROM and jt mobility. Modalities will be used as needed for pain modulation. Dry needling as indicated.        History # of Personal Factors and/or Comorbidities: LOW (0)  Examination of Body System(s): # of elements: LOW (1-2)  Clinical Presentation: EVOLVING  Clinical Decision Making: LOW       Timed:         Manual Therapy:    0     mins  64296;     Therapeutic Exercise:    10     mins  37556;     Neuromuscular Koffi:    0    mins  11414;    Therapeutic Activity:     0     mins  22783;     Gait Trainin     mins  52726;     Ultrasound:     0     mins  24707;    Ionto                               0    mins   11818  Self Care                       0     mins   79995  Canalith Repos    0     mins 21809      Un-Timed:  Electrical Stimulation:    0     mins  33150 (MC );  Dry Needling     0     mins self-pay  Traction     0     mins 75222  Low Eval     20     Mins  13200  Mod Eval     0     Mins  17309  High Eval                       0     Mins  62486        Timed Treatment:   10   mins   Total Treatment:     30   mins          PT: Delfino Amin PT     License Number: [id number]  Electronically signed by Delfino Amin PT, 22, 10:05 AM EDT    Certification Period: 2022 thru 10/30/2022  I certify that the therapy services are furnished while this patient is under my care.  The services outlined above are required by this patient, and will be reviewed every 90 days.         Physician Signature:__________________________________________________    PHYSICIAN: Rey Worthy MD  NPI: [id number]                                      DATE:      Please sign and return via fax to  [email or web address] . Thank you, [facility].   PRINCIPAL PROCEDURE  Procedure: Radical nephrectomy with ureterotomy  Findings and Treatment:

## 2022-08-02 NOTE — TELEPHONE ENCOUNTER
PATIENT CALLED REGARDING MYCHART MESSAGE. HE SAID HE WOULD LIKE TO COME IN TO HAVE THE STITCHES EVALUATED. I SPOKE WITH SHY WHO AGREED AND  ADVISED TO PATIENT HE COULD COME IN TO HAVE THEM EVALUATED. PATIENT WILL BE COMING IN THIS AFTERNOON.

## 2022-08-09 ENCOUNTER — TREATMENT (OUTPATIENT)
Dept: PHYSICAL THERAPY | Facility: CLINIC | Age: 67
End: 2022-08-09

## 2022-08-09 DIAGNOSIS — M25.562 ACUTE PAIN OF LEFT KNEE: ICD-10-CM

## 2022-08-09 DIAGNOSIS — Z98.890 S/P LEFT KNEE ARTHROSCOPY: Primary | ICD-10-CM

## 2022-08-09 PROCEDURE — 97110 THERAPEUTIC EXERCISES: CPT | Performed by: PHYSICAL THERAPIST

## 2022-08-09 NOTE — PROGRESS NOTES
Physical Therapy Daily Treatment Note      Patient: Chente Rich   : 1955  Referring practitioner: Rey Worthy,*  Date of Initial Visit: Type: THERAPY  Noted: 2022  Today's Date: 2022  Patient seen for 2 sessions       Visit Diagnoses:    ICD-10-CM ICD-9-CM   1. S/P left knee arthroscopy  Z98.890 V45.89   2. Acute pain of left knee  M25.562 719.46       Subjective Evaluation    History of Present Illness  Mechanism of injury: The pt stated that his knee was more irritable last week than he expected and reported he took it easy for several days. He has been able to return to walking with minimal discomfort in the last few days and navigated the stairs without pain for the first time this morning. He has not been taking any medication. He has been icing regularly. He has been compliant with his HEP and stated it is going well, but reported some discomfort with active knee flexion. Stitches were removed last week.    Pain  Current pain ratin  Location: L knee           Objective   See Exercise, Manual, and Modality Logs for complete treatment.       Assessment & Plan     Assessment    Assessment details: The pt reported improving discomfort in the L knee this week and was again encouraged to take it easy for the next week as inflammation subsides. He demonstrated improved quad activation today but continued to have appx 3 degrees extension lag with SLR. The hamstrings remain tight and he was advised to focus on hamstring stretching and quad activation this week. Weight bearing exercises were introduced today via glute strengthening and standing TKE and were tolerated relatively well, but some joint line discomfort was noted. He will benefit from at least one more week of reduced activity levels before ramping up as his pain is controlled.    Plan  Plan details: Assess quad activation and use NMES as needed. Progress into standing positions for exercise as tolerated.          Timed:          Manual Therapy:    0     mins  87317;     Therapeutic Exercise:    38     mins  89261;     Neuromuscular Koffi:    0    mins  65273;    Therapeutic Activity:     0     mins  58514;     Gait Trainin     mins  18078;     Ultrasound:     0     mins  23923;    Ionto                               0    mins   03978  Self Care                       0     mins   17897  Canalith Repos    0     mins 44661      Un-Timed:  Electrical Stimulation:    0     mins  87334 ( );  Dry Needling     0     mins self-pay  Traction     0     mins 35803      Timed Treatment:   38   mins   Total Treatment:     54   mins    Delfino Amin PT  [place] License: [id number]

## 2022-08-11 ENCOUNTER — OUTSIDE FACILITY SERVICE (OUTPATIENT)
Dept: GASTROENTEROLOGY | Facility: CLINIC | Age: 67
End: 2022-08-11

## 2022-08-11 PROCEDURE — G0500 MOD SEDAT ENDO SERVICE >5YRS: HCPCS | Performed by: INTERNAL MEDICINE

## 2022-08-11 PROCEDURE — G0105 COLORECTAL SCRN; HI RISK IND: HCPCS | Performed by: INTERNAL MEDICINE

## 2022-08-16 ENCOUNTER — TREATMENT (OUTPATIENT)
Dept: PHYSICAL THERAPY | Facility: CLINIC | Age: 67
End: 2022-08-16

## 2022-08-16 DIAGNOSIS — Z98.890 S/P LEFT KNEE ARTHROSCOPY: ICD-10-CM

## 2022-08-16 DIAGNOSIS — M25.562 ACUTE PAIN OF LEFT KNEE: Primary | ICD-10-CM

## 2022-08-16 PROCEDURE — 97110 THERAPEUTIC EXERCISES: CPT | Performed by: PHYSICAL THERAPIST

## 2022-08-16 NOTE — PROGRESS NOTES
Physical Therapy Daily Treatment Note      Patient: Chente Rich   : 1955  Referring practitioner: Rey Worthy,*  Date of Initial Visit: Type: THERAPY  Noted: 2022  Today's Date: 2022  Patient seen for 3 sessions       Visit Diagnoses:    ICD-10-CM ICD-9-CM   1. Acute pain of left knee  M25.562 719.46   2. S/P left knee arthroscopy  Z98.890 V45.89       Subjective Evaluation    History of Present Illness  Mechanism of injury: The pt stated that his knee has been feeling much better and reported he was able to walk 3 miles yesterday without discomfort. He has continued to withhold from his exercise class and tennis. He has been compliant with his HEP and feels his quad activation has improved significantly.     Pain  Current pain ratin  Location: L knee           Objective   See Exercise, Manual, and Modality Logs for complete treatment.       Assessment & Plan     Assessment    Assessment details: The pt demonstrated a weighted SLR with no extensor lag today and his quad activation is improving nicely. Pain has reduced substantially and he has benefited from a period of relative rest following surgery. He was sore following his last visit so similar interventions were repeated today and added to his HEP for independent management. He was advised to walk no further than 3 miles at a time for at least one more week, but as long as his pain remains low next week, he should be fine to gradually ramp this up. He had functional shakiness of the L LE when in SLS today and will need to improve his quad and glute strength.    Plan  Plan details: Continue strengthening of the glutes and quads and progress exercise intensity as tolerated.          Timed:         Manual Therapy:    0     mins  59212;     Therapeutic Exercise:    40     mins  36037;     Neuromuscular Koffi:    0    mins  25119;    Therapeutic Activity:     0     mins  93733;     Gait Trainin     mins  94822;      Ultrasound:     0     mins  60482;    Ionto                               0    mins   18930  Self Care                       0     mins   06139  Canalith Repos    0     mins 74114      Un-Timed:  Electrical Stimulation:    0     mins  87878 ( );  Dry Needling     0     mins self-pay  Traction     0     mins 66883      Timed Treatment:   40   mins   Total Treatment:     50   mins    Delfino Amin, PT  [place] License: [id number]

## 2022-08-23 ENCOUNTER — TREATMENT (OUTPATIENT)
Dept: PHYSICAL THERAPY | Facility: CLINIC | Age: 67
End: 2022-08-23

## 2022-08-23 DIAGNOSIS — Z98.890 S/P LEFT KNEE ARTHROSCOPY: ICD-10-CM

## 2022-08-23 DIAGNOSIS — M25.562 ACUTE PAIN OF LEFT KNEE: Primary | ICD-10-CM

## 2022-08-23 PROCEDURE — 97110 THERAPEUTIC EXERCISES: CPT | Performed by: PHYSICAL THERAPIST

## 2022-08-23 NOTE — PROGRESS NOTES
Physical Therapy Daily Treatment Note      Patient: Chente Rich   : 1955  Referring practitioner: Rey Worthy,*  Date of Initial Visit: Type: THERAPY  Noted: 2022  Today's Date: 2022  Patient seen for 4 sessions       Visit Diagnoses:    ICD-10-CM ICD-9-CM   1. Acute pain of left knee  M25.562 719.46   2. S/P left knee arthroscopy  Z98.890 V45.89       Subjective Evaluation    History of Present Illness  Mechanism of injury: The pt stated that he experienced mild joint line pain the day following his last visit but reported resolution of symptoms that evening. He feels weak in the legs and reported feelings of instability with stairs this week. He has had mild pain with walking 2-3 miles at a time and has been apprehensive to progress.     Pain  Current pain ratin  Location: L knee           Objective          Active Range of Motion     Additional Active Range of Motion Details  No TTP in the L knee joint line      See Exercise, Manual, and Modality Logs for complete treatment.       Assessment & Plan     Assessment    Assessment details: The pt continues to experience mild discomfort in the knee with prolonged walking and more strenuous activity that is likely attributable to inflammation. Symptoms have been mild and he shows no signs of mechanical instability, so he was advised to continue walking within his tolerance and to use ice and NSAIDs as needed. SLS activities were very challenging last week but were more stable today so they were progressed and added to his HEP. A lateral step down was attempted but was not tolerated well. This was modified to lateral step up and was tolerated much better. He will need to continue getting stronger in the quads and glutes to improve functional stability and to reduce strain at the knee.     Plan  Plan details: Pt to return to exercise class and 4-5 mile walks as tolerated. Continue quad and glute strengthening.           Timed:          Manual Therapy:    0     mins  79872;     Therapeutic Exercise:    55     mins  56184;     Neuromuscular Koffi:    0    mins  89350;    Therapeutic Activity:     0     mins  89794;     Gait Trainin     mins  89310;     Ultrasound:     0     mins  34088;    Ionto                               0    mins   98669  Self Care                       0     mins   18726  Canalith Repos    0     mins 65951      Un-Timed:  Electrical Stimulation:    0     mins  08789 ( );  Dry Needling     0     mins self-pay  Traction     0     mins 99583      Timed Treatment:   55   mins   Total Treatment:     55   mins    Delfino Amin PT  [place] License: [id number]

## 2022-08-24 RX ORDER — MELOXICAM 7.5 MG/1
TABLET ORAL
Qty: 30 TABLET | Refills: 0 | Status: SHIPPED | OUTPATIENT
Start: 2022-08-24

## 2022-08-31 ENCOUNTER — TREATMENT (OUTPATIENT)
Dept: PHYSICAL THERAPY | Facility: CLINIC | Age: 67
End: 2022-08-31

## 2022-08-31 DIAGNOSIS — Z98.890 S/P LEFT KNEE ARTHROSCOPY: ICD-10-CM

## 2022-08-31 DIAGNOSIS — M25.562 ACUTE PAIN OF LEFT KNEE: Primary | ICD-10-CM

## 2022-08-31 PROCEDURE — 97110 THERAPEUTIC EXERCISES: CPT | Performed by: PHYSICAL THERAPIST

## 2022-08-31 NOTE — PROGRESS NOTES
Physical Therapy Re Certification Of Plan of Care  Patient: Chente Rich   : 1955  Diagnosis/ICD-10 Code:  Acute pain of left knee [M25.562]  Referring practitioner: Rey Worthy,*  Date of Initial Visit: Type: THERAPY  Noted: 2022  Today's Date: 2022  Patient seen for 5 sessions         Visit Diagnoses:    ICD-10-CM ICD-9-CM   1. Acute pain of left knee  M25.562 719.46   2. S/P left knee arthroscopy  Z98.890 V45.89       Subjective Questionnaire: LEFS: 66  Clinical Progress: improved  Home Program Compliance: Yes  Treatment has included: therapeutic exercise, neuromuscular re-education, manual therapy and therapeutic activity      Subjective Evaluation    History of Present Illness  Mechanism of injury: The pt stated that his knee has been feeling much better and reported minimal pain in the last week. He was able to walk as far as 6 miles at a time this week and reported no pain afterwards. He has not yet attempted to return to his exercise class but plans to tomorrow. He feels stairs are getting easier and noted reduced feelings of instability. He believes he is ready for HEP progressions. He feels he is 90% better.    Pain  Current pain ratin  Location: L knee             Objective          Observations     Additional Knee Observation Details  No apparent swelling at the L knee joint line  Mod atrophy of the L quads    Thigh girth (measured 10 cm superior to superior patella):  R 42 cm, L 39 cm       Palpation     Additional Palpation Details  No TTP in the L knee musculature    Tenderness   Left Knee   No tenderness in the lateral joint line, medial joint line, patellar tendon and quadriceps tendon.     Active Range of Motion   Left Knee   Flexion: 132 degrees   Extension: Left knee active extension: -5.   Extensor la degrees     Right Knee   Flexion: 135 degrees   Extension: Right knee active extension: -7.   Extensor la degrees     Patellar Mobility   Left Knee  Patellar tendons within functional limits include the medial, lateral, superior and inferior.     Right Knee Patellar tendons within functional limits include the medial, lateral, superior and inferior.     Strength/Myotome Testing     Left Knee   Flexion: 5  Extension: 5  Quadriceps contraction: good    Right Knee   Flexion: 5  Extension: 5  Quadriceps contraction: good          Assessment & Plan     Assessment  Impairments: abnormal gait, abnormal muscle firing, abnormal or restricted ROM, activity intolerance, impaired balance, impaired physical strength, lacks appropriate home exercise program and pain with function  Functional Limitations: lifting, walking, uncomfortable because of pain, standing, stooping and unable to perform repetitive tasks  Assessment details: The patient is 1 month S/P L knee arthroscopy and is recovering as expected. Pain is nearly resolved with daily activity but he continues to have joint line pain with stair descent and knee flexion in SLS. Swelling is absent and mm atrophy persists, but should improve with ongoing strengthening. Quad activation is normal and will allow for more functional exercise progressions. He was prescribed deep squats today and was encouraged to return to his strengthening program at the [facility]. He is doing well with recreational walking and was advised to progress within his tolerance. He is not yet ready to return to tennis and light bound were attempted in the clinic today with ongoing discomfort. He will need to build strength and improve tolerance to quick loading activities before returning to higher level activities.  Prognosis: good    Goals  Plan Goals: Short Term Goals (4 weeks):     1. The patient will be independent and compliant with initial HEP. Met     2. The patient will report pain at rest 0/10 or less and worst pain 1/10 or less. Partially met    3. The patient will display decreased TTP in the medial jt line and dec mm tension in the  surrounding musculature. Met    4.  L knee AROM will improve to ext/flex -5/135 deg. Partially met    5. The patient will demonstrate inc strength evidenced by MMT as follows: hip abd 5/5, hip ext 5/5, knee ext 5/5, and knee flex 5/5. Ongoing     6. LEFS will improve by 9 points or more. Met    7. The pt will ambulate 200 feet with no AD and gait pattern free of apparent deviations. Met        Long Term Goals (8 weeks):     1. The patient will be appropriate for independent management and compliant with progressed HEP. Ongoing     2. The patient will report pain at rest 0/10 or less and worst pain 0/10 or less. Ongoing     3. The patient will display L knee AROM -7/135. Ongoing     4. The patient will demonstrate good VMO activation evidenced by performance of a SLR with 3# and no extensor lag. Ongoing     5. The patient will return to work duties and/or ADLs with no limitations due to knee pain or dysfunction. Ongoing     6. The patient will return to recreational and community activities with no limitations due to knee pain or dysfunction. Ongoing       Plan  Therapy options: will be seen for skilled therapy services  Planned modality interventions: cryotherapy, electrical stimulation/Russian stimulation, iontophoresis, TENS and thermotherapy (hydrocollator packs)  Planned therapy interventions: ADL retraining, body mechanics training, balance/weight-bearing training, flexibility, functional ROM exercises, gait training, home exercise program, joint mobilization, manual therapy, neuromuscular re-education, postural training, soft tissue mobilization, strengthening, stretching, therapeutic activities and transfer training  Frequency: 1x week  Duration in visits: 8  Duration in weeks: 8  Treatment plan discussed with: patient  Plan details: Continue functional strength and stabilization exercise for the L LE.            Recommendations: Continue as planned  Timeframe: 2 months  Prognosis to achieve goals:  good      Timed:         Manual Therapy:    0     mins  11155;     Therapeutic Exercise:    45     mins  67638;     Neuromuscular Koffi:    0    mins  33214;    Therapeutic Activity:     0     mins  77131;     Gait Trainin     mins  07845;     Ultrasound:     0     mins  86363;    Ionto                               0    mins   71799  Self Care                       0     mins   72564  Canalith Repos    0     mins 00913      Un-Timed:  Electrical Stimulation:    0     mins  29045 (MC );  Dry Needling     0     mins self-pay  Traction     0     mins 59650  Re-Eval                           0    mins  90734      Timed Treatment:   45   mins   Total Treatment:     70   mins          PT: Delfino Amin PT     [place] License:  [id number]    Electronically signed by Delfino Amin PT, 22, 9:35 AM EDT    Certification Period: 2022 thru 2022  I certify that the therapy services are furnished while this patient is under my care.  The services outlined above are required by this patient, and will be reviewed every 90 days.         Physician Signature:__________________________________________________    PHYSICIAN: Rey Worthy MD  NPI: [id number]                                      DATE:  :     Please sign and return via fax to [email or web address] . Thank you, [facility]

## 2022-09-08 ENCOUNTER — OFFICE VISIT (OUTPATIENT)
Dept: ORTHOPEDIC SURGERY | Facility: CLINIC | Age: 67
End: 2022-09-08

## 2022-09-08 DIAGNOSIS — M17.12 PRIMARY OSTEOARTHRITIS OF LEFT KNEE: ICD-10-CM

## 2022-09-08 DIAGNOSIS — S83.242D TEAR OF MEDIAL MENISCUS OF LEFT KNEE, CURRENT, UNSPECIFIED TEAR TYPE, SUBSEQUENT ENCOUNTER: ICD-10-CM

## 2022-09-08 DIAGNOSIS — Z98.890 STATUS POST ARTHROSCOPY OF LEFT KNEE: Primary | ICD-10-CM

## 2022-09-08 PROCEDURE — 99024 POSTOP FOLLOW-UP VISIT: CPT | Performed by: PHYSICIAN ASSISTANT

## 2022-09-08 NOTE — PROGRESS NOTES
[facility] Note        Subjective     Post-op (6 weeks s/p (L) knee arthroscopy with partial medial menisectomy 07/29/2022)       HPI    Chente Rich is a 67 y.o. male.  Patient presents for their initial postop visit following left knee arthroscopy with partial medial meniscectomy and knee with osteoarthritis performed on the above date by Dr. Worthy.    Pain scale: 1/10.  Patient is working with formal PT.  He states he is much better than what he was prior to surgery.  He still has difficulty with stairs and inclines but better.  No reported numbness or tingling into the extremity.    Patient denies any fever, chills, night sweats or other constitutional symptoms.          Objective      Physical Exam  There were no vitals taken for this visit.    There is no height or weight on file to calculate BMI.        Ortho Exam  Peripheral Vascular:    Upper Extremity:   Inspection:  Left--no cyanotic nail beds Right--no cyanotic nail beds   Bilateral:  Pink nail beds with brisk capillary refill   Palpation:  Bilateral radial pulse normal    Musculoskeletal:  Global Assessment:  Overall assessment of Lower Extremity Muscle Strength and Tone:    Left quadriceps--4+/5      Lower Extremity:  Knee:      Inspection and Palpation:      Left knee:    Effusion: None  Crepitus:  none  Pulses:  2+  Ecchymosis:  None  Warmth:  None  Incision: Sutures are already out to his portals.  No redness, warmth, drainage or evidence of infection.  Portals are healing well.  Calf:  Non tender    ROM:  Left:  Extension:0    Flexion:120      Imaging Reviewed:  No new imaging today.      Assessment:  1. Status post arthroscopy of left knee    2. Tear of medial meniscus of left knee, current, unspecified tear type, subsequent encounter    3. Primary osteoarthritis of left knee        Plan:  1. Status post left knee arthroscopy with partial medial meniscectomy knee with known arthritis, stable.  2. Sutures of already  been removed prior to appointment.  3. Reviewed arthroscopic images with the patient.  4. Continue working with formal PT.  5. Recommend OTC NSAIDS/pain medication as needed.  6. At this point patient does not feel he needs any injections however if over the next few weeks if pain starts increasing will contact the clinic and we will place a preauthorization for viscosupplementation series.  7. Follow up with Dr. Worthy in 4 weeks.  8. Questions and concerns answered.      Renee Orellana PA-C  09/09/22  07:55 EDT      Dictated Utilizing Dragon Dictation.

## 2022-09-13 ENCOUNTER — TREATMENT (OUTPATIENT)
Dept: PHYSICAL THERAPY | Facility: CLINIC | Age: 67
End: 2022-09-13

## 2022-09-13 DIAGNOSIS — M25.562 ACUTE PAIN OF LEFT KNEE: Primary | ICD-10-CM

## 2022-09-13 DIAGNOSIS — Z98.890 S/P LEFT KNEE ARTHROSCOPY: ICD-10-CM

## 2022-09-13 PROCEDURE — 97110 THERAPEUTIC EXERCISES: CPT | Performed by: PHYSICAL THERAPIST

## 2022-09-13 NOTE — PROGRESS NOTES
Physical Therapy Daily Treatment Note      Patient: Chente Rich   : 1955  Referring practitioner: Rey Worthy,*  Date of Initial Visit: Type: THERAPY  Noted: 2022  Today's Date: 2022  Patient seen for 6 sessions       Visit Diagnoses:    ICD-10-CM ICD-9-CM   1. Acute pain of left knee  M25.562 719.46   2. S/P left knee arthroscopy  Z98.890 V45.89       Subjective Evaluation    History of Present Illness  Mechanism of injury: The pt stated that his knee pain has continued to improve and reported only mild discomfort with prolonged walking and with lunging. He has returned to exercise classes and reported that he is tolerating it well. He has walked as far as 8 miles at a time. He has not yet attempted to return to tennis but did try to jog last week and stated it was painful. He has been compliant with his HEP and feels it is getting easier. He has noticed improved stability and decreased pain on the stairs.    Pain  Current pain ratin  Location: L knee           Objective   See Exercise, Manual, and Modality Logs for complete treatment.       Assessment & Plan     Assessment    Assessment details: The pt continues to show improvement in L knee pain and function from visit to visit and has tolerated return to recreational walking and group exercise classes with minimal limitation. He continues to experience pain with lateral step downs and deep lunging, but this is improving and was tolerated well in short doses today. A TKE was added to his HEP today to work on functional quad stability and strength at high reps. Partial depth lunges were performed in the clinic with minimal discomfort and he was encouraged to continue these in his independent exercise. Light bounding exercises were introduced and were initially performed apprehensively, but this was improved with repetition and instruction to focus on heel to toe landing. He is doing very well and should continue to see  improvement with gradual increase in activity.    Plan  Plan details: Continue light plyometrics and strengthening of the quads and glutes in functional positions.          Timed:         Manual Therapy:    0     mins  16099;     Therapeutic Exercise:    38     mins  40981;     Neuromuscular Koffi:    0    mins  23743;    Therapeutic Activity:     0     mins  04192;     Gait Trainin     mins  41008;     Ultrasound:     0     mins  98885;    Ionto                               0    mins   30701  Self Care                       0     mins   86763  Canalith Repos    0     mins 86214      Un-Timed:  Electrical Stimulation:    0     mins  49732 ( );  Dry Needling     0     mins self-pay  Traction     0     mins 36511      Timed Treatment:   38   mins   Total Treatment:     50   mins    Delfino Amin, PT  [place] License: [id number]

## 2022-09-28 RX ORDER — SIMVASTATIN 20 MG
TABLET ORAL
Qty: 30 TABLET | Refills: 3 | Status: SHIPPED | OUTPATIENT
Start: 2022-09-28 | End: 2022-12-02 | Stop reason: SDUPTHER

## 2022-10-01 ENCOUNTER — FLU SHOT (OUTPATIENT)
Dept: INTERNAL MEDICINE | Facility: CLINIC | Age: 67
End: 2022-10-01

## 2022-10-01 DIAGNOSIS — Z23 NEED FOR INFLUENZA VACCINATION: Primary | ICD-10-CM

## 2022-10-01 PROCEDURE — G0008 ADMIN INFLUENZA VIRUS VAC: HCPCS | Performed by: INTERNAL MEDICINE

## 2022-10-01 PROCEDURE — 90662 IIV NO PRSV INCREASED AG IM: CPT | Performed by: INTERNAL MEDICINE

## 2022-10-04 ENCOUNTER — TREATMENT (OUTPATIENT)
Dept: PHYSICAL THERAPY | Facility: CLINIC | Age: 67
End: 2022-10-04

## 2022-10-04 DIAGNOSIS — M25.562 ACUTE PAIN OF LEFT KNEE: Primary | ICD-10-CM

## 2022-10-04 DIAGNOSIS — Z98.890 S/P LEFT KNEE ARTHROSCOPY: ICD-10-CM

## 2022-10-04 PROCEDURE — 97110 THERAPEUTIC EXERCISES: CPT | Performed by: PHYSICAL THERAPIST

## 2022-10-04 NOTE — PROGRESS NOTES
Physical Therapy Re Certification Of Plan of Care  Patient: Chente Rich   : 1955  Diagnosis/ICD-10 Code:  Acute pain of left knee [M25.562]  Referring practitioner: Rey Worthy,*  Date of Initial Visit: Type: THERAPY  Noted: 2022  Today's Date: 10/4/2022  Patient seen for 7 sessions         Visit Diagnoses:    ICD-10-CM ICD-9-CM   1. Acute pain of left knee  M25.562 719.46   2. S/P left knee arthroscopy  Z98.890 V45.89         Subjective Questionnaire: LEFS: 67  Clinical Progress: improved  Home Program Compliance: Yes  Treatment has included: therapeutic exercise, neuromuscular re-education, manual therapy, therapeutic activity, gait training and cryotherapy      Subjective Evaluation    History of Present Illness  Mechanism of injury: The patient stated that his knee pain has continued to fluctuate in the last few weeks but overall feels he is doing much better. He has been able to walk as far as 8 miles at a time. He has not attempted running. He would like to return to tennis but is apprehensive and also stated this is out of season. He has been consistent with his HEP and his exercise classes and stated he is tolerating most activities very well.    Pain  Current pain ratin  At worst pain ratin  Location: L knee             Objective       Assessment & Plan     Assessment    Assessment details: The patient continues to progress very well with rehab S/P left knee arthroscopy. He has ongoing quad atrophy and will need to improve his strength, endurance, and power in order to return to normalcy, but is highly motivated towards exercise and should see a full recovery. He saw minimal tenderness to palpation in the knee today and pain was only reproduce with lateral step downs, which has been an issue even prior to surgery. He remains apprehensive with light bounding exercises but is able to tolerate them well with practice. Overall, he has been able to return to a very active  lifestyle but I would like to see his quad strength improve so that he is able to return to higher level activities such as tennis. He has met all of the short term goals in his progressing nicely towards long-term goals.    Plan  Plan details: Patient to be seen two times per month for exercise progressions to build quad strength and improve tolerance to bounding.           Recommendations: Continue as planned  Timeframe: 2 months  Prognosis to achieve goals: good      Timed:         Manual Therapy:    0     mins  18183;     Therapeutic Exercise:    23     mins  80500;     Neuromuscular Koffi:    0    mins  44880;    Therapeutic Activity:     0     mins  64888;     Gait Trainin     mins  10114;     Ultrasound:     0     mins  69187;    Ionto                               0    mins   41212  Self Care                       0     mins   26376  Canalith Repos    0     mins 32258      Un-Timed:  Electrical Stimulation:    0     mins  53212 ( );  Dry Needling     0     mins self-pay  Traction     0     mins 50567  Re-Eval                           0    mins  13695      Timed Treatment:   23   mins   Total Treatment:     50   mins          PT: Delfino Amin PT     [place] License:  [id number]    Electronically signed by Delfino Amin PT, 10/04/22, 9:41 AM EDT    Certification Period: 10/4/2022 thru 2023  I certify that the therapy services are furnished while this patient is under my care.  The services outlined above are required by this patient, and will be reviewed every 90 days.         Physician Signature:__________________________________________________    PHYSICIAN: Rey Worthy MD  NPI: [id number]                                      DATE:  :     Please sign and return via fax to [email or web address] . Thank you, [facility]

## 2022-10-06 ENCOUNTER — OFFICE VISIT (OUTPATIENT)
Dept: ORTHOPEDIC SURGERY | Facility: CLINIC | Age: 67
End: 2022-10-06

## 2022-10-06 DIAGNOSIS — Z98.890 STATUS POST ARTHROSCOPY OF LEFT KNEE: Primary | ICD-10-CM

## 2022-10-06 DIAGNOSIS — M17.12 PRIMARY OSTEOARTHRITIS OF LEFT KNEE: ICD-10-CM

## 2022-10-06 PROCEDURE — 99024 POSTOP FOLLOW-UP VISIT: CPT | Performed by: ORTHOPAEDIC SURGERY

## 2022-10-18 ENCOUNTER — TREATMENT (OUTPATIENT)
Dept: PHYSICAL THERAPY | Facility: CLINIC | Age: 67
End: 2022-10-18

## 2022-10-18 DIAGNOSIS — M25.562 ACUTE PAIN OF LEFT KNEE: Primary | ICD-10-CM

## 2022-10-18 DIAGNOSIS — R53.1 WEAKNESS: ICD-10-CM

## 2022-10-18 DIAGNOSIS — Z98.890 S/P LEFT KNEE ARTHROSCOPY: ICD-10-CM

## 2022-10-18 PROCEDURE — 97110 THERAPEUTIC EXERCISES: CPT | Performed by: PHYSICAL THERAPIST

## 2022-10-18 NOTE — PROGRESS NOTES
Physical Therapy Daily Treatment Note  [facility] [place]  [address], [address]  [place] [address]      Patient: Chente Rich   : 1955  Referring practitioner: Rey Worthy,*  Date of Initial Visit: Type: THERAPY  Noted: 2022  Today's Date: 10/18/2022  Patient seen for 8 sessions       Visit Diagnoses:    ICD-10-CM ICD-9-CM   1. Acute pain of left knee  M25.562 719.46   2. S/P left knee arthroscopy  Z98.890 V45.89   3. Weakness  R53.1 780.79       Subjective Evaluation    History of Present Illness  Mechanism of injury: The pt stated that his knee has turned a corner and is feeling much better. He has nearly doubled his leg press weight at the gym and feels that improved quad strength has reduced his pain. He has tolerated walking well since his last visit and has walked as far as 8 miles at a time. He is having less discomfort with hills. He has not attempted to jog and has not tried many bounding activities.      Pain  Current pain ratin  At worst pain ratin  Location: L knee           Objective   See Exercise, Manual, and Modality Logs for complete treatment.       Assessment & Plan     Assessment    Assessment details: The pt reported a significant improvement in L knee pain this week and displayed confident loading and propulsion off the L LE. Apprehension has been a large limitation to bounding activities in the past but this was not observed this week. Lateral bounding was confident and pain-free and anterior/posterior bounding was only limited by excessive rotation of the pelvis due to limited glute activation. Several isolated glute strengthening exercises were prescribed to target this limitation and were tolerated well with limited signs of fatigue. He was encouraged to increase weight and reduce reps on quad exercises at the gym this week to build power.     Plan  Plan details: Pt to work on glute and quad strength/power for 2 weeks and return for HEP  progression and d/c planning.          Timed:         Manual Therapy:    0     mins  20280;     Therapeutic Exercise:    55     mins  19848;     Neuromuscular Koffi:    0    mins  27808;    Therapeutic Activity:     0     mins  54897;     Gait Trainin     mins  03313;     Ultrasound:     0     mins  72869;    Ionto                               0    mins   89622  Self Care                       0     mins   92420  Canalith Repos    0     mins 02282      Un-Timed:  Electrical Stimulation:    0     mins  91757 ( );  Dry Needling     0     mins self-pay  Traction     0     mins 45593      Timed Treatment:   55   mins   Total Treatment:     55   mins    Delfino Amin PT  [place] License: [id number]

## 2022-11-01 ENCOUNTER — TREATMENT (OUTPATIENT)
Dept: PHYSICAL THERAPY | Facility: CLINIC | Age: 67
End: 2022-11-01

## 2022-11-01 DIAGNOSIS — Z98.890 S/P LEFT KNEE ARTHROSCOPY: ICD-10-CM

## 2022-11-01 DIAGNOSIS — R53.1 WEAKNESS: ICD-10-CM

## 2022-11-01 DIAGNOSIS — M25.562 ACUTE PAIN OF LEFT KNEE: Primary | ICD-10-CM

## 2022-11-01 PROCEDURE — 97110 THERAPEUTIC EXERCISES: CPT | Performed by: PHYSICAL THERAPIST

## 2022-11-01 NOTE — PROGRESS NOTES
Physical Therapy Daily Treatment Note  [facility] [place]  [address] [address]      Patient: Chente Rich   : 1955  Referring practitioner: Rey Worthy,*  Date of Initial Visit: Type: THERAPY  Noted: 2022  Today's Date: 2022  Patient seen for 9 sessions       Visit Diagnoses:    ICD-10-CM ICD-9-CM   1. Acute pain of left knee  M25.562 719.46   2. S/P left knee arthroscopy  Z98.890 V45.89   3. Weakness  R53.1 780.79       Subjective Evaluation    History of Present Illness  Mechanism of injury: The patient stated that he has not had much knee pain at all since his last visit. He was able to run three times this week including one run for nearly 10 minutes with no knee pain. He climbed 100 flights of stairs on a stair climber without increased pain and has been increasing the weight of his leg presses. He has not returned to tennis but is not interested in doing so until the spring. He is pleased with his progress and is agreeable to discharge today.    Pain  Current pain ratin  Location: L knee           Objective          Observations     Additional Knee Observation Details  No apparent swelling at the L knee joint line  Mod atrophy of the L quads    Thigh girth (measured 10 cm superior to superior patella):  R 42 cm, L 39 cm       Palpation     Additional Palpation Details  No TTP in the L knee musculature    Tenderness   Left Knee   No tenderness in the lateral joint line, medial joint line, patellar tendon and quadriceps tendon.     Active Range of Motion   Left Knee   Flexion: 132 degrees   Extension: Left knee active extension: -5.   Extensor la degrees     Right Knee   Flexion: 135 degrees   Extension: Right knee active extension: -7.   Extensor la degrees     Patellar Mobility   Left Knee Patellar tendons within functional limits include the medial, lateral, superior and inferior.     Right Knee Patellar tendons within functional  limits include the medial, lateral, superior and inferior.     Strength/Myotome Testing     Left Knee   Flexion: 5  Extension: 5  Quadriceps contraction: good    Right Knee   Flexion: 5  Extension: 5  Quadriceps contraction: good      See Exercise, Manual, and Modality Logs for complete treatment.       Assessment & Plan     Assessment    Assessment details: The patient has returned to a high level of activity with little to no knee pain since his last visit, and is doing very well with independent exercise progressions. He participates in exercise classes in addition to independent strengthening and should continue to see improvements in pain and function with ongoing Intervention. He demonstrated pain-free lateral step downs for the first time since surgery and had appropriate technique with all other exercises from his advanced HEP. He remains somewhat apprehensive with rapid loading during plyometric exercises but this should get better with practice as he learns to trust his knee again. He has met all of his goals for PT and as appropriate for discharge today.    Goals  Plan Goals: Short Term Goals (4 weeks):     1. The patient will be independent and compliant with initial HEP. Met     2. The patient will report pain at rest 0/10 or less and worst pain 1/10 or less. Met    3. The patient will display decreased TTP in the medial jt line and dec mm tension in the surrounding musculature. Met    4.  L knee AROM will improve to ext/flex -5/135 deg. Partially met    5. The patient will demonstrate inc strength evidenced by MMT as follows: hip abd 5/5, hip ext 5/5, knee ext 5/5, and knee flex 5/5. Met    6. LEFS will improve by 9 points or more. Met    7. The pt will ambulate 200 feet with no AD and gait pattern free of apparent deviations. Met        Long Term Goals (8 weeks):     1. The patient will be appropriate for independent management and compliant with progressed HEP. Met    2. The patient will report pain  at rest 0/10 or less and worst pain 0/10 or less. Met    3. The patient will display L knee AROM -7/135. Not met     4. The patient will demonstrate good VMO activation evidenced by performance of a SLR with 3# and no extensor lag. Met     5. The patient will return to work duties and/or ADLs with no limitations due to knee pain or dysfunction. Met     6. The patient will return to recreational and community activities with no limitations due to knee pain or dysfunction. Met      Plan  Plan details: Patient to be discharged.          Timed:         Manual Therapy:    0     mins  00120;     Therapeutic Exercise:    25     mins  55718;     Neuromuscular Koffi:    0    mins  87344;    Therapeutic Activity:     0     mins  26198;     Gait Trainin     mins  59246;     Ultrasound:     0     mins  14436;    Ionto                               0    mins   29139  Self Care                       0     mins   71043  Canalith Repos    0     mins 72718      Un-Timed:  Electrical Stimulation:    0     mins  35912 (MC );  Dry Needling     0     mins self-pay  Traction     0     mins 05701      Timed Treatment:   25   mins   Total Treatment:     30   mins    Delfino Amin PT  [place] License: [id number]        Discharge Summary  Discharge Summary from Physical Therapy Report      Date of initial PT visit: 22    Number of Visits: 9     Goals: All Met    Discharge Plan: Continue with current home exercise program as instructed    Comments: see assessment    Date of Discharge: 22        Delfino Amin PT  Physical Therapist

## 2022-12-02 ENCOUNTER — TELEPHONE (OUTPATIENT)
Dept: INTERNAL MEDICINE | Facility: CLINIC | Age: 67
End: 2022-12-02

## 2022-12-02 RX ORDER — SIMVASTATIN 20 MG
20 TABLET ORAL DAILY
Qty: 90 TABLET | Refills: 1 | Status: SHIPPED | OUTPATIENT
Start: 2022-12-02

## 2022-12-12 ENCOUNTER — LAB (OUTPATIENT)
Dept: LAB | Facility: HOSPITAL | Age: 67
End: 2022-12-12

## 2022-12-12 ENCOUNTER — OFFICE VISIT (OUTPATIENT)
Dept: INTERNAL MEDICINE | Facility: CLINIC | Age: 67
End: 2022-12-12

## 2022-12-12 VITALS
RESPIRATION RATE: 18 BRPM | BODY MASS INDEX: 21.81 KG/M2 | WEIGHT: 149.8 LBS | OXYGEN SATURATION: 96 % | SYSTOLIC BLOOD PRESSURE: 128 MMHG | TEMPERATURE: 97.5 F | HEART RATE: 60 BPM | DIASTOLIC BLOOD PRESSURE: 76 MMHG

## 2022-12-12 DIAGNOSIS — E11.9 CONTROLLED TYPE 2 DIABETES MELLITUS WITHOUT COMPLICATION, WITHOUT LONG-TERM CURRENT USE OF INSULIN: ICD-10-CM

## 2022-12-12 DIAGNOSIS — E78.49 OTHER HYPERLIPIDEMIA: ICD-10-CM

## 2022-12-12 DIAGNOSIS — E11.9 CONTROLLED TYPE 2 DIABETES MELLITUS WITHOUT COMPLICATION, WITHOUT LONG-TERM CURRENT USE OF INSULIN: Primary | ICD-10-CM

## 2022-12-12 DIAGNOSIS — R20.2 PARESTHESIA: ICD-10-CM

## 2022-12-12 DIAGNOSIS — K63.5 POLYP OF COLON, UNSPECIFIED PART OF COLON, UNSPECIFIED TYPE: ICD-10-CM

## 2022-12-12 LAB
ALBUMIN SERPL-MCNC: 4.7 G/DL (ref 3.5–5.2)
ALBUMIN/GLOB SERPL: 2 G/DL
ALP SERPL-CCNC: 57 U/L (ref 39–117)
ALT SERPL W P-5'-P-CCNC: 21 U/L (ref 1–41)
ANION GAP SERPL CALCULATED.3IONS-SCNC: 9.4 MMOL/L (ref 5–15)
AST SERPL-CCNC: 25 U/L (ref 1–40)
BASOPHILS # BLD AUTO: 0.03 10*3/MM3 (ref 0–0.2)
BASOPHILS NFR BLD AUTO: 0.5 % (ref 0–1.5)
BILIRUB SERPL-MCNC: 0.5 MG/DL (ref 0–1.2)
BUN SERPL-MCNC: 20 MG/DL (ref 8–23)
BUN/CREAT SERPL: 14.5 (ref 7–25)
CALCIUM SPEC-SCNC: 9.8 MG/DL (ref 8.6–10.5)
CHLORIDE SERPL-SCNC: 98 MMOL/L (ref 98–107)
CHOLEST SERPL-MCNC: 175 MG/DL (ref 0–200)
CO2 SERPL-SCNC: 28.6 MMOL/L (ref 22–29)
CREAT SERPL-MCNC: 1.38 MG/DL (ref 0.76–1.27)
DEPRECATED RDW RBC AUTO: 41.2 FL (ref 37–54)
EGFRCR SERPLBLD CKD-EPI 2021: 55.4 ML/MIN/1.73
EOSINOPHIL # BLD AUTO: 0.29 10*3/MM3 (ref 0–0.4)
EOSINOPHIL NFR BLD AUTO: 4.7 % (ref 0.3–6.2)
ERYTHROCYTE [DISTWIDTH] IN BLOOD BY AUTOMATED COUNT: 12.2 % (ref 12.3–15.4)
EXPIRATION DATE: NORMAL
GLOBULIN UR ELPH-MCNC: 2.3 GM/DL
GLUCOSE SERPL-MCNC: 119 MG/DL (ref 65–99)
HBA1C MFR BLD: 6.2 %
HCT VFR BLD AUTO: 42.3 % (ref 37.5–51)
HDLC SERPL-MCNC: 73 MG/DL (ref 40–60)
HGB BLD-MCNC: 14.1 G/DL (ref 13–17.7)
IMM GRANULOCYTES # BLD AUTO: 0.01 10*3/MM3 (ref 0–0.05)
IMM GRANULOCYTES NFR BLD AUTO: 0.2 % (ref 0–0.5)
LDLC SERPL CALC-MCNC: 87 MG/DL (ref 0–100)
LDLC/HDLC SERPL: 1.18 {RATIO}
LYMPHOCYTES # BLD AUTO: 1.95 10*3/MM3 (ref 0.7–3.1)
LYMPHOCYTES NFR BLD AUTO: 31.5 % (ref 19.6–45.3)
Lab: NORMAL
MCH RBC QN AUTO: 30.9 PG (ref 26.6–33)
MCHC RBC AUTO-ENTMCNC: 33.3 G/DL (ref 31.5–35.7)
MCV RBC AUTO: 92.8 FL (ref 79–97)
MONOCYTES # BLD AUTO: 0.54 10*3/MM3 (ref 0.1–0.9)
MONOCYTES NFR BLD AUTO: 8.7 % (ref 5–12)
NEUTROPHILS NFR BLD AUTO: 3.37 10*3/MM3 (ref 1.7–7)
NEUTROPHILS NFR BLD AUTO: 54.4 % (ref 42.7–76)
NRBC BLD AUTO-RTO: 0 /100 WBC (ref 0–0.2)
PLATELET # BLD AUTO: 272 10*3/MM3 (ref 140–450)
PMV BLD AUTO: 10.4 FL (ref 6–12)
POTASSIUM SERPL-SCNC: 4.7 MMOL/L (ref 3.5–5.2)
PROT SERPL-MCNC: 7 G/DL (ref 6–8.5)
RBC # BLD AUTO: 4.56 10*6/MM3 (ref 4.14–5.8)
SODIUM SERPL-SCNC: 136 MMOL/L (ref 136–145)
TRIGL SERPL-MCNC: 80 MG/DL (ref 0–150)
VLDLC SERPL-MCNC: 15 MG/DL (ref 5–40)
WBC NRBC COR # BLD: 6.19 10*3/MM3 (ref 3.4–10.8)

## 2022-12-12 PROCEDURE — 83036 HEMOGLOBIN GLYCOSYLATED A1C: CPT | Performed by: INTERNAL MEDICINE

## 2022-12-12 PROCEDURE — 99214 OFFICE O/P EST MOD 30 MIN: CPT | Performed by: INTERNAL MEDICINE

## 2022-12-12 PROCEDURE — 85025 COMPLETE CBC W/AUTO DIFF WBC: CPT

## 2022-12-12 PROCEDURE — 80053 COMPREHEN METABOLIC PANEL: CPT

## 2022-12-12 PROCEDURE — 80061 LIPID PANEL: CPT

## 2022-12-12 PROCEDURE — 3044F HG A1C LEVEL LT 7.0%: CPT | Performed by: INTERNAL MEDICINE

## 2022-12-12 NOTE — PROGRESS NOTES
Subjective       Chente Rich is a 67 y.o. male.     Chief Complaint   Patient presents with   • Diabetes     Recheck, pt is fasting       History obtained from the patient.      History of Present Illness     Cardiac Follow-Up:  The patient is here for a follow-up visit.  His Diabetes has been stable.  Medication: Simvastatin.  His Hyperlipidemia has been stable.  LDL goal < 70. Last  LDL was 58, TG 61.  Medication: Simvastatin.  Side Effects: None.     Comorbid Illness: Coronary Artery Calcification.  Takes Aspirin 81 mg daily.      Procedures: On 10/12/2017, Cardiac CT scan showed a Calcium Score of 133. On 6/19/20, he had a negative stress echo, EF 60%     Interval Events: Last Hemoglobin A1c on 6/17/2022 was 6.2.  Creatinine was 1.28 (stable).  He has been off his Metformin since 6/20/2021 due to the elevated Creatinine (initially 1.55).  He does not check his blood sugar or blood pressure at home.  His last Ophthalmology visit was 5/3/22, no retinopathy.  He does check his feet daily.       Symptoms: Denies chest pain, shortness of breath, OLIVEIRA, orthopnea, PND, palpitations, syncope, lower extremity edema, claudication, lightheadedness, and dizziness.    Associated Symptoms: Weight decreased 3 pounds in the past 6 months.   Denies fatigue, headache, polydipsia, polyuria, myalgias, arthralgias, visual impairment, memory loss, concentration issues, and focal neurological deficit.  Reports a 6-month history of mild numbness, no tingling, of his right third through fifth toes.  No foot pain or ulcer.     Lifestyle: The patient follows a diverse and healthy diet.  He exercises 4-6 times per week (walks, does strength training, and lifts weights).  Tobacco Use: Never a smoker.      Colon Polyp Follow-Up: The patient is here for follow-up of colon polyps, which have been stable.    Comorbid Illness: Diverticulosis.  Interval Events: Last Colonoscopy on 8/11/2022 showed diverticulosis, but no polyps.    Symptoms:  Denies abdominal pain, diarrhea, constipation, hematochezia, melena, and changes in stool.    Medication: None.       Current Outpatient Medications on File Prior to Visit   Medication Sig Dispense Refill   • aspirin 81 MG EC tablet Take 1 tablet by mouth Daily.     • simvastatin (ZOCOR) 20 MG tablet Take 1 tablet by mouth Daily. 90 tablet 1   • acetaminophen (TYLENOL) 650 MG 8 hr tablet Take 1 tablet by mouth Every 8 (Eight) Hours As Needed for Mild Pain . 30 tablet 0   • docusate sodium (Colace) 100 MG capsule Take 1 capsule by mouth 2 (Two) Times a Day As Needed for Constipation. 62 capsule 0   • meloxicam (MOBIC) 7.5 MG tablet TAKE ONE TABLET BY MOUTH DAILY 30 tablet 0     No current facility-administered medications on file prior to visit.       Current outpatient and discharge medications have been reconciled for the patient.  Reviewed by: Millie Loya MD        The following portions of the patient's history were reviewed and updated as appropriate: allergies, current medications, past family history, past medical history, past social history, past surgical history and problem list.    Review of Systems   Constitutional: Negative for fatigue and unexpected weight change.   Eyes: Negative for visual disturbance.   Respiratory: Negative for cough, shortness of breath and wheezing.    Cardiovascular: Negative for chest pain, palpitations and leg swelling.        No OLIVEIRA, orthopnea, or claudication.   Gastrointestinal: Negative for abdominal pain, blood in stool, constipation, diarrhea, nausea and vomiting.        Denies melena.   Endocrine: Negative for polydipsia and polyuria.   Musculoskeletal: Negative for arthralgias and myalgias.   Neurological: Positive for numbness. Negative for dizziness, syncope, light-headedness and headaches.        No memory issues.   Psychiatric/Behavioral: Negative for decreased concentration.         Objective       Blood pressure 128/76, pulse 60, temperature 97.5 °F (36.4 °C),  temperature source Infrared, resp. rate 18, weight 67.9 kg (149 lb 12.8 oz), SpO2 96 %.  Body mass index is 21.81 kg/m².      Physical Exam  Vitals and nursing note reviewed.   Constitutional:       Appearance: He is well-developed and normal weight.   Neck:      Thyroid: No thyroid mass or thyromegaly.      Vascular: No carotid bruit.   Cardiovascular:      Rate and Rhythm: Normal rate and regular rhythm.      Pulses: Normal pulses.      Heart sounds: Normal heart sounds. No murmur heard.    No friction rub. No gallop.   Pulmonary:      Effort: Pulmonary effort is normal.      Breath sounds: Normal breath sounds.   Musculoskeletal:      Right lower leg: No edema.      Left lower leg: No edema.   Neurological:      Mental Status: He is alert.   Psychiatric:         Mood and Affect: Mood normal.       Results for orders placed or performed in visit on 12/12/22   POC Glycosylated Hemoglobin (Hb A1C)    Specimen: Blood   Result Value Ref Range    Hemoglobin A1C 6.2 %    Lot Number [id number]     Expiration Date 09/14/2024        Assessment / Plan:  Diagnoses and all orders for this visit:    1. Controlled type 2 diabetes mellitus without complication, without long-term current use of insulin (HCC) (Primary)  -     POC Glycosylated Hemoglobin (Hb A1C)  -     Lipid Panel; Future  -     Comprehensive Metabolic Panel; Future  -     CBC & Differential; Future   Continue current medication(s) as noted in the history of present illness.    2. Other hyperlipidemia  -     Lipid Panel; Future  -     Comprehensive Metabolic Panel; Future  -     CBC & Differential; Future   Continue current medication(s) as noted in the history of present illness.    3. Polyp of colon, unspecified part of colon, unspecified type   Colonoscopy up-to-date.    4. Paresthesia  -     EMG & Nerve Conduction Test; Future          BMI is within normal parameters. No other follow-up for BMI required.          Return in about 6 months (around 6/12/2023) for  Annual physical, fasting, and schedule subseq Medicare Wellness Exam (same appt) after 6/5/23.

## 2022-12-16 ENCOUNTER — PROCEDURE VISIT (OUTPATIENT)
Dept: NEUROLOGY | Facility: CLINIC | Age: 67
End: 2022-12-16

## 2022-12-16 DIAGNOSIS — R20.0 NUMBNESS: Primary | ICD-10-CM

## 2022-12-16 PROCEDURE — 95909 NRV CNDJ TST 5-6 STUDIES: CPT | Performed by: PSYCHIATRY & NEUROLOGY

## 2022-12-16 PROCEDURE — 95886 MUSC TEST DONE W/N TEST COMP: CPT | Performed by: PSYCHIATRY & NEUROLOGY

## 2022-12-16 NOTE — PROGRESS NOTES
[facility], [place], operated by [facility]   Electrodiagnostic Laboratory    Nerve Conduction & EMG Report        Patient:  Chente Rich   Patient ID: [id number]   YOB: 1955  Sex:  male      Exam Physician:  Miguel Warren MD  Refer Physician:  Millie Loya MD    Electromyogram and Nerve Conduction Velocity Procedure Note    Hx: 67 y.o. right handed male with complaint of numbness involving the right leg. Symptoms have been present for several months and were provoked by no clear event. Significant past medical history includes diabetes mellitus.  Family history no family history of nerve or muscle disease.    Exam: Motor power is normal. There is no atrophy. There are no fasciculations. Deep tendon reflexes are present and symmetrical. Sensory exam decreased pp and lt 3 - 5 digits R foot.      Edx studies of the R LE were performed to evaluate for peripheral neuropathy.      NCS Examination   For sensory nerve conduction studies, the amplitude is measured peak-to-peak, the latency reported is the distal peak latency, and the conduction velocity, if measured, is determined from onset latencies and is over the forearm.   For motor nerve conduction studies, the amplitude is measured baseline-to-peak, the latency reported is the distal onset latency, the conduction velocity is calculated over the forearm, and the F wave latency is the minimum latency.   Unless otherwise noted, the hand temperature was monitored continuously and remained between 32°C and 36°C during the performance of the NCSs.          Nerve Conduction Studies  Anti Sensory Summary Table     Stim Site NR Norm Peak (ms) O-P Amp (µV) Norm O-P Amp Onset (ms) Site1 Site2 Delta-0 (ms) Dist (cm) James (m/s) Norm James (m/s)   Right Sup Fibular Anti Sensory (Ant Lat Mall)   14 cm    <4.4 7.6 >5.0 2.6 14 cm Ant Lat Mall 2.6 14.0 54    Right Sural Anti Sensory (Lat Mall)   Calf    <4.0 38.4 >5.0 2.0 Calf Lat Mall 2.0 14.0 70      Motor Summary Table     Stim  Site NR Onset (ms) Norm Onset (ms) O-P Amp (mV) Norm O-P Amp Site1 Site2 Delta-0 (ms) Dist (cm) James (m/s) Norm James (m/s)   Right Fibular Motor (Ext Dig Brev)   Ankle    3.3 <6.1 3.7 >2.5 B Fib Ankle 8.2 34.0 41 >40   B Fib    11.5  3.2  Poplt B Fib 2.3 10.0 43 >40   Poplt    13.8  3.2          Right Tibial Motor (Abd Booth Brev)   Ankle    5.5 <6.1 6.0 >3.0 Knee Ankle 7.7 41.0 53 >40   Knee    13.2  4.0            F Wave Studies     NR F-Lat (ms) Lat Norm (ms) L-R F-Lat (ms) L-R Lat Norm   Right Fibular (Mrkrs) (EDB)      *63.91 <60  <5.1   Right Tibial (Mrkrs) (Abd Hallucis)      *65.00 <61  <5.7     H Reflex Studies     NR H-Lat (ms) L-R H-Lat (ms) L-R Lat Norm   Right Tibial (Mrkrs) (Gastroc)      39.69  <2.0     EMG Examination   The study was performed with a concentric needle electrode. Fibrillation and fasciculation activity is graded from none (0) to continuous (4+). The configuration and recruitment pattern of motor unit action potentials under voluntary control, if not normal, are described below       Side Muscle Nerve Root Ins Act Fibs Psw Amp Dur Poly Recrt Int Pat Comment   Right AntTibialis Dp Br Fibular L4-5 Nml Nml Nml Nml Nml 0 Nml Nml    Right Gastroc Tibial S1-2 Nml Nml Nml Nml Nml 0 Nml Nml    Right Fibularis Long Sup Br Fibular L5-S1 Nml Nml Nml Nml Nml 0 Nml Nml    Right BicepsFemL Sciatic L5-S2 Nml Nml Nml Nml Nml 0 Nml Nml    Right VastusMed Femoral L2-4 Nml Nml Nml Nml Nml 0 Nml Nml    Right GluteusMax InfGluteal L5-S2 Nml Nml Nml Nml Nml 0 Nml Nml    Right Iliopsoas Femoral L2-3 Nml Nml Nml Nml Nml 0 Nml Nml         Waveforms:                               NCV FINDINGS:  • All nerve conduction studies (as indicated in the following tables) were within normal limits.  • F Wave studies indicate that the right Fibular F wave has prolonged latency (63.91 ms).  • The right tibial F wave has prolonged latency (65.00 ms).      EMG FINDINGS:  • All examined muscles (as indicated in the following  table) showed no evidence of electrical instability.           Conclusion: This study showed neurophysiologic evidence of prolongation of F waves in fibular and tibia nerves suggested mild underlying neuropathy.            Instrument used:  Teca Synergy        Performed by:          Miguel Warren MD

## 2022-12-28 ENCOUNTER — TELEPHONE (OUTPATIENT)
Dept: INTERNAL MEDICINE | Facility: CLINIC | Age: 67
End: 2022-12-28

## 2022-12-28 DIAGNOSIS — G57.91 NEUROPATHY OF RIGHT LOWER EXTREMITY: Primary | ICD-10-CM

## 2022-12-29 PROBLEM — G57.91 NEUROPATHY OF RIGHT LOWER EXTREMITY: Status: ACTIVE | Noted: 2022-12-29

## 2022-12-29 NOTE — TELEPHONE ENCOUNTER
Call patient please.  His right lower extremity EMG showed a mild neuropathy, which would complain the numbness he is feeling in his toes.  I would recommend a referral to neurology to further evaluate the cause of this neuropathy.  If he is agreeable, I will enter an order.

## 2023-02-21 ENCOUNTER — OFFICE VISIT (OUTPATIENT)
Dept: ORTHOPEDIC SURGERY | Facility: CLINIC | Age: 68
End: 2023-02-21
Payer: MEDICARE

## 2023-02-21 VITALS
WEIGHT: 151 LBS | DIASTOLIC BLOOD PRESSURE: 80 MMHG | SYSTOLIC BLOOD PRESSURE: 130 MMHG | HEIGHT: 69 IN | BODY MASS INDEX: 22.36 KG/M2

## 2023-02-21 DIAGNOSIS — M17.12 PRIMARY OSTEOARTHRITIS OF LEFT KNEE: ICD-10-CM

## 2023-02-21 DIAGNOSIS — Z98.890 STATUS POST ARTHROSCOPY OF LEFT KNEE: Primary | ICD-10-CM

## 2023-02-21 PROCEDURE — 99214 OFFICE O/P EST MOD 30 MIN: CPT | Performed by: ORTHOPAEDIC SURGERY

## 2023-02-21 PROCEDURE — 20610 DRAIN/INJ JOINT/BURSA W/O US: CPT | Performed by: ORTHOPAEDIC SURGERY

## 2023-02-21 RX ORDER — LIDOCAINE HYDROCHLORIDE 10 MG/ML
3 INJECTION, SOLUTION EPIDURAL; INFILTRATION; INTRACAUDAL; PERINEURAL
Status: COMPLETED | OUTPATIENT
Start: 2023-02-21 | End: 2023-02-21

## 2023-02-21 RX ORDER — TRIAMCINOLONE ACETONIDE 40 MG/ML
40 INJECTION, SUSPENSION INTRA-ARTICULAR; INTRAMUSCULAR
Status: COMPLETED | OUTPATIENT
Start: 2023-02-21 | End: 2023-02-21

## 2023-02-21 RX ADMIN — TRIAMCINOLONE ACETONIDE 40 MG: 40 INJECTION, SUSPENSION INTRA-ARTICULAR; INTRAMUSCULAR at 10:36

## 2023-02-21 RX ADMIN — LIDOCAINE HYDROCHLORIDE 3 ML: 10 INJECTION, SOLUTION EPIDURAL; INFILTRATION; INTRACAUDAL; PERINEURAL at 10:36

## 2023-02-21 NOTE — PROGRESS NOTES
"    [facility] [place] Orthopaedic Surgery Clinic Note        Subjective     CC: Follow-up (4 month f/u; Status post arthroscopy of left knee)      HPI    Chente Rich is a 67 y.o. male.  Patient here today for follow-up of his left knee arthritis.  He is status post left knee arthroscopy with partial medial meniscectomy on 7/29/2022.  He tells me he played pickle ball 1 time he got the knee flared up.  He did of a lot of walking in [place].  He has had a couple of episodes where the knee hurts him bad enough that he cannot bear weight.  He says overall his knee is better after exercise.    Overall, patient's symptoms are as above.    ROS:    Constiutional:Pt denies fever, chills, nausea, or vomiting.  MSK:as above        Objective      Past Medical History  Past Medical History:   Diagnosis Date   • Abnormal screening cardiac CT 10/12/2017    Calcium Score 133   • BCC (basal cell carcinoma of skin)     Dx (initially)-2010.  Has occurred on head, chest, back, and right leg.   • Colon polyp     Dx 10/17- tubular adenoma ascending colon, no dysplasia   • Diabetes mellitus (HCC)    • Diverticulosis     Dx 10/17 by Colonoscopy- pancolonic   • History of cardiovascular stress test 06/19/2020    negative stress echo, EF 60%   • Hyperlipidemia    • Neuropathy of right lower extremity     Dx 12/2022- mild (by EMG)     Social History     Socioeconomic History   • Marital status:    • Number of children: 2   Tobacco Use   • Smoking status: Never   • Smokeless tobacco: Never   Vaping Use   • Vaping Use: Never used   Substance and Sexual Activity   • Alcohol use: Yes     Alcohol/week: 6.0 standard drinks     Types: 3 Cans of beer, 3 Drinks containing 0.5 oz of alcohol per week     Comment: 1 drink (beer) daily   • Drug use: Never   • Sexual activity: Yes     Partners: Female     Birth control/protection: Post-menopausal          Physical Exam  /80   Ht 176.5 cm (69.49\")   Wt 68.5 kg (151 lb)   BMI 21.99 kg/m²     Body mass " index is 21.99 kg/m².    Patient is well nourished and well developed.        Ortho Exam      Musculoskeletal:  Global Assessment:  Overall assessment of Lower Extremity Muscle Strength and Tone:  Left quadriceps--5/5   Left hamstrings--5/5       Left tibialis anterior--5/5  Left gastroc-soleus--5/5  Left EHL --5/5    Lower Extremity:    Inspection and Palpation:  Left knee:  Tenderness:  Over the medial joint line and moderate severity  Effusion:  1+  Crepitus:  Positive  Pulses:  2+  Ecchymosis:  None  Warmth:  None     ROM:  Left:  Extension: 5     Flexion:120    Instability:    Left:    Lachman Test:  Negative   Varus stress test negative  Valgus stress test negative    Deformities/Malalignments/Discrepancies:    Left:  Genu Varum     Functional Testing:  Orlando's test:  Negative  Patella grind test:  Positive  Q-angle:  normal      Imaging/Labs/EMG Reviewed:  Imaging Results (Last 24 Hours)     ** No results found for the last 24 hours. **            Assessment    Assessment:  1. Status post arthroscopy of left knee    2. Primary osteoarthritis of left knee        Plan:  1. Recommend over the counter anti-inflammatories for pain and/or swelling  2. Left knee arthritis status post left knee arthroscopy with partial meniscectomy--steroid injection will be given today.  Follow-up in 6 weeks.  If he is not a lot better, we will begin a course of viscosupplementation.  He is scheduled to go on a trip to [place] with the [employer] in about 3 months and wants to be in optimum shape.    Procedure Note:    I discussed with the patient the potential benefits of performing a therapeutic injections as well as potential risks including but not limited to infection, swelling, pain, bleeding, bruising, nerve/vessel damage, skin color changes, transient elevation in blood glucose levels, and fat atrophy. After informed consent and after the areas were prepped with chlorhexadine soap, ethyl chloride was used to numb  the skin. Via the anterolateral approach, 3mL of 1% lidocaine followed by 40mg of Kenalog were each injected into the left knee joint. The patient tolerated the procedure well. There were no complications. A sterile dressing was placed over the injection sites.        Rey Worthy MD  02/21/23  17:24 EST      Dictated Utilizing Dragon Dictation.

## 2023-02-21 NOTE — PROGRESS NOTES
Procedure   Large Joint Arthrocentesis: L knee  Date/Time: 2/21/2023 10:36 AM  Consent given by: patient  Site marked: site marked  Timeout: Immediately prior to procedure a time out was called to verify the correct patient, procedure, equipment, support staff and site/side marked as required   Supporting Documentation  Indications: pain   Procedure Details  Location: knee - L knee  Preparation: Patient was prepped and draped in the usual sterile fashion  Needle size: 23 G  Approach: anterolateral  Medications administered: 40 mg triamcinolone acetonide 40 MG/ML; 3 mL lidocaine PF 1% 1 %  Patient tolerance: patient tolerated the procedure well with no immediate complications

## 2023-03-01 NOTE — PROGRESS NOTES
[facility] [place] [facility] [place]        Subjective     Post-op (4 week recheck - status post (L) knee arthroscopy with partial medial menisectomy 07/29/2022)       AIMEE    Chente Rich is a 67 y.o. male.  Patient is now 2 and half months out from left knee arthroscopy with partial medial meniscectomy on 7/29/2022.  He is doing physical therapy once every other week.  He is doing well overall.  He continues to strengthen and is doing squats and lunges.  Has not tried to run.  Has walked 8 miles on a couple of occasions.          Objective      Physical Exam  There were no vitals taken for this visit.    There is no height or weight on file to calculate BMI.        Ortho Exam  No left knee effusion  Incisions have healed and are free of erythema or drainage  Calf soft nontender  Range of motion 5-125 left knee    Imaging Reviewed:  Imaging Results (Last 24 Hours)     ** No results found for the last 24 hours. **            Assessment    Assessment:  1. Status post arthroscopy of left knee    2. Primary osteoarthritis of left knee        Plan:  1. Status post left knee arthroscopy--this point, patient is doing quite well overall.  Continue to strengthen.  If he has pain down the road, consider modalities but he is leery of injections overall.  Follow-up as needed going forward.      Rey Worthy MD  10/06/22  13:13 EDT      Dictated Utilizing Dragon Dictation.  
How Severe Are They?: mild
Is This A New Presentation, Or A Follow-Up?: Skin Lesions

## 2023-03-08 ENCOUNTER — APPOINTMENT (OUTPATIENT)
Dept: NEUROLOGY | Facility: HOSPITAL | Age: 68
End: 2023-03-08

## 2023-03-09 ENCOUNTER — OFFICE VISIT (OUTPATIENT)
Dept: NEUROLOGY | Facility: CLINIC | Age: 68
End: 2023-03-09
Payer: MEDICARE

## 2023-03-09 VITALS
HEART RATE: 61 BPM | WEIGHT: 152 LBS | BODY MASS INDEX: 21.76 KG/M2 | DIASTOLIC BLOOD PRESSURE: 70 MMHG | OXYGEN SATURATION: 99 % | SYSTOLIC BLOOD PRESSURE: 120 MMHG | HEIGHT: 70 IN

## 2023-03-09 DIAGNOSIS — G57.91 NEUROPATHY OF RIGHT LOWER EXTREMITY: Primary | ICD-10-CM

## 2023-03-09 PROCEDURE — 99214 OFFICE O/P EST MOD 30 MIN: CPT | Performed by: PSYCHIATRY & NEUROLOGY

## 2023-03-09 PROCEDURE — 1160F RVW MEDS BY RX/DR IN RCRD: CPT | Performed by: PSYCHIATRY & NEUROLOGY

## 2023-03-09 PROCEDURE — 1159F MED LIST DOCD IN RCRD: CPT | Performed by: PSYCHIATRY & NEUROLOGY

## 2023-03-09 NOTE — PROGRESS NOTES
"Subjective   Patient ID: Chente Rich is a 67 y.o. male     Chief Complaint   Patient presents with   • Follow-up     Had a test done (emg) and needed to follow up with the results.        History of Present Illness    67 y.o. male returns in follow up.  Last visit on 22 performed EMG/NCS.    EMG/NCS - R LE prolongation of F waves in fibular and tibia nerves suggested mild underlying neuropathy.       A1C 6.2 Cr 1.38  TSH 1.37    R > L LE with N/T.  Waxing and waning.  Mild intensity.      Past Medical History:   Diagnosis Date   • Abnormal screening cardiac CT 10/12/2017    Calcium Score 133   • BCC (basal cell carcinoma of skin)     Dx (initially)-.  Has occurred on head, chest, back, and right leg.   • Colon polyp     Dx 10/17- tubular adenoma ascending colon, no dysplasia   • Diabetes mellitus (HCC)    • Diverticulosis     Dx 10/17 by Colonoscopy- pancolonic   • History of cardiovascular stress test 2020    negative stress echo, EF 60%   • Hyperlipidemia    • Neuropathy of right lower extremity     Dx 2022- mild (by EMG)     Family History   Problem Relation Age of Onset   • Breast cancer Mother          from sepsis   • Tremor Mother    • Hypertension Father    • Hyperlipidemia Father    • Prostate cancer Father    • Coronary artery disease Father         Angioplasty/CABG, no MI   • Tremor Sister    • Mental illness Paternal Grandmother    • Peripheral vascular disease Paternal Grandfather    • Dementia Paternal Grandfather         \"Vascular\"   • Aneurysm Neg Hx         AAA     Social History     Socioeconomic History   • Marital status:    • Number of children: 2   Tobacco Use   • Smoking status: Never   • Smokeless tobacco: Never   Vaping Use   • Vaping Use: Never used   Substance and Sexual Activity   • Alcohol use: Yes     Alcohol/week: 6.0 standard drinks     Types: 3 Cans of beer, 3 Drinks containing 0.5 oz of alcohol per week     Comment: 1 drink (beer) daily   • Drug use: " "Never   • Sexual activity: Yes     Partners: Female     Birth control/protection: Post-menopausal       Review of Systems   Constitutional: Negative for activity change, fatigue and unexpected weight change.   HENT: Negative for facial swelling, hearing loss, tinnitus, trouble swallowing and voice change.    Eyes: Negative for photophobia, pain and visual disturbance.   Respiratory: Negative for apnea, cough and choking.    Cardiovascular: Negative for chest pain.   Gastrointestinal: Negative for constipation, nausea and vomiting.   Endocrine: Negative for cold intolerance.   Genitourinary: Negative for difficulty urinating, frequency and urgency.   Musculoskeletal: Positive for arthralgias and myalgias. Negative for back pain, gait problem, neck pain and neck stiffness.   Skin: Negative for rash.   Allergic/Immunologic: Negative for immunocompromised state.   Neurological: Positive for numbness. Negative for dizziness, tremors, seizures, syncope, facial asymmetry, speech difficulty, weakness, light-headedness and headaches.   Hematological: Negative for adenopathy.   Psychiatric/Behavioral: Negative for confusion, decreased concentration, hallucinations and sleep disturbance. The patient is not nervous/anxious.        Objective     Vitals:    03/09/23 0953   BP: 120/70   BP Location: Left arm   Patient Position: Sitting   Cuff Size: Adult   Pulse: 61   SpO2: 99%   Weight: 68.9 kg (152 lb)   Height: 177.8 cm (70\")       Neurologic Exam     Mental Status   Oriented to person, place, and time.   Speech: speech is normal   Level of consciousness: alert  Knowledge: good and consistent with education.   Normal comprehension.     Cranial Nerves   Cranial nerves II through XII intact.     CN II   Visual fields full to confrontation.   Visual acuity: normal  Right visual field deficit: none  Left visual field deficit: none     CN III, IV, VI   Pupils are equal, round, and reactive to light.  Extraocular motions are normal. "   Nystagmus: none   Diplopia: none  Ophthalmoparesis: none  Upgaze: normal  Downgaze: normal  Conjugate gaze: present    CN V   Facial sensation intact.   Right corneal reflex: normal  Left corneal reflex: normal    CN VII   Right facial weakness: none  Left facial weakness: none    CN VIII   Hearing: intact    CN IX, X   Palate: symmetric  Right gag reflex: normal  Left gag reflex: normal    CN XI   Right sternocleidomastoid strength: normal  Left sternocleidomastoid strength: normal    CN XII   Tongue: not atrophic  Fasciculations: absent  Tongue deviation: none    Motor Exam   Muscle bulk: normal  Overall muscle tone: normal    Strength   Strength 5/5 throughout.     Sensory Exam   Light touch normal.     Gait, Coordination, and Reflexes     Gait  Gait: normal    Tremor   Resting tremor: absent  Intention tremor: absent  Action tremor: absent    Reflexes   Reflexes 2+ except as noted.       Physical Exam  Eyes:      Extraocular Movements: EOM normal.      Pupils: Pupils are equal, round, and reactive to light.   Neurological:      Mental Status: He is oriented to person, place, and time.      Cranial Nerves: Cranial nerves 2-12 are intact.      Motor: Motor strength is normal.      Gait: Gait is intact.   Psychiatric:         Speech: Speech normal.         Lab on 12/12/2022   Component Date Value Ref Range Status   • Total Cholesterol 12/12/2022 175  0 - 200 mg/dL Final   • Triglycerides 12/12/2022 80  0 - 150 mg/dL Final   • HDL Cholesterol 12/12/2022 73 (H)  40 - 60 mg/dL Final   • LDL Cholesterol  12/12/2022 87  0 - 100 mg/dL Final   • VLDL Cholesterol 12/12/2022 15  5 - 40 mg/dL Final   • LDL/HDL Ratio 12/12/2022 1.18   Final   • Glucose 12/12/2022 119 (H)  65 - 99 mg/dL Final   • BUN 12/12/2022 20  8 - 23 mg/dL Final   • Creatinine 12/12/2022 1.38 (H)  0.76 - 1.27 mg/dL Final   • Sodium 12/12/2022 136  136 - 145 mmol/L Final   • Potassium 12/12/2022 4.7  3.5 - 5.2 mmol/L Final   • Chloride 12/12/2022 98  98  - 107 mmol/L Final   • CO2 12/12/2022 28.6  22.0 - 29.0 mmol/L Final   • Calcium 12/12/2022 9.8  8.6 - 10.5 mg/dL Final   • Total Protein 12/12/2022 7.0  6.0 - 8.5 g/dL Final   • Albumin 12/12/2022 4.70  3.50 - 5.20 g/dL Final   • ALT (SGPT) 12/12/2022 21  1 - 41 U/L Final   • AST (SGOT) 12/12/2022 25  1 - 40 U/L Final   • Alkaline Phosphatase 12/12/2022 57  39 - 117 U/L Final   • Total Bilirubin 12/12/2022 0.5  0.0 - 1.2 mg/dL Final   • Globulin 12/12/2022 2.3  gm/dL Final   • A/G Ratio 12/12/2022 2.0  g/dL Final   • BUN/Creatinine Ratio 12/12/2022 14.5  7.0 - 25.0 Final   • Anion Gap 12/12/2022 9.4  5.0 - 15.0 mmol/L Final   • eGFR 12/12/2022 55.4 (L)  >60.0 mL/min/1.73 Final    National Kidney Foundation and American Society of Nephrology (ASN) Task Force recommended calculation based on the Chronic Kidney Disease Epidemiology Collaboration (CKD-EPI) equation refit without adjustment for race.   • WBC 12/12/2022 6.19  3.40 - 10.80 10*3/mm3 Final   • RBC 12/12/2022 4.56  4.14 - 5.80 10*6/mm3 Final   • Hemoglobin 12/12/2022 14.1  13.0 - 17.7 g/dL Final   • Hematocrit 12/12/2022 42.3  37.5 - 51.0 % Final   • MCV 12/12/2022 92.8  79.0 - 97.0 fL Final   • MCH 12/12/2022 30.9  26.6 - 33.0 pg Final   • MCHC 12/12/2022 33.3  31.5 - 35.7 g/dL Final   • RDW 12/12/2022 12.2 (L)  12.3 - 15.4 % Final   • RDW-SD 12/12/2022 41.2  37.0 - 54.0 fl Final   • MPV 12/12/2022 10.4  6.0 - 12.0 fL Final   • Platelets 12/12/2022 272  140 - 450 10*3/mm3 Final   • Neutrophil % 12/12/2022 54.4  42.7 - 76.0 % Final   • Lymphocyte % 12/12/2022 31.5  19.6 - 45.3 % Final   • Monocyte % 12/12/2022 8.7  5.0 - 12.0 % Final   • Eosinophil % 12/12/2022 4.7  0.3 - 6.2 % Final   • Basophil % 12/12/2022 0.5  0.0 - 1.5 % Final   • Immature Grans % 12/12/2022 0.2  0.0 - 0.5 % Final   • Neutrophils, Absolute 12/12/2022 3.37  1.70 - 7.00 10*3/mm3 Final   • Lymphocytes, Absolute 12/12/2022 1.95  0.70 - 3.10 10*3/mm3 Final   • Monocytes, Absolute 12/12/2022  0.54  0.10 - 0.90 10*3/mm3 Final   • Eosinophils, Absolute 12/12/2022 0.29  0.00 - 0.40 10*3/mm3 Final   • Basophils, Absolute 12/12/2022 0.03  0.00 - 0.20 10*3/mm3 Final   • Immature Grans, Absolute 12/12/2022 0.01  0.00 - 0.05 10*3/mm3 Final   • nRBC 12/12/2022 0.0  0.0 - 0.2 /100 WBC Final         Assessment & Plan     Problem List Items Addressed This Visit        Neuro    Neuropathy of right lower extremity - Primary    Overview     Dx 12/2022- mild (by EMG)         Current Assessment & Plan     Continue to manage blood glucose, stop ETOH               Return if symptoms worsen or fail to improve.

## 2023-04-11 ENCOUNTER — OFFICE VISIT (OUTPATIENT)
Dept: ORTHOPEDIC SURGERY | Facility: CLINIC | Age: 68
End: 2023-04-11
Payer: MEDICARE

## 2023-04-11 VITALS
WEIGHT: 147.8 LBS | SYSTOLIC BLOOD PRESSURE: 134 MMHG | HEIGHT: 70 IN | BODY MASS INDEX: 21.16 KG/M2 | DIASTOLIC BLOOD PRESSURE: 76 MMHG

## 2023-04-11 DIAGNOSIS — Z98.890 STATUS POST ARTHROSCOPY OF LEFT KNEE: Primary | ICD-10-CM

## 2023-04-11 DIAGNOSIS — M17.12 PRIMARY OSTEOARTHRITIS OF LEFT KNEE: ICD-10-CM

## 2023-04-11 RX ORDER — LIDOCAINE HYDROCHLORIDE 10 MG/ML
2 INJECTION, SOLUTION EPIDURAL; INFILTRATION; INTRACAUDAL; PERINEURAL
Status: COMPLETED | OUTPATIENT
Start: 2023-04-11 | End: 2023-04-11

## 2023-04-11 RX ADMIN — LIDOCAINE HYDROCHLORIDE 2 ML: 10 INJECTION, SOLUTION EPIDURAL; INFILTRATION; INTRACAUDAL; PERINEURAL at 10:05

## 2023-04-11 NOTE — PROGRESS NOTES
Procedure   - Large Joint Arthrocentesis: L knee on 4/11/2023 10:05 AM  Indications: pain  Details: 22 G needle, anterolateral approach  Medications: 8 mg/mL Hylan 16 MG/2ML; 2 mL lidocaine PF 1% 1 %  Outcome: tolerated well, no immediate complications  Procedure, treatment alternatives, risks and benefits explained, specific risks discussed. Consent was given by the patient. Immediately prior to procedure a time out was called to verify the correct patient, procedure, equipment, support staff and site/side marked as required. Patient was prepped and draped in the usual sterile fashion.

## 2023-04-11 NOTE — PROGRESS NOTES
"    [facility]        Subjective     CC: Follow-up (7 week follow up -- Status post arthroscopy of left knee)      HPI    Chente Rich is a 68 y.o. male.  Patient returns for follow-up of his left knee arthritis.  At his last visit on 2/20/2023, he was injected with steroid at his request and got about a month of solid relief but it is started to wear off.  Continues to have pain on the medial side of the knee.  He is not yet back to running.  He was able to walk 7 miles with some soreness medially.    Overall, patient's symptoms are as above.    ROS:    Constiutional:Pt denies fever, chills, nausea, or vomiting.  MSK:as above        Objective      Past Medical History  Past Medical History:   Diagnosis Date   • Abnormal screening cardiac CT 10/12/2017    Calcium Score 133   • BCC (basal cell carcinoma of skin)     Dx (initially)-2010.  Has occurred on head, chest, back, and right leg.   • Colon polyp     Dx 10/17- tubular adenoma ascending colon, no dysplasia   • Diabetes mellitus    • Diverticulosis     Dx 10/17 by Colonoscopy- pancolonic   • History of cardiovascular stress test 06/19/2020    negative stress echo, EF 60%   • Hyperlipidemia    • Neuropathy of right lower extremity     Dx 12/2022- mild (by [facility])     Social History     Socioeconomic History   • Marital status:    • Number of children: 2   Tobacco Use   • Smoking status: Never   • Smokeless tobacco: Never   Vaping Use   • Vaping Use: Never used   Substance and Sexual Activity   • Alcohol use: Yes     Alcohol/week: 6.0 standard drinks     Types: 3 Cans of beer, 3 Drinks containing 0.5 oz of alcohol per week     Comment: 1 drink (beer) daily   • Drug use: Never   • Sexual activity: Yes     Partners: Female     Birth control/protection: Post-menopausal          Physical Exam  /76   Ht 177.8 cm (70\")   Wt 67 kg (147 lb 12.8 oz)   BMI 21.21 kg/m²     Body mass index is 21.21 kg/m².    Patient is well nourished " and well developed.        Ortho Exam      Musculoskeletal:  Global Assessment:  Overall assessment of Lower Extremity Muscle Strength and Tone:  Left quadriceps--5/5   Left hamstrings--5/5       Left tibialis anterior--5/5  Left gastroc-soleus--5/5  Left EHL --5/5    Lower Extremity:    Inspection and Palpation:  Left knee:  Tenderness:  Over the medial joint line and moderate severity  Effusion:  1+  Crepitus:  Positive  Pulses:  2+  Ecchymosis:  None  Warmth:  None     ROM:  Left:  Extension: 5     Flexion:120    Instability:    Left:    Lachman Test:  Negative   Varus stress test negative  Valgus stress test negative    Deformities/Malalignments/Discrepancies:    Left:  Genu Varum     Functional Testing:  Orlando's test:  Negative  Patella grind test:  Positive  Q-angle:  normal      Imaging/Labs/EMG Reviewed:  Imaging Results (Last 24 Hours)     ** No results found for the last 24 hours. **            Assessment    Assessment:  1. Status post arthroscopy of left knee    2. Primary osteoarthritis of left knee        Plan:  1. Recommend over the counter anti-inflammatories for pain and/or swelling  2. Left knee arthritis status post arthroscopy--plan at this point given modest relief only from the steroid for about a month is to initiate a course of viscosupplementation.  We will inject him with Synvisc 3 #1 today and see him back in a week for Synvisc 3 #2.    Procedure Note:    I discussed with the patient the potential benefits of performing a therapeutic injections as well as potential risks including but not limited to infection, swelling, pain, bleeding, bruising, nerve/vessel damage, skin color changes, transient elevation in blood glucose levels, and fat atrophy. After informed consent and after the areas were prepped with chlorhexadine soap, ethyl chloride was used to numb the skin. Via the superiorlateral approach, 3mL of 1% lidocaine followed by Synvisc 3 #1 were each injected into the left knee  joint. The patient tolerated the procedure well. There were no complications. A sterile dressing was placed over the injection sites.        Rey Worthy MD  04/11/23  10:19 EDT      Dictated Utilizing Dragon Dictation.

## 2023-04-18 ENCOUNTER — CLINICAL SUPPORT (OUTPATIENT)
Dept: ORTHOPEDIC SURGERY | Facility: CLINIC | Age: 68
End: 2023-04-18
Payer: MEDICARE

## 2023-04-18 DIAGNOSIS — M17.12 PRIMARY OSTEOARTHRITIS OF LEFT KNEE: Primary | ICD-10-CM

## 2023-04-18 RX ORDER — LIDOCAINE HYDROCHLORIDE 10 MG/ML
3 INJECTION, SOLUTION EPIDURAL; INFILTRATION; INTRACAUDAL; PERINEURAL
Status: COMPLETED | OUTPATIENT
Start: 2023-04-18 | End: 2023-04-18

## 2023-04-18 RX ADMIN — LIDOCAINE HYDROCHLORIDE 3 ML: 10 INJECTION, SOLUTION EPIDURAL; INFILTRATION; INTRACAUDAL; PERINEURAL at 09:14

## 2023-04-18 NOTE — PROGRESS NOTES
Procedure Note:    I discussed with the patient the potential benefits of performing a therapeutic injections as well as potential risks including but not limited to infection, swelling, pain, bleeding, bruising, nerve/vessel damage, skin color changes, transient elevation in blood glucose levels, and fat atrophy. After informed consent and after the areas were prepped with chlorhexadine soap, ethyl chloride was used to numb the skin. Via the superiorlateral approach, 3mL of 1% lidocaine followed by Synvisc 3 #2 were each injected into the left knee joint. The patient tolerated the procedure well. There were no complications. A sterile dressing was placed over the injection sites.

## 2023-04-18 NOTE — PROGRESS NOTES
Procedure   - Large Joint Arthrocentesis: L knee on 4/18/2023 9:14 AM  Indications: pain  Details: 22 G needle, anterolateral approach  Medications: 8 mg/mL Hylan 16 MG/2ML; 3 mL lidocaine PF 1% 1 %  Outcome: tolerated well, no immediate complications  Consent was given by the patient. Immediately prior to procedure a time out was called to verify the correct patient, procedure, equipment, support staff and site/side marked as required. Patient was prepped and draped in the usual sterile fashion.

## 2023-04-25 ENCOUNTER — CLINICAL SUPPORT (OUTPATIENT)
Dept: ORTHOPEDIC SURGERY | Facility: CLINIC | Age: 68
End: 2023-04-25
Payer: MEDICARE

## 2023-04-25 DIAGNOSIS — M17.12 PRIMARY OSTEOARTHRITIS OF LEFT KNEE: Primary | ICD-10-CM

## 2023-04-25 RX ORDER — LIDOCAINE HYDROCHLORIDE 10 MG/ML
3 INJECTION, SOLUTION EPIDURAL; INFILTRATION; INTRACAUDAL; PERINEURAL
Status: COMPLETED | OUTPATIENT
Start: 2023-04-25 | End: 2023-04-25

## 2023-04-25 RX ADMIN — LIDOCAINE HYDROCHLORIDE 3 ML: 10 INJECTION, SOLUTION EPIDURAL; INFILTRATION; INTRACAUDAL; PERINEURAL at 10:20

## 2023-04-25 NOTE — PROGRESS NOTES
Procedure   Large Joint Arthrocentesis: L knee  Date/Time: 4/25/2023 10:20 AM  Consent given by: patient  Site marked: site marked  Timeout: Immediately prior to procedure a time out was called to verify the correct patient, procedure, equipment, support staff and site/side marked as required   Supporting Documentation  Indications: pain   Procedure Details  Location: knee - L knee  Preparation: Patient was prepped and draped in the usual sterile fashion  Needle gauge: 21g.  Approach: anterolateral  Medications administered: 8 mg/mL Hylan 16 MG/2ML; 3 mL lidocaine PF 1% 1 %  Patient tolerance: patient tolerated the procedure well with no immediate complications

## 2023-04-25 NOTE — PROGRESS NOTES
Procedure Note:    I discussed with the patient the potential benefits of performing a therapeutic injections as well as potential risks including but not limited to infection, swelling, pain, bleeding, bruising, nerve/vessel damage, skin color changes, transient elevation in blood glucose levels, and fat atrophy. After informed consent and after the areas were prepped with chlorhexadine soap, ethyl chloride was used to numb the skin. Via the superiorlateral approach, 3mL of 1% lidocaine followed by Synvisc 3 #3 were each injected into the left knee joint. The patient tolerated the procedure well. There were no complications. A sterile dressing was placed over the injection sites.      Appointment will be made for 5/18/2023 prior to a trip he is taking.  If he does not need the steroid injection he will skip it.

## 2023-05-18 ENCOUNTER — OFFICE VISIT (OUTPATIENT)
Dept: ORTHOPEDIC SURGERY | Facility: CLINIC | Age: 68
End: 2023-05-18
Payer: MEDICARE

## 2023-05-18 VITALS
DIASTOLIC BLOOD PRESSURE: 78 MMHG | BODY MASS INDEX: 21.53 KG/M2 | HEIGHT: 70 IN | WEIGHT: 150.4 LBS | SYSTOLIC BLOOD PRESSURE: 132 MMHG

## 2023-05-18 DIAGNOSIS — M17.12 PRIMARY OSTEOARTHRITIS OF LEFT KNEE: Primary | ICD-10-CM

## 2023-05-18 RX ORDER — TRIAMCINOLONE ACETONIDE 40 MG/ML
40 INJECTION, SUSPENSION INTRA-ARTICULAR; INTRAMUSCULAR
Status: COMPLETED | OUTPATIENT
Start: 2023-05-18 | End: 2023-05-18

## 2023-05-18 RX ORDER — LIDOCAINE HYDROCHLORIDE 10 MG/ML
3 INJECTION, SOLUTION EPIDURAL; INFILTRATION; INTRACAUDAL; PERINEURAL
Status: COMPLETED | OUTPATIENT
Start: 2023-05-18 | End: 2023-05-18

## 2023-05-18 RX ADMIN — LIDOCAINE HYDROCHLORIDE 3 ML: 10 INJECTION, SOLUTION EPIDURAL; INFILTRATION; INTRACAUDAL; PERINEURAL at 11:29

## 2023-05-18 RX ADMIN — TRIAMCINOLONE ACETONIDE 40 MG: 40 INJECTION, SUSPENSION INTRA-ARTICULAR; INTRAMUSCULAR at 11:29

## 2023-05-18 NOTE — PROGRESS NOTES
[facility]        Subjective     CC: Follow-up (1 month follow up; Primary osteoarthritis of left knee)      AIMEE Rich is a 68 y.o. male.  Patient here for steroid injection prior to vacation.    Overall, patient's symptoms are improved after Synvisc 3 shot injection series completed on 4/25/2023.  He wants to make sure that his knee is as good as possible prior to his trip.    ROS:    Constiutional:Pt denies fever, chills, nausea, or vomiting.  MSK:as above        Objective      Past Medical History  Past Medical History:   Diagnosis Date   • Abnormal screening cardiac CT 10/12/2017    Calcium Score 133   • BCC (basal cell carcinoma of skin)     Dx (initially)-2010.  Has occurred on head, chest, back, and right leg.   • Colon polyp     Dx 10/17- tubular adenoma ascending colon, no dysplasia   • Diabetes mellitus    • Diverticulosis     Dx 10/17 by Colonoscopy- pancolonic   • History of cardiovascular stress test 06/19/2020    negative stress echo, EF 60%   • Hyperlipidemia    • Neuropathy of right lower extremity     Dx 12/2022- mild (by [facility])     Social History     Socioeconomic History   • Marital status:    • Number of children: 2   Tobacco Use   • Smoking status: Never   • Smokeless tobacco: Never   Vaping Use   • Vaping Use: Never used   Substance and Sexual Activity   • Alcohol use: Yes     Alcohol/week: 6.0 standard drinks     Types: 3 Cans of beer, 3 Drinks containing 0.5 oz of alcohol per week     Comment: 1 drink (beer) daily   • Drug use: Never   • Sexual activity: Yes     Partners: Female     Birth control/protection: Post-menopausal          Assessment    Assessment:  1. Primary osteoarthritis of left knee        Plan:  1. Recommend over the counter anti-inflammatories for pain and/or swelling  2. Left knee arthritis--steroid injection will be given today.  Follow-up as needed going forward for state he can have repeat Synvisc 3 series would be October  26    Procedure Note:    I discussed with the patient the potential benefits of performing a therapeutic injections as well as potential risks including but not limited to infection, swelling, pain, bleeding, bruising, nerve/vessel damage, skin color changes, transient elevation in blood glucose levels, and fat atrophy. After informed consent and after the areas were prepped with chlorhexadine soap, ethyl chloride was used to numb the skin. Via the anterolateral approach, 3mL of 1% lidocaine followed by 40mg of Kenalog were each injected into the left knee joint. The patient tolerated the procedure well. There were no complications. A sterile dressing was placed over the injection sites.        Rey Worthy MD  05/18/23  17:21 EDT      Dictated Utilizing Dragon Dictation.

## 2023-05-18 NOTE — PROGRESS NOTES
Procedure   - Large Joint Arthrocentesis: L knee on 5/18/2023 11:29 AM  Indications: pain  Details: 21 G needle, anterolateral approach  Medications: 3 mL lidocaine PF 1% 1 %; 40 mg triamcinolone acetonide 40 MG/ML  Outcome: tolerated well, no immediate complications  Procedure, treatment alternatives, risks and benefits explained, specific risks discussed. Consent was given by the patient. Immediately prior to procedure a time out was called to verify the correct patient, procedure, equipment, support staff and site/side marked as required. Patient was prepped and draped in the usual sterile fashion.

## 2023-10-07 ENCOUNTER — FLU SHOT (OUTPATIENT)
Dept: INTERNAL MEDICINE | Facility: CLINIC | Age: 68
End: 2023-10-07
Payer: MEDICARE

## 2023-10-07 DIAGNOSIS — Z23 NEED FOR INFLUENZA VACCINATION: Primary | ICD-10-CM

## 2023-10-07 PROCEDURE — 90662 IIV NO PRSV INCREASED AG IM: CPT | Performed by: INTERNAL MEDICINE

## 2023-10-07 PROCEDURE — G0008 ADMIN INFLUENZA VIRUS VAC: HCPCS | Performed by: INTERNAL MEDICINE

## 2023-10-10 ENCOUNTER — OFFICE VISIT (OUTPATIENT)
Dept: ORTHOPEDIC SURGERY | Facility: CLINIC | Age: 68
End: 2023-10-10
Payer: MEDICARE

## 2023-10-10 VITALS
SYSTOLIC BLOOD PRESSURE: 130 MMHG | WEIGHT: 145.8 LBS | BODY MASS INDEX: 20.87 KG/M2 | HEIGHT: 70 IN | DIASTOLIC BLOOD PRESSURE: 80 MMHG

## 2023-10-10 DIAGNOSIS — M17.12 PRIMARY OSTEOARTHRITIS OF LEFT KNEE: Primary | ICD-10-CM

## 2023-10-10 RX ORDER — TRIAMCINOLONE ACETONIDE 40 MG/ML
40 INJECTION, SUSPENSION INTRA-ARTICULAR; INTRAMUSCULAR
Status: COMPLETED | OUTPATIENT
Start: 2023-10-10 | End: 2023-10-10

## 2023-10-10 RX ORDER — LIDOCAINE HYDROCHLORIDE 10 MG/ML
3 INJECTION, SOLUTION EPIDURAL; INFILTRATION; INTRACAUDAL; PERINEURAL
Status: COMPLETED | OUTPATIENT
Start: 2023-10-10 | End: 2023-10-10

## 2023-10-10 RX ADMIN — LIDOCAINE HYDROCHLORIDE 3 ML: 10 INJECTION, SOLUTION EPIDURAL; INFILTRATION; INTRACAUDAL; PERINEURAL at 08:22

## 2023-10-10 RX ADMIN — TRIAMCINOLONE ACETONIDE 40 MG: 40 INJECTION, SUSPENSION INTRA-ARTICULAR; INTRAMUSCULAR at 08:22

## 2023-10-10 NOTE — PROGRESS NOTES
"    [facility]        Subjective     CC: Follow-up (5 month f/u - Primary osteoarthritis of left knee)      HPI    Chente Rich is a 68 y.o. male.  Patient is here today for follow-up of his left knee arthritis.  On 5/18/2023, he underwent a left knee steroid injection which is helped him more than the gel.  He had the steroid injection before a trip to [place].    Overall, patient's symptoms are as above.  Says he is having swelling after walking for 45 minutes or more.  He is able to play pickle ball however.    ROS:    Constiutional:Pt denies fever, chills, nausea, or vomiting.  MSK:as above        Objective      Past Medical History  Past Medical History:   Diagnosis Date    Abnormal screening cardiac CT 10/12/2017    Calcium Score 133    BCC (basal cell carcinoma of skin)     Dx (initially)-2010.  Has occurred on head, chest, back, and right leg.    Colon polyp     Dx 10/17- tubular adenoma ascending colon, no dysplasia    Diabetes mellitus     Diverticulosis     Dx 10/17 by Colonoscopy- pancolonic    History of cardiovascular stress test 06/19/2020    negative stress echo, EF 60%    Hyperlipidemia     Neuropathy of right lower extremity     Dx 12/2022- mild (by [facility])     Social History     Socioeconomic History    Marital status:     Number of children: 2   Tobacco Use    Smoking status: Never     Passive exposure: Never    Smokeless tobacco: Never   Vaping Use    Vaping Use: Never used   Substance and Sexual Activity    Alcohol use: Yes     Alcohol/week: 6.0 standard drinks of alcohol     Types: 3 Cans of beer, 3 Drinks containing 0.5 oz of alcohol per week     Comment: 1 drink (beer) daily    Drug use: Never    Sexual activity: Yes     Partners: Female     Birth control/protection: Post-menopausal          Physical Exam  /80   Ht 177.8 cm (70\")   Wt 66.1 kg (145 lb 12.8 oz)   BMI 20.92 kg/mý     Body mass index is 20.92 kg/mý.    Patient is well nourished and well " developed.        Ortho Exam      Musculoskeletal:  Global Assessment:  Overall assessment of Lower Extremity Muscle Strength and Tone:  Left quadriceps--5/5   Left hamstrings--5/5       Left tibialis anterior--5/5  Left gastroc-soleus--5/5  Left EHL --5/5    Lower Extremity:    Inspection and Palpation:  Left knee:  Tenderness:  Over the medial joint line and moderate severity  Effusion:  1+  Crepitus:  Positive  Pulses:  2+  Ecchymosis:  None  Warmth:  None     ROM:  Left:  Extension: 5     Flexion:120    Instability:    Left:    Lachman Test:  Negative   Varus stress test negative  Valgus stress test negative    Deformities/Malalignments/Discrepancies:    Left:  Genu Varum     Functional Testing:  Orlando's test:  Negative  Patella grind test:  Positive  Q-angle:  normal      Imaging/Labs/EMG Reviewed:  Imaging Results (Last 24 Hours)       ** No results found for the last 24 hours. **              Assessment    Assessment:  1. Primary osteoarthritis of left knee        Plan:  Recommend over the counter anti-inflammatories for pain and/or swelling  Left knee arthritis--repeat injection will be given today.  Follow-up in the last week of February with x-rays.  Can consider discussion about arthroplasty at that point.    Procedure Note:    I discussed with the patient the potential benefits of performing a therapeutic injections as well as potential risks including but not limited to infection, swelling, pain, bleeding, bruising, nerve/vessel damage, skin color changes, transient elevation in blood glucose levels, and fat atrophy. After informed consent and after the areas were prepped with chlorhexadine soap, ethyl chloride was used to numb the skin. Via the anterolateral approach, 3mL of 1% lidocaine followed by 40mg of Kenalog were each injected into the left knee joint. The patient tolerated the procedure well. There were no complications. A sterile dressing was placed over the injection sites.        Rey  Christian Worthy MD  10/10/23  08:33 EDT      Dictated Utilizing Dragon Dictation.

## 2023-10-10 NOTE — PROGRESS NOTES
Procedure   - Large Joint Arthrocentesis: L knee on 10/10/2023 8:22 AM  Indications: pain  Details: 21 G needle, anterolateral approach  Medications: 3 mL lidocaine PF 1% 1 %; 40 mg triamcinolone acetonide 40 MG/ML  Outcome: tolerated well, no immediate complications  Procedure, treatment alternatives, risks and benefits explained, specific risks discussed. Consent was given by the patient. Immediately prior to procedure a time out was called to verify the correct patient, procedure, equipment, support staff and site/side marked as required. Patient was prepped and draped in the usual sterile fashion.

## 2023-11-18 ENCOUNTER — HOSPITAL ENCOUNTER (OUTPATIENT)
Facility: HOSPITAL | Age: 68
Setting detail: OBSERVATION
Discharge: HOME | End: 2023-11-21
Attending: STUDENT IN AN ORGANIZED HEALTH CARE EDUCATION/TRAINING PROGRAM | Admitting: STUDENT IN AN ORGANIZED HEALTH CARE EDUCATION/TRAINING PROGRAM
Payer: MEDICARE

## 2023-11-18 DIAGNOSIS — K57.32 DIVERTICULITIS LARGE INTESTINE: ICD-10-CM

## 2023-11-18 DIAGNOSIS — R19.5 DARK STOOLS: ICD-10-CM

## 2023-11-18 DIAGNOSIS — K57.93 GASTROINTESTINAL HEMORRHAGE ASSOCIATED WITH INTESTINAL DIVERTICULITIS: Primary | ICD-10-CM

## 2023-11-18 DIAGNOSIS — N17.9 AKI (ACUTE KIDNEY INJURY): ICD-10-CM

## 2023-11-18 DIAGNOSIS — Z87.19 HISTORY OF GI DIVERTICULAR BLEED: ICD-10-CM

## 2023-11-18 DIAGNOSIS — D50.0 BLOOD LOSS ANEMIA: ICD-10-CM

## 2023-11-18 DIAGNOSIS — K57.90 DIVERTICULOSIS: ICD-10-CM

## 2023-11-18 DIAGNOSIS — K63.5 POLYP OF COLON, UNSPECIFIED PART OF COLON, UNSPECIFIED TYPE: ICD-10-CM

## 2023-11-18 DIAGNOSIS — K57.92 ACUTE DIVERTICULITIS: ICD-10-CM

## 2023-11-18 LAB
ALBUMIN SERPL-MCNC: 4.1 G/DL (ref 3.5–5.2)
ALBUMIN/GLOB SERPL: 1.6 G/DL
ALP SERPL-CCNC: 63 U/L (ref 39–117)
ALT SERPL W P-5'-P-CCNC: 17 U/L (ref 1–41)
ANION GAP SERPL CALCULATED.3IONS-SCNC: 9 MMOL/L (ref 5–15)
AST SERPL-CCNC: 24 U/L (ref 1–40)
BASOPHILS # BLD AUTO: 0.04 10*3/MM3 (ref 0–0.2)
BASOPHILS NFR BLD AUTO: 0.6 % (ref 0–1.5)
BILIRUB SERPL-MCNC: 0.2 MG/DL (ref 0–1.2)
BUN SERPL-MCNC: 17 MG/DL (ref 8–23)
BUN/CREAT SERPL: 9.9 (ref 7–25)
CALCIUM SPEC-SCNC: 9.4 MG/DL (ref 8.6–10.5)
CHLORIDE SERPL-SCNC: 102 MMOL/L (ref 98–107)
CO2 SERPL-SCNC: 29 MMOL/L (ref 22–29)
CREAT SERPL-MCNC: 1.71 MG/DL (ref 0.76–1.27)
CRP SERPL-MCNC: 1.15 MG/DL (ref 0–0.5)
D-LACTATE SERPL-SCNC: 1.7 MMOL/L (ref 0.5–2)
DEPRECATED RDW RBC AUTO: 44.2 FL (ref 37–54)
EGFRCR SERPLBLD CKD-EPI 2021: 42.5 ML/MIN/1.73
EOSINOPHIL # BLD AUTO: 0.34 10*3/MM3 (ref 0–0.4)
EOSINOPHIL NFR BLD AUTO: 4.9 % (ref 0.3–6.2)
ERYTHROCYTE [DISTWIDTH] IN BLOOD BY AUTOMATED COUNT: 12.5 % (ref 12.3–15.4)
FECAL OCCULT BLOOD SCREEN, POC: POSITIVE
GLOBULIN UR ELPH-MCNC: 2.5 GM/DL
GLUCOSE SERPL-MCNC: 127 MG/DL (ref 65–99)
HCT VFR BLD AUTO: 38.7 % (ref 37.5–51)
HGB BLD-MCNC: 12.9 G/DL (ref 13–17.7)
IMM GRANULOCYTES # BLD AUTO: 0.01 10*3/MM3 (ref 0–0.05)
IMM GRANULOCYTES NFR BLD AUTO: 0.1 % (ref 0–0.5)
LYMPHOCYTES # BLD AUTO: 2.27 10*3/MM3 (ref 0.7–3.1)
LYMPHOCYTES NFR BLD AUTO: 32.6 % (ref 19.6–45.3)
MAGNESIUM SERPL-MCNC: 2 MG/DL (ref 1.6–2.4)
MCH RBC QN AUTO: 31.9 PG (ref 26.6–33)
MCHC RBC AUTO-ENTMCNC: 33.3 G/DL (ref 31.5–35.7)
MCV RBC AUTO: 95.8 FL (ref 79–97)
MONOCYTES # BLD AUTO: 0.6 10*3/MM3 (ref 0.1–0.9)
MONOCYTES NFR BLD AUTO: 8.6 % (ref 5–12)
NEGATIVE CONTROL: ABNORMAL
NEUTROPHILS NFR BLD AUTO: 3.7 10*3/MM3 (ref 1.7–7)
NEUTROPHILS NFR BLD AUTO: 53.2 % (ref 42.7–76)
NRBC BLD AUTO-RTO: 0 /100 WBC (ref 0–0.2)
PLATELET # BLD AUTO: 344 10*3/MM3 (ref 140–450)
PMV BLD AUTO: 9.6 FL (ref 6–12)
POSITIVE CONTROL: ABNORMAL
POTASSIUM SERPL-SCNC: 4.2 MMOL/L (ref 3.5–5.2)
PROT SERPL-MCNC: 6.6 G/DL (ref 6–8.5)
RBC # BLD AUTO: 4.04 10*6/MM3 (ref 4.14–5.8)
SODIUM SERPL-SCNC: 140 MMOL/L (ref 136–145)
WBC NRBC COR # BLD AUTO: 6.96 10*3/MM3 (ref 3.4–10.8)

## 2023-11-18 PROCEDURE — 82272 OCCULT BLD FECES 1-3 TESTS: CPT | Performed by: STUDENT IN AN ORGANIZED HEALTH CARE EDUCATION/TRAINING PROGRAM

## 2023-11-18 PROCEDURE — 80053 COMPREHEN METABOLIC PANEL: CPT | Performed by: STUDENT IN AN ORGANIZED HEALTH CARE EDUCATION/TRAINING PROGRAM

## 2023-11-18 PROCEDURE — 87040 BLOOD CULTURE FOR BACTERIA: CPT | Performed by: STUDENT IN AN ORGANIZED HEALTH CARE EDUCATION/TRAINING PROGRAM

## 2023-11-18 PROCEDURE — 86901 BLOOD TYPING SEROLOGIC RH(D): CPT | Performed by: STUDENT IN AN ORGANIZED HEALTH CARE EDUCATION/TRAINING PROGRAM

## 2023-11-18 PROCEDURE — 86140 C-REACTIVE PROTEIN: CPT | Performed by: STUDENT IN AN ORGANIZED HEALTH CARE EDUCATION/TRAINING PROGRAM

## 2023-11-18 PROCEDURE — 85025 COMPLETE CBC W/AUTO DIFF WBC: CPT | Performed by: STUDENT IN AN ORGANIZED HEALTH CARE EDUCATION/TRAINING PROGRAM

## 2023-11-18 PROCEDURE — 99285 EMERGENCY DEPT VISIT HI MDM: CPT

## 2023-11-18 PROCEDURE — 83735 ASSAY OF MAGNESIUM: CPT | Performed by: STUDENT IN AN ORGANIZED HEALTH CARE EDUCATION/TRAINING PROGRAM

## 2023-11-18 PROCEDURE — 36415 COLL VENOUS BLD VENIPUNCTURE: CPT

## 2023-11-18 PROCEDURE — 86900 BLOOD TYPING SEROLOGIC ABO: CPT | Performed by: STUDENT IN AN ORGANIZED HEALTH CARE EDUCATION/TRAINING PROGRAM

## 2023-11-18 PROCEDURE — 86850 RBC ANTIBODY SCREEN: CPT | Performed by: STUDENT IN AN ORGANIZED HEALTH CARE EDUCATION/TRAINING PROGRAM

## 2023-11-18 PROCEDURE — 84145 PROCALCITONIN (PCT): CPT | Performed by: STUDENT IN AN ORGANIZED HEALTH CARE EDUCATION/TRAINING PROGRAM

## 2023-11-18 PROCEDURE — 82270 OCCULT BLOOD FECES: CPT | Performed by: STUDENT IN AN ORGANIZED HEALTH CARE EDUCATION/TRAINING PROGRAM

## 2023-11-18 PROCEDURE — 83605 ASSAY OF LACTIC ACID: CPT | Performed by: STUDENT IN AN ORGANIZED HEALTH CARE EDUCATION/TRAINING PROGRAM

## 2023-11-18 RX ORDER — PANTOPRAZOLE SODIUM 40 MG/10ML
80 INJECTION, POWDER, LYOPHILIZED, FOR SOLUTION INTRAVENOUS ONCE
Status: COMPLETED | OUTPATIENT
Start: 2023-11-18 | End: 2023-11-19

## 2023-11-18 RX ORDER — SODIUM CHLORIDE 0.9 % (FLUSH) 0.9 %
10 SYRINGE (ML) INJECTION AS NEEDED
Status: DISCONTINUED | OUTPATIENT
Start: 2023-11-18 | End: 2023-11-21 | Stop reason: HOSPADM

## 2023-11-18 NOTE — Clinical Note
Level of Care: Telemetry [5]   Diagnosis: Dark stools [[id number]]   Admitting Physician: SAMEERA LAST [[id number]]   Attending Physician: SAMEERA LAST [[id number]]

## 2023-11-19 ENCOUNTER — APPOINTMENT (OUTPATIENT)
Dept: CT IMAGING | Facility: HOSPITAL | Age: 68
End: 2023-11-19
Payer: MEDICARE

## 2023-11-19 PROBLEM — K57.92 ACUTE DIVERTICULITIS: Status: ACTIVE | Noted: 2023-11-19

## 2023-11-19 PROBLEM — R19.5 DARK STOOLS: Status: ACTIVE | Noted: 2023-11-19

## 2023-11-19 PROBLEM — N17.9 AKI (ACUTE KIDNEY INJURY): Status: ACTIVE | Noted: 2023-11-19

## 2023-11-19 LAB
ABO GROUP BLD: NORMAL
ABO GROUP BLD: NORMAL
ADV 40+41 DNA STL QL NAA+NON-PROBE: NOT DETECTED
ANION GAP SERPL CALCULATED.3IONS-SCNC: 7 MMOL/L (ref 5–15)
APTT PPP: 28.9 SECONDS (ref 60–90)
ASTRO TYP 1-8 RNA STL QL NAA+NON-PROBE: NOT DETECTED
BASOPHILS # BLD AUTO: 0.02 10*3/MM3 (ref 0–0.2)
BASOPHILS NFR BLD AUTO: 0.3 % (ref 0–1.5)
BLD GP AB SCN SERPL QL: NEGATIVE
BUN SERPL-MCNC: 17 MG/DL (ref 8–23)
BUN/CREAT SERPL: 13.6 (ref 7–25)
C CAYETANENSIS DNA STL QL NAA+NON-PROBE: NOT DETECTED
C COLI+JEJ+UPSA DNA STL QL NAA+NON-PROBE: NOT DETECTED
CALCIUM SPEC-SCNC: 8.5 MG/DL (ref 8.6–10.5)
CHLORIDE SERPL-SCNC: 107 MMOL/L (ref 98–107)
CO2 SERPL-SCNC: 26 MMOL/L (ref 22–29)
CREAT SERPL-MCNC: 1.25 MG/DL (ref 0.76–1.27)
CRYPTOSP DNA STL QL NAA+NON-PROBE: NOT DETECTED
DEPRECATED RDW RBC AUTO: 43.9 FL (ref 37–54)
E HISTOLYT DNA STL QL NAA+NON-PROBE: NOT DETECTED
EAEC PAA PLAS AGGR+AATA ST NAA+NON-PRB: NOT DETECTED
EC STX1+STX2 GENES STL QL NAA+NON-PROBE: NOT DETECTED
EGFRCR SERPLBLD CKD-EPI 2021: 61.9 ML/MIN/1.73
EOSINOPHIL # BLD AUTO: 0.25 10*3/MM3 (ref 0–0.4)
EOSINOPHIL NFR BLD AUTO: 4.1 % (ref 0.3–6.2)
EPEC EAE GENE STL QL NAA+NON-PROBE: NOT DETECTED
ERYTHROCYTE [DISTWIDTH] IN BLOOD BY AUTOMATED COUNT: 12.6 % (ref 12.3–15.4)
ETEC LTA+ST1A+ST1B TOX ST NAA+NON-PROBE: NOT DETECTED
G LAMBLIA DNA STL QL NAA+NON-PROBE: NOT DETECTED
GLUCOSE SERPL-MCNC: 134 MG/DL (ref 65–99)
HCT VFR BLD AUTO: 32 % (ref 37.5–51)
HCT VFR BLD AUTO: 32.5 % (ref 37.5–51)
HCT VFR BLD AUTO: 32.8 % (ref 37.5–51)
HEMOCCULT STL QL: POSITIVE
HGB BLD-MCNC: 10.5 G/DL (ref 13–17.7)
HGB BLD-MCNC: 10.5 G/DL (ref 13–17.7)
HGB BLD-MCNC: 10.9 G/DL (ref 13–17.7)
HOLD SPECIMEN: NORMAL
IMM GRANULOCYTES # BLD AUTO: 0.01 10*3/MM3 (ref 0–0.05)
IMM GRANULOCYTES NFR BLD AUTO: 0.2 % (ref 0–0.5)
INR PPP: 1.03 (ref 0.89–1.12)
LYMPHOCYTES # BLD AUTO: 2.17 10*3/MM3 (ref 0.7–3.1)
LYMPHOCYTES NFR BLD AUTO: 35.3 % (ref 19.6–45.3)
MAGNESIUM SERPL-MCNC: 2.1 MG/DL (ref 1.6–2.4)
MCH RBC QN AUTO: 30.8 PG (ref 26.6–33)
MCHC RBC AUTO-ENTMCNC: 32.3 G/DL (ref 31.5–35.7)
MCV RBC AUTO: 95.3 FL (ref 79–97)
MONOCYTES # BLD AUTO: 0.47 10*3/MM3 (ref 0.1–0.9)
MONOCYTES NFR BLD AUTO: 7.6 % (ref 5–12)
NEUTROPHILS NFR BLD AUTO: 3.23 10*3/MM3 (ref 1.7–7)
NEUTROPHILS NFR BLD AUTO: 52.5 % (ref 42.7–76)
NOROVIRUS GI+II RNA STL QL NAA+NON-PROBE: DETECTED
NRBC BLD AUTO-RTO: 0 /100 WBC (ref 0–0.2)
P SHIGELLOIDES DNA STL QL NAA+NON-PROBE: NOT DETECTED
PLATELET # BLD AUTO: 282 10*3/MM3 (ref 140–450)
PMV BLD AUTO: 9.5 FL (ref 6–12)
POTASSIUM SERPL-SCNC: 4.2 MMOL/L (ref 3.5–5.2)
PROCALCITONIN SERPL-MCNC: 0.04 NG/ML (ref 0–0.25)
PROTHROMBIN TIME: 13.6 SECONDS (ref 12.2–14.5)
RBC # BLD AUTO: 3.41 10*6/MM3 (ref 4.14–5.8)
RH BLD: POSITIVE
RH BLD: POSITIVE
RVA RNA STL QL NAA+NON-PROBE: NOT DETECTED
S ENT+BONG DNA STL QL NAA+NON-PROBE: NOT DETECTED
SAPO I+II+IV+V RNA STL QL NAA+NON-PROBE: NOT DETECTED
SHIGELLA SP+EIEC IPAH ST NAA+NON-PROBE: NOT DETECTED
SODIUM SERPL-SCNC: 140 MMOL/L (ref 136–145)
T&S EXPIRATION DATE: NORMAL
V CHOL+PARA+VUL DNA STL QL NAA+NON-PROBE: NOT DETECTED
V CHOLERAE DNA STL QL NAA+NON-PROBE: NOT DETECTED
WBC NRBC COR # BLD AUTO: 6.15 10*3/MM3 (ref 3.4–10.8)
WHOLE BLOOD HOLD COAG: NORMAL
WHOLE BLOOD HOLD SPECIMEN: NORMAL
Y ENTEROCOL DNA STL QL NAA+NON-PROBE: NOT DETECTED

## 2023-11-19 PROCEDURE — 25810000003 LACTATED RINGERS PER 1000 ML: Performed by: STUDENT IN AN ORGANIZED HEALTH CARE EDUCATION/TRAINING PROGRAM

## 2023-11-19 PROCEDURE — 80048 BASIC METABOLIC PNL TOTAL CA: CPT | Performed by: STUDENT IN AN ORGANIZED HEALTH CARE EDUCATION/TRAINING PROGRAM

## 2023-11-19 PROCEDURE — G0378 HOSPITAL OBSERVATION PER HR: HCPCS

## 2023-11-19 PROCEDURE — 99222 1ST HOSP IP/OBS MODERATE 55: CPT | Performed by: NURSE PRACTITIONER

## 2023-11-19 PROCEDURE — 96361 HYDRATE IV INFUSION ADD-ON: CPT

## 2023-11-19 PROCEDURE — 74178 CT ABD&PLV WO CNTR FLWD CNTR: CPT

## 2023-11-19 PROCEDURE — 85610 PROTHROMBIN TIME: CPT | Performed by: STUDENT IN AN ORGANIZED HEALTH CARE EDUCATION/TRAINING PROGRAM

## 2023-11-19 PROCEDURE — 85025 COMPLETE CBC W/AUTO DIFF WBC: CPT | Performed by: STUDENT IN AN ORGANIZED HEALTH CARE EDUCATION/TRAINING PROGRAM

## 2023-11-19 PROCEDURE — 99214 OFFICE O/P EST MOD 30 MIN: CPT | Performed by: NURSE PRACTITIONER

## 2023-11-19 PROCEDURE — 25010000002 METRONIDAZOLE 500 MG/100ML SOLUTION: Performed by: STUDENT IN AN ORGANIZED HEALTH CARE EDUCATION/TRAINING PROGRAM

## 2023-11-19 PROCEDURE — 85014 HEMATOCRIT: CPT | Performed by: FAMILY MEDICINE

## 2023-11-19 PROCEDURE — 86901 BLOOD TYPING SEROLOGIC RH(D): CPT

## 2023-11-19 PROCEDURE — 85730 THROMBOPLASTIN TIME PARTIAL: CPT | Performed by: STUDENT IN AN ORGANIZED HEALTH CARE EDUCATION/TRAINING PROGRAM

## 2023-11-19 PROCEDURE — 25810000003 SODIUM CHLORIDE 0.9 % SOLUTION: Performed by: STUDENT IN AN ORGANIZED HEALTH CARE EDUCATION/TRAINING PROGRAM

## 2023-11-19 PROCEDURE — 25010000002 CEFEPIME PER 500 MG: Performed by: STUDENT IN AN ORGANIZED HEALTH CARE EDUCATION/TRAINING PROGRAM

## 2023-11-19 PROCEDURE — 25810000003 LACTATED RINGERS PER 1000 ML: Performed by: FAMILY MEDICINE

## 2023-11-19 PROCEDURE — 25510000001 IOPAMIDOL PER 1 ML: Performed by: STUDENT IN AN ORGANIZED HEALTH CARE EDUCATION/TRAINING PROGRAM

## 2023-11-19 PROCEDURE — 25010000002 CEFTRIAXONE PER 250 MG: Performed by: STUDENT IN AN ORGANIZED HEALTH CARE EDUCATION/TRAINING PROGRAM

## 2023-11-19 PROCEDURE — 83735 ASSAY OF MAGNESIUM: CPT | Performed by: STUDENT IN AN ORGANIZED HEALTH CARE EDUCATION/TRAINING PROGRAM

## 2023-11-19 PROCEDURE — 85018 HEMOGLOBIN: CPT | Performed by: FAMILY MEDICINE

## 2023-11-19 PROCEDURE — 87507 IADNA-DNA/RNA PROBE TQ 12-25: CPT | Performed by: STUDENT IN AN ORGANIZED HEALTH CARE EDUCATION/TRAINING PROGRAM

## 2023-11-19 PROCEDURE — 96375 TX/PRO/DX INJ NEW DRUG ADDON: CPT

## 2023-11-19 PROCEDURE — 96376 TX/PRO/DX INJ SAME DRUG ADON: CPT

## 2023-11-19 PROCEDURE — 86900 BLOOD TYPING SEROLOGIC ABO: CPT

## 2023-11-19 PROCEDURE — 96365 THER/PROPH/DIAG IV INF INIT: CPT

## 2023-11-19 RX ORDER — ATORVASTATIN CALCIUM 10 MG/1
10 TABLET, FILM COATED ORAL NIGHTLY
Status: DISCONTINUED | OUTPATIENT
Start: 2023-11-19 | End: 2023-11-21 | Stop reason: HOSPADM

## 2023-11-19 RX ORDER — SODIUM CHLORIDE, SODIUM LACTATE, POTASSIUM CHLORIDE, CALCIUM CHLORIDE 600; 310; 30; 20 MG/100ML; MG/100ML; MG/100ML; MG/100ML
30 INJECTION, SOLUTION INTRAVENOUS CONTINUOUS PRN
Status: CANCELLED | OUTPATIENT
Start: 2023-11-19

## 2023-11-19 RX ORDER — POLYETHYLENE GLYCOL 3350 17 G/17G
17 POWDER, FOR SOLUTION ORAL DAILY PRN
Status: DISCONTINUED | OUTPATIENT
Start: 2023-11-19 | End: 2023-11-21 | Stop reason: HOSPADM

## 2023-11-19 RX ORDER — AMOXICILLIN 250 MG
2 CAPSULE ORAL 2 TIMES DAILY
Status: DISCONTINUED | OUTPATIENT
Start: 2023-11-19 | End: 2023-11-21 | Stop reason: HOSPADM

## 2023-11-19 RX ORDER — SODIUM CHLORIDE 9 MG/ML
40 INJECTION, SOLUTION INTRAVENOUS AS NEEDED
Status: DISCONTINUED | OUTPATIENT
Start: 2023-11-19 | End: 2023-11-21 | Stop reason: HOSPADM

## 2023-11-19 RX ORDER — BISACODYL 10 MG
10 SUPPOSITORY, RECTAL RECTAL DAILY PRN
Status: DISCONTINUED | OUTPATIENT
Start: 2023-11-19 | End: 2023-11-21 | Stop reason: HOSPADM

## 2023-11-19 RX ORDER — ACETAMINOPHEN 325 MG/1
650 TABLET ORAL EVERY 4 HOURS PRN
Status: DISCONTINUED | OUTPATIENT
Start: 2023-11-19 | End: 2023-11-21 | Stop reason: HOSPADM

## 2023-11-19 RX ORDER — PANTOPRAZOLE SODIUM 40 MG/10ML
40 INJECTION, POWDER, LYOPHILIZED, FOR SOLUTION INTRAVENOUS EVERY 12 HOURS SCHEDULED
Status: DISCONTINUED | OUTPATIENT
Start: 2023-11-19 | End: 2023-11-21 | Stop reason: HOSPADM

## 2023-11-19 RX ORDER — METRONIDAZOLE 500 MG/100ML
500 INJECTION, SOLUTION INTRAVENOUS EVERY 8 HOURS
Status: COMPLETED | OUTPATIENT
Start: 2023-11-19 | End: 2023-11-19

## 2023-11-19 RX ORDER — NALOXONE HCL 0.4 MG/ML
0.4 VIAL (ML) INJECTION
Status: DISCONTINUED | OUTPATIENT
Start: 2023-11-19 | End: 2023-11-21 | Stop reason: HOSPADM

## 2023-11-19 RX ORDER — ACETAMINOPHEN 650 MG/1
650 SUPPOSITORY RECTAL EVERY 4 HOURS PRN
Status: DISCONTINUED | OUTPATIENT
Start: 2023-11-19 | End: 2023-11-21 | Stop reason: HOSPADM

## 2023-11-19 RX ORDER — PEG-3350, SODIUM SULFATE, SODIUM CHLORIDE, POTASSIUM CHLORIDE, SODIUM ASCORBATE AND ASCORBIC ACID 7.5-2.691G
1000 KIT ORAL
Status: COMPLETED | OUTPATIENT
Start: 2023-11-20 | End: 2023-11-20

## 2023-11-19 RX ORDER — MORPHINE SULFATE 2 MG/ML
2 INJECTION, SOLUTION INTRAMUSCULAR; INTRAVENOUS EVERY 4 HOURS PRN
Status: DISCONTINUED | OUTPATIENT
Start: 2023-11-19 | End: 2023-11-21 | Stop reason: HOSPADM

## 2023-11-19 RX ORDER — SODIUM CHLORIDE 0.9 % (FLUSH) 0.9 %
10 SYRINGE (ML) INJECTION EVERY 12 HOURS SCHEDULED
Status: DISCONTINUED | OUTPATIENT
Start: 2023-11-19 | End: 2023-11-21 | Stop reason: HOSPADM

## 2023-11-19 RX ORDER — BISACODYL 5 MG/1
5 TABLET, DELAYED RELEASE ORAL DAILY PRN
Status: DISCONTINUED | OUTPATIENT
Start: 2023-11-19 | End: 2023-11-21 | Stop reason: HOSPADM

## 2023-11-19 RX ORDER — ASPIRIN 81 MG/1
81 TABLET ORAL DAILY
Status: DISCONTINUED | OUTPATIENT
Start: 2023-11-19 | End: 2023-11-20

## 2023-11-19 RX ORDER — ACETAMINOPHEN 160 MG/5ML
650 SOLUTION ORAL EVERY 4 HOURS PRN
Status: DISCONTINUED | OUTPATIENT
Start: 2023-11-19 | End: 2023-11-21 | Stop reason: HOSPADM

## 2023-11-19 RX ORDER — SODIUM CHLORIDE 0.9 % (FLUSH) 0.9 %
10 SYRINGE (ML) INJECTION AS NEEDED
Status: DISCONTINUED | OUTPATIENT
Start: 2023-11-19 | End: 2023-11-21 | Stop reason: HOSPADM

## 2023-11-19 RX ORDER — NITROGLYCERIN 0.4 MG/1
0.4 TABLET SUBLINGUAL
Status: DISCONTINUED | OUTPATIENT
Start: 2023-11-19 | End: 2023-11-21 | Stop reason: HOSPADM

## 2023-11-19 RX ORDER — SODIUM CHLORIDE, SODIUM LACTATE, POTASSIUM CHLORIDE, CALCIUM CHLORIDE 600; 310; 30; 20 MG/100ML; MG/100ML; MG/100ML; MG/100ML
100 INJECTION, SOLUTION INTRAVENOUS CONTINUOUS
Status: ACTIVE | OUTPATIENT
Start: 2023-11-19 | End: 2023-11-20

## 2023-11-19 RX ORDER — PEG-3350, SODIUM SULFATE, SODIUM CHLORIDE, POTASSIUM CHLORIDE, SODIUM ASCORBATE AND ASCORBIC ACID 7.5-2.691G
1000 KIT ORAL
Status: COMPLETED | OUTPATIENT
Start: 2023-11-19 | End: 2023-11-19

## 2023-11-19 RX ORDER — HYDROMORPHONE HYDROCHLORIDE 1 MG/ML
0.5 INJECTION, SOLUTION INTRAMUSCULAR; INTRAVENOUS; SUBCUTANEOUS
Status: DISCONTINUED | OUTPATIENT
Start: 2023-11-19 | End: 2023-11-19

## 2023-11-19 RX ORDER — METRONIDAZOLE 500 MG/100ML
500 INJECTION, SOLUTION INTRAVENOUS EVERY 8 HOURS
Qty: 1500 ML | Refills: 0 | Status: DISCONTINUED | OUTPATIENT
Start: 2023-11-19 | End: 2023-11-21 | Stop reason: HOSPADM

## 2023-11-19 RX ADMIN — METRONIDAZOLE 500 MG: 500 INJECTION, SOLUTION INTRAVENOUS at 11:11

## 2023-11-19 RX ADMIN — METRONIDAZOLE 500 MG: 500 INJECTION, SOLUTION INTRAVENOUS at 17:15

## 2023-11-19 RX ADMIN — PANTOPRAZOLE SODIUM 40 MG: 40 INJECTION, POWDER, LYOPHILIZED, FOR SOLUTION INTRAVENOUS at 09:07

## 2023-11-19 RX ADMIN — SODIUM CHLORIDE, POTASSIUM CHLORIDE, SODIUM LACTATE AND CALCIUM CHLORIDE 100 ML/HR: 600; 310; 30; 20 INJECTION, SOLUTION INTRAVENOUS at 14:12

## 2023-11-19 RX ADMIN — PANTOPRAZOLE SODIUM 40 MG: 40 INJECTION, POWDER, LYOPHILIZED, FOR SOLUTION INTRAVENOUS at 22:24

## 2023-11-19 RX ADMIN — SODIUM CHLORIDE, POTASSIUM CHLORIDE, SODIUM LACTATE AND CALCIUM CHLORIDE 100 ML/HR: 600; 310; 30; 20 INJECTION, SOLUTION INTRAVENOUS at 03:48

## 2023-11-19 RX ADMIN — IOPAMIDOL 100 ML: 755 INJECTION, SOLUTION INTRAVENOUS at 00:32

## 2023-11-19 RX ADMIN — Medication 10 ML: at 09:08

## 2023-11-19 RX ADMIN — CEFTRIAXONE SODIUM 2000 MG: 2 INJECTION, POWDER, FOR SOLUTION INTRAMUSCULAR; INTRAVENOUS at 09:10

## 2023-11-19 RX ADMIN — SODIUM CHLORIDE 1000 ML: 9 INJECTION, SOLUTION INTRAVENOUS at 00:45

## 2023-11-19 RX ADMIN — PEG-3350, SODIUM SULFATE, SODIUM CHLORIDE, POTASSIUM CHLORIDE, SODIUM ASCORBATE AND ASCORBIC ACID 1000 ML: KIT at 19:38

## 2023-11-19 RX ADMIN — PANTOPRAZOLE SODIUM 80 MG: 40 INJECTION, POWDER, LYOPHILIZED, FOR SOLUTION INTRAVENOUS at 00:46

## 2023-11-19 RX ADMIN — METRONIDAZOLE 500 MG: 500 INJECTION, SOLUTION INTRAVENOUS at 02:24

## 2023-11-19 RX ADMIN — Medication 10 ML: at 22:25

## 2023-11-19 RX ADMIN — CEFEPIME 2000 MG: 2 INJECTION, POWDER, FOR SOLUTION INTRAVENOUS at 01:51

## 2023-11-19 NOTE — CONSULTS
[facility]    Referring Provider: No ref. provider found    PCP: Millie Loya MD    Reason for Consultation: Diverticulitis, ?  Outpatient scope    Chief complaint: Bloody stools    History of present illness:    Chente Rich is a 68 y.o. male who is admitted with acute onset of bloody stool.  Patient notes that approximately a week ago he started having some mild left lower quadrant abdominal pain that began after period of constipation.  Overnight and into this morning, developed acute onset of bloody stools-last bloody stool greater than 12 hours ago.  No prior history gastrointestinal bleeding reported.  Last colonoscopy was completed by Dr. Berry in 2022 with diverticulosis throughout colon.  Patient is not anticoagulated but has been on ibuprofen several times weekly for musculoskeletal pain secondary to pickleball.  At time of exam, does not endorse any N/V/D, abdominal pain, SOB, CP, or F/C. Past medical, surgical, social, and family histories are reviewed for accuracy.  No documented alleviating or exacerbating factors.  Does not endorse pain at time of exam.    Allergies:  Penicillins    Scheduled Meds:  [Held by provider] aspirin, 81 mg, Oral, Daily  atorvastatin, 10 mg, Oral, Nightly  cefTRIAXone, 2,000 mg, Intravenous, Q24H  metroNIDAZOLE, 500 mg, Intravenous, Q8H  pantoprazole, 40 mg, Intravenous, Q12H  senna-docusate sodium, 2 tablet, Oral, BID  sodium chloride, 10 mL, Intravenous, Q12H         Infusions:  lactated ringers, 100 mL/hr, Last Rate: 100 mL/hr (11/19/23 1412)        PRN Meds:    acetaminophen **OR** acetaminophen **OR** acetaminophen    senna-docusate sodium **AND** polyethylene glycol **AND** bisacodyl **AND** bisacodyl    Morphine    [DISCONTINUED] HYDROmorphone **AND** naloxone    nitroglycerin    sodium chloride    sodium chloride    sodium chloride    Home Meds:  Medications Prior to Admission   Medication Sig Dispense Refill Last Dose    aspirin 81 MG EC  tablet Take 1 tablet by mouth Daily.   11/18/2023 at 0900    simvastatin (ZOCOR) 20 MG tablet Take 1 tablet by mouth Daily. 90 tablet 3 11/18/2023 at 0900       ROS: Review of Systems   Constitutional:  Positive for activity change and appetite change. Negative for chills, diaphoresis, fatigue, fever and unexpected weight change.   HENT:  Negative for sore throat, trouble swallowing and voice change.    Eyes: Negative.    Respiratory:  Negative for apnea, cough, choking, chest tightness, shortness of breath, wheezing and stridor.    Cardiovascular:  Negative for chest pain, palpitations and leg swelling.   Gastrointestinal:  Positive for abdominal pain, blood in stool and nausea. Negative for abdominal distention, anal bleeding, constipation, diarrhea, rectal pain and vomiting.   Endocrine: Negative.    Genitourinary: Negative.    Musculoskeletal: Negative.    Skin: Negative.    Allergic/Immunologic: Negative.    Neurological: Negative.    Hematological: Negative.    Psychiatric/Behavioral: Negative.     All other systems reviewed and are negative.      PAST MED HX:  Past Medical History:   Diagnosis Date    Abnormal screening cardiac CT 10/12/2017    Calcium Score 133    BCC (basal cell carcinoma of skin)     Dx (initially)-2010.  Has occurred on head, chest, back, and right leg.    Colon polyp     Dx 10/17- tubular adenoma ascending colon, no dysplasia    Diabetes mellitus     Diverticulosis     Dx 10/17 by Colonoscopy- pancolonic    History of cardiovascular stress test 06/19/2020    negative stress echo, EF 60%    Hyperlipidemia     Neuropathy of right lower extremity     Dx 12/2022- mild (by [facility])       PAST SURG HX:  Past Surgical History:   Procedure Laterality Date    CATARACT EXTRACTION Right 03/01/2021    laser (approx date)    KNEE SURGERY Left 07/29/2022    knee arthroscopy with partial medial menisectomy- JRT    WISDOM TOOTH EXTRACTION         FAM HX:  Family History   Problem Relation Age of Onset     "Breast cancer Mother          from sepsis    Tremor Mother     Hypertension Father     Hyperlipidemia Father     Prostate cancer Father     Coronary artery disease Father         Angioplasty/CABG, no MI    Tremor Sister     Cerebral aneurysm Sister     Mental illness Paternal Grandmother     Peripheral vascular disease Paternal Grandfather     Dementia Paternal Grandfather         \"Vascular\"    Aneurysm Neg Hx         AAA       SOC HX:  Social History     Socioeconomic History    Marital status:     Number of children: 2   Tobacco Use    Smoking status: Never     Passive exposure: Never    Smokeless tobacco: Never   Vaping Use    Vaping Use: Never used   Substance and Sexual Activity    Alcohol use: Yes     Alcohol/week: 10.0 standard drinks of alcohol     Types: 7 Cans of beer, 3 Drinks containing 0.5 oz of alcohol per week     Comment: 1 drink (beer) daily    Drug use: Never    Sexual activity: Yes     Partners: Female     Birth control/protection: Post-menopausal       PHYSICAL EXAM  /78 (BP Location: Left arm, Patient Position: Lying)   Pulse 54   Temp 98.3 °F (36.8 °C) (Oral)   Resp 16   Ht 177.8 cm (70\")   Wt 66.7 kg (147 lb)   SpO2 96%   BMI 21.09 kg/m²   Wt Readings from Last 3 Encounters:   23 66.7 kg (147 lb)   10/10/23 66.1 kg (145 lb 12.8 oz)   23 69.1 kg (152 lb 4 oz)   ,body mass index is 21.09 kg/m².  Physical Exam  Vitals and nursing note reviewed.   Constitutional:       General: He is not in acute distress.     Appearance: Normal appearance. He is normal weight. He is ill-appearing. He is not toxic-appearing.   HENT:      Head: Normocephalic and atraumatic.   Eyes:      General: No scleral icterus.     Extraocular Movements: Extraocular movements intact.      Conjunctiva/sclera: Conjunctivae normal.      Pupils: Pupils are equal, round, and reactive to light.   Cardiovascular:      Rate and Rhythm: Normal rate and regular rhythm.      Pulses: Normal pulses.      " Heart sounds: Normal heart sounds.   Pulmonary:      Effort: Pulmonary effort is normal. No respiratory distress.      Breath sounds: Normal breath sounds.   Abdominal:      General: Abdomen is flat. Bowel sounds are normal. There is no distension.      Palpations: Abdomen is soft. There is no mass.      Tenderness: There is abdominal tenderness. There is no guarding or rebound.      Hernia: No hernia is present.   Genitourinary:     Rectum: Guaiac result positive.   Musculoskeletal:         General: Normal range of motion.      Right lower leg: No edema.      Left lower leg: No edema.   Skin:     General: Skin is warm and dry.      Capillary Refill: Capillary refill takes less than 2 seconds.      Coloration: Skin is pale. Skin is not jaundiced.   Neurological:      General: No focal deficit present.      Mental Status: He is alert and oriented to person, place, and time.   Psychiatric:         Mood and Affect: Mood normal.         Behavior: Behavior normal.         Thought Content: Thought content normal.         Judgment: Judgment normal.         Results Review:   I reviewed the patient's new clinical results.  I reviewed the patient's new imaging results and agree with the interpretation.  I reviewed the patient's other test results and agree with the interpretation  I personally viewed and interpreted the patient's EKG/Telemetry data    Lab Results   Component Value Date    WBC 6.15 11/19/2023    HGB 10.5 (L) 11/19/2023    HGB 10.5 (L) 11/19/2023    HGB 12.9 (L) 11/18/2023    HCT 32.0 (L) 11/19/2023    MCV 95.3 11/19/2023     11/19/2023       Lab Results   Component Value Date    INR 1.03 11/19/2023       Lab Results   Component Value Date    GLUCOSE 134 (H) 11/19/2023    BUN 17 11/19/2023    CREATININE 1.25 11/19/2023    EGFRIFNONA 56 (L) 12/08/2021    BCR 13.6 11/19/2023     11/19/2023    K 4.2 11/19/2023    CO2 26.0 11/19/2023    CALCIUM 8.5 (L) 11/19/2023    ALBUMIN 4.1 11/18/2023    ALKPHOS 63  11/18/2023    BILITOT 0.2 11/18/2023    ALT 17 11/18/2023    AST 24 11/18/2023       CT Abdomen Pelvis With & Without Contrast    Result Date: 11/19/2023  CT ABDOMEN PELVIS W WO CONTRAST Date of Exam: 11/19/2023 12:17 AM EST Indication: 3 episodes of large-volume maroon, mucousy diarrhea with blood clots, left abdominal pain, diverticulosis. Comparison: None available. Technique: Axial CT images were obtained of the abdomen and pelvis before and after the uneventful intravenous administration of 100 mL Isovue 3 7. Sagittal and coronal reconstructions were performed.  Automated exposure control and iterative reconstruction methods were used. Findings: There is mild linear scarring in the lung bases. The heart size is normal. There are no acute findings in the superficial soft tissues. There is no acute osseous abnormality or destructive bone lesion. There is mild lumbar spondylosis. There is what appears to be a small outpouching at the dorsal aspect of the mid descending colon best seen on noncontrast axial image 66. This area contains a small amount of mildly hyperdense material on the noncontrast study and exhibits a blush of intense hyperdensity on the arterial phase and progressive increase in hyperdense material on subsequent phases of contrast, which would suggest an area of active bleeding. It is unclear if this represents a diverticulum. There is mild inflammation in the adjacent fat and this could represent acute diverticulitis. There are additional colonic diverticula elsewhere throughout the colon. There is no small bowel distention. There is mild colonic fecal retention. The prostate, urinary bladder, appendix, liver, gallbladder, bile ducts, pancreas, stomach, spleen and right adrenal gland appear within normal limits. There is a low-density left adrenal nodule measuring 10 mm, likely adenoma. There is a large simple cyst at the upper pole of the left kidney measuring 71 mm. There is a small cyst at the  inferior left kidney. The right kidney is normal. No hydronephrosis. The abdominal and pelvic vasculature appear within normal limits. There is no ascites, pneumoperitoneum or lymphadenopathy.     Impression: 1.A small outpouching at the dorsal aspect of the mid descending colon with a small amount of hyperdense material on the noncontrast study and progressive increase in hyperdense material on subsequent phases of contrast, which would suggest an area of active bleeding. It is unclear if this represents a diverticulum. There is mild inflammation in the adjacent fat and this could represent acute diverticulitis. 2.There are additional colonic diverticula elsewhere throughout the colon. 3.Mild colonic fecal retention. 4.71 mm simple cyst at the upper pole of the left kidney. 5.10 mm low-density left adrenal nodule, likely an adenoma. Electronically Signed: Chente Interiano MD  11/19/2023 12:56 AM EST  Workstation ID: [id number]      COVID19   Date Value Ref Range Status   07/27/2022 Not Detected Not Detected - Ref. Range Final     ASSESSMENTS/PLANS  1.  Acute diverticulitis with diverticular bleed  2.  Hematochezia, related to above  3.  Type 2 diabetes mellitus  4.  High risk NSAID use, ibuprofen  5.  Norovirus positive on GI PCR.    Chente Rich is a 68 y.o. male who presents to hospital with acute onset of bloody stools.  Patient reports that approximately week ago he had left lower quadrant abdominal pains but did not think much of it; over the following days, has since developed bloody stools.  CT imaging with contrast reviewed and shows evidence of contrast extravasation into a diverticulum in the descending colon consistent with acute diverticular bleed.  Given patient's high risk NSAID use and ongoing bleeding from diverticulum, recommend bidirectional endoscopy tomorrow for control of bleeding and evaluation of the GI tract.    As patient is not having any ongoing diarrhea, suspect old infection being  detected by GI PCR panel in regards to norovirus.    >>> N.p.o. at midnight; clear liquids until then  >>> Obtain informed consent for colonoscopy and esophagogastroduodenoscopy  >>> MoviPrep: Give 8 ounces every 15 minutes ×2 L starting at 4 PM.  Repeat dose at 5 AM tomorrow.  Must finish both doses in 2 hours.  Stool should look like lemonade when finished.  Please call ENDO at [phone] at 7:30am if not clear.  >>> IV PPI twice daily  >>> Trend H&H; transfuse for hemoglobin less than 7 or symptomatic anemia per protocol  >>> Instructed to abstain from any further use of NSAIDs  >>> IV antibiotics given concerns for diverticulitis    I discussed the patient's findings and my recommendations with patient, family, and primary care team    Rom Solano, TASIA  11/19/23  17:00 EST

## 2023-11-19 NOTE — PROGRESS NOTES
[facility]  PROGRESS NOTE    Patient Name: Chente Rich  : 1955  MRN: [id number]    Date of Admission: 2023  Primary Care Physician: Millie Loya MD    Subjective   Subjective     CC:  Bloody stools, mild abdominal pain     HPI:  Patient is a 69 yo M recently admitted overnight secondary to 1 week history of mild L sided abdominal pain and recent development of maroon/bloody stools. Patient reports he has a history of diverticuli on previous colonoscopy from a few years ago. He reports improvement in his symptoms since admission, only one bloody stool since admission, plans for GI evaluation later today. He denies any other new acute complaints at this time.        Objective   Objective     Vital Signs:   Temp:  [98 °F (36.7 °C)-98.2 °F (36.8 °C)] 98.2 °F (36.8 °C)  Heart Rate:  [54-98] 54  Resp:  [16] 16  BP: (110-173)/(66-90) 127/80     Physical Exam:  Constitutional: No acute distress, awake, alert, oriented x 3, resting comfortably in bed at this time  HENT: NCAT, nares patent, mucous membranes moist  Respiratory: Clear to auscultation bilaterally, respiratory effort normal   Cardiovascular: RRR, no murmurs, rubs, or gallops  Gastrointestinal: Positive bowel sounds, soft, nontender on moderate palpation, nondistended  Musculoskeletal: No bilateral ankle edema, no clubbing or cyanosis   Psychiatric: Appropriate affect, cooperative  Neurologic: Oriented x 3, strength symmetric in all extremities, Cranial Nerves grossly intact to confrontation, speech clear  Skin: No rashes      Results Reviewed:  LAB RESULTS:      Lab 23  0543 23  0045 23  2304   WBC 6.15  --  6.96   HEMOGLOBIN 10.5*  --  12.9*   HEMATOCRIT 32.5*  --  38.7   PLATELETS 282  --  344   NEUTROS ABS 3.23  --  3.70   IMMATURE GRANS (ABS) 0.01  --  0.01   LYMPHS ABS 2.17  --  2.27   MONOS ABS 0.47  --  0.60   EOS ABS 0.25  --  0.34   MCV 95.3  --  95.8   CRP  --   --   1.15*   PROCALCITONIN  --   --  0.04   LACTATE  --   --  1.7   PROTIME  --  13.6  --    APTT  --  28.9*  --          Lab 11/19/23  0543 11/18/23  2304   SODIUM 140 140   POTASSIUM 4.2 4.2   CHLORIDE 107 102   CO2 26.0 29.0   ANION GAP 7.0 9.0   BUN 17 17   CREATININE 1.25 1.71*   EGFR 61.9 42.5*   GLUCOSE 134* 127*   CALCIUM 8.5* 9.4   MAGNESIUM 2.1 2.0         Lab 11/18/23  2304   TOTAL PROTEIN 6.6   ALBUMIN 4.1   GLOBULIN 2.5   ALT (SGPT) 17   AST (SGOT) 24   BILIRUBIN 0.2   ALK PHOS 63         Lab 11/19/23  0045   PROTIME 13.6   INR 1.03             Lab 11/19/23  0021 11/18/23  2309   ABO TYPING A A   RH TYPING Positive Positive   ANTIBODY SCREEN  --  Negative         Brief Urine Lab Results  (Last result in the past 365 days)        Color   Clarity   Blood   Leuk Est   Nitrite   Protein   CREAT   Urine HCG        06/19/23 1016 Yellow   Clear   Negative   Negative   Negative   Negative                   Microbiology Results Abnormal       None            CT Abdomen Pelvis With & Without Contrast    Result Date: 11/19/2023  CT ABDOMEN PELVIS W WO CONTRAST Date of Exam: 11/19/2023 12:17 AM EST Indication: 3 episodes of large-volume maroon, mucousy diarrhea with blood clots, left abdominal pain, diverticulosis. Comparison: None available. Technique: Axial CT images were obtained of the abdomen and pelvis before and after the uneventful intravenous administration of 100 mL Isovue 3 7. Sagittal and coronal reconstructions were performed.  Automated exposure control and iterative reconstruction methods were used. Findings: There is mild linear scarring in the lung bases. The heart size is normal. There are no acute findings in the superficial soft tissues. There is no acute osseous abnormality or destructive bone lesion. There is mild lumbar spondylosis. There is what appears to be a small outpouching at the dorsal aspect of the mid descending colon best seen on noncontrast axial image 66. This area contains a small  amount of mildly hyperdense material on the noncontrast study and exhibits a blush of intense hyperdensity on the arterial phase and progressive increase in hyperdense material on subsequent phases of contrast, which would suggest an area of active bleeding. It is unclear if this represents a diverticulum. There is mild inflammation in the adjacent fat and this could represent acute diverticulitis. There are additional colonic diverticula elsewhere throughout the colon. There is no small bowel distention. There is mild colonic fecal retention. The prostate, urinary bladder, appendix, liver, gallbladder, bile ducts, pancreas, stomach, spleen and right adrenal gland appear within normal limits. There is a low-density left adrenal nodule measuring 10 mm, likely adenoma. There is a large simple cyst at the upper pole of the left kidney measuring 71 mm. There is a small cyst at the inferior left kidney. The right kidney is normal. No hydronephrosis. The abdominal and pelvic vasculature appear within normal limits. There is no ascites, pneumoperitoneum or lymphadenopathy.     Impression: Impression: 1.A small outpouching at the dorsal aspect of the mid descending colon with a small amount of hyperdense material on the noncontrast study and progressive increase in hyperdense material on subsequent phases of contrast, which would suggest an area of active bleeding. It is unclear if this represents a diverticulum. There is mild inflammation in the adjacent fat and this could represent acute diverticulitis. 2.There are additional colonic diverticula elsewhere throughout the colon. 3.Mild colonic fecal retention. 4.71 mm simple cyst at the upper pole of the left kidney. 5.10 mm low-density left adrenal nodule, likely an adenoma. Electronically Signed: Chente Interiano MD  11/19/2023 12:56 AM EST  Workstation ID: [id number]     Results for orders placed during the hospital encounter of 06/19/20    Adult Stress Echo W/ Cont or  Stress Agent if Necessary Per Protocol    Interpretation Summary  · Estimated EF = 60%.  · The cardiac valves are anatomically and functionally normal.  · Normal stress echo with no significant echocardiographic evidence for myocardial ischemia.      Current medications:  Scheduled Meds:aspirin, 81 mg, Oral, Daily  atorvastatin, 10 mg, Oral, Nightly  cefTRIAXone, 2,000 mg, Intravenous, Q24H  metroNIDAZOLE, 500 mg, Intravenous, Q8H  pantoprazole, 40 mg, Intravenous, Q12H  senna-docusate sodium, 2 tablet, Oral, BID  sodium chloride, 10 mL, Intravenous, Q12H      Continuous Infusions:lactated ringers, 100 mL/hr, Last Rate: 100 mL/hr (11/19/23 0600)      PRN Meds:.  acetaminophen **OR** acetaminophen **OR** acetaminophen    senna-docusate sodium **AND** polyethylene glycol **AND** bisacodyl **AND** bisacodyl    HYDROmorphone **AND** naloxone    nitroglycerin    sodium chloride    sodium chloride    sodium chloride    Assessment & Plan   Assessment & Plan     Active Hospital Problems    Diagnosis  POA    **Dark stools [R19.5]  Yes    NABIL (acute kidney injury) [N17.9]  Unknown    Acute diverticulitis [K57.92]  Unknown    Controlled type 2 diabetes mellitus without complication, without long-term current use of insulin [E11.9]  Yes    Hyperlipidemia [E78.5]  Yes      Resolved Hospital Problems   No resolved problems to display.        Brief Hospital Course to date:  Chente Rich is a 68 y.o. male admitted with about 1 week history of abdominal pain, he reports he has not ate well this week and had multiple foods he probably shouldn't have over the past few days. He was admitted with likely acute diverticulitis, diverticular bleed following multiple recently maroon/blood stools. Patient to be evaluated by GI, continuing to monitor his hemoglobin at this time and plans for continued IV antibiotics at this time.     Acute diverticulitis  Bloody stools  LGIB  - serial H/H  - type / screen  - protonix IV  - continue  cefepime, flagyl  - GI consult, previous colonoscopy showed diverticuli   - last colonoscopy 8/11/2022 w/ diverticulosis noted in the sigmoid colon and descending colon  - IV fluids  - pain control prn  - nausea control prn  - symptoms appear to be improving, will allow for CLD at lunch today if symptoms remain improved.   - Patient also found to have possible norovirus on GI panel from earlier this morning      NABIL  - avoid nephrotoxic agents  - fluids  - Improved      T2DM - diet controlled  - glucose 127 on BMP, A1c 6.0     HLD  - continue statin  - Hold Asa with the above         DVT prophylaxis:  SCDS  Expected Discharge Location and Transportation: Home  Expected Discharge 11/21  Expected Discharge Date: 11/21/2023; Expected Discharge Time:      DVT prophylaxis:  Mechanical DVT prophylaxis orders are present.     AM-PAC 6 Clicks Score (PT): 24 (11/19/23 0810)    CODE STATUS:   Code Status and Medical Interventions:   Ordered at: 11/19/23 0311     Code Status (Patient has no pulse and is not breathing):    CPR (Attempt to Resuscitate)     Medical Interventions (Patient has pulse or is breathing):    Full Support       MAIRA Grant,   11/19/23

## 2023-11-19 NOTE — H&P
[facility]  HISTORY AND PHYSICAL    Patient Name: Chente Rich  : 1955  MRN: [id number]  Primary Care Physician: Millie Loya MD  Date of admission: 2023    Subjective   Subjective     Chief Complaint:  Bloody stools    HPI:  Chente Rich is a 68 y.o. male with PMHx of DM, HLD, basal cell skin cancer, neuropathy RLE who presents to the ED for evaluation of bloody stools. Reports ~ 1 week hx of left sided abdominal pain with worsening pain tonight prior to bloody stools. Reports maroon stools x 5 prior to arrival, last episode in the ED ~ 1 hour ago and was much less than before per pt report. He does take daily aspirin and statin; no recent illness; had knee scope earlier in the year. No nausea, vomiting, fever or chills. He does have known history of diverticulosis diagnosed with routine colonoscopy, no history of GI bleed or diverticulitis.     CT abd/pelvis w/w/o contrast obtained in the ED w/ concern for acute diverticulitis and small area that could be suggested of an active bleed; also noted is a 71 mm simple cyst at the upper pole of the left kidney and a 10 mm left adrenal nodule, likely adenoma. Pertinent labs: creatinine 1.71, CRP 1.15, H/H 12.9/38.7. Fecal occult positive.    Review of Systems   Constitutional:  Negative for chills and fever.   Gastrointestinal:  Positive for abdominal pain and blood in stool. Negative for abdominal distention, constipation, diarrhea, nausea and vomiting.   All other systems reviewed and are negative.               Personal History     Past Medical History:   Diagnosis Date   • Abnormal screening cardiac CT 10/12/2017    Calcium Score 133   • BCC (basal cell carcinoma of skin)     Dx (initially)-.  Has occurred on head, chest, back, and right leg.   • Colon polyp     Dx 10/17- tubular adenoma ascending colon, no dysplasia   • Diabetes mellitus    • Diverticulosis     Dx 10/17 by Colonoscopy-  pancolonic   • History of cardiovascular stress test 06/19/2020    negative stress echo, EF 60%   • Hyperlipidemia    • Neuropathy of right lower extremity     Dx 12/2022- mild (by EMG)         Oncology Problem List:  BCC (basal cell carcinoma of skin) (05/28/2020; Status: Active)    Oncology/Hematology History       Past Surgical History:   Procedure Laterality Date   • CATARACT EXTRACTION Right 03/01/2021    laser (approx date)   • KNEE SURGERY Left 07/29/2022    knee arthroscopy with partial medial menisectomy- JRT   • WISDOM TOOTH EXTRACTION         Family History:  family history includes Breast cancer in his mother; Cerebral aneurysm in his sister; Coronary artery disease in his father; Dementia in his paternal grandfather; Hyperlipidemia in his father; Hypertension in his father; Mental illness in his paternal grandmother; Peripheral vascular disease in his paternal grandfather; Prostate cancer in his father; Tremor in his mother and sister.     Social History:  reports that he has never smoked. He has never been exposed to tobacco smoke. He has never used smokeless tobacco. He reports current alcohol use of about 10.0 standard drinks of alcohol per week. He reports that he does not use drugs.  Social History     Social History Narrative   • Not on file       Medications:  aspirin and simvastatin    Allergies   Allergen Reactions   • Penicillins Rash     Swelling skin turns purple        Objective   Objective     Vital Signs:   Temp:  [98 °F (36.7 °C)] 98 °F (36.7 °C)  Heart Rate:  [57-98] 57  Resp:  [16] 16  BP: (110-173)/(66-90) 129/74    Physical Exam  Constitutional:       General: He is not in acute distress.     Appearance: He is well-developed.   HENT:      Head: Normocephalic and atraumatic.      Nose: Nose normal.      Mouth/Throat:      Pharynx: Oropharynx is clear.   Eyes:      Extraocular Movements: Extraocular movements intact.      Conjunctiva/sclera: Conjunctivae normal.      Pupils: Pupils are  equal, round, and reactive to light.   Cardiovascular:      Rate and Rhythm: Normal rate and regular rhythm.      Pulses: Normal pulses.      Heart sounds: Normal heart sounds. No murmur heard.  Pulmonary:      Effort: Pulmonary effort is normal.      Breath sounds: Normal breath sounds.   Abdominal:      General: Bowel sounds are normal. There is no distension.      Palpations: Abdomen is soft.      Tenderness: There is abdominal tenderness (LUQ).   Musculoskeletal:         General: No swelling. Normal range of motion.      Cervical back: Normal range of motion and neck supple.   Skin:     General: Skin is warm and dry.      Capillary Refill: Capillary refill takes less than 2 seconds.      Findings: No rash.   Neurological:      Mental Status: He is alert and oriented to person, place, and time.      Cranial Nerves: No cranial nerve deficit.   Psychiatric:         Mood and Affect: Mood normal.         Behavior: Behavior normal.            Result Review:  I have personally reviewed the results from the time of this admission to 11/19/2023 03:27 EST and agree with these findings:  [x]  Laboratory list / accordion  []  Microbiology  [x]  Radiology  []  EKG/Telemetry   []  Cardiology/Vascular   []  Pathology  []  Old records  []  Other:  Most notable findings include:     LAB RESULTS:      Lab 11/19/23  0045 11/18/23  2304   WBC  --  6.96   HEMOGLOBIN  --  12.9*   HEMATOCRIT  --  38.7   PLATELETS  --  344   NEUTROS ABS  --  3.70   IMMATURE GRANS (ABS)  --  0.01   LYMPHS ABS  --  2.27   MONOS ABS  --  0.60   EOS ABS  --  0.34   MCV  --  95.8   CRP  --  1.15*   PROCALCITONIN  --  0.04   LACTATE  --  1.7   PROTIME 13.6  --    APTT 28.9*  --          Lab 11/18/23  2304   SODIUM 140   POTASSIUM 4.2   CHLORIDE 102   CO2 29.0   ANION GAP 9.0   BUN 17   CREATININE 1.71*   EGFR 42.5*   GLUCOSE 127*   CALCIUM 9.4   MAGNESIUM 2.0         Lab 11/18/23  2304   TOTAL PROTEIN 6.6   ALBUMIN 4.1   GLOBULIN 2.5   ALT (SGPT) 17   AST  (SGOT) 24   BILIRUBIN 0.2   ALK PHOS 63         Lab 11/19/23  0045   PROTIME 13.6   INR 1.03             Lab 11/18/23  2309   ABO TYPING A   RH TYPING Positive   ANTIBODY SCREEN Negative         Brief Urine Lab Results  (Last result in the past 365 days)        Color   Clarity   Blood   Leuk Est   Nitrite   Protein   CREAT   Urine HCG        06/19/23 1016 Yellow   Clear   Negative   Negative   Negative   Negative                 Microbiology Results (last 10 days)       ** No results found for the last 240 hours. **            CT Abdomen Pelvis With & Without Contrast    Result Date: 11/19/2023  CT ABDOMEN PELVIS W WO CONTRAST Date of Exam: 11/19/2023 12:17 AM EST Indication: 3 episodes of large-volume maroon, mucousy diarrhea with blood clots, left abdominal pain, diverticulosis. Comparison: None available. Technique: Axial CT images were obtained of the abdomen and pelvis before and after the uneventful intravenous administration of 100 mL Isovue 3 7. Sagittal and coronal reconstructions were performed.  Automated exposure control and iterative reconstruction methods were used. Findings: There is mild linear scarring in the lung bases. The heart size is normal. There are no acute findings in the superficial soft tissues. There is no acute osseous abnormality or destructive bone lesion. There is mild lumbar spondylosis. There is what appears to be a small outpouching at the dorsal aspect of the mid descending colon best seen on noncontrast axial image 66. This area contains a small amount of mildly hyperdense material on the noncontrast study and exhibits a blush of intense hyperdensity on the arterial phase and progressive increase in hyperdense material on subsequent phases of contrast, which would suggest an area of active bleeding. It is unclear if this represents a diverticulum. There is mild inflammation in the adjacent fat and this could represent acute diverticulitis. There are additional colonic  diverticula elsewhere throughout the colon. There is no small bowel distention. There is mild colonic fecal retention. The prostate, urinary bladder, appendix, liver, gallbladder, bile ducts, pancreas, stomach, spleen and right adrenal gland appear within normal limits. There is a low-density left adrenal nodule measuring 10 mm, likely adenoma. There is a large simple cyst at the upper pole of the left kidney measuring 71 mm. There is a small cyst at the inferior left kidney. The right kidney is normal. No hydronephrosis. The abdominal and pelvic vasculature appear within normal limits. There is no ascites, pneumoperitoneum or lymphadenopathy.     Impression: Impression: 1.A small outpouching at the dorsal aspect of the mid descending colon with a small amount of hyperdense material on the noncontrast study and progressive increase in hyperdense material on subsequent phases of contrast, which would suggest an area of active bleeding. It is unclear if this represents a diverticulum. There is mild inflammation in the adjacent fat and this could represent acute diverticulitis. 2.There are additional colonic diverticula elsewhere throughout the colon. 3.Mild colonic fecal retention. 4.71 mm simple cyst at the upper pole of the left kidney. 5.10 mm low-density left adrenal nodule, likely an adenoma. Electronically Signed: hCente Interiano MD  11/19/2023 12:56 AM EST  Workstation ID: [id number]     Results for orders placed during the hospital encounter of 06/19/20    Adult Stress Echo W/ Cont or Stress Agent if Necessary Per Protocol    Interpretation Summary  · Estimated EF = 60%.  · The cardiac valves are anatomically and functionally normal.  · Normal stress echo with no significant echocardiographic evidence for myocardial ischemia.      Assessment & Plan   Assessment & Plan       Dark stools    Hyperlipidemia    Controlled type 2 diabetes mellitus without complication, without long-term current use of insulin    NABIL  (acute kidney injury)    Acute diverticulitis    Acute diverticulitis  Bloody stools  - serial H/H  - type / screen  - protonix IV  - continue cefepime, flagyl  - consider colorectal evaluation inpatient vs outpatient  - last colonoscopy 8/11/2022 w/ diverticulosis noted in the sigmoid colon and descending colon  - IV fluids  - pain control prn  - nausea control prn  - NPO    NABIL  - avoid nephrotoxic agents  - repeat BMP in am  - creatiine 1.71, previously 1.21 6/19/23  - fluids    T2DM - diet controlled  - glucose 127 on BMP, A1c 6.0    HLD  - continue aspirin, statin      DVT prophylaxis:  SCDS    CODE STATUS:    Code Status (Patient has no pulse and is not breathing): CPR (Attempt to Resuscitate)  Medical Interventions (Patient has pulse or is breathing): Full Support      Expected Discharge    Expected Discharge Date: 11/21/2023; Expected Discharge Time:       Total time spent: 40 minutes  Time spent includes time reviewing chart, face-to-face time, counseling patient/family/caregiver, ordering medications/tests/procedures, communicating with other health care professionals, documenting clinical information in the electronic health record, and coordination of care.      Signature: Electronically signed by TASIA Feliz, 11/19/23, 3:27 AM EST

## 2023-11-19 NOTE — ED PROVIDER NOTES
EMERGENCY DEPARTMENT ENCOUNTER    Pt Name: Chente Rich  MRN: [id number]  Pt :   1955  Room Number:    Date of encounter:  2023  PCP: Millie Loya MD  ED Provider: Graeme Panchal MD    Historian: Patient, spouse      HPI:  Chief Complaint: GI bleeding, left-sided abdominal pain        Context: Chente Rich is a 68-year-old man with hyperlipidemia and previous diabetes no longer needing treatment who presents for evaluation of several episodes of maroon, bloody, mucousy stool that started this evening.  He says he had a 5-day period of left-sided abdominal pain but that resolved a couple of days ago.  He does have known diverticulosis.  He has not appreciated any fevers or systemic symptoms.  Denies pain with defecation.  No other complaints at this time.      PAST MEDICAL HISTORY  Past Medical History:   Diagnosis Date    Abnormal screening cardiac CT 10/12/2017    Calcium Score 133    BCC (basal cell carcinoma of skin)     Dx (initially)-.  Has occurred on head, chest, back, and right leg.    Colon polyp     Dx 10/17- tubular adenoma ascending colon, no dysplasia    Diabetes mellitus     Diverticulosis     Dx 10/17 by Colonoscopy- pancolonic    History of cardiovascular stress test 2020    negative stress echo, EF 60%    Hyperlipidemia     Neuropathy of right lower extremity     Dx 2022- mild (by EM[facility])         PAST SURGICAL HISTORY  Past Surgical History:   Procedure Laterality Date    CATARACT EXTRACTION Right 2021    laser (approx date)    KNEE SURGERY Left 2022    knee arthroscopy with partial medial menisectomy- JRT    WISDOM TOOTH EXTRACTION           FAMILY HISTORY  Family History   Problem Relation Age of Onset    Breast cancer Mother          from sepsis    Tremor Mother     Hypertension Father     Hyperlipidemia Father     Prostate cancer Father     Coronary artery disease Father         Angioplasty/CABG, no MI    Tremor Sister     Cerebral  "aneurysm Sister     Mental illness Paternal Grandmother     Peripheral vascular disease Paternal Grandfather     Dementia Paternal Grandfather         \"Vascular\"    Aneurysm Neg Hx         AAA         SOCIAL HISTORY  Social History     Socioeconomic History    Marital status:     Number of children: 2   Tobacco Use    Smoking status: Never     Passive exposure: Never    Smokeless tobacco: Never   Vaping Use    Vaping Use: Never used   Substance and Sexual Activity    Alcohol use: Yes     Alcohol/week: 10.0 standard drinks of alcohol     Types: 7 Cans of beer, 3 Drinks containing 0.5 oz of alcohol per week     Comment: 1 drink (beer) daily    Drug use: Never    Sexual activity: Yes     Partners: Female     Birth control/protection: Post-menopausal         ALLERGIES  Penicillins        REVIEW OF SYSTEMS  Review of Systems       All systems reviewed and negative except for those discussed in HPI.       PHYSICAL EXAM    I have reviewed the triage vital signs and nursing notes.    ED Triage Vitals [11/18/23 2211]   Temp Heart Rate Resp BP SpO2   98 °F (36.7 °C) 72 16 173/90 98 %      Temp src Heart Rate Source Patient Position BP Location FiO2 (%)   Oral Monitor;Left Sitting Right arm --       Physical Exam  GENERAL:   Appears in no acute distress.   HENT: Nares patent.  EYES: No scleral icterus.  CV: Regular rhythm, regular rate.  RESPIRATORY: Normal effort.  No audible wheezes, rales or rhonchi.  ABDOMEN: Soft, nontender  MUSCULOSKELETAL: No deformities.   NEURO: Alert, moves all extremities, follows commands.  SKIN: Warm, dry, no rash visualized.      LAB RESULTS  Recent Results (from the past 24 hour(s))   Comprehensive Metabolic Panel    Collection Time: 11/18/23 11:04 PM    Specimen: Blood   Result Value Ref Range    Glucose 127 (H) 65 - 99 mg/dL    BUN 17 8 - 23 mg/dL    Creatinine 1.71 (H) 0.76 - 1.27 mg/dL    Sodium 140 136 - 145 mmol/L    Potassium 4.2 3.5 - 5.2 mmol/L    Chloride 102 98 - 107 mmol/L    " CO2 29.0 22.0 - 29.0 mmol/L    Calcium 9.4 8.6 - 10.5 mg/dL    Total Protein 6.6 6.0 - 8.5 g/dL    Albumin 4.1 3.5 - 5.2 g/dL    ALT (SGPT) 17 1 - 41 U/L    AST (SGOT) 24 1 - 40 U/L    Alkaline Phosphatase 63 39 - 117 U/L    Total Bilirubin 0.2 0.0 - 1.2 mg/dL    Globulin 2.5 gm/dL    A/G Ratio 1.6 g/dL    BUN/Creatinine Ratio 9.9 7.0 - 25.0    Anion Gap 9.0 5.0 - 15.0 mmol/L    eGFR 42.5 (L) >60.0 mL/min/1.73   Lactic Acid, Plasma    Collection Time: 11/18/23 11:04 PM    Specimen: Blood   Result Value Ref Range    Lactate 1.7 0.5 - 2.0 mmol/L   POC Occult Blood Stool    Collection Time: 11/18/23 11:04 PM    Specimen: Stool   Result Value Ref Range    Fecal Occult Blood Positive (A)     Positive Control      Negative Control     Green Top (Gel)    Collection Time: 11/18/23 11:04 PM   Result Value Ref Range    Extra Tube Hold for add-ons.    Lavender Top    Collection Time: 11/18/23 11:04 PM   Result Value Ref Range    Extra Tube hold for add-on    Gold Top - SST    Collection Time: 11/18/23 11:04 PM   Result Value Ref Range    Extra Tube Hold for add-ons.    Light Blue Top    Collection Time: 11/18/23 11:04 PM   Result Value Ref Range    Extra Tube Hold for add-ons.    CBC Auto Differential    Collection Time: 11/18/23 11:04 PM    Specimen: Blood   Result Value Ref Range    WBC 6.96 3.40 - 10.80 10*3/mm3    RBC 4.04 (L) 4.14 - 5.80 10*6/mm3    Hemoglobin 12.9 (L) 13.0 - 17.7 g/dL    Hematocrit 38.7 37.5 - 51.0 %    MCV 95.8 79.0 - 97.0 fL    MCH 31.9 26.6 - 33.0 pg    MCHC 33.3 31.5 - 35.7 g/dL    RDW 12.5 12.3 - 15.4 %    RDW-SD 44.2 37.0 - 54.0 fl    MPV 9.6 6.0 - 12.0 fL    Platelets 344 140 - 450 10*3/mm3    Neutrophil % 53.2 42.7 - 76.0 %    Lymphocyte % 32.6 19.6 - 45.3 %    Monocyte % 8.6 5.0 - 12.0 %    Eosinophil % 4.9 0.3 - 6.2 %    Basophil % 0.6 0.0 - 1.5 %    Immature Grans % 0.1 0.0 - 0.5 %    Neutrophils, Absolute 3.70 1.70 - 7.00 10*3/mm3    Lymphocytes, Absolute 2.27 0.70 - 3.10 10*3/mm3     Monocytes, Absolute 0.60 0.10 - 0.90 10*3/mm3    Eosinophils, Absolute 0.34 0.00 - 0.40 10*3/mm3    Basophils, Absolute 0.04 0.00 - 0.20 10*3/mm3    Immature Grans, Absolute 0.01 0.00 - 0.05 10*3/mm3    nRBC 0.0 0.0 - 0.2 /100 WBC   Procalcitonin    Collection Time: 11/18/23 11:04 PM    Specimen: Blood   Result Value Ref Range    Procalcitonin 0.04 0.00 - 0.25 ng/mL   C-reactive Protein    Collection Time: 11/18/23 11:04 PM    Specimen: Blood   Result Value Ref Range    C-Reactive Protein 1.15 (H) 0.00 - 0.50 mg/dL   Magnesium    Collection Time: 11/18/23 11:04 PM    Specimen: Blood   Result Value Ref Range    Magnesium 2.0 1.6 - 2.4 mg/dL   Type & Screen    Collection Time: 11/18/23 11:09 PM    Specimen: Blood   Result Value Ref Range    ABO Type A     RH type Positive     Antibody Screen Negative     T&S Expiration Date 11/21/2023 11:59:59 PM    Protime-INR    Collection Time: 11/19/23 12:45 AM    Specimen: Blood   Result Value Ref Range    Protime 13.6 12.2 - 14.5 Seconds    INR 1.03 0.89 - 1.12   aPTT    Collection Time: 11/19/23 12:45 AM    Specimen: Blood   Result Value Ref Range    PTT 28.9 (L) 60.0 - 90.0 seconds       If labs were ordered, I independently reviewed the results and considered them in treating the patient.        RADIOLOGY  CT Abdomen Pelvis With & Without Contrast    Result Date: 11/19/2023  CT ABDOMEN PELVIS W WO CONTRAST Date of Exam: 11/19/2023 12:17 AM EST Indication: 3 episodes of large-volume maroon, mucousy diarrhea with blood clots, left abdominal pain, diverticulosis. Comparison: None available. Technique: Axial CT images were obtained of the abdomen and pelvis before and after the uneventful intravenous administration of 100 mL Isovue 3 7. Sagittal and coronal reconstructions were performed.  Automated exposure control and iterative reconstruction methods were used. Findings: There is mild linear scarring in the lung bases. The heart size is normal. There are no acute findings in  the superficial soft tissues. There is no acute osseous abnormality or destructive bone lesion. There is mild lumbar spondylosis. There is what appears to be a small outpouching at the dorsal aspect of the mid descending colon best seen on noncontrast axial image 66. This area contains a small amount of mildly hyperdense material on the noncontrast study and exhibits a blush of intense hyperdensity on the arterial phase and progressive increase in hyperdense material on subsequent phases of contrast, which would suggest an area of active bleeding. It is unclear if this represents a diverticulum. There is mild inflammation in the adjacent fat and this could represent acute diverticulitis. There are additional colonic diverticula elsewhere throughout the colon. There is no small bowel distention. There is mild colonic fecal retention. The prostate, urinary bladder, appendix, liver, gallbladder, bile ducts, pancreas, stomach, spleen and right adrenal gland appear within normal limits. There is a low-density left adrenal nodule measuring 10 mm, likely adenoma. There is a large simple cyst at the upper pole of the left kidney measuring 71 mm. There is a small cyst at the inferior left kidney. The right kidney is normal. No hydronephrosis. The abdominal and pelvic vasculature appear within normal limits. There is no ascites, pneumoperitoneum or lymphadenopathy.     Impression: 1.A small outpouching at the dorsal aspect of the mid descending colon with a small amount of hyperdense material on the noncontrast study and progressive increase in hyperdense material on subsequent phases of contrast, which would suggest an area of active bleeding. It is unclear if this represents a diverticulum. There is mild inflammation in the adjacent fat and this could represent acute diverticulitis. 2.There are additional colonic diverticula elsewhere throughout the colon. 3.Mild colonic fecal retention. 4.71 mm simple cyst at the upper  pole of the left kidney. 5.10 mm low-density left adrenal nodule, likely an adenoma. Electronically Signed: Chente Interiano MD  11/19/2023 12:56 AM EST  Workstation ID: [id number]     I ordered and independently reviewed the above noted radiographic studies.      I viewed images of CT scan of the abdomen pelvis which shows contrast in the descending colon and findings consistent with colitis or diverticulitis, formal over read suggest diverticulitis    See radiologist's dictation for official interpretation.        PROCEDURES    Procedures    No orders to display       MEDICATIONS GIVEN IN ER    Medications   sodium chloride 0.9 % flush 10 mL (has no administration in time range)   cefepime 2 gm IVPB in 100 ml NS (MBP) (2,000 mg Intravenous New Bag 11/19/23 0151)   metroNIDAZOLE (FLAGYL) IVPB 500 mg (has no administration in time range)   sodium chloride 0.9 % bolus 1,000 mL (0 mL Intravenous Stopped 11/19/23 0149)   pantoprazole (PROTONIX) injection 80 mg (80 mg Intravenous Given 11/19/23 0046)   iopamidol (ISOVUE-370) 76 % injection 100 mL (100 mL Intravenous Given 11/19/23 0032)         MEDICAL DECISION MAKING, PROGRESS, and CONSULTS    All labs, if obtained, have been independently reviewed by me.  All radiology studies, if obtained, have been reviewed by me and the radiologist dictating the report.  All EKG's, if obtained, have been independently viewed and interpreted by me/my attending physician.      Discussion below represents my analysis of pertinent findings related to patient's condition, differential diagnosis, treatment plan and final disposition.                         Differential diagnosis:    Hemorrhagic shock, acute blood loss anemia, massive GI bleed, colitis, diverticulitis, bacterial infection, sepsis, dehydration, electrolyte abnormality, coagulopathy      Additional sources:    - Discussed/ obtained information from independent historians: Spouse    - External (non-ED) record review: Outpatient  notes for controlled type 2 diabetes, arthritis, neuropathy    - Chronic or social conditions impacting care: None    - Shared decision making: Agreeable to hospital admission      Orders placed during this visit:  Orders Placed This Encounter   Procedures    Blood Culture - Blood,    Blood Culture - Blood,    Gastrointestinal Panel, PCR - Stool, Per Rectum    CT Abdomen Pelvis With & Without Contrast    [place] Draw    Comprehensive Metabolic Panel    Lactic Acid, Plasma    Occult Blood X 1, Stool - Stool, Per Rectum    CBC Auto Differential    Protime-INR    aPTT    Procalcitonin    C-reactive Protein    Magnesium    Hemoglobin & Hematocrit, Blood    NPO Diet NPO Type: Strict NPO    Undress & Gown    Measure Blood Pressure    Vital Signs    Orthostatic Blood Pressure    Pulse Oximetry    Oxygen Therapy- Nasal Cannula; Titrate 1-6 LPM Per SpO2; 90 - 95%    POC Occult Blood Stool    Type & Screen    ABO RH Specimen Verification    Insert Peripheral IV    CBC & Differential    Green Top (Gel)    Lavender Top    Gold Top - SST    Gray Top    Light Blue Top         Additional orders considered but not ordered:      ED Course:    Consultants:      ED Course as of 11/19/23 0205   Sat Nov 18, 2023   4098 In summary this is a very nice 68-year-old man with hyperlipidemia and previous diabetes no longer needing treatment who presents for evaluation of several episodes of maroon, bloody, mucousy stool that started this evening.  He says he had a 5-day period of left-sided abdominal pain but that resolved a couple of days ago.  He does have known diverticulosis.  He has not appreciated any fevers or systemic symptoms.  Denies pain with defecation.  No other complaints at this time. [CC]   Sun Nov 19, 2023   0154 He arrived awake and alert hypertensive at 173/90 otherwise vitals within normal limits.  Heart rate is 72 and he is not beta blocked.  Fecal occult was sent but is grossly bloody on appearance.  Started on  pantoprazole.  Has dry mucous membranes.  Started on IV fluids and have obtained coags type and screen H&H etc.  CT scan of the abdomen pelvis GI bleed protocol shows bleeding diverticulitis.  Started on cefepime and Flagyl due to a penicillin allergy.  Hemoglobin 12.9.  He had another bloody bowel movement after initial work-up and feels like it is decreasing in volume.  Metabolic panel concerning for acute kidney injury creatinine is 1.71 which shows a significant increase when compared with prior values.  Other labs generally reassuring and nonactionable.  Upon reevaluation he continues to rest comfortably heart rate is 61, I think he is stable for telemetry but would not be comfortable discharging continuing fluids and IV antibiotics we will continue to trend H&H.  Medicine team consulted for admission. [CC]      ED Course User Index  [CC] Graeme Panchal MD              Shared Decision Making:  After my consideration of clinical presentation and any laboratory/radiology studies obtained, I discussed the findings with the patient/patient representative who is in agreement with the treatment plan and the final disposition.   Risks and benefits of discharge and/or observation/admission were discussed.       AS OF 02:05 EST VITALS:    BP - 125/66  HR - 61  TEMP - 98 °F (36.7 °C) (Oral)  O2 SATS - 99%                  DIAGNOSIS  Final diagnoses:   Gastrointestinal hemorrhage associated with intestinal diverticulitis   NABIL (acute kidney injury)   Blood loss anemia         DISPOSITION  Admit      Please note that portions of this document were completed with voice recognition software.        Graeme Panchal MD  11/19/23 1867

## 2023-11-20 ENCOUNTER — ANESTHESIA EVENT (OUTPATIENT)
Dept: GASTROENTEROLOGY | Facility: HOSPITAL | Age: 68
End: 2023-11-20
Payer: MEDICARE

## 2023-11-20 ENCOUNTER — ANESTHESIA (OUTPATIENT)
Dept: GASTROENTEROLOGY | Facility: HOSPITAL | Age: 68
End: 2023-11-20
Payer: MEDICARE

## 2023-11-20 LAB
ALBUMIN SERPL-MCNC: 3.5 G/DL (ref 3.5–5.2)
ALBUMIN/GLOB SERPL: 1.5 G/DL
ALP SERPL-CCNC: 52 U/L (ref 39–117)
ALT SERPL W P-5'-P-CCNC: 11 U/L (ref 1–41)
ANION GAP SERPL CALCULATED.3IONS-SCNC: 8 MMOL/L (ref 5–15)
AST SERPL-CCNC: 20 U/L (ref 1–40)
BASOPHILS # BLD AUTO: 0.04 10*3/MM3 (ref 0–0.2)
BASOPHILS NFR BLD AUTO: 0.7 % (ref 0–1.5)
BILIRUB SERPL-MCNC: 0.3 MG/DL (ref 0–1.2)
BUN SERPL-MCNC: 7 MG/DL (ref 8–23)
BUN/CREAT SERPL: 6.4 (ref 7–25)
CALCIUM SPEC-SCNC: 8.8 MG/DL (ref 8.6–10.5)
CHLORIDE SERPL-SCNC: 106 MMOL/L (ref 98–107)
CO2 SERPL-SCNC: 25 MMOL/L (ref 22–29)
CREAT SERPL-MCNC: 1.1 MG/DL (ref 0.76–1.27)
DEPRECATED RDW RBC AUTO: 46.3 FL (ref 37–54)
EGFRCR SERPLBLD CKD-EPI 2021: 72.2 ML/MIN/1.73
EOSINOPHIL # BLD AUTO: 0.28 10*3/MM3 (ref 0–0.4)
EOSINOPHIL NFR BLD AUTO: 4.8 % (ref 0.3–6.2)
ERYTHROCYTE [DISTWIDTH] IN BLOOD BY AUTOMATED COUNT: 12.9 % (ref 12.3–15.4)
GLOBULIN UR ELPH-MCNC: 2.3 GM/DL
GLUCOSE BLDC GLUCOMTR-MCNC: 91 MG/DL (ref 70–130)
GLUCOSE SERPL-MCNC: 127 MG/DL (ref 65–99)
HCT VFR BLD AUTO: 35.1 % (ref 37.5–51)
HGB BLD-MCNC: 11.6 G/DL (ref 13–17.7)
IMM GRANULOCYTES # BLD AUTO: 0.03 10*3/MM3 (ref 0–0.05)
IMM GRANULOCYTES NFR BLD AUTO: 0.5 % (ref 0–0.5)
LYMPHOCYTES # BLD AUTO: 1.91 10*3/MM3 (ref 0.7–3.1)
LYMPHOCYTES NFR BLD AUTO: 32.8 % (ref 19.6–45.3)
MAGNESIUM SERPL-MCNC: 2.1 MG/DL (ref 1.6–2.4)
MCH RBC QN AUTO: 32.2 PG (ref 26.6–33)
MCHC RBC AUTO-ENTMCNC: 33 G/DL (ref 31.5–35.7)
MCV RBC AUTO: 97.5 FL (ref 79–97)
MONOCYTES # BLD AUTO: 0.55 10*3/MM3 (ref 0.1–0.9)
MONOCYTES NFR BLD AUTO: 9.4 % (ref 5–12)
NEUTROPHILS NFR BLD AUTO: 3.02 10*3/MM3 (ref 1.7–7)
NEUTROPHILS NFR BLD AUTO: 51.8 % (ref 42.7–76)
NRBC BLD AUTO-RTO: 0 /100 WBC (ref 0–0.2)
PLATELET # BLD AUTO: 303 10*3/MM3 (ref 140–450)
PMV BLD AUTO: 9.6 FL (ref 6–12)
POTASSIUM SERPL-SCNC: 4.1 MMOL/L (ref 3.5–5.2)
PROT SERPL-MCNC: 5.8 G/DL (ref 6–8.5)
RBC # BLD AUTO: 3.6 10*6/MM3 (ref 4.14–5.8)
SODIUM SERPL-SCNC: 139 MMOL/L (ref 136–145)
WBC NRBC COR # BLD AUTO: 5.83 10*3/MM3 (ref 3.4–10.8)

## 2023-11-20 PROCEDURE — 25010000002 CEFTRIAXONE PER 250 MG: Performed by: STUDENT IN AN ORGANIZED HEALTH CARE EDUCATION/TRAINING PROGRAM

## 2023-11-20 PROCEDURE — 45380 COLONOSCOPY AND BIOPSY: CPT | Performed by: INTERNAL MEDICINE

## 2023-11-20 PROCEDURE — 45382 COLONOSCOPY W/CONTROL BLEED: CPT | Performed by: INTERNAL MEDICINE

## 2023-11-20 PROCEDURE — 96361 HYDRATE IV INFUSION ADD-ON: CPT

## 2023-11-20 PROCEDURE — 85025 COMPLETE CBC W/AUTO DIFF WBC: CPT | Performed by: FAMILY MEDICINE

## 2023-11-20 PROCEDURE — 80053 COMPREHEN METABOLIC PANEL: CPT | Performed by: FAMILY MEDICINE

## 2023-11-20 PROCEDURE — G0378 HOSPITAL OBSERVATION PER HR: HCPCS

## 2023-11-20 PROCEDURE — 99232 SBSQ HOSP IP/OBS MODERATE 35: CPT | Performed by: FAMILY MEDICINE

## 2023-11-20 PROCEDURE — 83735 ASSAY OF MAGNESIUM: CPT | Performed by: FAMILY MEDICINE

## 2023-11-20 PROCEDURE — 43235 EGD DIAGNOSTIC BRUSH WASH: CPT | Performed by: INTERNAL MEDICINE

## 2023-11-20 PROCEDURE — 88305 TISSUE EXAM BY PATHOLOGIST: CPT | Performed by: INTERNAL MEDICINE

## 2023-11-20 PROCEDURE — 25010000002 METRONIDAZOLE 500 MG/100ML SOLUTION: Performed by: STUDENT IN AN ORGANIZED HEALTH CARE EDUCATION/TRAINING PROGRAM

## 2023-11-20 PROCEDURE — 96376 TX/PRO/DX INJ SAME DRUG ADON: CPT

## 2023-11-20 PROCEDURE — 25010000002 PROPOFOL 10 MG/ML EMULSION

## 2023-11-20 PROCEDURE — 82948 REAGENT STRIP/BLOOD GLUCOSE: CPT

## 2023-11-20 PROCEDURE — 25810000003 LACTATED RINGERS PER 1000 ML: Performed by: FAMILY MEDICINE

## 2023-11-20 DEVICE — DEV CLIP ENDO RESOLUTION360 CONTRL ROT 235CM: Type: IMPLANTABLE DEVICE | Site: DESCENDING COLON | Status: FUNCTIONAL

## 2023-11-20 RX ORDER — FAMOTIDINE 10 MG/ML
20 INJECTION, SOLUTION INTRAVENOUS ONCE
Status: CANCELLED | OUTPATIENT
Start: 2023-11-20 | End: 2023-11-20

## 2023-11-20 RX ORDER — FAMOTIDINE 20 MG/1
20 TABLET, FILM COATED ORAL ONCE
Status: CANCELLED | OUTPATIENT
Start: 2023-11-20 | End: 2023-11-20

## 2023-11-20 RX ORDER — SODIUM CHLORIDE 0.9 % (FLUSH) 0.9 %
10 SYRINGE (ML) INJECTION EVERY 12 HOURS SCHEDULED
Status: CANCELLED | OUTPATIENT
Start: 2023-11-20

## 2023-11-20 RX ORDER — HYDROMORPHONE HYDROCHLORIDE 1 MG/ML
0.5 INJECTION, SOLUTION INTRAMUSCULAR; INTRAVENOUS; SUBCUTANEOUS
Status: CANCELLED | OUTPATIENT
Start: 2023-11-20

## 2023-11-20 RX ORDER — LIDOCAINE HYDROCHLORIDE 10 MG/ML
0.5 INJECTION, SOLUTION EPIDURAL; INFILTRATION; INTRACAUDAL; PERINEURAL ONCE AS NEEDED
Status: CANCELLED | OUTPATIENT
Start: 2023-11-20

## 2023-11-20 RX ORDER — PROPOFOL 10 MG/ML
VIAL (ML) INTRAVENOUS AS NEEDED
Status: DISCONTINUED | OUTPATIENT
Start: 2023-11-20 | End: 2023-11-20 | Stop reason: SURG

## 2023-11-20 RX ORDER — SODIUM CHLORIDE 0.9 % (FLUSH) 0.9 %
10 SYRINGE (ML) INJECTION AS NEEDED
Status: CANCELLED | OUTPATIENT
Start: 2023-11-20

## 2023-11-20 RX ORDER — LIDOCAINE HYDROCHLORIDE 10 MG/ML
INJECTION, SOLUTION EPIDURAL; INFILTRATION; INTRACAUDAL; PERINEURAL AS NEEDED
Status: DISCONTINUED | OUTPATIENT
Start: 2023-11-20 | End: 2023-11-20 | Stop reason: SURG

## 2023-11-20 RX ORDER — FENTANYL CITRATE 50 UG/ML
50 INJECTION, SOLUTION INTRAMUSCULAR; INTRAVENOUS
Status: CANCELLED | OUTPATIENT
Start: 2023-11-20

## 2023-11-20 RX ORDER — SODIUM CHLORIDE 9 MG/ML
40 INJECTION, SOLUTION INTRAVENOUS AS NEEDED
Status: CANCELLED | OUTPATIENT
Start: 2023-11-20

## 2023-11-20 RX ORDER — MIDAZOLAM HYDROCHLORIDE 1 MG/ML
0.5 INJECTION INTRAMUSCULAR; INTRAVENOUS
Status: CANCELLED | OUTPATIENT
Start: 2023-11-20

## 2023-11-20 RX ORDER — ASPIRIN 81 MG/1
81 TABLET ORAL DAILY
Status: DISCONTINUED | OUTPATIENT
Start: 2023-11-21 | End: 2023-11-21 | Stop reason: HOSPADM

## 2023-11-20 RX ORDER — SODIUM CHLORIDE, SODIUM LACTATE, POTASSIUM CHLORIDE, CALCIUM CHLORIDE 600; 310; 30; 20 MG/100ML; MG/100ML; MG/100ML; MG/100ML
9 INJECTION, SOLUTION INTRAVENOUS CONTINUOUS
Status: CANCELLED | OUTPATIENT
Start: 2023-11-20

## 2023-11-20 RX ADMIN — LIDOCAINE HYDROCHLORIDE 50 MG: 10 INJECTION, SOLUTION EPIDURAL; INFILTRATION; INTRACAUDAL; PERINEURAL at 13:02

## 2023-11-20 RX ADMIN — CEFTRIAXONE SODIUM 2000 MG: 2 INJECTION, POWDER, FOR SOLUTION INTRAMUSCULAR; INTRAVENOUS at 09:09

## 2023-11-20 RX ADMIN — METRONIDAZOLE 500 MG: 500 INJECTION, SOLUTION INTRAVENOUS at 17:15

## 2023-11-20 RX ADMIN — ATORVASTATIN CALCIUM 10 MG: 10 TABLET, FILM COATED ORAL at 20:55

## 2023-11-20 RX ADMIN — Medication 10 ML: at 08:06

## 2023-11-20 RX ADMIN — METRONIDAZOLE 500 MG: 500 INJECTION, SOLUTION INTRAVENOUS at 01:13

## 2023-11-20 RX ADMIN — Medication 10 ML: at 20:55

## 2023-11-20 RX ADMIN — PROPOFOL 100 MCG/KG/MIN: 10 INJECTION, EMULSION INTRAVENOUS at 13:03

## 2023-11-20 RX ADMIN — PROPOFOL 80 MG: 10 INJECTION, EMULSION INTRAVENOUS at 13:02

## 2023-11-20 RX ADMIN — PANTOPRAZOLE SODIUM 40 MG: 40 INJECTION, POWDER, LYOPHILIZED, FOR SOLUTION INTRAVENOUS at 08:06

## 2023-11-20 RX ADMIN — PANTOPRAZOLE SODIUM 40 MG: 40 INJECTION, POWDER, LYOPHILIZED, FOR SOLUTION INTRAVENOUS at 20:55

## 2023-11-20 RX ADMIN — PEG-3350, SODIUM SULFATE, SODIUM CHLORIDE, POTASSIUM CHLORIDE, SODIUM ASCORBATE AND ASCORBIC ACID 1000 ML: KIT at 04:00

## 2023-11-20 RX ADMIN — SODIUM CHLORIDE, POTASSIUM CHLORIDE, SODIUM LACTATE AND CALCIUM CHLORIDE 100 ML/HR: 600; 310; 30; 20 INJECTION, SOLUTION INTRAVENOUS at 01:13

## 2023-11-20 RX ADMIN — METRONIDAZOLE 500 MG: 500 INJECTION, SOLUTION INTRAVENOUS at 12:54

## 2023-11-20 NOTE — BRIEF OP NOTE
COLONOSCOPY, ESOPHAGOGASTRODUODENOSCOPY  Progress Note    Chente Rich  11/20/2023    Colonoscopy reveals scattered diverticula throughout the colon.  There is an inflamed diverticulum in the descending colon with scant oozing, and adherent clot.  The site was marked with tattoo just proximal to the diverticulum, and 1 Endo Clip applied just proximal to the diverticulum for radiographic marking.  Band ligation for hemostasis was attempted x2, but failed due to fibrotic nature of the mucosa in this region.  Cap suctioning of the diverticulum revealed an ulceration at the diverticular rim, and an inflammatory-appearing 2 mm polyp on the diverticular rim, biopsied.  With assistance of the banding cap, an Endo Clip was applied across ulcerated rim of the diverticulum for hemostasis.  Another Endo Clip deployment was unsuccessful.    EGD is normal.    >> Recommend 1 week course of antibiotics for focal diverticulitis.  >> If there is recurrent bleeding, recommend interventional angiography.    Mark I. Brunner, MD     Date: 11/20/2023  Time: 14:03 EST

## 2023-11-20 NOTE — ANESTHESIA PREPROCEDURE EVALUATION
Anesthesia Evaluation     Patient summary reviewed and Nursing notes reviewed   no history of anesthetic complications:   NPO Solid Status: > 8 hours  NPO Liquid Status: > 2 hours           Airway   Mallampati: II  TM distance: >3 FB  Neck ROM: full  No difficulty expected  Dental - normal exam     Pulmonary - negative pulmonary ROS and normal exam    breath sounds clear to auscultation  Cardiovascular - negative cardio ROS and normal exam    Rhythm: regular  Rate: normal      ROS comment: 2020 Stress Echo:  · Estimated EF = 60%.  · The cardiac valves are anatomically and functionally normal.  · Normal stress echo with no significant echocardiographic evidence for myocardial ischemia.      Neuro/Psych- negative ROS  GI/Hepatic/Renal/Endo    (+) GI bleeding , diabetes mellitus type 2    Musculoskeletal (-) negative ROS    Abdominal    Substance History      OB/GYN          Other - negative ROS                     Anesthesia Plan    ASA 2     general     intravenous induction     Anesthetic plan, risks, benefits, and alternatives have been provided, discussed and informed consent has been obtained with: patient.    Plan discussed with CRNA.    CODE STATUS:    Code Status (Patient has no pulse and is not breathing): CPR (Attempt to Resuscitate)  Medical Interventions (Patient has pulse or is breathing): Full Support

## 2023-11-20 NOTE — PROGRESS NOTES
[facility] Medicine Services  PROGRESS NOTE    Patient Name: Chente Rich  : 1955  MRN: [id number]    Date of Admission: 2023  Primary Care Physician: Millie Loya MD    Subjective   Subjective     CC:  Bloody stools, mild abdominal pain     HPI:  Patient is a 68-year-old male seen and examined by me this a.m., he was seen by GI yesterday and plans are for EGD and colonoscopy later today.  He reports prep went well overnight and his bleeding has now stopped, he denies any other new acute complaints or problems at this time.       Objective   Objective     Vital Signs:   Temp:  [97.9 °F (36.6 °C)-98.3 °F (36.8 °C)] 98 °F (36.7 °C)  Heart Rate:  [52-57] 54  Resp:  [16] 16  BP: (122-159)/(62-78) 159/70     Physical Exam:  Constitutional: No acute distress, awake, alert, oriented x 3, resting comfortably in bed at this time  HENT: NCAT, nares patent, mucous membranes moist  Respiratory: Clear to auscultation bilaterally, respiratory effort normal   Cardiovascular: RRR, no murmurs, rubs, or gallops  Gastrointestinal: Positive bowel sounds, soft, nontender on moderate palpation, nondistended  Musculoskeletal: No bilateral ankle edema, no clubbing or cyanosis   Psychiatric: Appropriate affect, cooperative  Neurologic: Oriented x 3, strength symmetric in all extremities, Cranial Nerves grossly intact to confrontation, speech clear  Skin: No rashes      Results Reviewed:  LAB RESULTS:      Lab 23  0621 23  1242 23  0543 23  0045 23  2304   WBC 5.83  --   --  6.15  --  6.96   HEMOGLOBIN 11.6* 10.9* 10.5* 10.5*  --  12.9*   HEMATOCRIT 35.1* 32.8* 32.0* 32.5*  --  38.7   PLATELETS 303  --   --  282  --  344   NEUTROS ABS 3.02  --   --  3.23  --  3.70   IMMATURE GRANS (ABS) 0.03  --   --  0.01  --  0.01   LYMPHS ABS 1.91  --   --  2.17  --  2.27   MONOS ABS 0.55  --   --  0.47  --  0.60   EOS ABS 0.28  --   --  0.25  --  0.34   MCV 97.5*   --   --  95.3  --  95.8   CRP  --   --   --   --   --  1.15*   PROCALCITONIN  --   --   --   --   --  0.04   LACTATE  --   --   --   --   --  1.7   PROTIME  --   --   --   --  13.6  --    APTT  --   --   --   --  28.9*  --          Lab 11/20/23  0621 11/19/23  0543 11/18/23  2304   SODIUM 139 140 140   POTASSIUM 4.1 4.2 4.2   CHLORIDE 106 107 102   CO2 25.0 26.0 29.0   ANION GAP 8.0 7.0 9.0   BUN 7* 17 17   CREATININE 1.10 1.25 1.71*   EGFR 72.2 61.9 42.5*   GLUCOSE 127* 134* 127*   CALCIUM 8.8 8.5* 9.4   MAGNESIUM 2.1 2.1 2.0         Lab 11/20/23  0621 11/18/23  2304   TOTAL PROTEIN 5.8* 6.6   ALBUMIN 3.5 4.1   GLOBULIN 2.3 2.5   ALT (SGPT) 11 17   AST (SGOT) 20 24   BILIRUBIN 0.3 0.2   ALK PHOS 52 63         Lab 11/19/23  0045   PROTIME 13.6   INR 1.03             Lab 11/19/23  0021 11/18/23  2309   ABO TYPING A A   RH TYPING Positive Positive   ANTIBODY SCREEN  --  Negative         Brief Urine Lab Results  (Last result in the past 365 days)        Color   Clarity   Blood   Leuk Est   Nitrite   Protein   CREAT   Urine HCG        06/19/23 1016 Yellow   Clear   Negative   Negative   Negative   Negative                   Microbiology Results Abnormal       Procedure Component Value - Date/Time    Blood Culture - Blood, Arm, Left [[id number]]  (Normal) Collected: 11/18/23 2357    Lab Status: Preliminary result Specimen: Blood from Arm, Left Updated: 11/20/23 0731     Blood Culture No growth at 24 hours    Blood Culture - Blood, Hand, Right [[id number]]  (Normal) Collected: 11/18/23 2357    Lab Status: Preliminary result Specimen: Blood from Hand, Right Updated: 11/20/23 0731     Blood Culture No growth at 24 hours            CT Abdomen Pelvis With & Without Contrast    Result Date: 11/19/2023  CT ABDOMEN PELVIS W WO CONTRAST Date of Exam: 11/19/2023 12:17 AM EST Indication: 3 episodes of large-volume maroon, mucousy diarrhea with blood clots, left abdominal pain, diverticulosis. Comparison: None available. Technique:  Axial CT images were obtained of the abdomen and pelvis before and after the uneventful intravenous administration of 100 mL Isovue 3 7. Sagittal and coronal reconstructions were performed.  Automated exposure control and iterative reconstruction methods were used. Findings: There is mild linear scarring in the lung bases. The heart size is normal. There are no acute findings in the superficial soft tissues. There is no acute osseous abnormality or destructive bone lesion. There is mild lumbar spondylosis. There is what appears to be a small outpouching at the dorsal aspect of the mid descending colon best seen on noncontrast axial image 66. This area contains a small amount of mildly hyperdense material on the noncontrast study and exhibits a blush of intense hyperdensity on the arterial phase and progressive increase in hyperdense material on subsequent phases of contrast, which would suggest an area of active bleeding. It is unclear if this represents a diverticulum. There is mild inflammation in the adjacent fat and this could represent acute diverticulitis. There are additional colonic diverticula elsewhere throughout the colon. There is no small bowel distention. There is mild colonic fecal retention. The prostate, urinary bladder, appendix, liver, gallbladder, bile ducts, pancreas, stomach, spleen and right adrenal gland appear within normal limits. There is a low-density left adrenal nodule measuring 10 mm, likely adenoma. There is a large simple cyst at the upper pole of the left kidney measuring 71 mm. There is a small cyst at the inferior left kidney. The right kidney is normal. No hydronephrosis. The abdominal and pelvic vasculature appear within normal limits. There is no ascites, pneumoperitoneum or lymphadenopathy.     Impression: Impression: 1.A small outpouching at the dorsal aspect of the mid descending colon with a small amount of hyperdense material on the noncontrast study and progressive  increase in hyperdense material on subsequent phases of contrast, which would suggest an area of active bleeding. It is unclear if this represents a diverticulum. There is mild inflammation in the adjacent fat and this could represent acute diverticulitis. 2.There are additional colonic diverticula elsewhere throughout the colon. 3.Mild colonic fecal retention. 4.71 mm simple cyst at the upper pole of the left kidney. 5.10 mm low-density left adrenal nodule, likely an adenoma. Electronically Signed: Chente Interiano MD  11/19/2023 12:56 AM EST  Workstation ID: [id number]     Results for orders placed during the hospital encounter of 06/19/20    Adult Stress Echo W/ Cont or Stress Agent if Necessary Per Protocol    Interpretation Summary  · Estimated EF = 60%.  · The cardiac valves are anatomically and functionally normal.  · Normal stress echo with no significant echocardiographic evidence for myocardial ischemia.      Current medications:  Scheduled Meds:[Held by provider] aspirin, 81 mg, Oral, Daily  atorvastatin, 10 mg, Oral, Nightly  cefTRIAXone, 2,000 mg, Intravenous, Q24H  metroNIDAZOLE, 500 mg, Intravenous, Q8H  pantoprazole, 40 mg, Intravenous, Q12H  senna-docusate sodium, 2 tablet, Oral, BID  sodium chloride, 10 mL, Intravenous, Q12H      Continuous Infusions:     PRN Meds:.  acetaminophen **OR** acetaminophen **OR** acetaminophen    senna-docusate sodium **AND** polyethylene glycol **AND** bisacodyl **AND** bisacodyl    Morphine    [DISCONTINUED] HYDROmorphone **AND** naloxone    nitroglycerin    sodium chloride    sodium chloride    sodium chloride    Assessment & Plan   Assessment & Plan     Active Hospital Problems    Diagnosis  POA    **Dark stools [R19.5]  Yes    NABIL (acute kidney injury) [N17.9]  Unknown    Acute diverticulitis [K57.92]  Unknown    Controlled type 2 diabetes mellitus without complication, without long-term current use of insulin [E11.9]  Yes    Hyperlipidemia [E78.5]  Yes      Resolved  Hospital Problems   No resolved problems to display.        Brief Hospital Course to date:  Chente Rich is a 68 y.o. male admitted with about 1 week history of abdominal pain, he reports he has not ate well this week and had multiple foods he probably shouldn't have over the past few days. He was admitted with likely acute diverticulitis, diverticular bleed following multiple recently maroon/blood stools. GI consulted and now following since 11/19, plans are for EGD and colonoscopy later today. Patient's Hgb has stabilized and improved overnight, he reports bleeding has now stopped.     Acute diverticulitis  Bloody stools  LGIB  - serial H/H  - type / screen  - protonix IV  - continue cefepime, flagyl  - GI consult, previous colonoscopy showed diverticuli, plans for follow up EGD and colonoscopy today with GI.   - IV fluids  - pain control prn  - nausea control prn  - Patient reports bleeding has now stopped as of 11/20, Hgb improved overnight, continue to follow with results of above.   - Patient also found to have possible norovirus on GI panel     NABIL  - avoid nephrotoxic agents  - fluids  - Resolved      T2DM - diet controlled  - glucose 127 on BMP, A1c 6.0     HLD  - continue statin  - Hold Asa with the above         DVT prophylaxis:  SCDS  Expected Discharge Location and Transportation: Home  Expected Discharge 11/21  Expected Discharge Date: 11/21/2023; Expected Discharge Time:      DVT prophylaxis:  Mechanical DVT prophylaxis orders are present.     AM-PAC 6 Clicks Score (PT): 24 (11/20/23 0812)    CODE STATUS:   Code Status and Medical Interventions:   Ordered at: 11/19/23 0311     Code Status (Patient has no pulse and is not breathing):    CPR (Attempt to Resuscitate)     Medical Interventions (Patient has pulse or is breathing):    Full Support       MAIRA Grant, DO  11/20/23

## 2023-11-20 NOTE — CASE MANAGEMENT/SOCIAL WORK
Discharge Planning Assessment  [facility]     Patient Name: Chente Rich  MRN: [id number]  Today's Date: 11/20/2023    Admit Date: 11/18/2023    Plan: Home with family   Discharge Needs Assessment       Row Name 11/20/23 1542       Living Environment    People in Home spouse    Current Living Arrangements home    Primary Care Provided by self    Provides Primary Care For no one    Family Caregiver if Needed spouse    Quality of Family Relationships supportive    Able to Return to Prior Arrangements yes       Resource/Environmental Concerns    Transportation Concerns none       Transition Planning    Patient/Family Anticipates Transition to home with family    Patient/Family Anticipated Services at Transition none    Transportation Anticipated family or friend will provide       Discharge Needs Assessment    Readmission Within the Last 30 Days no previous admission in last 30 days    Equipment Currently Used at Home none                   Discharge Plan       Row Name 11/20/23 1543       Plan    Plan Home with family    Patient/Family in Agreement with Plan yes    Plan Comments Spoke with Mr. Rich's Spouse Laura by telephone. Mr. Rich lives with his Spouse in [facility]. He is independent with ADL's. He does not use any DME, Oxygen or Home Health. His POA is his Spouse, Laura and he has a living will. His PCP is Millie Loya and he has [employer] Medicare Insurance. Discharge plan is home. CM will continue to follow for discharge needs.    Final Discharge Disposition Code 01 - home or self-care                  Continued Care and Services - Admitted Since 11/18/2023    Coordination has not been started for this encounter.       Expected Discharge Date and Time       Expected Discharge Date Expected Discharge Time    Nov 21, 2023            Demographic Summary       Row Name 11/20/23 1542       General Information    Admission Type observation    Arrived From home    Referral Source admission list     Reason for Consult discharge planning    Preferred Language English       Contact Information    Permission Granted to Share Info With                    Functional Status       Row Name 11/20/23 1542       Functional Status, IADL    Medications independent    Meal Preparation independent    Housekeeping independent    Laundry independent    Shopping independent                   Psychosocial    No documentation.                  Abuse/Neglect    No documentation.                  Legal    No documentation.                  Substance Abuse    No documentation.                  Patient Forms    No documentation.                     Yancy Harris RN

## 2023-11-20 NOTE — ANESTHESIA POSTPROCEDURE EVALUATION
Patient: Chente Rich    Procedure Summary       Date: 11/20/23 Room / Location:  [facility]    Anesthesia Start: 1251 Anesthesia Stop: 1406    Procedures:       COLONOSCOPY      ESOPHAGOGASTRODUODENOSCOPY Diagnosis:     Surgeons: Brunner, Mark I, MD Provider: Ben Nixon MD    Anesthesia Type: general ASA Status: 2            Anesthesia Type: general    Vitals  Vitals Value Taken Time   /74 11/20/23 1415   Temp 97 °F (36.1 °C) 11/20/23 1405   Pulse 46 11/20/23 1417   Resp 16 11/20/23 1405   SpO2 100 % 11/20/23 1417   Vitals shown include unfiled device data.        Post Anesthesia Care and Evaluation    Patient location during evaluation: PACU  Patient participation: complete - patient participated  Level of consciousness: awake and alert  Pain score: 0  Pain management: adequate    Airway patency: patent  Anesthetic complications: No anesthetic complications  PONV Status: none  Cardiovascular status: hemodynamically stable and acceptable  Respiratory status: nonlabored ventilation, acceptable and nasal cannula  Hydration status: acceptable

## 2023-11-21 ENCOUNTER — READMISSION MANAGEMENT (OUTPATIENT)
Dept: CALL CENTER | Facility: HOSPITAL | Age: 68
End: 2023-11-21
Payer: MEDICARE

## 2023-11-21 VITALS
BODY MASS INDEX: 21.05 KG/M2 | DIASTOLIC BLOOD PRESSURE: 71 MMHG | HEIGHT: 70 IN | OXYGEN SATURATION: 98 % | SYSTOLIC BLOOD PRESSURE: 134 MMHG | WEIGHT: 147 LBS | HEART RATE: 56 BPM | TEMPERATURE: 96.8 F | RESPIRATION RATE: 16 BRPM

## 2023-11-21 LAB
ALBUMIN SERPL-MCNC: 3.5 G/DL (ref 3.5–5.2)
ALBUMIN/GLOB SERPL: 1.7 G/DL
ALP SERPL-CCNC: 50 U/L (ref 39–117)
ALT SERPL W P-5'-P-CCNC: 12 U/L (ref 1–41)
ANION GAP SERPL CALCULATED.3IONS-SCNC: 5 MMOL/L (ref 5–15)
AST SERPL-CCNC: 21 U/L (ref 1–40)
BASOPHILS # BLD AUTO: 0.03 10*3/MM3 (ref 0–0.2)
BASOPHILS NFR BLD AUTO: 0.5 % (ref 0–1.5)
BILIRUB SERPL-MCNC: 0.2 MG/DL (ref 0–1.2)
BUN SERPL-MCNC: 5 MG/DL (ref 8–23)
BUN/CREAT SERPL: 4.4 (ref 7–25)
CALCIUM SPEC-SCNC: 8.8 MG/DL (ref 8.6–10.5)
CHLORIDE SERPL-SCNC: 105 MMOL/L (ref 98–107)
CO2 SERPL-SCNC: 31 MMOL/L (ref 22–29)
CREAT SERPL-MCNC: 1.14 MG/DL (ref 0.76–1.27)
DEPRECATED RDW RBC AUTO: 44 FL (ref 37–54)
EGFRCR SERPLBLD CKD-EPI 2021: 69.2 ML/MIN/1.73
EOSINOPHIL # BLD AUTO: 0.3 10*3/MM3 (ref 0–0.4)
EOSINOPHIL NFR BLD AUTO: 4.6 % (ref 0.3–6.2)
ERYTHROCYTE [DISTWIDTH] IN BLOOD BY AUTOMATED COUNT: 12.6 % (ref 12.3–15.4)
GLOBULIN UR ELPH-MCNC: 2.1 GM/DL
GLUCOSE SERPL-MCNC: 117 MG/DL (ref 65–99)
HCT VFR BLD AUTO: 32.3 % (ref 37.5–51)
HGB BLD-MCNC: 10.8 G/DL (ref 13–17.7)
IMM GRANULOCYTES # BLD AUTO: 0.02 10*3/MM3 (ref 0–0.05)
IMM GRANULOCYTES NFR BLD AUTO: 0.3 % (ref 0–0.5)
LYMPHOCYTES # BLD AUTO: 1.97 10*3/MM3 (ref 0.7–3.1)
LYMPHOCYTES NFR BLD AUTO: 30.1 % (ref 19.6–45.3)
MAGNESIUM SERPL-MCNC: 1.9 MG/DL (ref 1.6–2.4)
MCH RBC QN AUTO: 31.7 PG (ref 26.6–33)
MCHC RBC AUTO-ENTMCNC: 33.4 G/DL (ref 31.5–35.7)
MCV RBC AUTO: 94.7 FL (ref 79–97)
MONOCYTES # BLD AUTO: 0.51 10*3/MM3 (ref 0.1–0.9)
MONOCYTES NFR BLD AUTO: 7.8 % (ref 5–12)
NEUTROPHILS NFR BLD AUTO: 3.71 10*3/MM3 (ref 1.7–7)
NEUTROPHILS NFR BLD AUTO: 56.7 % (ref 42.7–76)
NRBC BLD AUTO-RTO: 0 /100 WBC (ref 0–0.2)
PLATELET # BLD AUTO: 298 10*3/MM3 (ref 140–450)
PMV BLD AUTO: 9.4 FL (ref 6–12)
POTASSIUM SERPL-SCNC: 4.9 MMOL/L (ref 3.5–5.2)
PROT SERPL-MCNC: 5.6 G/DL (ref 6–8.5)
QT INTERVAL: 436 MS
QTC INTERVAL: 436 MS
RBC # BLD AUTO: 3.41 10*6/MM3 (ref 4.14–5.8)
SODIUM SERPL-SCNC: 141 MMOL/L (ref 136–145)
WBC NRBC COR # BLD AUTO: 6.54 10*3/MM3 (ref 3.4–10.8)

## 2023-11-21 PROCEDURE — 83735 ASSAY OF MAGNESIUM: CPT | Performed by: FAMILY MEDICINE

## 2023-11-21 PROCEDURE — 85025 COMPLETE CBC W/AUTO DIFF WBC: CPT | Performed by: FAMILY MEDICINE

## 2023-11-21 PROCEDURE — 80053 COMPREHEN METABOLIC PANEL: CPT | Performed by: FAMILY MEDICINE

## 2023-11-21 PROCEDURE — 93010 ELECTROCARDIOGRAM REPORT: CPT | Performed by: INTERNAL MEDICINE

## 2023-11-21 PROCEDURE — 99239 HOSP IP/OBS DSCHRG MGMT >30: CPT | Performed by: STUDENT IN AN ORGANIZED HEALTH CARE EDUCATION/TRAINING PROGRAM

## 2023-11-21 PROCEDURE — 25010000002 METRONIDAZOLE 500 MG/100ML SOLUTION: Performed by: STUDENT IN AN ORGANIZED HEALTH CARE EDUCATION/TRAINING PROGRAM

## 2023-11-21 PROCEDURE — 25010000002 CEFTRIAXONE PER 250 MG: Performed by: STUDENT IN AN ORGANIZED HEALTH CARE EDUCATION/TRAINING PROGRAM

## 2023-11-21 PROCEDURE — G0378 HOSPITAL OBSERVATION PER HR: HCPCS

## 2023-11-21 PROCEDURE — 99212 OFFICE O/P EST SF 10 MIN: CPT | Performed by: PHYSICIAN ASSISTANT

## 2023-11-21 PROCEDURE — 93005 ELECTROCARDIOGRAM TRACING: CPT | Performed by: STUDENT IN AN ORGANIZED HEALTH CARE EDUCATION/TRAINING PROGRAM

## 2023-11-21 RX ORDER — METRONIDAZOLE 500 MG/1
500 TABLET ORAL EVERY 8 HOURS
Qty: 21 TABLET | Refills: 0 | Status: SHIPPED | OUTPATIENT
Start: 2023-11-21 | End: 2023-11-29

## 2023-11-21 RX ORDER — CIPROFLOXACIN 500 MG/1
500 TABLET, FILM COATED ORAL EVERY 12 HOURS
Qty: 14 TABLET | Refills: 0 | Status: SHIPPED | OUTPATIENT
Start: 2023-11-21 | End: 2023-11-29

## 2023-11-21 RX ADMIN — METRONIDAZOLE 500 MG: 500 INJECTION, SOLUTION INTRAVENOUS at 09:49

## 2023-11-21 RX ADMIN — SENNOSIDES AND DOCUSATE SODIUM 2 TABLET: 8.6; 5 TABLET ORAL at 09:47

## 2023-11-21 RX ADMIN — CEFTRIAXONE SODIUM 2000 MG: 2 INJECTION, POWDER, FOR SOLUTION INTRAMUSCULAR; INTRAVENOUS at 09:48

## 2023-11-21 RX ADMIN — Medication 10 ML: at 09:49

## 2023-11-21 RX ADMIN — PANTOPRAZOLE SODIUM 40 MG: 40 INJECTION, POWDER, LYOPHILIZED, FOR SOLUTION INTRAVENOUS at 09:49

## 2023-11-21 RX ADMIN — METRONIDAZOLE 500 MG: 500 INJECTION, SOLUTION INTRAVENOUS at 01:48

## 2023-11-21 NOTE — PROGRESS NOTES
"GI Daily Progress Note  Subjective:    Chief Complaint:  Follow up diverticulitis with bleeding     Denies any recurrence in bleeding.   Tolerating clear liquids without abdominal pain.       Objective:    /71 (BP Location: Right arm, Patient Position: Lying)   Pulse 56   Temp 96.8 °F (36 °C) (Axillary)   Resp 16   Ht 177.8 cm (70\")   Wt 66.7 kg (147 lb)   SpO2 98%   BMI 21.09 kg/m²     Physical Exam  Constitutional:       General: He is not in acute distress.  Cardiovascular:      Rate and Rhythm: Normal rate and regular rhythm.   Pulmonary:      Effort: Pulmonary effort is normal. No respiratory distress.   Abdominal:      General: Bowel sounds are normal. There is no distension.      Palpations: Abdomen is soft.      Tenderness: There is no abdominal tenderness.   Neurological:      Mental Status: He is alert and oriented to person, place, and time.         Lab  Lab Results   Component Value Date    WBC 6.54 11/21/2023    HGB 10.8 (L) 11/21/2023    HGB 11.6 (L) 11/20/2023    HGB 10.9 (L) 11/19/2023    MCV 94.7 11/21/2023     11/21/2023    INR 1.03 11/19/2023       Lab Results   Component Value Date    GLUCOSE 117 (H) 11/21/2023    BUN 5 (L) 11/21/2023    CREATININE 1.14 11/21/2023    EGFRIFNONA 56 (L) 12/08/2021    BCR 4.4 (L) 11/21/2023     11/21/2023    K 4.9 11/21/2023    CO2 31.0 (H) 11/21/2023    CALCIUM 8.8 11/21/2023    ALBUMIN 3.5 11/21/2023    ALKPHOS 50 11/21/2023    BILITOT 0.2 11/21/2023    ALT 12 11/21/2023    AST 21 11/21/2023       Assessment:    Acute diverticulitis with bleeding s/p EGD and Colonoscopy yesterday, with an inflamed diverticulum in descending with scant oozing and clot, marked with tattoo and 1 endo clip applied for marking  Colon polyp, inflammatory appearing, s/p  biopsy.  Path pending.       Plan:    >> Complete 1 week course of antibiotics     Low residue diet for 1 week and then high fiber diet indefinitely.      Anticipate home today.       Janey" DEL Catherine  11/21/23  13:02 EST

## 2023-11-22 ENCOUNTER — TRANSITIONAL CARE MANAGEMENT TELEPHONE ENCOUNTER (OUTPATIENT)
Dept: CALL CENTER | Facility: HOSPITAL | Age: 68
End: 2023-11-22
Payer: MEDICARE

## 2023-11-22 DIAGNOSIS — E11.9 CONTROLLED TYPE 2 DIABETES MELLITUS WITHOUT COMPLICATION, WITHOUT LONG-TERM CURRENT USE OF INSULIN: Primary | ICD-10-CM

## 2023-11-22 DIAGNOSIS — I25.10 CORONARY ARTERY CALCIFICATION: ICD-10-CM

## 2023-11-22 DIAGNOSIS — E78.5 DYSLIPIDEMIA: ICD-10-CM

## 2023-11-22 DIAGNOSIS — I25.84 CORONARY ARTERY CALCIFICATION: ICD-10-CM

## 2023-11-22 LAB
CYTO UR: NORMAL
LAB AP CASE REPORT: NORMAL
LAB AP CLINICAL INFORMATION: NORMAL
PATH REPORT.FINAL DX SPEC: NORMAL
PATH REPORT.GROSS SPEC: NORMAL

## 2023-11-22 NOTE — DISCHARGE SUMMARY
[facility] Medicine Services  DISCHARGE SUMMARY    Patient Name: Chente Rich  : 1955  MRN: [id number]    Date of Admission: 2023 10:12 PM  Date of Discharge: 2023  Primary Care Physician: Millie Loya MD    Consults       Date and Time Order Name Status Description    2023  8:27 AM Inpatient Gastroenterology Consult Completed             Hospital Course     Presenting Problem: Hematochezia, abdominal pain    Active Hospital Problems    Diagnosis  POA    **Dark stools [R19.5]  Yes    NABIL (acute kidney injury) [N17.9]  Unknown    Acute diverticulitis [K57.92]  Unknown    Controlled type 2 diabetes mellitus without complication, without long-term current use of insulin [E11.9]  Yes    Hyperlipidemia [E78.5]  Yes      Resolved Hospital Problems   No resolved problems to display.          Hospital Course:  Chente Rich is a 68 y.o. male who was admitted with 1 week history of abdominal pain and multiple maroon/bloody stools. He was found to be anemic with NABIL on admission. Was also positive for norovirus. NABIL resolved with fluids. Gastroenterology evaluated. Upper endoscopy was unrevealing for source of bleeding. Performed colonoscopy which revealed scattered diverticula throughout colon, with inflamed diverticulum in the descending colon with scant oozing and adherent clot. Endo Clips were applied across ulcerated rim of diverticulum for hemostasis. Patient was treated with IV antibiotics x3 days. Ultimately patient's hemoglobin remained stable and he was discharged home with plan to complete additional 1 week course of antibiotics for focal diverticulitis.    Acute diverticulitis  Acute diverticular bleed  -Positive for norovirus on admission.  -Completed IV ceftriaxone and IV Flagyl x3 days.  -Gastroenterology evaluated, performed EGD and colonoscopy. Source of bleeding was found to be due to acute diverticular bleeding.  -Given significant penicillin  allergy, unable to prescribe Augmentin on discharge. Patient to complete 7-day course of ciprofloxacin and metronidazole on discharge. Discussed benefits, risks and adverse effects of these antibiotics with patient's wife.     NABIL  -Resolved with IV fluids.    Discharge Follow Up Recommendations for outpatient labs/diagnostics:  - Close follow-up with PCP in 1 week. Repeat CBC in 1 week.  - Complete 7-day course of ciprofloxacin and metronidazole on discharge.  - Recommend low residue diet for 1 week, then high fiber diet thereafter.    Day of Discharge     HPI:   Evaluated patient this morning. Patient denied abdominal pain, nausea, vomiting. Denied any further hematochezia overnight. Gastroenterology team was just finishing up discussing plan with patient and his wife.      Vital Signs:   Temp:  [96.8 °F (36 °C)-98.1 °F (36.7 °C)] 96.8 °F (36 °C)  Heart Rate:  [51-59] 56  Resp:  [16-20] 16  BP: (124-145)/(65-73) 134/71      Physical Exam:  Constitutional: Well-appearing male, resting comfortably, in no acute distress  HENT: NCAT, mucous membranes moist  Respiratory: Clear to auscultation bilaterally, respiratory effort normal   Cardiovascular: Bradycardic, no murmurs, rubs, or gallops  Gastrointestinal: Positive bowel sounds, soft, nontender, nondistended  Musculoskeletal: No bilateral ankle edema  Neurologic: Alert and oriented x3, no focal deficits, speech clear  Skin: No rashes      Pertinent  and/or Most Recent Results     LAB RESULTS:      Lab 11/21/23  0551 11/20/23  0621 11/19/23 2006 11/19/23  1242 11/19/23  0543 11/19/23  0045 11/18/23  2304   WBC 6.54 5.83  --   --  6.15  --  6.96   HEMOGLOBIN 10.8* 11.6* 10.9* 10.5* 10.5*  --  12.9*   HEMATOCRIT 32.3* 35.1* 32.8* 32.0* 32.5*  --  38.7   PLATELETS 298 303  --   --  282  --  344   NEUTROS ABS 3.71 3.02  --   --  3.23  --  3.70   IMMATURE GRANS (ABS) 0.02 0.03  --   --  0.01  --  0.01   LYMPHS ABS 1.97 1.91  --   --  2.17  --  2.27   MONOS ABS 0.51 0.55   --   --  0.47  --  0.60   EOS ABS 0.30 0.28  --   --  0.25  --  0.34   MCV 94.7 97.5*  --   --  95.3  --  95.8   CRP  --   --   --   --   --   --  1.15*   PROCALCITONIN  --   --   --   --   --   --  0.04   LACTATE  --   --   --   --   --   --  1.7   PROTIME  --   --   --   --   --  13.6  --    APTT  --   --   --   --   --  28.9*  --          Lab 11/21/23  0551 11/20/23  0621 11/19/23  0543 11/18/23  2304   SODIUM 141 139 140 140   POTASSIUM 4.9 4.1 4.2 4.2   CHLORIDE 105 106 107 102   CO2 31.0* 25.0 26.0 29.0   ANION GAP 5.0 8.0 7.0 9.0   BUN 5* 7* 17 17   CREATININE 1.14 1.10 1.25 1.71*   EGFR 69.2 72.2 61.9 42.5*   GLUCOSE 117* 127* 134* 127*   CALCIUM 8.8 8.8 8.5* 9.4   MAGNESIUM 1.9 2.1 2.1 2.0         Lab 11/21/23  0551 11/20/23  0621 11/18/23  2304   TOTAL PROTEIN 5.6* 5.8* 6.6   ALBUMIN 3.5 3.5 4.1   GLOBULIN 2.1 2.3 2.5   ALT (SGPT) 12 11 17   AST (SGOT) 21 20 24   BILIRUBIN 0.2 0.3 0.2   ALK PHOS 50 52 63         Lab 11/19/23  0045   PROTIME 13.6   INR 1.03             Lab 11/19/23  0021 11/18/23  2309   ABO TYPING A A   RH TYPING Positive Positive   ANTIBODY SCREEN  --  Negative         Brief Urine Lab Results  (Last result in the past 365 days)        Color   Clarity   Blood   Leuk Est   Nitrite   Protein   CREAT   Urine HCG        06/19/23 1016 Yellow   Clear   Negative   Negative   Negative   Negative                 Microbiology Results (last 10 days)       Procedure Component Value - Date/Time    Gastrointestinal Panel, PCR - Stool, Per Rectum [[id number]]  (Abnormal) Collected: 11/19/23 0046    Lab Status: Final result Specimen: Stool from Per Rectum Updated: 11/19/23 1148     Campylobacter Not Detected     Plesiomonas shigelloides Not Detected     Salmonella Not Detected     Vibrio Not Detected     Vibrio cholerae Not Detected     Yersinia enterocolitica Not Detected     Enteroaggregative E. coli (EAEC) Not Detected     Enteropathogenic E. coli (EPEC) Not Detected     Enterotoxigenic E. coli (ETEC)  lt/st Not Detected     Shiga-like toxin-producing E. coli (STEC) stx1/stx2 Not Detected     Shigella/Enteroinvasive E. coli (EIEC) Not Detected     Cryptosporidium Not Detected     Cyclospora cayetanensis Not Detected     Entamoeba histolytica Not Detected     Giardia lamblia Not Detected     Adenovirus F40/41 Not Detected     Astrovirus Not Detected     Norovirus GI/GII Detected     Rotavirus A Not Detected     Sapovirus (I, II, IV or V) Not Detected    Blood Culture - Blood, Arm, Left [[id number]]  (Normal) Collected: 11/18/23 2357    Lab Status: Preliminary result Specimen: Blood from Arm, Left Updated: 11/21/23 0730     Blood Culture No growth at 2 days    Blood Culture - Blood, Hand, Right [[id number]]  (Normal) Collected: 11/18/23 2357    Lab Status: Preliminary result Specimen: Blood from Hand, Right Updated: 11/21/23 0730     Blood Culture No growth at 2 days            CT Abdomen Pelvis With & Without Contrast    Result Date: 11/19/2023  CT ABDOMEN PELVIS W WO CONTRAST Date of Exam: 11/19/2023 12:17 AM EST Indication: 3 episodes of large-volume maroon, mucousy diarrhea with blood clots, left abdominal pain, diverticulosis. Comparison: None available. Technique: Axial CT images were obtained of the abdomen and pelvis before and after the uneventful intravenous administration of 100 mL Isovue 3 7. Sagittal and coronal reconstructions were performed.  Automated exposure control and iterative reconstruction methods were used. Findings: There is mild linear scarring in the lung bases. The heart size is normal. There are no acute findings in the superficial soft tissues. There is no acute osseous abnormality or destructive bone lesion. There is mild lumbar spondylosis. There is what appears to be a small outpouching at the dorsal aspect of the mid descending colon best seen on noncontrast axial image 66. This area contains a small amount of mildly hyperdense material on the noncontrast study and exhibits a blush of  intense hyperdensity on the arterial phase and progressive increase in hyperdense material on subsequent phases of contrast, which would suggest an area of active bleeding. It is unclear if this represents a diverticulum. There is mild inflammation in the adjacent fat and this could represent acute diverticulitis. There are additional colonic diverticula elsewhere throughout the colon. There is no small bowel distention. There is mild colonic fecal retention. The prostate, urinary bladder, appendix, liver, gallbladder, bile ducts, pancreas, stomach, spleen and right adrenal gland appear within normal limits. There is a low-density left adrenal nodule measuring 10 mm, likely adenoma. There is a large simple cyst at the upper pole of the left kidney measuring 71 mm. There is a small cyst at the inferior left kidney. The right kidney is normal. No hydronephrosis. The abdominal and pelvic vasculature appear within normal limits. There is no ascites, pneumoperitoneum or lymphadenopathy.     Impression: 1.A small outpouching at the dorsal aspect of the mid descending colon with a small amount of hyperdense material on the noncontrast study and progressive increase in hyperdense material on subsequent phases of contrast, which would suggest an area of active bleeding. It is unclear if this represents a diverticulum. There is mild inflammation in the adjacent fat and this could represent acute diverticulitis. 2.There are additional colonic diverticula elsewhere throughout the colon. 3.Mild colonic fecal retention. 4.71 mm simple cyst at the upper pole of the left kidney. 5.10 mm low-density left adrenal nodule, likely an adenoma. Electronically Signed: Chente Interiano MD  11/19/2023 12:56 AM EST  Workstation ID: [id number]             Results for orders placed during the hospital encounter of 06/19/20    Adult Stress Echo W/ Cont or Stress Agent if Necessary Per Protocol    Interpretation Summary  · Estimated EF = 60%.  · The  cardiac valves are anatomically and functionally normal.  · Normal stress echo with no significant echocardiographic evidence for myocardial ischemia.      Plan for Follow-up of Pending Labs/Results: Hospitalist to follow.    Pending Labs       Order Current Status    Tissue Pathology Exam In process    Blood Culture - Blood, Arm, Left Preliminary result    Blood Culture - Blood, Hand, Right Preliminary result          Discharge Details        Discharge Medications        New Medications        Instructions Start Date   ciprofloxacin 500 MG tablet  Commonly known as: Cipro   500 mg, Oral, Every 12 Hours      metroNIDAZOLE 500 MG tablet  Commonly known as: Flagyl   500 mg, Oral, Every 8 Hours             Continue These Medications        Instructions Start Date   aspirin 81 MG EC tablet   81 mg, Oral, Daily      simvastatin 20 MG tablet  Commonly known as: ZOCOR   20 mg, Oral, Daily               Allergies   Allergen Reactions    Penicillins Rash     Swelling skin turns purple          Discharge Disposition:  Home or Self Care    Diet:  Hospital:No active diet order           Activity:      Restrictions or Other Recommendations:  N/A       CODE STATUS:    Code Status and Medical Interventions:   Ordered at: 11/19/23 0311     Code Status (Patient has no pulse and is not breathing):    CPR (Attempt to Resuscitate)     Medical Interventions (Patient has pulse or is breathing):    Full Support       Future Appointments   Date Time Provider Department Center   11/29/2023  3:00 PM Millie Loya MD [facility]   12/11/2023  8:30 AM Millie Loya MD [facility]   1/11/2024  9:00 AM Rey Worthy MD [facility]   6/20/2024  8:30 AM Millie Loya MD [facility]       Additional Instructions for the Follow-ups that You Need to Schedule       Discharge Follow-up with PCP   As directed       Currently Documented PCP:    Millie Loya MD    PCP Phone Number:    [phone]     Follow Up  Details: in 1 week                      Monserrat White DO  11/21/23      Time Spent on Discharge:  I spent  45  minutes on this discharge activity which included: face-to-face encounter with the patient, reviewing the data in the system, coordination of the care with the nursing staff as well as consultants, documentation, and entering orders.

## 2023-11-22 NOTE — OUTREACH NOTE
Call Center TCM Note      Flowsheet Row Responses   [facility] patient discharged from? [place]   Does the patient have one of the following disease processes/diagnoses(primary or secondary)? Other   TCM attempt successful? Yes   Call start time 0803   Call end time 0808   Discharge diagnosis Dark stools-colonoscopy this visit   Meds reviewed with patient/caregiver? Yes   Is the patient having any side effects they believe may be caused by any medication additions or changes? No   Does the patient have all medications ordered at discharge? Yes   Is the patient taking all medications as directed (includes completed medication regime)? Yes   Comments Has appt on 11/29 @ 3:00 with Dr. Loya   Does the patient have an appointment with their PCP within 7-14 days of discharge? Yes   Psychosocial issues? No   Did the patient receive a copy of their discharge instructions? Yes   Nursing interventions Reviewed instructions with patient   What is the patient's perception of their health status since discharge? Improving   Is the patient/caregiver able to teach back signs and symptoms related to disease process for when to call PCP? Yes   Is the patient/caregiver able to teach back signs and symptoms related to disease process for when to call 911? Yes   Is the patient/caregiver able to teach back the hierarchy of who to call/visit for symptoms/problems? PCP, Specialist, Home health nurse, Urgent Care, ED, 911 Yes   Additional teach back comments States he is doing well.  Has not had a bm since before procedure but he hasn't ate much either. Advsided if doesn't have one in a day or so to contact PCP office. He is also going to contact them regarding having a fasting A1C done on visit of 11/29   TCM call completed? Yes   Wrap up additional comments Pt to contact PCP office in no bm in the next day or two.   Call end time 0808            Mora Pierre LPN    11/22/2023, 08:11 EST

## 2023-11-22 NOTE — OUTREACH NOTE
Prep Survey      Flowsheet Row Responses   [facility] patient discharged from? [place]   Is LACE score < 7 ? Yes   Eligibility [facility]   Date of Admission 11/18/23   Date of Discharge 11/21/23   Discharge Disposition Home or Self Care   Discharge diagnosis Dark stools-colonoscopy this visit   Does the patient have one of the following disease processes/diagnoses(primary or secondary)? Other   Does the patient have Home health ordered? No   Is there a DME ordered? No   Prep survey completed? Yes            HENRIETTA BENZ - Registered Nurse

## 2023-11-24 LAB
BACTERIA SPEC AEROBE CULT: NORMAL
BACTERIA SPEC AEROBE CULT: NORMAL

## 2023-11-27 NOTE — OUTREACH NOTE
Spoke to patient and he wanted to know if he can come in the morning on the same day to do his fasting A1c. He stated he does not want to fast until 3 in the afternoon.

## 2023-11-28 ENCOUNTER — LAB (OUTPATIENT)
Dept: INTERNAL MEDICINE | Facility: CLINIC | Age: 68
End: 2023-11-28
Payer: MEDICARE

## 2023-11-28 DIAGNOSIS — I25.10 CORONARY ARTERY CALCIFICATION: ICD-10-CM

## 2023-11-28 DIAGNOSIS — E11.9 CONTROLLED TYPE 2 DIABETES MELLITUS WITHOUT COMPLICATION, WITHOUT LONG-TERM CURRENT USE OF INSULIN: ICD-10-CM

## 2023-11-28 DIAGNOSIS — I25.84 CORONARY ARTERY CALCIFICATION: ICD-10-CM

## 2023-11-28 DIAGNOSIS — E78.5 DYSLIPIDEMIA: ICD-10-CM

## 2023-11-28 LAB
ALBUMIN SERPL-MCNC: 4.2 G/DL (ref 3.5–5.2)
ALBUMIN/GLOB SERPL: 1.8 G/DL
ALP SERPL-CCNC: 51 U/L (ref 39–117)
ALT SERPL W P-5'-P-CCNC: 36 U/L (ref 1–41)
ANION GAP SERPL CALCULATED.3IONS-SCNC: 11.8 MMOL/L (ref 5–15)
AST SERPL-CCNC: 30 U/L (ref 1–40)
BASOPHILS # BLD AUTO: 0.04 10*3/MM3 (ref 0–0.2)
BASOPHILS NFR BLD AUTO: 0.6 % (ref 0–1.5)
BILIRUB SERPL-MCNC: 0.3 MG/DL (ref 0–1.2)
BUN SERPL-MCNC: 10 MG/DL (ref 8–23)
BUN/CREAT SERPL: 8.1 (ref 7–25)
CALCIUM SPEC-SCNC: 9.6 MG/DL (ref 8.6–10.5)
CHLORIDE SERPL-SCNC: 96 MMOL/L (ref 98–107)
CHOLEST SERPL-MCNC: 150 MG/DL (ref 0–200)
CO2 SERPL-SCNC: 24.2 MMOL/L (ref 22–29)
CREAT SERPL-MCNC: 1.23 MG/DL (ref 0.76–1.27)
DEPRECATED RDW RBC AUTO: 43.2 FL (ref 37–54)
EGFRCR SERPLBLD CKD-EPI 2021: 63.2 ML/MIN/1.73
EOSINOPHIL # BLD AUTO: 0.15 10*3/MM3 (ref 0–0.4)
EOSINOPHIL NFR BLD AUTO: 2.2 % (ref 0.3–6.2)
ERYTHROCYTE [DISTWIDTH] IN BLOOD BY AUTOMATED COUNT: 12.6 % (ref 12.3–15.4)
EXPIRATION DATE: ABNORMAL
GLOBULIN UR ELPH-MCNC: 2.4 GM/DL
GLUCOSE SERPL-MCNC: 125 MG/DL (ref 65–99)
HBA1C MFR BLD: 6.4 % (ref 4.5–5.7)
HCT VFR BLD AUTO: 36.3 % (ref 37.5–51)
HDLC SERPL-MCNC: 83 MG/DL (ref 40–60)
HGB BLD-MCNC: 12.3 G/DL (ref 13–17.7)
IMM GRANULOCYTES # BLD AUTO: 0.02 10*3/MM3 (ref 0–0.05)
IMM GRANULOCYTES NFR BLD AUTO: 0.3 % (ref 0–0.5)
LDLC SERPL CALC-MCNC: 56 MG/DL (ref 0–100)
LDLC/HDLC SERPL: 0.69 {RATIO}
LYMPHOCYTES # BLD AUTO: 1.63 10*3/MM3 (ref 0.7–3.1)
LYMPHOCYTES NFR BLD AUTO: 24 % (ref 19.6–45.3)
Lab: ABNORMAL
MCH RBC QN AUTO: 32 PG (ref 26.6–33)
MCHC RBC AUTO-ENTMCNC: 33.9 G/DL (ref 31.5–35.7)
MCV RBC AUTO: 94.5 FL (ref 79–97)
MONOCYTES # BLD AUTO: 0.64 10*3/MM3 (ref 0.1–0.9)
MONOCYTES NFR BLD AUTO: 9.4 % (ref 5–12)
NEUTROPHILS NFR BLD AUTO: 4.32 10*3/MM3 (ref 1.7–7)
NEUTROPHILS NFR BLD AUTO: 63.5 % (ref 42.7–76)
NRBC BLD AUTO-RTO: 0 /100 WBC (ref 0–0.2)
PLATELET # BLD AUTO: 447 10*3/MM3 (ref 140–450)
PMV BLD AUTO: 9.6 FL (ref 6–12)
POTASSIUM SERPL-SCNC: 5.1 MMOL/L (ref 3.5–5.2)
PROT SERPL-MCNC: 6.6 G/DL (ref 6–8.5)
RBC # BLD AUTO: 3.84 10*6/MM3 (ref 4.14–5.8)
SODIUM SERPL-SCNC: 132 MMOL/L (ref 136–145)
TRIGL SERPL-MCNC: 50 MG/DL (ref 0–150)
TSH SERPL DL<=0.05 MIU/L-ACNC: 1.81 UIU/ML (ref 0.27–4.2)
VLDLC SERPL-MCNC: 11 MG/DL (ref 5–40)
WBC NRBC COR # BLD AUTO: 6.8 10*3/MM3 (ref 3.4–10.8)

## 2023-11-28 PROCEDURE — 84443 ASSAY THYROID STIM HORMONE: CPT | Performed by: INTERNAL MEDICINE

## 2023-11-28 PROCEDURE — 80061 LIPID PANEL: CPT | Performed by: INTERNAL MEDICINE

## 2023-11-28 PROCEDURE — 85025 COMPLETE CBC W/AUTO DIFF WBC: CPT | Performed by: INTERNAL MEDICINE

## 2023-11-28 PROCEDURE — 80053 COMPREHEN METABOLIC PANEL: CPT | Performed by: INTERNAL MEDICINE

## 2023-11-29 ENCOUNTER — OFFICE VISIT (OUTPATIENT)
Dept: INTERNAL MEDICINE | Facility: CLINIC | Age: 68
End: 2023-11-29
Payer: MEDICARE

## 2023-11-29 VITALS
DIASTOLIC BLOOD PRESSURE: 64 MMHG | BODY MASS INDEX: 21.09 KG/M2 | SYSTOLIC BLOOD PRESSURE: 122 MMHG | TEMPERATURE: 97.8 F | RESPIRATION RATE: 16 BRPM | HEART RATE: 62 BPM | WEIGHT: 147 LBS

## 2023-11-29 DIAGNOSIS — K57.93 GASTROINTESTINAL HEMORRHAGE ASSOCIATED WITH INTESTINAL DIVERTICULITIS: Primary | ICD-10-CM

## 2023-11-29 DIAGNOSIS — N17.9 AKI (ACUTE KIDNEY INJURY): ICD-10-CM

## 2023-11-29 DIAGNOSIS — I25.10 CORONARY ARTERY CALCIFICATION: ICD-10-CM

## 2023-11-29 DIAGNOSIS — E11.9 CONTROLLED TYPE 2 DIABETES MELLITUS WITHOUT COMPLICATION, WITHOUT LONG-TERM CURRENT USE OF INSULIN: ICD-10-CM

## 2023-11-29 DIAGNOSIS — E78.5 DYSLIPIDEMIA: ICD-10-CM

## 2023-11-29 DIAGNOSIS — I25.84 CORONARY ARTERY CALCIFICATION: ICD-10-CM

## 2023-11-29 DIAGNOSIS — K57.92 ACUTE DIVERTICULITIS: ICD-10-CM

## 2023-11-29 NOTE — PROGRESS NOTES
Transitional Care Follow Up Visit    Subjective     Chief Complaint   Patient presents with    Hospital Follow Up Visit         Chente Rich is a 68 y.o. male who presents for a transitional care management visit.    Within 48 business hours after discharge our office contacted him via telephone to coordinate his care and needs.      I reviewed and discussed the details of that call along with the discharge summary, hospital problems, inpatient lab results, inpatient diagnostic studies, and consultation reports with Chente.     Current outpatient and discharge medications have been reconciled for the patient.  Reviewed by: Millie Loya MD          11/21/2023     8:21 PM   Date of TCM Phone Call   [facility]   Date of Admission 11/18/2023   Date of Discharge 11/21/2023   Discharge Disposition Home or Self Care     Risk for Readmission (LACE) Score: 4 (11/21/2023  6:00 AM)      History of Present Illness     Course During Hospital Stay: The patient is here for hospital follow-up.  He was admitted to [facility] on 11/18/2023 and discharged on 11/21/2023.  Admitting diagnosis was Acute Diverticulitis with Lower GI bleed.  Records have been received and reviewed.  The patient presented to [facility] ED with a one week history of abdominal pain and multiple maroon/bloody stools. He was found to be anemic with NABIL on admission. Was also positive for norovirus. NABIL resolved with fluids. Gastroenterology evaluated. Upper endoscopy was unrevealing for source of bleeding. Performed Colonoscopy which revealed scattered diverticula throughout colon, with inflamed diverticulum in the descending colon with scant oozing and adherent clot. Endo Clips were applied across ulcerated rim of diverticulum for hemostasis. Patient was treated with IV antibiotics (Ceftriaxone and Flagyl) x 3 days. Ultimately patient's hemoglobin remained stable and he was discharged home with plan to complete additional 1 week course of  antibiotics (Ciprofloxacin and Metronidazole) for focal diverticulitis.       Labs: Admitting labs on 11/18/2023 were significant for CBC with decreased hemoglobin (12.9) with a normal hematocrit (38.7).  CMP was significant for an elevated creatinine (1.71) and nonfasting glucose (127).  CRP was elevated (1.15).  PT was normal and PTT was slightly decreased.  Magnesium, lactate, and procalcitonin were normal.  Occult blood was positive x 2.  GI panel was significant for Norovirus + .  Blood cultures were negative x 2 x 5 days.  Labs on 11/19/2023 were significant for an elevated creatinine (1.25), a low calcium (8.5), an elevated nonfasting glucose (134) and a low H/H (10.5/32.5).  On 11/20/2021, creatinine was normal (1.1).  On 11/21/2023, discharge labs were significant for a normal creatinine of 1.14 and a low H/H (10.8/32.3).    Pathology:  Final Diagnosis   COLON, DESCENDING, BIOPSY:  Colonic mucosa with nonspecific surface erosions, no additional abnormalities identified     Radiology:   CT/pelvis on 11/19/2023 Impression:  1.A small outpouching at the dorsal aspect of the mid descending colon with a small amount of hyperdense material on the noncontrast study and progressive increase in hyperdense material on subsequent phases of contrast, which would suggest an area of   active bleeding. It is unclear if this represents a diverticulum. There is mild inflammation in the adjacent fat and this could represent acute diverticulitis.  2.There are additional colonic diverticula elsewhere throughout the colon.  3.Mild colonic fecal retention.  4.71 mm simple cyst at the upper pole of the left kidney.  5.10 mm low-density left adrenal nodule, likely an adenoma.     Cardiology:    EKG 11/21/23:  Sinus rhythm with 1st degree AV block.  Otherwise normal ECG.  No previous ECGs available     Since discharge, the patient has completed his antibiotics.  He is on a low fiber diet.  He denies fever and chills.  He denies  abdominal pain, nausea, vomiting, diarrhea, and constipation.  Overall his stools are looser.  He did have a small amount of bright red blood in the stool yesterday.  He does report fatigue, decreased stamina, and decreased appetite although those symptoms are improving slightly.  He did have some stomach upset with the Flagyl.    The patient is here for follow-up of NIDDM and Hyperlipidemia.  He is on Simvastatin, but no other medication other than Aspirin for Coronary Artery Calcification.  Fasting labs were done yesterday.  Hemoglobin A1C was 6.4.  LDL was 56.  CMP was significant for normal creatinine (1.23) and a slightly low sodium (132).  CBC was significant for improved anemia (H/H 12.3/36.3).    The following portions of the patient's history were reviewed and updated as appropriate: allergies, current medications, past family history, past medical history, past social history, past surgical history, and problem list.    Review of Systems   Constitutional:  Positive for activity change, appetite change, fatigue and unexpected weight change (decreased 5 pounds from weight 6 months agos). Negative for chills and fever.   Respiratory:  Negative for cough and shortness of breath.    Cardiovascular:  Negative for chest pain.   Gastrointestinal:  Positive for blood in stool. Negative for abdominal pain, constipation, diarrhea, nausea and vomiting.   Genitourinary:  Negative for dysuria, frequency, hematuria and urgency.       Objective   Physical Exam  Vitals and nursing note reviewed.   Constitutional:       Appearance: He is normal weight.   Cardiovascular:      Rate and Rhythm: Normal rate and regular rhythm.      Heart sounds: Normal heart sounds. No murmur heard.  Pulmonary:      Effort: Pulmonary effort is normal.      Breath sounds: Normal breath sounds.   Abdominal:      General: Bowel sounds are normal. There is no distension.      Palpations: Abdomen is soft. There is no hepatomegaly, splenomegaly or  mass.      Tenderness: There is no abdominal tenderness. There is no right CVA tenderness or left CVA tenderness.   Neurological:      Mental Status: He is alert.   Psychiatric:         Mood and Affect: Mood normal.         Assessment & Plan   Diagnoses and all orders for this visit:    1. Gastrointestinal hemorrhage associated with intestinal diverticulitis (Primary)   Monitor.  S/P full course of IV and po antibiotics.    2. Acute diverticulitis   Monitor.  S/P full course of IV and po antibiotics.    3. NABIL (acute kidney injury)   Most recent Creatinine normal.    4. Controlled type 2 diabetes mellitus without complication, without long-term current use of insulin   Fasting labs done 11/28/23  Continue current medication(s) as noted in the history of present illness.     5. Dyslipidemia   Fasting labs done 11/28/23  Continue current medication(s) as noted in the history of present illness.    6. Coronary artery calcification  Continue current medication(s) as noted in the history of present illness.        Transitional Care Management Certification  I certify that the following are true:  Communication was made within 2 business days of discharge.  Complexity of Medical Decision Making is moderate.  Face to face visit occurred within 8 days.    *Note: 76120 is for high complexity patients with a face to face visit within 7 days of discharge.  09759 is for high complexity patients with a face to face on days 8-14 post discharge or medium complexity with face to face visit within 14 days post discharge.      Return in about 3 months (around 2/29/2024) for Recheck Diabetes, fasting (cancel 12/11/23 appt).

## 2024-01-11 ENCOUNTER — OFFICE VISIT (OUTPATIENT)
Dept: ORTHOPEDIC SURGERY | Facility: CLINIC | Age: 69
End: 2024-01-11
Payer: MEDICARE

## 2024-01-11 VITALS
DIASTOLIC BLOOD PRESSURE: 66 MMHG | SYSTOLIC BLOOD PRESSURE: 112 MMHG | BODY MASS INDEX: 20.73 KG/M2 | HEIGHT: 70 IN | WEIGHT: 144.8 LBS

## 2024-01-11 DIAGNOSIS — M17.12 PRIMARY OSTEOARTHRITIS OF LEFT KNEE: Primary | ICD-10-CM

## 2024-01-11 PROCEDURE — 99214 OFFICE O/P EST MOD 30 MIN: CPT | Performed by: ORTHOPAEDIC SURGERY

## 2024-01-11 NOTE — PROGRESS NOTES
"    [facility] Clinic Note        Subjective     CC: Follow-up (3 month f/u-- Primary osteoarthritis of left knee)      HPI    Chente Rich is a 68 y.o. male.  Patient is here with his wife today for evaluation of his left knee.  Continues to feel \"pretty good\".  He plays pickle ball twice a week.  He has a diminished ability to run and walk greater than 75 minutes secondary to swelling and pain in the knee.  He has no nocturnal symptoms.  No rest symptoms.  He has crepitance with activity.    Overall, patient's symptoms are as above.    ROS:    Constiutional:Pt denies fever, chills, nausea, or vomiting.  MSK:as above        Objective      Past Medical History  Past Medical History:   Diagnosis Date    Abnormal screening cardiac CT 10/12/2017    Calcium Score 133    BCC (basal cell carcinoma of skin)     Dx (initially)-2010.  Has occurred on head, chest, back, and right leg.    Colon polyp     Dx 10/17- tubular adenoma ascending colon, no dysplasia    Diabetes mellitus     Diverticulosis     Dx 10/17 by Colonoscopy- pancolonic    History of cardiovascular stress test 06/19/2020    negative stress echo, EF 60%    History of esophagogastroduodenoscopy (EGD) 11/20/2023    Normal    History of GI bleed 11/18/2023    Hospitalized.  EGD and Colonoscopy normal except diverticulosis    Hyperlipidemia     Neuropathy of right lower extremity     Dx 12/2022- mild (by EMG)     Social History     Socioeconomic History    Marital status:     Number of children: 2   Tobacco Use    Smoking status: Never     Passive exposure: Never    Smokeless tobacco: Never   Vaping Use    Vaping Use: Never used   Substance and Sexual Activity    Alcohol use: Yes     Alcohol/week: 10.0 standard drinks of alcohol     Types: 7 Cans of beer, 3 Drinks containing 0.5 oz of alcohol per week     Comment: 1 drink (beer) daily    Drug use: Never    Sexual activity: Yes     Partners: Female     Birth control/protection: " "Post-menopausal          Physical Exam  /66   Ht 176.5 cm (69.5\")   Wt 65.7 kg (144 lb 12.8 oz)   BMI 21.08 kg/m²     Body mass index is 21.08 kg/m².    Patient is well nourished and well developed.        Ortho Exam      Musculoskeletal:  Global Assessment:  Overall assessment of Lower Extremity Muscle Strength and Tone:  Left quadriceps--5/5   Left hamstrings--5/5       Left tibialis anterior--5/5  Left gastroc-soleus--5/5  Left EHL --5/5    Lower Extremity:    Inspection and Palpation:  Left knee:  Tenderness:  Over the medial joint line and moderate severity  Effusion:  1+  Crepitus:  Positive  Pulses:  2+  Ecchymosis:  None  Warmth:  None     ROM:  Left:  Extension: 5     Flexion:120    Instability:    Left:    Lachman Test:  Negative   Varus stress test negative  Valgus stress test negative    Deformities/Malalignments/Discrepancies:    Left:  Genu Varum     Functional Testing:  Orlando's test:  Negative  Patella grind test:  Positive  Q-angle:  normal      Imaging/Labs/EMG Reviewed:  Imaging Results (Last 24 Hours)       Procedure Component Value Units Date/Time    XR Knee 4+ View Left [[id number]] Resulted: 01/11/24 0924     Updated: 01/11/24 0926    Narrative:      Left knee X-Ray    Indication: Pain    Study:  Upright AP, Skiers, Lateral, and Sunrise views of Left knee(s)    Comparison: Left knee 4/19/2022    Findings:    Patient appears to have severe degenerative changes in the medial   compartment.    There are mild degenerative changes in the lateral compartment.    There are moderate to early severe changes in the patellofemoral   compartment.    Patient has overall varus alignment.    Kellgren-Capo rdGrdrrdarddrderd:rd rd3rd Impression:   Severe medial compartment and moderate to early severe patellofemoral   compartment degnerative changes of the left knee               Assessment    Assessment:  1. Primary osteoarthritis of left knee        Plan:  Recommend over the counter anti-inflammatories for " pain and/or swelling  Left knee arthritis--severe medial moderate to early severe patellofemoral disease.  We discussed the risk, benefits, potential hazards, typical recovery and rehab as well as reasonable alternatives to robotic assisted left TKA.  Decide at the time whether we do a press-fit versus cemented implant.  He will do therapy with [facility] via the PT to you program.  Will use a full dose aspirin daily for DVT prophylaxis.  He will stay overnight.  See him back preoperatively to answer any final questions.  He will go to [facility] today for scheduling.      Rey Worthy MD  01/11/24  17:55 EST      Dictated Utilizing Dragon Dictation.

## 2024-01-15 ENCOUNTER — PREP FOR SURGERY (OUTPATIENT)
Dept: OTHER | Facility: HOSPITAL | Age: 69
End: 2024-01-15
Payer: MEDICARE

## 2024-01-15 DIAGNOSIS — M17.12 PRIMARY OSTEOARTHRITIS OF LEFT KNEE: Primary | ICD-10-CM

## 2024-01-15 PROBLEM — M17.9 OA (OSTEOARTHRITIS) OF KNEE: Status: ACTIVE | Noted: 2024-01-15

## 2024-01-15 RX ORDER — ACETAMINOPHEN 500 MG
1000 TABLET ORAL ONCE
OUTPATIENT
Start: 2024-01-15 | End: 2024-01-15

## 2024-01-15 RX ORDER — CHLORHEXIDINE GLUCONATE 4 G/100ML
SOLUTION TOPICAL DAILY
Qty: 236 ML | Refills: 0 | Status: SHIPPED | OUTPATIENT
Start: 2024-01-15

## 2024-01-15 RX ORDER — OXYCODONE HCL 10 MG/1
10 TABLET, FILM COATED, EXTENDED RELEASE ORAL ONCE
OUTPATIENT
Start: 2024-01-15 | End: 2024-01-15

## 2024-01-15 RX ORDER — TRANEXAMIC ACID 10 MG/ML
1000 INJECTION, SOLUTION INTRAVENOUS ONCE
OUTPATIENT
Start: 2024-01-15 | End: 2024-01-15

## 2024-03-04 DIAGNOSIS — M17.12 PRIMARY OSTEOARTHRITIS OF LEFT KNEE: Primary | ICD-10-CM

## 2024-03-05 ENCOUNTER — OFFICE VISIT (OUTPATIENT)
Dept: INTERNAL MEDICINE | Facility: CLINIC | Age: 69
End: 2024-03-05
Payer: MEDICARE

## 2024-03-05 VITALS
WEIGHT: 150 LBS | BODY MASS INDEX: 21.83 KG/M2 | TEMPERATURE: 97.8 F | RESPIRATION RATE: 16 BRPM | DIASTOLIC BLOOD PRESSURE: 72 MMHG | HEART RATE: 72 BPM | SYSTOLIC BLOOD PRESSURE: 126 MMHG

## 2024-03-05 DIAGNOSIS — E78.5 DYSLIPIDEMIA: ICD-10-CM

## 2024-03-05 DIAGNOSIS — K63.5 POLYP OF COLON, UNSPECIFIED PART OF COLON, UNSPECIFIED TYPE: ICD-10-CM

## 2024-03-05 DIAGNOSIS — I25.10 CORONARY ARTERY CALCIFICATION: ICD-10-CM

## 2024-03-05 DIAGNOSIS — E11.9 TYPE 2 DIABETES MELLITUS WITHOUT COMPLICATION, WITHOUT LONG-TERM CURRENT USE OF INSULIN: Primary | ICD-10-CM

## 2024-03-05 DIAGNOSIS — I25.84 CORONARY ARTERY CALCIFICATION: ICD-10-CM

## 2024-03-05 PROBLEM — N17.9 AKI (ACUTE KIDNEY INJURY): Status: RESOLVED | Noted: 2023-11-19 | Resolved: 2024-03-05

## 2024-03-05 PROBLEM — R19.5 DARK STOOLS: Status: RESOLVED | Noted: 2023-11-19 | Resolved: 2024-03-05

## 2024-03-05 PROBLEM — R20.0 NUMBNESS: Status: RESOLVED | Noted: 2022-12-16 | Resolved: 2024-03-05

## 2024-03-05 PROBLEM — K57.92 ACUTE DIVERTICULITIS: Status: RESOLVED | Noted: 2023-11-19 | Resolved: 2024-03-05

## 2024-03-05 PROBLEM — C44.92 SCC (SQUAMOUS CELL CARCINOMA): Status: ACTIVE | Noted: 2024-03-05

## 2024-03-05 LAB
ALBUMIN SERPL-MCNC: 4.1 G/DL (ref 3.5–5.2)
ALBUMIN/GLOB SERPL: 1.6 G/DL
ALP SERPL-CCNC: 59 U/L (ref 39–117)
ALT SERPL W P-5'-P-CCNC: 19 U/L (ref 1–41)
ANION GAP SERPL CALCULATED.3IONS-SCNC: 14.9 MMOL/L (ref 5–15)
AST SERPL-CCNC: 27 U/L (ref 1–40)
BASOPHILS # BLD AUTO: 0.04 10*3/MM3 (ref 0–0.2)
BASOPHILS NFR BLD AUTO: 0.7 % (ref 0–1.5)
BILIRUB SERPL-MCNC: 0.3 MG/DL (ref 0–1.2)
BUN SERPL-MCNC: 16 MG/DL (ref 8–23)
BUN/CREAT SERPL: 10.5 (ref 7–25)
CALCIUM SPEC-SCNC: 9.3 MG/DL (ref 8.6–10.5)
CHLORIDE SERPL-SCNC: 96 MMOL/L (ref 98–107)
CHOLEST SERPL-MCNC: 146 MG/DL (ref 0–200)
CO2 SERPL-SCNC: 26.1 MMOL/L (ref 22–29)
CREAT SERPL-MCNC: 1.52 MG/DL (ref 0.76–1.27)
DEPRECATED RDW RBC AUTO: 42.9 FL (ref 37–54)
EGFRCR SERPLBLD CKD-EPI 2021: 49 ML/MIN/1.73
EOSINOPHIL # BLD AUTO: 0.23 10*3/MM3 (ref 0–0.4)
EOSINOPHIL NFR BLD AUTO: 4.1 % (ref 0.3–6.2)
ERYTHROCYTE [DISTWIDTH] IN BLOOD BY AUTOMATED COUNT: 12.6 % (ref 12.3–15.4)
EXPIRATION DATE: ABNORMAL
GLOBULIN UR ELPH-MCNC: 2.6 GM/DL
GLUCOSE SERPL-MCNC: 126 MG/DL (ref 65–99)
HBA1C MFR BLD: 6.6 % (ref 4.5–5.7)
HCT VFR BLD AUTO: 42.1 % (ref 37.5–51)
HDLC SERPL-MCNC: 71 MG/DL (ref 40–60)
HGB BLD-MCNC: 13.8 G/DL (ref 13–17.7)
IMM GRANULOCYTES # BLD AUTO: 0.01 10*3/MM3 (ref 0–0.05)
IMM GRANULOCYTES NFR BLD AUTO: 0.2 % (ref 0–0.5)
LDLC SERPL CALC-MCNC: 64 MG/DL (ref 0–100)
LDLC/HDLC SERPL: 0.92 {RATIO}
LYMPHOCYTES # BLD AUTO: 1.59 10*3/MM3 (ref 0.7–3.1)
LYMPHOCYTES NFR BLD AUTO: 28.1 % (ref 19.6–45.3)
Lab: ABNORMAL
MCH RBC QN AUTO: 30.4 PG (ref 26.6–33)
MCHC RBC AUTO-ENTMCNC: 32.8 G/DL (ref 31.5–35.7)
MCV RBC AUTO: 92.7 FL (ref 79–97)
MONOCYTES # BLD AUTO: 0.61 10*3/MM3 (ref 0.1–0.9)
MONOCYTES NFR BLD AUTO: 10.8 % (ref 5–12)
NEUTROPHILS NFR BLD AUTO: 3.18 10*3/MM3 (ref 1.7–7)
NEUTROPHILS NFR BLD AUTO: 56.1 % (ref 42.7–76)
NRBC BLD AUTO-RTO: 0 /100 WBC (ref 0–0.2)
PLATELET # BLD AUTO: 268 10*3/MM3 (ref 140–450)
PMV BLD AUTO: 9.8 FL (ref 6–12)
POTASSIUM SERPL-SCNC: 4.5 MMOL/L (ref 3.5–5.2)
PROT SERPL-MCNC: 6.7 G/DL (ref 6–8.5)
RBC # BLD AUTO: 4.54 10*6/MM3 (ref 4.14–5.8)
SODIUM SERPL-SCNC: 137 MMOL/L (ref 136–145)
TRIGL SERPL-MCNC: 47 MG/DL (ref 0–150)
VLDLC SERPL-MCNC: 11 MG/DL (ref 5–40)
WBC NRBC COR # BLD AUTO: 5.66 10*3/MM3 (ref 3.4–10.8)

## 2024-03-05 PROCEDURE — 80053 COMPREHEN METABOLIC PANEL: CPT | Performed by: INTERNAL MEDICINE

## 2024-03-05 PROCEDURE — 85025 COMPLETE CBC W/AUTO DIFF WBC: CPT | Performed by: INTERNAL MEDICINE

## 2024-03-05 PROCEDURE — 80061 LIPID PANEL: CPT | Performed by: INTERNAL MEDICINE

## 2024-03-05 RX ORDER — CETIRIZINE HYDROCHLORIDE 10 MG/1
10 TABLET ORAL DAILY
COMMUNITY

## 2024-03-05 RX ORDER — CALCIUM POLYCARBOPHIL 625 MG
TABLET ORAL DAILY
COMMUNITY

## 2024-03-05 NOTE — PROGRESS NOTES
Subjective       Chente Rich is a 68 y.o. male.     Chief Complaint   Patient presents with    Diabetes     3 month follow up    Hyperlipidemia       History obtained from the patient.      History of Present Illness     The patient states he was diagnosed with a SCC on the left forehead on 2/26/2024.  He has Mohs Procedure scheduled for 3/1/2024.    The patient has been seeing Dr. Ma for Chronic Left Knee Pain.  He had arthroscopic surgery on 7/29/2022.  He has had gel injections and then cortisone injections (last 5/18/2023).  He denies swelling and stiffness of the knee.  He is off Meloxicam.  He states he has a left knee replacement scheduled for 4/10/2024.     The patient has been diagnosed with RLE Neuropathy (mild), stable on no medication.  He reports stable right lower extremity numbness and decreased sensation, but no pain or tingling.  He last saw Dr. Warren on 3/9/2023 and was told to follow-up as needed.    Cardiac Follow-Up:  The patient is here for a follow-up visit.  His Diabetes has been stable.  Medication: Simvastatin.  His Hyperlipidemia has been stable.  LDL goal < 70. Last  LDL was 56, TG 50.  Medication: Simvastatin.  Side Effects: None.     Comorbid Illness: Coronary Artery Calcification.  Takes Aspirin 81 mg daily.      Procedures: On 10/12/2017, Cardiac CT scan showed a Calcium Score of 133. On 6/19/20, he had a negative stress echo, EF 60%     Interval Events: Last Hemoglobin A1c on 11/28/2023 was 6.4.  Creatinine was 1.38 (stable).  He has been off his Metformin since 6/20/2021 due to the elevated Creatinine (initially 1.55).  He does not check his blood sugar or blood pressure at home.  His last Ophthalmology visit was 3/31/2023, no retinopathy.  He does check his feet daily.       Symptoms: Denies chest pain, shortness of breath, OLIVEIRA, orthopnea, PND, palpitations, syncope, lower extremity edema, claudication, lightheadedness, and dizziness.    Associated Symptoms: Weight  increased 2 pounds in the past 8-9 months.  Denies fatigue, headache, polydipsia, polyuria, myalgias (except mild cramps in hands and calves every few days), arthralgias, visual impairment, memory loss, and concentration issues.   Reports stable mild numbness, but no no tingling, of his right third through fifth toes.  No foot pain or ulcer.  He states he has been treating a corn/callus on his left foot.       Lifestyle: The patient follows a diverse and healthy diet.  He exercises 5 times per week (walks, does strength training, and lifts weights).  Tobacco Use: Never a smoker.      Colon Polyp Follow-Up: The patient is here for follow-up of colon polyps, which have been stable.    Comorbid Illness: Diverticulosis.  Interval Events: He was hospitalized for diverticulosis in November 2023.  Last Colonoscopy on 11/20/2023 showed: Diverticulosis in the entire examined colon. Focal diverticulitis in the descending colon, with oozing, adherent clot, and ulcerated diverticular rim. Banded x 2 (failed). Clip was placed across the ulcerated rim for hemostasis. Clip was placed for radiographic marking proximal to the diverticulum. Tattooed proximal to the diverticulum. One 2 mm inflammatory polyp in the descending colon adjacent to bleeding diverticulum.  Pathology c/w: Colonic mucosa with nonspecific surface erosions   Symptoms: Denies abdominal pain, diarrhea, constipation, hematochezia, melena, and changes in stool.    Medication: FiberCon daily.       Current Outpatient Medications on File Prior to Visit   Medication Sig Dispense Refill    aspirin 81 MG EC tablet Take 1 tablet by mouth Daily.      cetirizine (zyrTEC) 10 MG tablet Take 1 tablet by mouth Daily.      polycarbophil (calcium polycarbophil) 625 MG tablet tablet Take  by mouth Daily.      simvastatin (ZOCOR) 20 MG tablet Take 1 tablet by mouth Daily. 90 tablet 3    [DISCONTINUED] chlorhexidine (HIBICLENS) 4 % external liquid Apply  topically to the appropriate  area as directed Daily. Shower w/ solution for 5 days before surgery. Bring to [facility] to be filled. (Patient not taking: Reported on 3/5/2024) 236 mL 0     No current facility-administered medications on file prior to visit.       Current outpatient and discharge medications have been reconciled for the patient.  Reviewed by: Millie Loya MD        The following portions of the patient's history were reviewed and updated as appropriate: allergies, current medications, past family history, past medical history, past social history, past surgical history, and problem list.    Review of Systems   Constitutional:  Negative for fatigue and unexpected weight change.   Eyes:  Negative for visual disturbance.   Respiratory:  Negative for cough, shortness of breath and wheezing.    Cardiovascular:  Negative for chest pain, palpitations and leg swelling.        No OLIVEIRA, orthopnea, or claudication.   Gastrointestinal:  Negative for abdominal pain, blood in stool, constipation, diarrhea, nausea and vomiting.        Denies melena.   Endocrine: Negative for polydipsia and polyuria.   Musculoskeletal:  Negative for arthralgias and myalgias.   Neurological:  Negative for dizziness, syncope, light-headedness and headaches.        No memory issues.   Psychiatric/Behavioral:  Negative for decreased concentration.          Objective       Blood pressure 126/72, pulse 72, temperature 97.8 °F (36.6 °C), temperature source Infrared, resp. rate 16, weight 68 kg (150 lb).  Body mass index is 21.83 kg/m².      Physical Exam  Vitals and nursing note reviewed.   Constitutional:       Appearance: He is well-developed and normal weight.   Neck:      Thyroid: No thyroid mass or thyromegaly.      Vascular: No carotid bruit.   Cardiovascular:      Rate and Rhythm: Normal rate and regular rhythm.      Pulses: Normal pulses.      Heart sounds: Normal heart sounds. No murmur heard.     No friction rub. No gallop.   Pulmonary:      Effort: Pulmonary  effort is normal.      Breath sounds: Normal breath sounds.   Musculoskeletal:      Right lower leg: No edema.      Left lower leg: No edema.   Neurological:      Mental Status: He is alert.   Psychiatric:         Mood and Affect: Mood normal.       Results for orders placed or performed in visit on 03/05/24   POC Glycosylated Hemoglobin (Hb A1C)    Specimen: Blood   Result Value Ref Range    Hemoglobin A1C 6.6 (A) 4.5 - 5.7 %    Lot Number [id number]     Expiration Date 11/21/25        Assessment / Plan:  Diagnoses and all orders for this visit:    1. Type 2 diabetes mellitus without complication, without long-term current use of insulin (Primary)  -     POC Glycosylated Hemoglobin (Hb A1C)  -     CBC & Differential  -     Lipid Panel  -     Comprehensive Metabolic Panel   Continue current medication(s) as noted in the history of present illness.    2. Dyslipidemia  -     CBC & Differential  -     Lipid Panel  -     Comprehensive Metabolic Panel   Continue current medication(s) as noted in the history of present illness.    3. Coronary artery calcification   Continue current medication(s) as noted in the history of present illness.    4. Polyp of colon, unspecified part of colon, unspecified type   Colonoscopy up-to-date.      BMI is within normal parameters. No other follow-up for BMI required.           Return in about 6 months (around 9/5/2024) for schedule subseq Medicare Wellness Exam and Annual physical, fasting (same appt.

## 2024-03-18 DIAGNOSIS — R79.89 ELEVATED SERUM CREATININE: Primary | ICD-10-CM

## 2024-03-28 ENCOUNTER — PRE-ADMISSION TESTING (OUTPATIENT)
Dept: PREADMISSION TESTING | Facility: HOSPITAL | Age: 69
End: 2024-03-28
Payer: MEDICARE

## 2024-03-28 ENCOUNTER — OFFICE VISIT (OUTPATIENT)
Dept: ORTHOPEDIC SURGERY | Facility: CLINIC | Age: 69
End: 2024-03-28
Payer: MEDICARE

## 2024-03-28 VITALS
WEIGHT: 153.66 LBS | DIASTOLIC BLOOD PRESSURE: 72 MMHG | SYSTOLIC BLOOD PRESSURE: 128 MMHG | HEIGHT: 70 IN | BODY MASS INDEX: 22 KG/M2

## 2024-03-28 VITALS — HEIGHT: 70 IN | BODY MASS INDEX: 22 KG/M2 | WEIGHT: 153.66 LBS

## 2024-03-28 DIAGNOSIS — M17.12 PRIMARY OSTEOARTHRITIS OF LEFT KNEE: ICD-10-CM

## 2024-03-28 DIAGNOSIS — M17.12 PRIMARY OSTEOARTHRITIS OF LEFT KNEE: Primary | ICD-10-CM

## 2024-03-28 LAB
ALBUMIN SERPL-MCNC: 4.1 G/DL (ref 3.5–5.2)
ALBUMIN/GLOB SERPL: 1.5 G/DL
ALP SERPL-CCNC: 60 U/L (ref 39–117)
ALT SERPL W P-5'-P-CCNC: 25 U/L (ref 1–41)
ANION GAP SERPL CALCULATED.3IONS-SCNC: 6 MMOL/L (ref 5–15)
AST SERPL-CCNC: 36 U/L (ref 1–40)
BASOPHILS # BLD AUTO: 0.05 10*3/MM3 (ref 0–0.2)
BASOPHILS NFR BLD AUTO: 0.8 % (ref 0–1.5)
BILIRUB SERPL-MCNC: 0.2 MG/DL (ref 0–1.2)
BUN SERPL-MCNC: 16 MG/DL (ref 8–23)
BUN/CREAT SERPL: 14.5 (ref 7–25)
CALCIUM SPEC-SCNC: 9.5 MG/DL (ref 8.6–10.5)
CHLORIDE SERPL-SCNC: 98 MMOL/L (ref 98–107)
CO2 SERPL-SCNC: 31 MMOL/L (ref 22–29)
CREAT SERPL-MCNC: 1.1 MG/DL (ref 0.76–1.27)
CRP SERPL-MCNC: <0.3 MG/DL (ref 0–0.5)
DEPRECATED RDW RBC AUTO: 44.6 FL (ref 37–54)
EGFRCR SERPLBLD CKD-EPI 2021: 71.8 ML/MIN/1.73
EOSINOPHIL # BLD AUTO: 0.24 10*3/MM3 (ref 0–0.4)
EOSINOPHIL NFR BLD AUTO: 3.7 % (ref 0.3–6.2)
ERYTHROCYTE [DISTWIDTH] IN BLOOD BY AUTOMATED COUNT: 13.2 % (ref 12.3–15.4)
ERYTHROCYTE [SEDIMENTATION RATE] IN BLOOD: 22 MM/HR (ref 0–20)
GLOBULIN UR ELPH-MCNC: 2.7 GM/DL
GLUCOSE SERPL-MCNC: 106 MG/DL (ref 65–99)
HBA1C MFR BLD: 7.1 % (ref 4.8–5.6)
HCT VFR BLD AUTO: 40.3 % (ref 37.5–51)
HGB BLD-MCNC: 13.3 G/DL (ref 13–17.7)
IMM GRANULOCYTES # BLD AUTO: 0.01 10*3/MM3 (ref 0–0.05)
IMM GRANULOCYTES NFR BLD AUTO: 0.2 % (ref 0–0.5)
LYMPHOCYTES # BLD AUTO: 1.65 10*3/MM3 (ref 0.7–3.1)
LYMPHOCYTES NFR BLD AUTO: 25.7 % (ref 19.6–45.3)
MCH RBC QN AUTO: 30 PG (ref 26.6–33)
MCHC RBC AUTO-ENTMCNC: 33 G/DL (ref 31.5–35.7)
MCV RBC AUTO: 90.8 FL (ref 79–97)
MONOCYTES # BLD AUTO: 0.58 10*3/MM3 (ref 0.1–0.9)
MONOCYTES NFR BLD AUTO: 9 % (ref 5–12)
NEUTROPHILS NFR BLD AUTO: 3.9 10*3/MM3 (ref 1.7–7)
NEUTROPHILS NFR BLD AUTO: 60.6 % (ref 42.7–76)
NRBC BLD AUTO-RTO: 0 /100 WBC (ref 0–0.2)
PLATELET # BLD AUTO: 294 10*3/MM3 (ref 140–450)
PMV BLD AUTO: 9.2 FL (ref 6–12)
POTASSIUM SERPL-SCNC: 4.4 MMOL/L (ref 3.5–5.2)
PROT SERPL-MCNC: 6.8 G/DL (ref 6–8.5)
RBC # BLD AUTO: 4.44 10*6/MM3 (ref 4.14–5.8)
SODIUM SERPL-SCNC: 135 MMOL/L (ref 136–145)
WBC NRBC COR # BLD AUTO: 6.43 10*3/MM3 (ref 3.4–10.8)

## 2024-03-28 PROCEDURE — 36415 COLL VENOUS BLD VENIPUNCTURE: CPT

## 2024-03-28 PROCEDURE — 86140 C-REACTIVE PROTEIN: CPT

## 2024-03-28 PROCEDURE — 83036 HEMOGLOBIN GLYCOSYLATED A1C: CPT

## 2024-03-28 PROCEDURE — 85025 COMPLETE CBC W/AUTO DIFF WBC: CPT

## 2024-03-28 PROCEDURE — 85652 RBC SED RATE AUTOMATED: CPT

## 2024-03-28 PROCEDURE — 80053 COMPREHEN METABOLIC PANEL: CPT

## 2024-03-28 NOTE — PAT
Patient viewed general PAT education video as instructed in their preoperative information received from their surgeon.  Patient stated the general PAT education video was viewed in its entirety and survey completed.  Copies of PAT general education handouts (Incentive Spirometry, Meds to Beds Program, Patient Belongings, Pre-op skin preparation instructions, Blood Glucose testing, Visitor policy, Surgery FAQ, Code H) distributed to patient if not printed. Education related to the PAT pass and skin preparation for surgery (if applicable) completed in PAT as a reinforcement to PAT education video. Patient instructed to return PAT pass provided today as well as completed skin preparation sheet (if applicable) on the day of procedure.     Additionally if patient had not viewed video yet but intended to view it at home or in our waiting area, then referred them to the handout with QR code/link provided during PAT visit.  Instructed patient to complete survey after viewing the video in its entirety.  Encouraged patient/family to read [facility] general education handouts thoroughly and notify PAT staff with any questions or concerns. Patient verbalized understanding of all information and priority content.    Discussed with patient options for receiving total joint replacement education and assessed patient's ability and preference. Joint Replacement Guide given to patient during PAT visit since not received a copy within the last year. Encouraged patient/family to read guide thoroughly and notify PAT staff with any questions or concerns. Handout provided directing patient to links to watch online videos related to joint replacement surgery on the [facility] website. The handout gives detailed instructions for joining an online joint replacement class through Zoom or phone conference offered on Thursdays. Patient agreed to participate by watching videos online. Patient verbalized understanding of instructions and to  complete the online learning tool survey. Encouraged to share information with family and/or . An overview of the joint replacement education was provided during the visit including general perioperative instructions that are routine for all surgical patients (PAT PASS, wipes, directions to pre-op, etc.).    An arrival time for procedure was not provided during PAT visit. If patient had any questions or concerns about their arrival time, they were instructed to contact their surgeon/physician.  Additionally, if the patient referred to an arrival time that was acquired from their my chart account, patient was encouraged to verify that time with their surgeon/physician. Arrival times are NOT provided in Pre Admission Testing Department.    Patient denies any current skin issues.     Post-Surgery Information Instruction Sheet given to patient during Pre-Admission Testing Visit with verbal instructions to patient to return with PAT PASS on the day of surgery. Additionally, encouraged patient to review the information provided.    In[employer] (by [employer]) pain pump patient informational handout given to patient.  Instructed patient to watch [employer] Patient Education Video regarding Peripheral Nerve Catheter that will be in place for upcoming surgery unless contraindicated. The video can be accessed using QR code noted on handout.  Patient agreed to watch video.  Stressed to patient to call [employer] Nursing Hotline 24/7 if patient has any questions or concerns after discharge.     Patient to apply Chlorhexadine wipes  to surgical area (as instructed) the night before procedure and the AM of procedure. Wipes provided.    Prescription for Chlorhexidine shower called into patient's pharmacy or [facility] pharmacy by patient's surgeon.  Reinforced with patient to  the prescription from applicable pharmacy if they haven't already.  Verbal and written instructions given regarding proper use of Chlorhexidine body wash  to patient and/or fallonly during PAT visit. Patient/family also instructed to complete checklist and return it to Pre-op on the day of surgery.  Patient and/or family verbalized understanding.    Patient instructed to drink 20 ounces of Gatorade or Gatorlyte (if diabetic) and it needs to be completed 1 hour (for Main OR patients) or 2 hours (scheduled  section & [facility] patients) before given arrival time for procedure (NO RED Gatorade and NO Gatorade Zero).    Patient verbalized understanding.

## 2024-03-28 NOTE — PROGRESS NOTES
[facility] Note        Subjective     CC: Follow-up (2.5 month f/u-- Primary osteoarthritis of left knee)      HPI    Chente Rich is a 69 y.o. male.  Patient is here today for his preop appointment for left TKA scheduled for 4/10/2024.    Overall, patient's symptoms are unchanged from his visit on 1/11/2024.    ROS:    Constiutional:Pt denies fever, chills, nausea, or vomiting.  MSK:as above        Objective      Past Medical History  Past Medical History:   Diagnosis Date    Abnormal screening cardiac CT 10/12/2017    Calcium Score 133    Acute diverticulitis 11/19/2023    Hosp 11/2023      BCC (basal cell carcinoma of skin)     Dx (initially)-2010.  Has occurred on head, chest, back, and right leg.    Colon polyp     Dx 10/17- tubular adenoma ascending colon, no dysplasia    Diabetes mellitus     NO LONGER NEEDS MEDS    Diverticulosis     Dx 10/17 by Colonoscopy- pancolonic    History of cardiovascular stress test 06/19/2020    negative stress echo, EF 60%    History of esophagogastroduodenoscopy (EGD) 11/20/2023    Normal    History of GI bleed 11/18/2023    Hospitalized.  EGD and Colonoscopy normal except diverticulosis    Hyperlipidemia     Neuropathy of right lower extremity     Dx 12/2022- mild (by EMG)    OA (osteoarthritis) of knee 01/15/2024    SCC (squamous cell carcinoma) 03/05/2024    Dx 2/28/24- left forehead (Mohs Procedure 3/11/24)      Wears glasses      Social History     Socioeconomic History    Marital status:     Number of children: 2   Tobacco Use    Smoking status: Never     Passive exposure: Never    Smokeless tobacco: Never   Vaping Use    Vaping status: Never Used   Substance and Sexual Activity    Alcohol use: Yes     Alcohol/week: 10.0 standard drinks of alcohol     Types: 7 Cans of beer, 3 Drinks containing 0.5 oz of alcohol per week     Comment: 1 drink (beer) daily    Drug use: Never    Sexual activity: Yes     Partners: Female     Birth  "control/protection: Post-menopausal          Physical Exam  /72   Ht 177.8 cm (70\")   Wt 69.7 kg (153 lb 10.6 oz)   BMI 22.05 kg/m²     Body mass index is 22.05 kg/m².    Patient is well nourished and well developed.        Ortho Exam      Musculoskeletal:  Global Assessment:  Overall assessment of Lower Extremity Muscle Strength and Tone:  Left quadriceps--5/5   Left hamstrings--5/5       Left tibialis anterior--5/5  Left gastroc-soleus--5/5  Left EHL --5/5    Lower Extremity:    Inspection and Palpation:  Left knee:  Tenderness:  Over the medial joint line and moderate severity  Effusion:  1+  Crepitus:  Positive  Pulses:  2+  Ecchymosis:  None  Warmth:  None     ROM:  Left:  Extension: 5     Flexion:120    Instability:    Left:    Lachman Test:  Negative   Varus stress test negative  Valgus stress test negative    Deformities/Malalignments/Discrepancies:    Left:  Genu Varum     Functional Testing:  Orlando's test:  Negative  Patella grind test:  Positive  Q-angle:  normal      Imaging/Labs/EMG Reviewed:  Imaging Results (Last 24 Hours)       ** No results found for the last 24 hours. **              Assessment    Assessment:  1. Primary osteoarthritis of left knee        Plan:  Recommend over the counter anti-inflammatories for pain and/or swelling  Left knee arthritis--patient's labs show an elevated hemoglobin A1c of 7.10 which is higher than his typical labs which are in the sixes.  Also has a slight elevation in his CRP.  Will double check that he has been feeling okay and has no dental issues.  Recently did get dental clearance.  I will plan on a cemented left robot-assisted TKA.  He is enrolled in the [facility] program and will eventually do PT at the [facility].  Aspirin for blood clot prevention.  He is going to stay 23 hours after surgery.  Extended antibiotic prophylaxis for his elevation and hemoglobin A1c level recently.      Rey Worthy MD  03/28/24  13:13 " EDT      Dictated Utilizing Dragon Dictation.

## 2024-04-03 ENCOUNTER — TRANSITIONAL CARE MANAGEMENT TELEPHONE ENCOUNTER (OUTPATIENT)
Dept: ORTHOPEDIC SURGERY | Facility: CLINIC | Age: 69
End: 2024-04-03
Payer: MEDICARE

## 2024-04-03 NOTE — OUTREACH NOTE
Pre-Operative Orthopedic Assessment      Patient: Chente Rich    YOB: 1955      Age/Gender: 69 y.o. male  Medical Record Number: [id number]  Surgical Procedure Planned:  LT TKA  Surgeon: DR. HERMOSILLO  Date of Surgery Planned: 04/10/2024    Question Value Score    Patient Score   1. What is your age group? 50-65 years  66-75 years  >75 years =2  =1  =0 []2  [x]1  []0   2. Gender? Male  Female =2  =1 [x]2  []1   3. How far on average can you walk? (a block is 200 meters) Two blocks or more (+\- rest)  1-2 blocks (+\- rest)  Housebound (most of time) =2  =1  =0 [x]2  []1  []0   4. Which gait aid to you use? (more often than not) None  Single-Point Stick  Crutches/Frame =2  =1  =0 [x]2  []1  []0   5. Do you use community  supports? (home help, meals on wheels, district nursing) None or one per week  Two or more per week =1  =0 [x]1  []0   6. Will you live with someone who can care for you after your operation? Yes  No =3  =0 [x]3  []0      Your Score (out of 12)  11     Key Destination at Discharge from acute care predicted by score   Scores < 6  -- extended inpatient rehabilitation   Scores 6-9 -- additional intervention to discharge directly home (Rehabilitation in home)   Scores > 9 -- directly home         Discharge Disposition/Planning:     Patient Response   Discussed the Predicted discharge disposition needed based on RAPT Assessment with the patient.    [x]YES []NO   Patient selected discharge disposition:      home   Outpatient Physical Therapy Location & Date: [facility]  04/12 @ 2:00PM   Home Health Services Preferred:   NONE   Post-Operative Care Giver Name: WIFE - LATRELL      Subacute Inpatient Rehabilitation:  Complete this section only if planning inpatient services at a Subacute Facility     Patient Response   Subacute Facility Preferred (Please list 2 facilities:      Requires pre-certification for inpatient rehabilitation services?         Planned source of transportation to  inpatient rehabilitation facility?       If choosing inpatient services at an Acute or Subacute Facility please list a subsequent back-up plan (in case patient fails to qualify for inpatient rehabilitation). Back-up plans should include caregiver (family member or friend) for first 24-48 post- -operatively.       Home Equipment Patient Response   Does patient have a walker for home use?    [x]YES []NO   Does patient have a shower chair for home use?    []YES [x]NO   Does patient have an elevated commode seat for home use? [x]YES []NO   Does patient have a cane for home use?    []YES [x]NO   Is there any other medical equipment in the home? If so,  List in comment section below []YES [x]NO   Pre-Operative Class & Clearances Patient Response   Cardiac Clearance []     Pulmonology Clearance []     Dental Clearance []  Medical Clearance []     Other Clearance [] ________________________________    Attended or scheduled to attend the pre-operative class within 1 year of total joint replacement? [x]YES []NO   Patient Education  Completed   Expected time of discharge discussed [x]YES []NO   Encouraged to attend Pre-Operative Class    [x]YES []NO   Education re: Predicted Discharge Disposition based on RAPT score    [x]YES []NO   Patient receptive and voiced understanding of information given    [x]YES []NO                                                                                                            Comments:                                           Signature: Jimena Beard Rep    Date:  4/3/2024

## 2024-04-09 ENCOUNTER — ANESTHESIA EVENT (OUTPATIENT)
Dept: PERIOP | Facility: HOSPITAL | Age: 69
End: 2024-04-09
Payer: MEDICARE

## 2024-04-09 RX ORDER — FAMOTIDINE 10 MG/ML
20 INJECTION, SOLUTION INTRAVENOUS ONCE
Status: CANCELLED | OUTPATIENT
Start: 2024-04-09 | End: 2024-04-09

## 2024-04-09 RX ORDER — SODIUM CHLORIDE 9 MG/ML
40 INJECTION, SOLUTION INTRAVENOUS AS NEEDED
Status: CANCELLED | OUTPATIENT
Start: 2024-04-09

## 2024-04-10 ENCOUNTER — ANESTHESIA (OUTPATIENT)
Dept: PERIOP | Facility: HOSPITAL | Age: 69
End: 2024-04-10
Payer: MEDICARE

## 2024-04-10 ENCOUNTER — APPOINTMENT (OUTPATIENT)
Dept: GENERAL RADIOLOGY | Facility: HOSPITAL | Age: 69
End: 2024-04-10
Payer: MEDICARE

## 2024-04-10 ENCOUNTER — HOSPITAL ENCOUNTER (OUTPATIENT)
Facility: HOSPITAL | Age: 69
Discharge: HOME | End: 2024-04-11
Attending: ORTHOPAEDIC SURGERY | Admitting: ANESTHESIOLOGY
Payer: MEDICARE

## 2024-04-10 ENCOUNTER — ANESTHESIA EVENT CONVERTED (OUTPATIENT)
Dept: ANESTHESIOLOGY | Facility: HOSPITAL | Age: 69
End: 2024-04-10
Payer: MEDICARE

## 2024-04-10 DIAGNOSIS — M17.12 PRIMARY OSTEOARTHRITIS OF LEFT KNEE: ICD-10-CM

## 2024-04-10 DIAGNOSIS — G57.91 NEUROPATHY OF RIGHT LOWER EXTREMITY: Primary | ICD-10-CM

## 2024-04-10 DIAGNOSIS — Z96.652 STATUS POST TOTAL LEFT KNEE REPLACEMENT: Primary | ICD-10-CM

## 2024-04-10 LAB
GLUCOSE BLDC GLUCOMTR-MCNC: 121 MG/DL (ref 70–130)
GLUCOSE BLDC GLUCOMTR-MCNC: 87 MG/DL (ref 70–130)

## 2024-04-10 PROCEDURE — 25010000002 BUPIVACAINE (PF) 0.25 % SOLUTION: Performed by: ANESTHESIOLOGY

## 2024-04-10 PROCEDURE — C1713 ANCHOR/SCREW BN/BN,TIS/BN: HCPCS | Performed by: ORTHOPAEDIC SURGERY

## 2024-04-10 PROCEDURE — 25010000002 CEFAZOLIN PER 500 MG: Performed by: ORTHOPAEDIC SURGERY

## 2024-04-10 PROCEDURE — 25010000002 BUPIVACAINE 0.5 % SOLUTION: Performed by: ANESTHESIOLOGY

## 2024-04-10 PROCEDURE — 27447 TOTAL KNEE ARTHROPLASTY: CPT | Performed by: ORTHOPAEDIC SURGERY

## 2024-04-10 PROCEDURE — 25010000002 ROPIVACAINE PER 1 MG: Performed by: NURSE ANESTHETIST, CERTIFIED REGISTERED

## 2024-04-10 PROCEDURE — 25810000003 LACTATED RINGERS PER 1000 ML: Performed by: ANESTHESIOLOGY

## 2024-04-10 PROCEDURE — 20985 CPTR-ASST DIR MS PX: CPT | Performed by: ORTHOPAEDIC SURGERY

## 2024-04-10 PROCEDURE — 25010000002 PHENYLEPHRINE 10 MG/ML SOLUTION 1 ML VIAL: Performed by: ANESTHESIOLOGY

## 2024-04-10 PROCEDURE — 25010000002 DEXAMETHASONE PER 1 MG: Performed by: ANESTHESIOLOGY

## 2024-04-10 PROCEDURE — 82948 REAGENT STRIP/BLOOD GLUCOSE: CPT

## 2024-04-10 PROCEDURE — 25010000002 GLYCOPYRROLATE 0.4 MG/2ML SOLUTION: Performed by: ANESTHESIOLOGY

## 2024-04-10 PROCEDURE — 73560 X-RAY EXAM OF KNEE 1 OR 2: CPT

## 2024-04-10 PROCEDURE — 25010000002 PROPOFOL 10 MG/ML EMULSION: Performed by: ANESTHESIOLOGY

## 2024-04-10 PROCEDURE — C1776 JOINT DEVICE (IMPLANTABLE): HCPCS | Performed by: ORTHOPAEDIC SURGERY

## 2024-04-10 PROCEDURE — 25010000002 SODIUM CHLORIDE 0.9 % WITH KCL 20 MEQ 20-0.9 MEQ/L-% SOLUTION: Performed by: ORTHOPAEDIC SURGERY

## 2024-04-10 PROCEDURE — 20985 CPTR-ASST DIR MS PX: CPT | Performed by: PHYSICIAN ASSISTANT

## 2024-04-10 PROCEDURE — 27447 TOTAL KNEE ARTHROPLASTY: CPT | Performed by: PHYSICIAN ASSISTANT

## 2024-04-10 PROCEDURE — 25010000002 ONDANSETRON PER 1 MG: Performed by: ANESTHESIOLOGY

## 2024-04-10 DEVICE — COMP FEM/KN PERSONA CR CMT COCR STD SZ7 LT: Type: IMPLANTABLE DEVICE | Site: KNEE | Status: FUNCTIONAL

## 2024-04-10 DEVICE — ART/SRF KN PERSONA/VE PS EF 6TO7 11MM LT: Type: IMPLANTABLE DEVICE | Site: KNEE | Status: FUNCTIONAL

## 2024-04-10 DEVICE — CMT BONE R 1X40: Type: IMPLANTABLE DEVICE | Site: KNEE | Status: FUNCTIONAL

## 2024-04-10 DEVICE — CAP TOTL KN CMT PRIMARY W/ROSA: Type: IMPLANTABLE DEVICE | Site: KNEE | Status: FUNCTIONAL

## 2024-04-10 DEVICE — DEV CONTRL TISS STRATAFIX SYMM PDS PLUS VIL CT-1 45CM: Type: IMPLANTABLE DEVICE | Site: KNEE | Status: FUNCTIONAL

## 2024-04-10 DEVICE — IMPLANTABLE DEVICE: Type: IMPLANTABLE DEVICE | Site: KNEE | Status: FUNCTIONAL

## 2024-04-10 DEVICE — SSC BONE WAX
Type: IMPLANTABLE DEVICE | Site: KNEE | Status: FUNCTIONAL
Brand: SSC BONE WAX

## 2024-04-10 RX ORDER — ONDANSETRON 2 MG/ML
INJECTION INTRAMUSCULAR; INTRAVENOUS AS NEEDED
Status: DISCONTINUED | OUTPATIENT
Start: 2024-04-10 | End: 2024-04-10 | Stop reason: SURG

## 2024-04-10 RX ORDER — MAGNESIUM HYDROXIDE 1200 MG/15ML
LIQUID ORAL AS NEEDED
Status: DISCONTINUED | OUTPATIENT
Start: 2024-04-10 | End: 2024-04-10 | Stop reason: HOSPADM

## 2024-04-10 RX ORDER — BUPIVACAINE HYDROCHLORIDE 5 MG/ML
INJECTION, SOLUTION PERINEURAL
Status: COMPLETED | OUTPATIENT
Start: 2024-04-10 | End: 2024-04-10

## 2024-04-10 RX ORDER — DEXTROSE MONOHYDRATE 25 G/50ML
25 INJECTION, SOLUTION INTRAVENOUS
Status: DISCONTINUED | OUTPATIENT
Start: 2024-04-10 | End: 2024-04-11 | Stop reason: HOSPADM

## 2024-04-10 RX ORDER — ROPIVACAINE HYDROCHLORIDE 2 MG/ML
INJECTION, SOLUTION EPIDURAL; INFILTRATION; PERINEURAL CONTINUOUS
Status: DISCONTINUED | OUTPATIENT
Start: 2024-04-10 | End: 2024-04-11 | Stop reason: HOSPADM

## 2024-04-10 RX ORDER — ONDANSETRON 2 MG/ML
4 INJECTION INTRAMUSCULAR; INTRAVENOUS EVERY 6 HOURS PRN
Status: DISCONTINUED | OUTPATIENT
Start: 2024-04-10 | End: 2024-04-11 | Stop reason: HOSPADM

## 2024-04-10 RX ORDER — TRANEXAMIC ACID 10 MG/ML
1000 INJECTION, SOLUTION INTRAVENOUS ONCE
Status: COMPLETED | OUTPATIENT
Start: 2024-04-10 | End: 2024-04-10

## 2024-04-10 RX ORDER — LABETALOL HYDROCHLORIDE 5 MG/ML
10 INJECTION, SOLUTION INTRAVENOUS EVERY 4 HOURS PRN
Status: DISCONTINUED | OUTPATIENT
Start: 2024-04-10 | End: 2024-04-11 | Stop reason: HOSPADM

## 2024-04-10 RX ORDER — OXYCODONE HYDROCHLORIDE 5 MG/1
5 TABLET ORAL EVERY 4 HOURS PRN
Status: DISCONTINUED | OUTPATIENT
Start: 2024-04-10 | End: 2024-04-11 | Stop reason: HOSPADM

## 2024-04-10 RX ORDER — IPRATROPIUM BROMIDE AND ALBUTEROL SULFATE 2.5; .5 MG/3ML; MG/3ML
3 SOLUTION RESPIRATORY (INHALATION) ONCE AS NEEDED
Status: DISCONTINUED | OUTPATIENT
Start: 2024-04-10 | End: 2024-04-10 | Stop reason: HOSPADM

## 2024-04-10 RX ORDER — SODIUM CHLORIDE 0.9 % (FLUSH) 0.9 %
10 SYRINGE (ML) INJECTION EVERY 12 HOURS SCHEDULED
Status: DISCONTINUED | OUTPATIENT
Start: 2024-04-10 | End: 2024-04-10 | Stop reason: HOSPADM

## 2024-04-10 RX ORDER — MIDAZOLAM HYDROCHLORIDE 1 MG/ML
0.5 INJECTION INTRAMUSCULAR; INTRAVENOUS
Status: DISCONTINUED | OUTPATIENT
Start: 2024-04-10 | End: 2024-04-10 | Stop reason: HOSPADM

## 2024-04-10 RX ORDER — HYDRALAZINE HYDROCHLORIDE 20 MG/ML
5 INJECTION INTRAMUSCULAR; INTRAVENOUS
Status: DISCONTINUED | OUTPATIENT
Start: 2024-04-10 | End: 2024-04-10 | Stop reason: HOSPADM

## 2024-04-10 RX ORDER — ASPIRIN 325 MG
325 TABLET, DELAYED RELEASE (ENTERIC COATED) ORAL DAILY
Qty: 42 TABLET | Refills: 0 | Status: SHIPPED | OUTPATIENT
Start: 2024-04-10

## 2024-04-10 RX ORDER — PROPOFOL 10 MG/ML
VIAL (ML) INTRAVENOUS CONTINUOUS PRN
Status: DISCONTINUED | OUTPATIENT
Start: 2024-04-10 | End: 2024-04-10 | Stop reason: SURG

## 2024-04-10 RX ORDER — HYDROMORPHONE HYDROCHLORIDE 1 MG/ML
0.5 INJECTION, SOLUTION INTRAMUSCULAR; INTRAVENOUS; SUBCUTANEOUS
Status: DISCONTINUED | OUTPATIENT
Start: 2024-04-10 | End: 2024-04-10 | Stop reason: SDUPTHER

## 2024-04-10 RX ORDER — PROMETHAZINE HYDROCHLORIDE 25 MG/1
25 TABLET ORAL ONCE AS NEEDED
Status: DISCONTINUED | OUTPATIENT
Start: 2024-04-10 | End: 2024-04-10 | Stop reason: HOSPADM

## 2024-04-10 RX ORDER — TRANEXAMIC ACID 10 MG/ML
1000 INJECTION, SOLUTION INTRAVENOUS ONCE
Status: DISCONTINUED | OUTPATIENT
Start: 2024-04-10 | End: 2024-04-10 | Stop reason: HOSPADM

## 2024-04-10 RX ORDER — FENTANYL CITRATE 50 UG/ML
50 INJECTION, SOLUTION INTRAMUSCULAR; INTRAVENOUS
Status: DISCONTINUED | OUTPATIENT
Start: 2024-04-10 | End: 2024-04-10 | Stop reason: SDUPTHER

## 2024-04-10 RX ORDER — DEXAMETHASONE SODIUM PHOSPHATE 4 MG/ML
INJECTION, SOLUTION INTRA-ARTICULAR; INTRALESIONAL; INTRAMUSCULAR; INTRAVENOUS; SOFT TISSUE AS NEEDED
Status: DISCONTINUED | OUTPATIENT
Start: 2024-04-10 | End: 2024-04-10 | Stop reason: SURG

## 2024-04-10 RX ORDER — DROPERIDOL 2.5 MG/ML
0.62 INJECTION, SOLUTION INTRAMUSCULAR; INTRAVENOUS ONCE AS NEEDED
Status: DISCONTINUED | OUTPATIENT
Start: 2024-04-10 | End: 2024-04-10 | Stop reason: HOSPADM

## 2024-04-10 RX ORDER — LIDOCAINE HYDROCHLORIDE 10 MG/ML
0.5 INJECTION, SOLUTION EPIDURAL; INFILTRATION; INTRACAUDAL; PERINEURAL ONCE AS NEEDED
Status: COMPLETED | OUTPATIENT
Start: 2024-04-10 | End: 2024-04-10

## 2024-04-10 RX ORDER — DOCUSATE SODIUM 100 MG/1
100 CAPSULE, LIQUID FILLED ORAL 2 TIMES DAILY PRN
Qty: 60 CAPSULE | Refills: 0 | Status: SHIPPED | OUTPATIENT
Start: 2024-04-10

## 2024-04-10 RX ORDER — SODIUM CHLORIDE 0.9 % (FLUSH) 0.9 %
10 SYRINGE (ML) INJECTION AS NEEDED
Status: DISCONTINUED | OUTPATIENT
Start: 2024-04-10 | End: 2024-04-10 | Stop reason: HOSPADM

## 2024-04-10 RX ORDER — NICOTINE POLACRILEX 4 MG
15 LOZENGE BUCCAL
Status: DISCONTINUED | OUTPATIENT
Start: 2024-04-10 | End: 2024-04-11 | Stop reason: HOSPADM

## 2024-04-10 RX ORDER — HYDROMORPHONE HYDROCHLORIDE 1 MG/ML
0.5 INJECTION, SOLUTION INTRAMUSCULAR; INTRAVENOUS; SUBCUTANEOUS
Status: DISCONTINUED | OUTPATIENT
Start: 2024-04-10 | End: 2024-04-10 | Stop reason: HOSPADM

## 2024-04-10 RX ORDER — HYDROCODONE BITARTRATE AND ACETAMINOPHEN 5; 325 MG/1; MG/1
1 TABLET ORAL ONCE AS NEEDED
Status: DISCONTINUED | OUTPATIENT
Start: 2024-04-10 | End: 2024-04-10 | Stop reason: HOSPADM

## 2024-04-10 RX ORDER — CEFADROXIL 500 MG/1
500 CAPSULE ORAL 2 TIMES DAILY
Qty: 14 CAPSULE | Refills: 0 | Status: SHIPPED | OUTPATIENT
Start: 2024-04-10 | End: 2024-04-17

## 2024-04-10 RX ORDER — SODIUM CHLORIDE AND POTASSIUM CHLORIDE 150; 900 MG/100ML; MG/100ML
50 INJECTION, SOLUTION INTRAVENOUS CONTINUOUS
Status: DISCONTINUED | OUTPATIENT
Start: 2024-04-10 | End: 2024-04-11 | Stop reason: HOSPADM

## 2024-04-10 RX ORDER — NALOXONE HCL 0.4 MG/ML
0.4 VIAL (ML) INJECTION
Status: DISCONTINUED | OUTPATIENT
Start: 2024-04-10 | End: 2024-04-11 | Stop reason: HOSPADM

## 2024-04-10 RX ORDER — ONDANSETRON 4 MG/1
4 TABLET, FILM COATED ORAL EVERY 8 HOURS PRN
Qty: 15 TABLET | Refills: 1 | Status: SHIPPED | OUTPATIENT
Start: 2024-04-10

## 2024-04-10 RX ORDER — BUPIVACAINE HYDROCHLORIDE 2.5 MG/ML
INJECTION, SOLUTION EPIDURAL; INFILTRATION; INTRACAUDAL
Status: DISCONTINUED | OUTPATIENT
Start: 2024-04-10 | End: 2024-04-10 | Stop reason: SURG

## 2024-04-10 RX ORDER — DROPERIDOL 2.5 MG/ML
0.62 INJECTION, SOLUTION INTRAMUSCULAR; INTRAVENOUS ONCE AS NEEDED
Status: DISCONTINUED | OUTPATIENT
Start: 2024-04-10 | End: 2024-04-10 | Stop reason: SDUPTHER

## 2024-04-10 RX ORDER — SODIUM CHLORIDE 0.9 % (FLUSH) 0.9 %
3-10 SYRINGE (ML) INJECTION AS NEEDED
Status: DISCONTINUED | OUTPATIENT
Start: 2024-04-10 | End: 2024-04-10 | Stop reason: HOSPADM

## 2024-04-10 RX ORDER — FAMOTIDINE 20 MG/1
20 TABLET, FILM COATED ORAL ONCE
Status: COMPLETED | OUTPATIENT
Start: 2024-04-10 | End: 2024-04-10

## 2024-04-10 RX ORDER — ONDANSETRON 2 MG/ML
4 INJECTION INTRAMUSCULAR; INTRAVENOUS EVERY 6 HOURS PRN
Status: DISCONTINUED | OUTPATIENT
Start: 2024-04-10 | End: 2024-04-10 | Stop reason: SDUPTHER

## 2024-04-10 RX ORDER — ACETAMINOPHEN 500 MG
1000 TABLET ORAL EVERY 6 HOURS
Status: DISCONTINUED | OUTPATIENT
Start: 2024-04-10 | End: 2024-04-11 | Stop reason: HOSPADM

## 2024-04-10 RX ORDER — SODIUM CHLORIDE, SODIUM LACTATE, POTASSIUM CHLORIDE, CALCIUM CHLORIDE 600; 310; 30; 20 MG/100ML; MG/100ML; MG/100ML; MG/100ML
9 INJECTION, SOLUTION INTRAVENOUS CONTINUOUS
Status: DISCONTINUED | OUTPATIENT
Start: 2024-04-10 | End: 2024-04-11 | Stop reason: HOSPADM

## 2024-04-10 RX ORDER — ATORVASTATIN CALCIUM 10 MG/1
10 TABLET, FILM COATED ORAL NIGHTLY
Status: DISCONTINUED | OUTPATIENT
Start: 2024-04-10 | End: 2024-04-11 | Stop reason: HOSPADM

## 2024-04-10 RX ORDER — OXYCODONE HCL 10 MG/1
10 TABLET, FILM COATED, EXTENDED RELEASE ORAL ONCE
Status: COMPLETED | OUTPATIENT
Start: 2024-04-10 | End: 2024-04-10

## 2024-04-10 RX ORDER — ASPIRIN 325 MG
325 TABLET ORAL DAILY
Status: DISCONTINUED | OUTPATIENT
Start: 2024-04-11 | End: 2024-04-11 | Stop reason: HOSPADM

## 2024-04-10 RX ORDER — SODIUM CHLORIDE 0.9 % (FLUSH) 0.9 %
3 SYRINGE (ML) INJECTION EVERY 12 HOURS SCHEDULED
Status: DISCONTINUED | OUTPATIENT
Start: 2024-04-10 | End: 2024-04-10 | Stop reason: HOSPADM

## 2024-04-10 RX ORDER — SENNOSIDES 8.6 MG
650 CAPSULE ORAL EVERY 8 HOURS
Qty: 90 TABLET | Refills: 0 | Status: SHIPPED | OUTPATIENT
Start: 2024-04-10

## 2024-04-10 RX ORDER — NALOXONE HCL 0.4 MG/ML
0.4 VIAL (ML) INJECTION AS NEEDED
Status: DISCONTINUED | OUTPATIENT
Start: 2024-04-10 | End: 2024-04-10 | Stop reason: HOSPADM

## 2024-04-10 RX ORDER — SODIUM CHLORIDE 0.9 % (FLUSH) 0.9 %
3-10 SYRINGE (ML) INJECTION AS NEEDED
Status: DISCONTINUED | OUTPATIENT
Start: 2024-04-10 | End: 2024-04-11 | Stop reason: HOSPADM

## 2024-04-10 RX ORDER — FENTANYL CITRATE 50 UG/ML
50 INJECTION, SOLUTION INTRAMUSCULAR; INTRAVENOUS
Status: DISCONTINUED | OUTPATIENT
Start: 2024-04-10 | End: 2024-04-10 | Stop reason: HOSPADM

## 2024-04-10 RX ORDER — OXYCODONE HYDROCHLORIDE 5 MG/1
5 TABLET ORAL EVERY 6 HOURS PRN
Qty: 30 TABLET | Refills: 0 | Status: SHIPPED | OUTPATIENT
Start: 2024-04-10

## 2024-04-10 RX ORDER — IBUPROFEN 400 MG/1
400 TABLET ORAL EVERY 8 HOURS PRN
Qty: 90 TABLET | Refills: 1 | Status: SHIPPED | OUTPATIENT
Start: 2024-04-10

## 2024-04-10 RX ORDER — ACETAMINOPHEN 500 MG
1000 TABLET ORAL ONCE
Status: COMPLETED | OUTPATIENT
Start: 2024-04-10 | End: 2024-04-10

## 2024-04-10 RX ORDER — SODIUM CHLORIDE 9 MG/ML
40 INJECTION, SOLUTION INTRAVENOUS AS NEEDED
Status: DISCONTINUED | OUTPATIENT
Start: 2024-04-10 | End: 2024-04-11 | Stop reason: HOSPADM

## 2024-04-10 RX ORDER — DOCUSATE SODIUM 100 MG/1
100 CAPSULE, LIQUID FILLED ORAL 2 TIMES DAILY PRN
Status: DISCONTINUED | OUTPATIENT
Start: 2024-04-10 | End: 2024-04-11 | Stop reason: HOSPADM

## 2024-04-10 RX ORDER — DROPERIDOL 2.5 MG/ML
0.62 INJECTION, SOLUTION INTRAMUSCULAR; INTRAVENOUS
Status: DISCONTINUED | OUTPATIENT
Start: 2024-04-10 | End: 2024-04-10 | Stop reason: SDUPTHER

## 2024-04-10 RX ORDER — LABETALOL HYDROCHLORIDE 5 MG/ML
5 INJECTION, SOLUTION INTRAVENOUS
Status: DISCONTINUED | OUTPATIENT
Start: 2024-04-10 | End: 2024-04-10 | Stop reason: HOSPADM

## 2024-04-10 RX ORDER — SODIUM CHLORIDE 9 MG/ML
40 INJECTION, SOLUTION INTRAVENOUS AS NEEDED
Status: DISCONTINUED | OUTPATIENT
Start: 2024-04-10 | End: 2024-04-10 | Stop reason: HOSPADM

## 2024-04-10 RX ORDER — GLYCOPYRROLATE 0.2 MG/ML
INJECTION INTRAMUSCULAR; INTRAVENOUS AS NEEDED
Status: DISCONTINUED | OUTPATIENT
Start: 2024-04-10 | End: 2024-04-10 | Stop reason: SURG

## 2024-04-10 RX ORDER — ONDANSETRON 4 MG/1
4 TABLET, ORALLY DISINTEGRATING ORAL EVERY 6 HOURS PRN
Status: DISCONTINUED | OUTPATIENT
Start: 2024-04-10 | End: 2024-04-11 | Stop reason: HOSPADM

## 2024-04-10 RX ORDER — ONDANSETRON 2 MG/ML
4 INJECTION INTRAMUSCULAR; INTRAVENOUS ONCE AS NEEDED
Status: DISCONTINUED | OUTPATIENT
Start: 2024-04-10 | End: 2024-04-10 | Stop reason: HOSPADM

## 2024-04-10 RX ORDER — INSULIN LISPRO 100 [IU]/ML
2-7 INJECTION, SOLUTION INTRAVENOUS; SUBCUTANEOUS
Status: DISCONTINUED | OUTPATIENT
Start: 2024-04-10 | End: 2024-04-11 | Stop reason: HOSPADM

## 2024-04-10 RX ORDER — IBUPROFEN 600 MG/1
1 TABLET ORAL
Status: DISCONTINUED | OUTPATIENT
Start: 2024-04-10 | End: 2024-04-11 | Stop reason: HOSPADM

## 2024-04-10 RX ORDER — PROMETHAZINE HYDROCHLORIDE 25 MG/1
25 SUPPOSITORY RECTAL ONCE AS NEEDED
Status: DISCONTINUED | OUTPATIENT
Start: 2024-04-10 | End: 2024-04-10 | Stop reason: HOSPADM

## 2024-04-10 RX ORDER — EPHEDRINE SULFATE 50 MG/ML
INJECTION INTRAVENOUS AS NEEDED
Status: DISCONTINUED | OUTPATIENT
Start: 2024-04-10 | End: 2024-04-10 | Stop reason: SURG

## 2024-04-10 RX ORDER — SODIUM CHLORIDE 0.9 % (FLUSH) 0.9 %
3 SYRINGE (ML) INJECTION EVERY 12 HOURS SCHEDULED
Status: DISCONTINUED | OUTPATIENT
Start: 2024-04-10 | End: 2024-04-11 | Stop reason: HOSPADM

## 2024-04-10 RX ORDER — MELOXICAM 15 MG/1
15 TABLET ORAL DAILY
Status: DISCONTINUED | OUTPATIENT
Start: 2024-04-10 | End: 2024-04-11 | Stop reason: HOSPADM

## 2024-04-10 RX ADMIN — ACETAMINOPHEN 1000 MG: 500 TABLET ORAL at 11:15

## 2024-04-10 RX ADMIN — ATORVASTATIN CALCIUM 10 MG: 10 TABLET, FILM COATED ORAL at 19:38

## 2024-04-10 RX ADMIN — SODIUM CHLORIDE, POTASSIUM CHLORIDE, SODIUM LACTATE AND CALCIUM CHLORIDE 9 ML/HR: 600; 310; 30; 20 INJECTION, SOLUTION INTRAVENOUS at 11:27

## 2024-04-10 RX ADMIN — TRANEXAMIC ACID 1000 MG: 10 INJECTION, SOLUTION INTRAVENOUS at 14:30

## 2024-04-10 RX ADMIN — POTASSIUM CHLORIDE AND SODIUM CHLORIDE 50 ML/HR: 900; 150 INJECTION, SOLUTION INTRAVENOUS at 18:54

## 2024-04-10 RX ADMIN — MELOXICAM 15 MG: 15 TABLET ORAL at 20:38

## 2024-04-10 RX ADMIN — BUPIVACAINE HYDROCHLORIDE 2.2 ML: 5 INJECTION, SOLUTION PERINEURAL at 14:25

## 2024-04-10 RX ADMIN — TRANEXAMIC ACID 1000 MG: 10 INJECTION, SOLUTION INTRAVENOUS at 16:29

## 2024-04-10 RX ADMIN — FAMOTIDINE 20 MG: 20 TABLET, FILM COATED ORAL at 11:15

## 2024-04-10 RX ADMIN — PROPOFOL 100 MCG/KG/MIN: 10 INJECTION, EMULSION INTRAVENOUS at 14:23

## 2024-04-10 RX ADMIN — PHENYLEPHRINE HYDROCHLORIDE 0.15 MCG/KG/MIN: 10 INJECTION INTRAVENOUS at 14:52

## 2024-04-10 RX ADMIN — SODIUM CHLORIDE 2 G: 900 INJECTION INTRAVENOUS at 14:30

## 2024-04-10 RX ADMIN — LIDOCAINE HYDROCHLORIDE 0.5 ML: 10 INJECTION, SOLUTION EPIDURAL; INFILTRATION; INTRACAUDAL; PERINEURAL at 11:15

## 2024-04-10 RX ADMIN — BUPIVACAINE HYDROCHLORIDE 20 ML: 2.5 INJECTION, SOLUTION EPIDURAL; INFILTRATION; INTRACAUDAL; PERINEURAL at 17:38

## 2024-04-10 RX ADMIN — DEXAMETHASONE SODIUM PHOSPHATE 4 MG: 4 INJECTION, SOLUTION INTRA-ARTICULAR; INTRALESIONAL; INTRAMUSCULAR; INTRAVENOUS; SOFT TISSUE at 16:29

## 2024-04-10 RX ADMIN — ACETAMINOPHEN 1000 MG: 500 TABLET ORAL at 20:38

## 2024-04-10 RX ADMIN — OXYCODONE HYDROCHLORIDE 10 MG: 10 TABLET, FILM COATED, EXTENDED RELEASE ORAL at 11:15

## 2024-04-10 RX ADMIN — ONDANSETRON 4 MG: 2 INJECTION INTRAMUSCULAR; INTRAVENOUS at 16:29

## 2024-04-10 RX ADMIN — EPHEDRINE SULFATE 5 MG: 50 INJECTION INTRAVENOUS at 15:05

## 2024-04-10 RX ADMIN — Medication: at 17:57

## 2024-04-10 RX ADMIN — GLYCOPYRROLATE 0.1 MG: 0.2 INJECTION INTRAMUSCULAR; INTRAVENOUS at 15:53

## 2024-04-10 RX ADMIN — EPHEDRINE SULFATE 5 MG: 50 INJECTION INTRAVENOUS at 15:00

## 2024-04-10 NOTE — H&P
Patient Name: Chente Rich  MRN: [id number]  : 1955  DOS: 4/10/2024    Attending: Rey Worthy,*    Primary Care Provider: Millie Loya MD      Chief complaint:  Left knee pain    Subjective   Patient is a pleasant 69 y.o. male presented for scheduled surgery by Dr. Worthy.  He anticipates left total knee replacement today.  His knee has been painful for couple years.  He has occasional swelling of his left knee.  He denies use of assistive device for ambulation or recent falls.    When seen preop he is doing well.  He denies pain or other complaints.  He denies nausea, shortness of breath or chest pain.  No history of DVT or PE.    Allergies:  Allergies   Allergen Reactions    Penicillins Rash     Swelling skin turns purple        Meds:  Medications Prior to Admission   Medication Sig Dispense Refill Last Dose    polycarbophil (calcium polycarbophil) 625 MG tablet tablet Take 1 tablet by mouth Daily.   2024 at 0800    simvastatin (ZOCOR) 20 MG tablet Take 1 tablet by mouth Daily. (Patient taking differently: Take 1 tablet by mouth Every Night.) 90 tablet 3 2024 at 2100    cetirizine (zyrTEC) 10 MG tablet Take 1 tablet by mouth Daily.   2024         History:   Past Medical History:   Diagnosis Date    Abnormal screening cardiac CT 10/12/2017    Calcium Score 133    Acute diverticulitis 2023    Hosp 2023      BCC (basal cell carcinoma of skin)     Dx (initially)-.  Has occurred on head, chest, back, and right leg.    Colon polyp     Dx 10/17- tubular adenoma ascending colon, no dysplasia    Diabetes mellitus     NO LONGER NEEDS MEDS    Diverticulosis     Dx 10/17 by Colonoscopy- pancolonic    History of cardiovascular stress test 2020    negative stress echo, EF 60%    History of esophagogastroduodenoscopy (EGD) 2023    Normal    History of GI bleed 2023    Hospitalized.  EGD and Colonoscopy normal except diverticulosis    Hyperlipidemia      "Neuropathy of right lower extremity     Dx 2022- mild (by EMG)    OA (osteoarthritis) of knee 01/15/2024    SCC (squamous cell carcinoma) 2024    Dx 24- left forehead (Mohs Procedure 3/11/24)      Wears glasses      Past Surgical History:   Procedure Laterality Date    CATARACT EXTRACTION Bilateral 2021    laser (approx date)    COLONOSCOPY N/A 2023    Procedure: COLONOSCOPY;  Surgeon: Brunner, Mark I, MD;  Location: [facility];  Service: Gastroenterology;  Laterality: N/A;  3 clips placed in the descending colon. 1ml of endoscopic marker used in descending colon.    ENDOSCOPY N/A 2023    Procedure: ESOPHAGOGASTRODUODENOSCOPY;  Surgeon: Brunner, Mark I, MD;  Location: [facility];  Service: Gastroenterology;  Laterality: N/A;    KNEE SURGERY Left 2022    knee arthroscopy with partial medial menisectomy- JRT    WISDOM TOOTH EXTRACTION       Family History   Problem Relation Age of Onset    Breast cancer Mother          from sepsis    Tremor Mother     Hypertension Father     Hyperlipidemia Father     Prostate cancer Father     Coronary artery disease Father         Angioplasty/CABG, no MI    Tremor Sister     Cerebral aneurysm Sister     Mental illness Paternal Grandmother     Peripheral vascular disease Paternal Grandfather     Dementia Paternal Grandfather         \"Vascular\"    Aneurysm Neg Hx         AAA     Social History     Tobacco Use    Smoking status: Never     Passive exposure: Never    Smokeless tobacco: Never   Vaping Use    Vaping status: Never Used   Substance Use Topics    Alcohol use: Yes     Alcohol/week: 10.0 standard drinks of alcohol     Types: 7 Cans of beer, 3 Drinks containing 0.5 oz of alcohol per week     Comment: 1 drink (beer) daily    Drug use: Never   He is  with 2 children.  He is retired from [employer].    Review of Systems  Pertinent items are noted in HPI, all other systems reviewed and negative    Vital Signs  /86 (BP " "Location: Right arm, Patient Position: Lying)   Pulse 52   Temp 98.2 °F (36.8 °C) (Temporal)   Resp 16   Ht 177.8 cm (70\")   Wt 67.1 kg (148 lb)   SpO2 99%   BMI 21.24 kg/m²     Physical Exam:    General Appearance:    Alert, cooperative, in no acute distress   Head:    Normocephalic, without obvious abnormality, atraumatic   Eyes:            Lids and lashes normal, conjunctivae and sclerae normal, no   icterus, no pallor, corneas clear,    Ears:    Ears appear intact with no abnormalities noted   Throat:   No oral lesions, no thrush, oral mucosa moist   Neck:   No adenopathy, supple, trachea midline, no thyromegaly    Lungs:     Clear to auscultation,respirations regular, even and unlabored    Heart:    Regular rhythm and normal rate, normal S1 and S2, no murmur, no gallop   Abdomen:     Normal bowel sounds, no masses, no organomegaly, soft non-tender, non-distended, no guarding, no rebound  tenderness   Genitalia:    Deferred   Extremities:   Moves all extremities well, no edema, no cyanosis, no  redness   Pulses:   Pulses palpable and equal bilaterally   Skin:   No bleeding, bruising or rash   Neurologic:   Cranial nerves 2 - 12 grossly intact. Flexion and dorsiflexion intact bilateral feet.        I reviewed the patient's new clinical results.     Lab Results   Component Value Date    HGBA1C 7.10 (H) 03/28/2024      Latest Reference Range & Units 03/28/24 10:28   Sodium 136 - 145 mmol/L 135 (L)   Potassium 3.5 - 5.2 mmol/L 4.4   Chloride 98 - 107 mmol/L 98   CO2 22.0 - 29.0 mmol/L 31.0 (H)   Anion Gap 5.0 - 15.0 mmol/L 6.0   BUN 8 - 23 mg/dL 16   Creatinine 0.76 - 1.27 mg/dL 1.10   BUN/Creatinine Ratio 7.0 - 25.0  14.5   eGFR >60.0 mL/min/1.73 71.8   Glucose 65 - 99 mg/dL 106 (H)   Calcium 8.6 - 10.5 mg/dL 9.5   Alkaline Phosphatase 39 - 117 U/L 60   Total Protein 6.0 - 8.5 g/dL 6.8   Albumin 3.5 - 5.2 g/dL 4.1   Globulin gm/dL 2.7   A/G Ratio g/dL 1.5   AST (SGOT) 1 - 40 U/L 36   ALT (SGPT) 1 - 41 U/L 25 "   Total Bilirubin 0.0 - 1.2 mg/dL 0.2   Hemoglobin A1C 4.80 - 5.60 % 7.10 (H)   C-Reactive Protein 0.00 - 0.50 mg/dL <0.30   WBC 3.40 - 10.80 10*3/mm3 6.43   RBC 4.14 - 5.80 10*6/mm3 4.44   Hemoglobin 13.0 - 17.7 g/dL 13.3   Hematocrit 37.5 - 51.0 % 40.3   Platelets 140 - 450 10*3/mm3 294   RDW 12.3 - 15.4 % 13.2   MCV 79.0 - 97.0 fL 90.8   MCH 26.6 - 33.0 pg 30.0   MCHC 31.5 - 35.7 g/dL 33.0   MPV 6.0 - 12.0 fL 9.2   (L): Data is abnormally low  (H): Data is abnormally high    Assessment and Plan:     OA (osteoarthritis) of knee    Hyperlipidemia    Controlled type 2 diabetes mellitus without complication, without long-term current use of insulin      Plan  1. PT/OT- WBAT LLE  2. Pain control-prns, AC nerve block  3. IS-encourage  4. DVT proph- Mechs/ASA  5. Bowel regimen  6. Resume home medications as appropriate  7. Monitor post-op labs  8. DC planning for home    Hyperlipidemia  -Continue home statin    DM  - hgb A1c on 3/28/2024 7.1  - Accu-Chek AC and HS with low dose SSI      Janelle Gonzales, TASIA  04/10/24  14:43 EDT

## 2024-04-10 NOTE — ANESTHESIA POSTPROCEDURE EVALUATION
Patient: Chente Rich    Procedure Summary       Date: 04/10/24 Room / [facility]:  [facility]    Anesthesia Start: 1420 Anesthesia Stop: 1729    Procedure: TOTAL KNEE ARTHROPLASTY WITH OSWALDO ROBOT - LEFT (Left: Knee) Diagnosis:       Primary osteoarthritis of left knee      (Primary osteoarthritis of left knee [M17.12])    Surgeons: Rey Worthy MD Provider: Ivory Green DO    Anesthesia Type: spinal ASA Status: 3            Anesthesia Type: spinal    Vitals  Vitals Value Taken Time   /64 04/10/24 1735   Temp 97.5 °F (36.4 °C) 04/10/24 1735   Pulse 42 04/10/24 1741   Resp 16 04/10/24 1735   SpO2 100 % 04/10/24 1741   Vitals shown include unfiled device data.        Post Anesthesia Care and Evaluation    Patient location during evaluation: PACU  Patient participation: complete - patient participated  Level of consciousness: awake and alert  Pain management: adequate    Airway patency: patent  Anesthetic complications: No anesthetic complications  PONV Status: none  Cardiovascular status: hemodynamically stable and acceptable  Respiratory status: nonlabored ventilation, acceptable and nasal cannula  Hydration status: acceptable

## 2024-04-10 NOTE — ANESTHESIA PROCEDURE NOTES
Spinal Block      Patient reassessed immediately prior to procedure    Patient location during procedure: OR  Start Time: 4/10/2024 2:23 PM  Stop Time: 4/10/2024 2:28 PM  Indication:at surgeon's request  Performed ByHA: Miguel Gautam SRNA  Preanesthetic Checklist  Completed: patient identified, IV checked, site marked, risks and benefits discussed, surgical consent, monitors and equipment checked, pre-op evaluation and timeout performed  Spinal Block Prep:  Patient Position:sitting  Sterile Tech:cap, gloves, sterile barriers and mask  Prep:Chloraprep  Patient Monitoring:blood pressure monitoring, continuous pulse oximetry and EKG    Spinal Block Procedure  Approach:midline  Guidance:landmark technique and palpation technique  Location:L4-L5  Needle Type:Lara  Needle Gauge:25 G  Placement of Spinal needle event:cerebrospinal fluid aspirated  Paresthesia: no  Fluid Appearance:clear  Medications: bupivacaine (MARCAINE) 0.5 % injection - Injection   2.2 mL - 4/10/2024 2:25:00 PM   Post Assessment  Patient Tolerance:patient tolerated the procedure well with no apparent complications  Complications no  Additional Notes  Procedure:  Pt assisted to sitting position, with legs in position of comfort over side of bed.  Pt. instructed in optimal spine presentation, the spine was prepped/ Draped and the skin at insertion site was anesthetized with 1% Lidocaine 2 ml.  The spinal needle was then advanced until CSF flow was obtained and LA was injected:

## 2024-04-10 NOTE — ANESTHESIA PROCEDURE NOTES
Peripheral Block    Pre-sedation assessment completed: 4/10/2024 5:23 PM    Patient reassessed immediately prior to procedure    Start time: 4/10/2024 5:30 PM  Stop time: 4/10/2024 5:38 PM  Reason for block: at surgeon's request and post-op pain management  Performed by  CRNA/CAA: Deejay Smith, BRETT  Assisted by: Charles Saleh CRNASRNA: Miguel Gautam SRNA  Preanesthetic Checklist  Completed: patient identified, IV checked, site marked, risks and benefits discussed, surgical consent, monitors and equipment checked, pre-op evaluation and timeout performed  Prep:  Pt Position: supine  Sterile barriers:cap, gloves, mask, sterile barriers and washed/disinfected hands  Prep: ChloraPrep  Patient monitoring: blood pressure monitoring, continuous pulse oximetry and EKG  Procedure  Performed under: spinal  Guidance:ultrasound guided    ULTRASOUND INTERPRETATION.  Using ultrasound guidance a 20 G gauge needle was placed in close proximity to the nerve, at which point, under ultrasound guidance anesthetic was injected in the area of the nerve and spread of the anesthesia was seen on ultrasound in close proximity thereto.  There were no abnormalities seen on ultrasound; a digital image was taken; and the patient tolerated the procedure with no complications. Images:still images obtained, printed/placed on chart    Laterality:left  Block Type:adductor canal block  Injection Technique:catheter  Needle Type:Tuohy and echogenic  Needle Gauge:18 G  Resistance on Injection: none  Catheter Size:20 G (20g)  Cath Depth at skin: 8 cm    Medications Used: bupivacaine PF (MARCAINE) 0.25 % injection - Injection   20 mL - 4/10/2024 5:38:00 PM      Medications  Preservative Free Saline:5ml    Post Assessment  Injection Assessment: negative aspiration for heme, incremental injection and no paresthesia on injection  Patient Tolerance:comfortable throughout block  Complications:no  Additional Notes  CATHETER   A high-frequency linear  "transducer, with sterile cover, was placed on the anterior mid-thigh (between the anterior superior iliac spine and patella). The transducer was then moved medially to identify the Sartorius muscle (Mal), Vastus Medialis muscle (VMM), Superficial Femoral Artery (SFA) and Vein. The transducer was then moved cephalad or caudad to position the SFA in the middle of the Mal. The insertion site was prepped and draped in sterile fashion. Skin and cutaneous tissue was infiltrated with 2-5 ml of 1% Lidocaine. Using ultrasound-guidance, an 18-gauge Contiplex Ultra 360 Touhy needle was advanced in plane from lateral to medial. Preservative-free normal saline was utilized for hydro-dissection of tissue, advancement of Touhy, and to confirm needle placement below the fascial plane of the Mal where the Nerve to the VMM is located. Local anesthetic (LA) 5 ml deposited here. The Touhy needle continues its path lateral to the SFA at the level of the Saphenous Nerve. The remainder of the LA was deposited at the 10-11 o'clock position of the SFA. This injection created a space between the Mal and the SFA. Aspiration every 5 ml to prevent intravascular injection. Injection was completed with negative aspiration of blood and negative intravascular injection. Injection pressures were normal with minimal resistance. A 20-gauge ByteShieldiplex Echo catheter was placed through the needle and advance out the tip of the Touhy 3-5 cm anterior to the SFA. The Touhy needle was then removed, and final catheter position verified at the 12 o'clock position to the SFA. The catheter was secured in the usual fashion with skin glue, benzoin, steri-strips, CHG tegaderm and label noting \"Nerve Block Catheter\". Jerk tape applied at yellow connector and catheter connection.           "

## 2024-04-10 NOTE — DISCHARGE INSTRUCTIONS
Discharge Instructions--Total Knee Replacement  Dr. Worthy      Congratulations!.  You have had knee replacement surgery.  The knee joint forms where the thigh bone, shin bone, and kneecap meet.  The knee joint is supported by muscles and ligaments.  It is lined with a cushioning called cartilage.  Over time, the cartilage wears away which can make the knee feel stiff and painful.  Dr. Worthy replaced your painful joint with an artificial joint to relieve the pain and restore range of motion.  Here are some directions to follow once you are at home.        **Medication/Pain management at home**    Medication schedule  A.  Expect to have pain following surgery.  Our goal is to maintain a manageable pain level.  The pain medications prescribed will provide relief but do not take away all of the pain.  The first few days after surgery can be the most painful.  Just keep in mind that it will get better.  B.  Staying on top of your pain with the combination of medications prescribed to you on a regular schedule is the best way to keep the pain from getting too severe.  You can begin weaning off the pain medication (i.e. oxycodone, Tylenol, ibuprofen) as symptoms allow.  Please make sure that your pain is controlled well enough to fully participate with physical therapy.  C.  The most effective way to take the medications is to take the Ibuprofen and Tylenol together every 8 hours.  If that does not do the trick then supplement with the oxycodone or hydrocodone.  If you are taking the oxycodone or hydrocodone, please be sure to also take the Colace (stool softener) to reduce the risk of constipation.  One of the major side effects of opioid pain medications is constipation.  Please keep this in mind.  D.  Do not wait until your pain level is at an 8 or 9 before taking medications.  At this point, you have already lost control of your pain and it will take a higher dosage to get back to a comfortable level.   Remember that the medications you are on take between 20 to 30 minutes to begin taking effect.    2.  Ice/compression/elevation  A.  Icing is helpful to reduce pain and swelling.  Icing 20 minutes at a time at least 3 times a day will be beneficial.  Do not place the ice directly on the skin itself but use a barrier such as a washcloth or a towel between the ice and your skin.  B.  Elevation works wonders for swelling.  Remember that the leg must be elevated at or above the heart for this to be effective.  C.  Remember not to place pillows directly under the knee but instead place them under your heel and ankle.  Placing pillows directly into your knee may lead to stiffness and difficulty regaining your extension.  D.  Sitting in a chair with the leg extended in front of you is not sufficient elevation and we will not make a significant difference in your swelling.  If your ankle is below your heart, your swelling may worsen.  E.  Compression is very helpful on the leg when you cannot elevate or ice.  Using an Ace wrap or compression sleeve are two good options.  Be sure to wrap the ace wrap starting from the foot and continuing up above the knee.      3.  DVT prevention  A.  Because you have had knee replacement surgery, you are at higher risk for developing blood clots.  The more active you are, the lower the risk of developing blood clots.  B.  We also reduce the risk of this rare complication from surgery by giving you medication such as aspirin, Eliquis, or Xarelto.  Please let Dr. Worthy know if you have a personal history of blood clots or a first-degree relative has a history of blood clots that you did not tell him about preoperatively.  This may require a change in your blood clot prevention medication that he has prescribed.  C.  The blood clot prevention medications must be taken as scheduled.  Please let the office know if you are having trouble tolerating the medication or affording the  medication.    4.  Planning for pain medication refills   A.  Keep track of the number of pills you are taking on a daily basis and how many you have left.   B.  It takes on average 24-hours for our office to refill medications  C.  Medications will not be called in on the weekends or after hours.  More than likely the doctors that are on call for emergencies are not familiar with your case and will not call in pain medication.  D.  Be especially aware if the weekend is coming up and plan accordingly.  If you feel like you may run out of medication over the weekend, please call earlier in the week.    5.  Resuming normal home medications  A.  Unless directed otherwise by your primary care physician, Dr. Worthy, or the hospitalist, you may resume all normal home medications after discharge from the hospital.  If you have questions please call the office.  B.  If you are on medications prescribed by a specialist (cardiologist, rheumatologist, etc.) contact their office about any questions or changes to those medications following surgery.      **Incision Care**    Your Exofin Fusion dressing system is designed to stay in place until your first follow-up appointment in 3 weeks.  Underneath the Exofin Fusion dressing system (looks similar to drywall tape), there are absorbable sutures which will dissolve on their own over time.  On occasion, people have a reaction to the Exofin Fusion dressing system and develop a rash.  Please call the office if this occurs.  You may shower following surgery once your nerve block has been removed.  Please keep the incision clean and dry, however.  The best way to do this is with cling wrap or a plastic bag taped on both ends.  Sit on a shower chair or stool when you shower to keep from falling.  While you may shower normally after surgery, baths unfortunately will disrupt the dressing and increase your risk of infection.  Similarly, do not submerge your operative leg in a bathtub,  swimming pool, or hot tub until you have been cleared to do so by Dr. Worthy.  It usually takes about 6 weeks of healing time until these activities are allowed.  If your Exofin Fusion dressing system starts to peel off before your appointment, simply trim the edges and leave as much of it intact as possible.  After the Exofin Fusion dressing system has been removed at your 3-week postop appointment, do not put any creams, lotions, antibiotic ointments, or scar creams on your incision.  Your incision needs to be completely healed (no scabs) until these products can be used safely.  Your operative knee will be warmer to the touch than your nonsurgical knee for at least a few months.  If you notice, however, that your knee is very hot to the touch, associated with any visible drainage or marked redness, or you are having a fever or a dramatic increase in your pain not related to activity, please call the office or the surgeon on call immediately.  If you went home the same day as your surgery, you will notice that you have a large bulky dressing consisting of an ACE wrap and some cotton soft roll.  On the first day after surgery, you should remove both the ACE wrap and cotton wrap which will help you feel less constricted with range of motion exercises.  Save the ACE wrap in case you need it to protect the Exofin Fusion dressing or the add some compression to your knee/leg if you are having some swelling.        **Activity**    You will have 1-2 physical therapy sessions while in the hospital.  Please make sure you have a physical therapy appointment scheduled within 3 to 5 days of returning home.  If you do not or have not heard anything with regards to scheduling, please contact the office.  Typically physical therapy is scheduled during your preop visit or during your hospital visit.  Very important!  Get that knee moving!  Range of motion is the MOST important aspect of your rehab in the first 2 weeks after  surgery.  Once you have reached sufficient range of motion, your physical therapist will advance your rehab and add strengthening exercises.  Your range of motion goals are 0 degrees of extension and 90 degrees of flexion at the 3-week visit.  By 6 weeks, we want 0 degrees of extension and 120 degrees of flexion.  In rare cases, if you fail to meet your range of motion goals, we may have to take you back to the operating room to perform a procedure to restore your range of motion.  This is called a manipulation under anesthesia and not something we love doing.  This can be avoided by being vigilant about restoring your range of motion as soon as possible before scar tissue forms.  Daily movement is important to improve your comfort following surgery.  You should plan on some gentle walking and stretching 2-3 times a day in addition to your physical therapy exercises.  Recovery from a knee replacement takes time.  There will be good days where you feel hardly any pain and bad days following the surgery where you feel your pain and swelling are greater.  Know that your body is healing from a major surgery and pay attention to cues when you need to rest and take a break.  Lean on your physical therapist to help you differentiate between good pain and bad pain.  Sit in chairs with arms.  The arms make it easier for you to stand up and sit down.  Do not sit for more than 30 to 45 minutes at a time.  Nap if you are tired but do not stay in bed all day.  Do not drive until your healthcare provider says it is okay.  Most people can start driving at about 6 weeks after surgery.  Do not drive while you are taking opioid pain medication.  Remove items that may cause you to fall such as throw rugs and electrical cords.  Use nonslip bath mats, grab bars, and elevated toilet seat, and a shower chair in your bathroom.  Until your balance, flexibility, and strength improved, use a cane or walker.  Typically your physical therapist  will tell you when you can wean from these devices.  Tells your dentist and all your healthcare providers that you have an artificial joint.  You may be directed to take antibiotics as prescribed for any dental work.  Dental work is not allowed until 6 weeks after surgery.          **Post surgical appointments**    Be sure to schedule your outpatient physical therapy appointments before your surgery takes place.  Once you are discharged from the hospital, expect to attend physical therapy 2-3 times a week for the first 6 weeks.  On days when you are not attending formal therapy, you are expected to do your home exercises given to you by your therapist.    Plan to take your pain medication about 30 minutes before your physical therapy sessions.  If you are taking narcotic pain medicine prior to your therapy, please be sure to have a  to take you to and from your appointments.  Please make sure you have a post operative appointment to see Dr. Worthy or his PA around 3 weeks after surgery.  This appointment is typically made by the surgery scheduler but sometimes things happen.  If you do not have an appointment, please call the office.          **Call 911**  Call 911 right away if you are experiencing chest pain or shortness of breath        **Who to contact**    Call your healthcare provider if any of the following occur:  Fever of 100 or higher  Pain or swelling in your calf  Shaking chills  Stiffness or inability to move the knee  Increased swelling in your leg  Increased redness, tenderness, or swelling in or around the knee incision  Drainage from the knee incision  Increased knee pain  Our office is open from 8 AM to 4:30 PM Monday through Thursday and 8 AM to 3 PM on Fridays.  The office number is [phone].  If you need to contact someone after hours, please call the [facility] and asked for the orthopedic surgeon on-call for Dr. Worthy.  That phone number is [phone]  [phone].

## 2024-04-10 NOTE — OP NOTE
Orthopaedics Operative Report    PREOPERATIVE DIAGNOSIS: Primary osteoarthritis left knee    POSTOPERATIVE DIAGNOSIS: Same    PROCEDURE PERFORMED: Left total knee arthroplasty using Lexi robot, CPT 07289, 09443    SURGEON: Rey Worthy MD    ANESTHESIA:  Spinal    STAFF:  Circulator: Clarissa Tanner RN; Naomi Obrien RN  Scrub Person: Belle Alcaraz; Shannan Patel  Vendor Representative: Slick Morin  Nursing Assistant: Irina Grimm; Zuleyka Valreio  Assistant: Renee Orellana PA-C    TOURNIQUET TIME: 97 minutes    ESTIMATED BLOOD LOSS: 50 mL    COMPLICATIONS: None apparent.    RELEASES: None required after approach, bone cuts made, and osteophytes removed    Surgical Approach: Knee Medial Parapatellar     TXA: IV    IMPLANTS:     Implant Name Type Inv. Item Serial No.  Lot No. LRB No. Used Action   DEV CONTRL TISS STRATAFIX SYMM PDS PLUS DIMITRY CT-1 45CM - [id number] Implant DEV CONTRL TISS STRATAFIX SYMM PDS PLUS DIMITRY CT-1 45CM  [employer]  [employer] [id number] Left 1 Implanted   WAX BONE HEMO LUKENS SHARPOINT 2.5GM WHT - [id number] Implant WAX BONE HEMO LUKENS SHARPOINT 2.5GM WHT  S[employer] N/A Left 1 Implanted   CMT BONE R 1X40 - [id number] Implant CMT BONE R 1X40  [employer] [id number] Left 2 Implanted   PAT KN PERSONA ALLPOLY CMT 35MM - [id number] Implant PAT KN PERSONA ALLPOLY CMT 35MM  [employer] [id number] Left 1 Implanted   BASEPLT TIB/KN PERSONA KEEL CMT 0DEG TONY LT - [id number] Implant BASEPLT TIB/KN PERSONA KEEL CMT 0DEG TONY LT  [employer] [id number] Left 1 Implanted   COMP FEM/KN PERSONA CR CMT COCR STD SZ7 LT - [id number] Implant COMP FEM/KN PERSONA CR CMT COCR STD SZ7 LT  [employer] [id number] Left 1 Implanted   ART/SRF KN PERSONA/VE PS EF 6TO7 11MM LT - [id number] Implant ART/SRF KN PERSONA/VE PS EF 6TO7 11MM LT  [employer] [id number] Left 1 Implanted       Lavelle Persona CR femur size 7 standard  size E 0 degree tibia  35 patella button    Size 11 Vitamin E Medial Congruent highly crosslinked polyethylene articular surface    PREOPERATIVE ANTIBIOTICS: Kefzol    REFERRING PHYSICIAN: Millie Loya MD    INDICATIONS: Failure of nonoperative treatment including injections, bracing, and activity modification.    DESCRIPTION OF PROCEDURE: After informed consent was obtained, the patient was taken to the operating room. The patient was given a dose of IV antibiotics prior to incision.After the smooth induction of spinal anesthesia, the patient’s left lower extremity was prepped and draped in the usual fashion for this type of procedure. We performed a timeout to verify site and the procedure to be performed.  We began with exsanguination of the left lower extremity using an Esmarch bandage and inflation of tourniquet to 300 mmHg. We made our standard midline incision and medial parapatellar approach. We took 20% of the quadriceps tendon medially and a sleeve of tissue around the patella for repair as well a sliver of patellar tendon. Dissected extra-ostially but subperiosteally on the medial side taking off the superficial and deep MCL from the proximal tibia. These were protected throughout the procedure. Visible medial meniscus was excised. The retropatellar fat pad was excised. The ACL and visible lateral meniscus was excised as well as the synovium from the anterior surface of the distal femur.  Osteophytes were removed from the distal femur and proximal tibia at this point.       We then turned our attention to placement of our pins for the femoral and tibial trackers.  The femoral trackers were placed within the incision about 2 to 3 fingerbreadths from the articular cartilage on the medial aspect of the femur.  The tibial trackers were placed through percutaneous incisions on the tibia.  Once these were placed, we obtained our hip center and then we then registered our data points on the femur and tibia.  Once these were registered, we took the knee  through a range of motion and assessed our medial and lateral gaps at 0, 45, and 90 degrees. The 90 degree gap was obtained using a Rubio elevator.  At this point, we then created our surgical plan.  Extension gaps medially and laterally were 19.5 and 21.5 mm.  Flexion gaps medially and laterally were 18.5 and 22.0 mm.  Patient had a preoperative 9 degree varus deformity.  We were able to correct them to approximately 4-1/2 degrees of varus alignment.  Preop range of motion was -3.5 to 132.  We placed 2 degrees of varus alignment on the distal femoral cut and 1.5 degrees of varus on the proximal tibial cut.  Distal femoral cut was made at 11 mm.  Proximal tibial cut initially was made at 10 mm.        At this point the robotic arm was brought in and the distal femoral cut was made in collaborative mode.  This was made and then validated.  We then sized the femur to verify the size of the femoral component to be appropriate.  We then brought the robotic arm back into pin our external rotation.  Our 4-in-1 block was then placed and we assessed for possible notching as well as for the size of the posterior condylar cuts.  The 4-in-1 cut was made without difficulty and the excess bone removed.  We then brought the robotic arm back in to make our proximal tibia cut.  Retractors were placed and the bone cut was made again in collaborative mode which was validated both for amount of bone taken off and for slope.  We then used our blocks and checked our extension and flexion gaps which were felt to be acceptable.  We then took off posterior osteophytes off the posterior medial posterior lateral femur.  We completed preparation of our tibia and femur and then trialed and a size 11 seemed to have the best stability and range of motion parameters.  We then everted the patella and this was resurfaced, restoring patellar height before trials were placed to protect the bone. The patellar height was 26 mm preoperatively and we cut  the patella to 16 mm and sized the patella to a 35.  The lugholes were drilled and the component slightly medialized.      The bone was then thoroughly irrigated and holes were drilled on the tibia for improved cement perforation.  We then injected our periarticular injection into the posterior medial aspect of the knee which consisted of 20 ml of NS, 20 ml of 0.5% lidocaine with epi, 20 ml of 0.5% bupivicaine, 30 mg of toradol, and 8 mg of morphine. We cemented these implants in place and once the cement was allowed to cure fully, the excess cement was removed. We then deflated the tourniquet prior to placement of our final polyethylene spacer. Any bleeding seen in the back of the knee was controlled and we obtained hemostasis. The final spacer was then secured into place.  After the implants were placed and a size 11 polyethylene was placed, the final range of motion was -4 to 136.5 degrees. We then used a final irrigation consisting of Irricept and diluted Betadine throughout the knee.  Trackers and pins were then removed.  We then closed our medial parapatellar approach using 0 Ethibond in an interrupted fashion.  We then closed our subcutaneous layer using running 2-0 Vicryl and interrupted 2-0 Vicryl. We closed our skin using a running 3-0 Monocryl. We placed an Exofin Fusion dressing system over the incision and took down the drapes. The patient was transferred back to their hospital bed and then taken to the recovery room in stable condition. All counts were correct postoperatively. I performed the case.      First assistant: Renee Orellana PA-C    The skilled assistance of the above noted first assistant was necessary during this complex surgical procedure.  The surgical assistant assisted with every aspect of the operation including, but not limited to, proper and safe positioning of the patient, obtaining adequate surgical exposure, manipulation of surgical instruments to make the proper bone cuts,  cementing of the final implants, the continual process of hemostasis during the procedure itself in addition to surgical wound closure and removal of the patient from the operating table and returning the patient back to the [facility].  The assistance of the surgical assistant allowed me to perform the most sensitive and technical potions of this operation using 2 hands, thus enhancing efficiency and patient safety.  This would not be possible without the help of a skilled assistant familiar with the procedure and capable of safely performing the aforementioned tasks.         POSTOPERATIVE PLAN:  1. Weight bearing as tolerated left lower extremity.  2. The patient will receive an indwelling low femoral nerve block in the recovery room.  3.  Patient will receive IV antibiotics on the floor for 24 hours  4.  Patient will be given full dose ASA for DVT prophylaxis starting tomorrow morning and continuing for 6 weeks.  5.  The patient will begin physical therapy  6.  Will be under the care of the hospitalist service and Dr. ANDERSON  7.  Patient will be discharged home with a prescription for oxycodone, ibuprofen, cefadroxil for 1 week, Tylenol, Colace, and Zofran in addition to their DVT prophylaxis  8.  Follow up with me in the office in 3 weeks    Rey Worthy M.D.*

## 2024-04-10 NOTE — H&P
Pre-Op H&P  Chente Rich  [id number]  1955      Chief complaint: Left knee pain      Subjective:  Patient is a 69 y.o.male presents for scheduled surgery by Dr. Worthy. He anticipates a TOTAL KNEE ARTHROPLASTY WITH OSWALDO ROBOT - LEFT - Left today.  The patient endorses having left knee pain for the past few years.  He endorses swelling as well in his left knee.  The pain tends to only be present with movement.  Resting helps to alleviate his symptoms.  He denies the use of a walker or assistive device to ambulate.    Review of Systems:  Constitutional-- No fever, chills or sweats. No fatigue.  CV-- No chest pain, palpitation or syncope. +HLD.  Resp-- No SOB, cough, hemoptysis  Skin--No rashes or lesions      Allergies:   Allergies   Allergen Reactions    Penicillins Rash     Swelling skin turns purple          Home Meds:  Medications Prior to Admission   Medication Sig Dispense Refill Last Dose    aspirin 81 MG EC tablet Take 1 tablet by mouth Daily.   4/9/2024 at 0800    polycarbophil (calcium polycarbophil) 625 MG tablet tablet Take 1 tablet by mouth Daily.   4/9/2024 at 0800    simvastatin (ZOCOR) 20 MG tablet Take 1 tablet by mouth Daily. (Patient taking differently: Take 1 tablet by mouth Every Night.) 90 tablet 3 4/9/2024 at 2100    cetirizine (zyrTEC) 10 MG tablet Take 1 tablet by mouth Daily.   4/8/2024         PMH:   Past Medical History:   Diagnosis Date    Abnormal screening cardiac CT 10/12/2017    Calcium Score 133    Acute diverticulitis 11/19/2023    Hosp 11/2023      BCC (basal cell carcinoma of skin)     Dx (initially)-2010.  Has occurred on head, chest, back, and right leg.    Colon polyp     Dx 10/17- tubular adenoma ascending colon, no dysplasia    Diabetes mellitus     NO LONGER NEEDS MEDS    Diverticulosis     Dx 10/17 by Colonoscopy- pancolonic    History of cardiovascular stress test 06/19/2020    negative stress echo, EF 60%    History of esophagogastroduodenoscopy (EGD)  "11/20/2023    Normal    History of GI bleed 11/18/2023    Hospitalized.  EGD and Colonoscopy normal except diverticulosis    Hyperlipidemia     Neuropathy of right lower extremity     Dx 12/2022- mild (by EMG)    OA (osteoarthritis) of knee 01/15/2024    SCC (squamous cell carcinoma) 03/05/2024    Dx 2/28/24- left forehead (Mohs Procedure 3/11/24)      Wears glasses      PSH:    Past Surgical History:   Procedure Laterality Date    CATARACT EXTRACTION Bilateral 03/01/2021    laser (approx date)    COLONOSCOPY N/A 11/20/2023    Procedure: COLONOSCOPY;  Surgeon: Brunner, Mark I, MD;  Location:  [facility];  Service: Gastroenterology;  Laterality: N/A;  3 clips placed in the descending colon. 1ml of endoscopic marker used in descending colon.    ENDOSCOPY N/A 11/20/2023    Procedure: ESOPHAGOGASTRODUODENOSCOPY;  Surgeon: Brunner, Mark I, MD;  Location:  [facility];  Service: Gastroenterology;  Laterality: N/A;    KNEE SURGERY Left 07/29/2022    knee arthroscopy with partial medial menisectomy- JRT    WISDOM TOOTH EXTRACTION         Immunization History:  Influenza: 2023  Pneumococcal: Yes  Tetanus: Unsure  Covid : x4    Social History:   Tobacco:   Social History     Tobacco Use   Smoking Status Never    Passive exposure: Never   Smokeless Tobacco Never      Alcohol:     Social History     Substance and Sexual Activity   Alcohol Use Yes    Alcohol/week: 10.0 standard drinks of alcohol    Types: 7 Cans of beer, 3 Drinks containing 0.5 oz of alcohol per week    Comment: 1 drink (beer) daily         Physical Exam:/86 (BP Location: Right arm, Patient Position: Lying)   Pulse 52   Temp 98.2 °F (36.8 °C) (Temporal)   Resp 16   Ht 177.8 cm (70\")   Wt 67.1 kg (148 lb)   SpO2 99%   BMI 21.24 kg/m²       General Appearance:    Alert, cooperative, no distress, appears stated age   Head:    Normocephalic, without obvious abnormality, atraumatic   Lungs:     Clear to auscultation bilaterally, respirations " unlabored    Heart:   Regular rhythm, S1 and S2 normal. +Bradycardic.    Abdomen:    Soft without tenderness   Extremities:   Extremities normal, atraumatic, no cyanosis or edema   Skin:   Skin color, texture, turgor normal, no rashes or lesions   Neurologic:   Grossly intact     Results Review:     LABS:  Lab Results   Component Value Date    WBC 6.43 03/28/2024    HGB 13.3 03/28/2024    HCT 40.3 03/28/2024    MCV 90.8 03/28/2024     03/28/2024    NEUTROABS 3.90 03/28/2024    GLUCOSE 106 (H) 03/28/2024    BUN 16 03/28/2024    CREATININE 1.10 03/28/2024    EGFRIFNONA 56 (L) 12/08/2021     (L) 03/28/2024    K 4.4 03/28/2024    CL 98 03/28/2024    CO2 31.0 (H) 03/28/2024    MG 1.9 11/21/2023    CALCIUM 9.5 03/28/2024    ALBUMIN 4.1 03/28/2024    AST 36 03/28/2024    ALT 25 03/28/2024    BILITOT 0.2 03/28/2024       RADIOLOGY:  Imaging Results (Last 72 Hours)       ** No results found for the last 72 hours. **            I reviewed the patient's new clinical results.    Cancer Staging (if applicable)  Cancer Patient: __ yes _x_no __unknown; If yes, clinical stage T:__ N:__M:__, stage group or __N/A      Impression: Osteoarthritis of left knee      Plan: TOTAL KNEE ARTHROPLASTY WITH OSWALDO ROBOT - LEFT - Left       TASIA Brown   4/10/2024   11:35 EDT

## 2024-04-10 NOTE — ANESTHESIA PREPROCEDURE EVALUATION
Anesthesia Evaluation     Patient summary reviewed and Nursing notes reviewed                Airway   Mallampati: I  TM distance: >3 FB  Neck ROM: full  No difficulty expected  Dental - normal exam     Pulmonary - negative pulmonary ROS and normal exam   Cardiovascular - normal exam    (+) CAD, hyperlipidemia      Neuro/Psych  (+) numbness  GI/Hepatic/Renal/Endo    (+) diabetes mellitus    Musculoskeletal     Abdominal  - normal exam    Bowel sounds: normal.   Substance History - negative use     OB/GYN negative ob/gyn ROS         Other   arthritis,   history of cancer (SKIN)                  Anesthesia Plan    ASA 3     spinal     (ADDUCTOR CANAL CATHETER FOR POSTOP PAIN)  intravenous induction     Anesthetic plan, risks, benefits, and alternatives have been provided, discussed and informed consent has been obtained with: patient.    Plan discussed with CRNA.    CODE STATUS:

## 2024-04-11 VITALS
RESPIRATION RATE: 18 BRPM | HEART RATE: 60 BPM | DIASTOLIC BLOOD PRESSURE: 71 MMHG | OXYGEN SATURATION: 97 % | BODY MASS INDEX: 21.19 KG/M2 | TEMPERATURE: 98.4 F | SYSTOLIC BLOOD PRESSURE: 142 MMHG | WEIGHT: 148 LBS | HEIGHT: 70 IN

## 2024-04-11 LAB
ANION GAP SERPL CALCULATED.3IONS-SCNC: 10 MMOL/L (ref 5–15)
BASOPHILS # BLD AUTO: 0.02 10*3/MM3 (ref 0–0.2)
BASOPHILS NFR BLD AUTO: 0.1 % (ref 0–1.5)
BUN SERPL-MCNC: 14 MG/DL (ref 8–23)
BUN/CREAT SERPL: 11.3 (ref 7–25)
CALCIUM SPEC-SCNC: 8.6 MG/DL (ref 8.6–10.5)
CHLORIDE SERPL-SCNC: 99 MMOL/L (ref 98–107)
CO2 SERPL-SCNC: 26 MMOL/L (ref 22–29)
CREAT SERPL-MCNC: 1.24 MG/DL (ref 0.76–1.27)
DEPRECATED RDW RBC AUTO: 45.6 FL (ref 37–54)
EGFRCR SERPLBLD CKD-EPI 2021: 62.2 ML/MIN/1.73
EOSINOPHIL # BLD AUTO: 0 10*3/MM3 (ref 0–0.4)
EOSINOPHIL NFR BLD AUTO: 0 % (ref 0.3–6.2)
ERYTHROCYTE [DISTWIDTH] IN BLOOD BY AUTOMATED COUNT: 13.6 % (ref 12.3–15.4)
GLUCOSE BLDC GLUCOMTR-MCNC: 123 MG/DL (ref 70–130)
GLUCOSE BLDC GLUCOMTR-MCNC: 147 MG/DL (ref 70–130)
GLUCOSE SERPL-MCNC: 144 MG/DL (ref 65–99)
HCT VFR BLD AUTO: 38.8 % (ref 37.5–51)
HGB BLD-MCNC: 13 G/DL (ref 13–17.7)
IMM GRANULOCYTES # BLD AUTO: 0.04 10*3/MM3 (ref 0–0.05)
IMM GRANULOCYTES NFR BLD AUTO: 0.3 % (ref 0–0.5)
LYMPHOCYTES # BLD AUTO: 0.88 10*3/MM3 (ref 0.7–3.1)
LYMPHOCYTES NFR BLD AUTO: 5.7 % (ref 19.6–45.3)
MCH RBC QN AUTO: 30.5 PG (ref 26.6–33)
MCHC RBC AUTO-ENTMCNC: 33.5 G/DL (ref 31.5–35.7)
MCV RBC AUTO: 91.1 FL (ref 79–97)
MONOCYTES # BLD AUTO: 0.96 10*3/MM3 (ref 0.1–0.9)
MONOCYTES NFR BLD AUTO: 6.2 % (ref 5–12)
NEUTROPHILS NFR BLD AUTO: 13.48 10*3/MM3 (ref 1.7–7)
NEUTROPHILS NFR BLD AUTO: 87.7 % (ref 42.7–76)
NRBC BLD AUTO-RTO: 0 /100 WBC (ref 0–0.2)
PLATELET # BLD AUTO: 254 10*3/MM3 (ref 140–450)
PMV BLD AUTO: 10.2 FL (ref 6–12)
POTASSIUM SERPL-SCNC: 5.2 MMOL/L (ref 3.5–5.2)
RBC # BLD AUTO: 4.26 10*6/MM3 (ref 4.14–5.8)
SODIUM SERPL-SCNC: 135 MMOL/L (ref 136–145)
WBC NRBC COR # BLD AUTO: 15.38 10*3/MM3 (ref 3.4–10.8)

## 2024-04-11 PROCEDURE — 80048 BASIC METABOLIC PNL TOTAL CA: CPT | Performed by: NURSE PRACTITIONER

## 2024-04-11 PROCEDURE — 97161 PT EVAL LOW COMPLEX 20 MIN: CPT

## 2024-04-11 PROCEDURE — 97116 GAIT TRAINING THERAPY: CPT

## 2024-04-11 PROCEDURE — 85025 COMPLETE CBC W/AUTO DIFF WBC: CPT | Performed by: ORTHOPAEDIC SURGERY

## 2024-04-11 PROCEDURE — 97110 THERAPEUTIC EXERCISES: CPT

## 2024-04-11 PROCEDURE — 82948 REAGENT STRIP/BLOOD GLUCOSE: CPT

## 2024-04-11 PROCEDURE — 99024 POSTOP FOLLOW-UP VISIT: CPT | Performed by: ORTHOPAEDIC SURGERY

## 2024-04-11 PROCEDURE — 97165 OT EVAL LOW COMPLEX 30 MIN: CPT | Performed by: OCCUPATIONAL THERAPIST

## 2024-04-11 PROCEDURE — 97535 SELF CARE MNGMENT TRAINING: CPT | Performed by: OCCUPATIONAL THERAPIST

## 2024-04-11 PROCEDURE — 25010000002 CEFAZOLIN PER 500 MG: Performed by: ORTHOPAEDIC SURGERY

## 2024-04-11 RX ORDER — ROPIVACAINE HYDROCHLORIDE 2 MG/ML
1 INJECTION, SOLUTION EPIDURAL; INFILTRATION; PERINEURAL CONTINUOUS
Start: 2024-04-11

## 2024-04-11 RX ADMIN — MELOXICAM 15 MG: 15 TABLET ORAL at 08:14

## 2024-04-11 RX ADMIN — ASPIRIN 325 MG: 325 TABLET ORAL at 08:15

## 2024-04-11 RX ADMIN — SODIUM CHLORIDE 2 G: 900 INJECTION INTRAVENOUS at 00:15

## 2024-04-11 RX ADMIN — ACETAMINOPHEN 1000 MG: 500 TABLET ORAL at 07:51

## 2024-04-11 RX ADMIN — ACETAMINOPHEN 1000 MG: 500 TABLET ORAL at 13:17

## 2024-04-11 RX ADMIN — ACETAMINOPHEN 1000 MG: 500 TABLET ORAL at 00:14

## 2024-04-11 RX ADMIN — SODIUM CHLORIDE 2 G: 900 INJECTION INTRAVENOUS at 07:51

## 2024-04-11 RX ADMIN — Medication 3 ML: at 08:15

## 2024-04-11 NOTE — THERAPY EVALUATION
Patient Name: Chente Rich  : 1955    MRN: [id number]                              Today's Date: 2024       Admit Date: 4/10/2024    Visit Dx:     ICD-10-CM ICD-9-CM   1. Neuropathy of right lower extremity  G57.91 355.8   2. Primary osteoarthritis of left knee  M17.12 715.16     Patient Active Problem List   Diagnosis    Hyperlipidemia    Controlled type 2 diabetes mellitus without complication, without long-term current use of insulin    Coronary artery calcification    BCC (basal cell carcinoma of skin)    Diverticulosis    Colon polyp    Neuropathy of right lower extremity    OA (osteoarthritis) of knee    SCC (squamous cell carcinoma)     Past Medical History:   Diagnosis Date    Abnormal screening cardiac CT 10/12/2017    Calcium Score 133    Acute diverticulitis 2023    Hosp 2023      BCC (basal cell carcinoma of skin)     Dx (initially)-.  Has occurred on head, chest, back, and right leg.    Colon polyp     Dx 10/17- tubular adenoma ascending colon, no dysplasia    Diabetes mellitus     NO LONGER NEEDS MEDS    Diverticulosis     Dx 10/17 by Colonoscopy- pancolonic    History of cardiovascular stress test 2020    negative stress echo, EF 60%    History of esophagogastroduodenoscopy (EGD) 2023    Normal    History of GI bleed 2023    Hospitalized.  EGD and Colonoscopy normal except diverticulosis    Hyperlipidemia     Neuropathy of right lower extremity     Dx 2022- mild (by EMG)    OA (osteoarthritis) of knee 01/15/2024    SCC (squamous cell carcinoma) 2024    Dx 24- left forehead (Mohs Procedure 3/11/24)      Wears glasses      Past Surgical History:   Procedure Laterality Date    CATARACT EXTRACTION Bilateral 2021    laser (approx date)    COLONOSCOPY N/A 2023    Procedure: COLONOSCOPY;  Surgeon: Brunner, Mark I, MD;  Location: [facility];  Service: Gastroenterology;  Laterality: N/A;  3 clips placed in the descending colon. 1ml  of endoscopic marker used in descending colon.    ENDOSCOPY N/A 11/20/2023    Procedure: ESOPHAGOGASTRODUODENOSCOPY;  Surgeon: Brunner, Mark I, MD;  Location: [facility];  Service: Gastroenterology;  Laterality: N/A;    KNEE SURGERY Left 07/29/2022    knee arthroscopy with partial medial menisectomy- JRT    TOTAL KNEE ARTHROPLASTY Left 4/10/2024    Procedure: TOTAL KNEE ARTHROPLASTY WITH OSWALDO ROBOT - LEFT;  Surgeon: Rey Worthy MD;  Location: [facility] OR;  Service: Robotics - Ortho;  Laterality: Left;    WISDOM TOOTH EXTRACTION        General Information       Row Name 04/11/24 1220          OT Time and Intention    Document Type evaluation  -AR     Mode of Treatment individual therapy;occupational therapy  -AR       Row Name 04/11/24 1220          General Information    Patient Profile Reviewed yes  -AR     Prior Level of Function min assist:;all household mobility;community mobility;gait;transfer;ADL's  -AR     Existing Precautions/Restrictions fall;other (see comments)  left adductor canal nerve catheter  -AR     Barriers to Rehab none identified  -AR       Row Name 04/11/24 1220          Living Environment    People in Home spouse  -AR       Row Name 04/11/24 1220          Home Main Entrance    Number of Stairs, Main Entrance two  -AR     Stair Railings, Main Entrance none  -AR       Row Name 04/11/24 1220          Stairs Within Home, Primary    Stairs Comment, Within Home, Primary Pt has standard commode and walk-in shower. Issued and reviewed handouts on bathroom DME if need arises.  -AR       Row Name 04/11/24 1220          Cognition    Orientation Status (Cognition) oriented x 4  -AR       Row Name 04/11/24 1220          Safety Issues, Functional Mobility    Safety Issues Affecting Function (Mobility) awareness of need for assistance  -AR     Impairments Affecting Function (Mobility) range of motion (ROM);strength  -AR               User Key  (r) = Recorded By, (t) = Taken By, (c) = Cosigned  By      Initials Name Provider Type    Janelle Peralta OT Occupational Therapist                     Mobility/ADL's       Row Name 04/11/24 1222          Bed Mobility    Comment, (Bed Mobility) Educated pt and spouse on use of leg liter to assist with bed mobilty and car transfers, pt declined need for device.  -AR       Row Name 04/11/24 1222          Transfers    Transfers sit-stand transfer;stand-sit transfer  -AR       Row Name 04/11/24 1222          Sit-Stand Transfer    Sit-Stand [place] (Transfers) contact guard  -AR     Assistive Device (Sit-Stand Transfers) walker, front-wheeled  -AR       Row Name 04/11/24 1222          Stand-Sit Transfer    Stand-Sit [place] (Transfers) contact guard  -AR     Assistive Device (Stand-Sit Transfers) walker, front-wheeled  -AR       Row Name 04/11/24 1222          Functional Mobility    Functional Mobility- Ind. Level contact guard assist;verbal cues required  -AR     Functional Mobility-Distance (Feet) 15  -AR     Functional Mobility- Comment Verbal cues to keep walker on floor and not carry it.  -AR       Row Name 04/11/24 1222          Activities of Daily Living    BADL Assessment/Intervention bathing;lower body dressing  -AR       Row Name 04/11/24 1222          Mobility    Extremity Weight-bearing Status left lower extremity  -AR     Left Lower Extremity (Weight-bearing Status) weight-bearing as tolerated (WBAT)  -[facility] Name 04/11/24 1222          Bathing Assessment/Intervention    Comment, (Bathing) Educated pt and spouse that nerve catheter cannot get wet. Issued LH sponge to assist at home.  -AR       Row Name 04/11/24 1222          Lower Body Dressing Assessment/Training    Comment, (Lower Body Dressing) Educated pt and spouse on ADL retraining for comfort including AE use as needed and incorporation of albert-dressing technique. Reviewed management of nerve catheter during ADLs to avoid dislodgement. Pt able to simulate donning/doffing sock  without AE. Pt received dressed for home.  -AR               User Key  (r) = Recorded By, (t) = Taken By, (c) = Cosigned By      Initials Name Provider Type    Janelle Peralta OT Occupational Therapist                   Obj/Interventions       Row Name 04/11/24 1225          Vision Assessment/Intervention    Visual Impairment/Limitations WNL  -AR       [facility] Name 04/11/24 1225          Range of Motion Comprehensive    General Range of Motion no range of motion deficits identified  -AR       Row Name 04/11/24 1225          Strength Comprehensive (MMT)    General Manual Muscle Testing (MMT) Assessment no strength deficits identified  -AR     Comment, General Manual Muscle Testing (MMT) Assessment WFL for ADL  -AR       [facility] Name 04/11/24 1225          Balance    Balance Assessment sitting static balance;sitting dynamic balance;standing static balance;standing dynamic balance  -AR     Static Sitting Balance supervision  -AR     Dynamic Sitting Balance supervision  -AR     Position, Sitting Balance unsupported;sitting in chair  -AR     Static Standing Balance contact guard  -AR     Dynamic Standing Balance contact guard  -AR     Position/Device Used, Standing Balance walker, front-wheeled  -AR               User Key  (r) = Recorded By, (t) = Taken By, (c) = Cosigned By      Initials Name Provider Type    Janelle Peralta OT Occupational Therapist                   Goals/Plan       Row Name 04/11/24 1228          Transfer Goal 1 (OT)    Activity/Assistive Device (Transfer Goal 1, OT) sit-to-stand/stand-to-sit;toilet  -AR     [place] Level/Cues Needed (Transfer Goal 1, OT) supervision required;verbal cues required  -AR     Time Frame (Transfer Goal 1, OT) long term goal (LTG);by discharge  -AR     Progress/Outcome (Transfer Goal 1, OT) goal ongoing  -AR       Row Name 04/11/24 1228          Dressing Goal 1 (OT)    Activity/Device (Dressing Goal 1, OT) lower body dressing  AE PRN  -AR     [place]/Cues  Needed (Dressing Goal 1, OT) verbal cues required;supervision required  -AR     Time Frame (Dressing Goal 1, OT) long term goal (LTG);by discharge  -AR     Progress/Outcome (Dressing Goal 1, OT) goal ongoing  -AR       Row Name 04/11/24 1228          Therapy Assessment/Plan (OT)    Planned Therapy Interventions (OT) adaptive equipment training;BADL retraining;edema control/reduction;IADL retraining;occupation/activity based interventions;patient/caregiver education/training;transfer/mobility retraining  -AR               User Key  (r) = Recorded By, (t) = Taken By, (c) = Cosigned By      Initials Name Provider Type    AR Janelle Sosa, OT Occupational Therapist                   Clinical Impression       Row Name 04/11/24 1226          Pain Assessment    Pretreatment Pain Rating 0/10 - no pain  -AR     Posttreatment Pain Rating 1/10  -AR     Pain Location - Side/Orientation Left  -AR     Pain Location posterior  -AR     Pain Location - knee  -AR     Pain Intervention(s) Cold applied;Repositioned;Medication (See MAR);Elevated;Ambulation/increased activity  -AR       Row Name 04/11/24 1226          Plan of Care Review    Plan of Care Reviewed With patient;spouse  -AR     Outcome Evaluation OT educated pt and spouse on ADL retraining, transfer training and home safety. OT issued necessary AE and educated on use. Issued handouts on bathroom safety and educated on use. Reviewed safe car transfer technique and encouraged them to take gait belt home to assist. Recommend DC home with initial family assist as needed.  -AR       Row Name 04/11/24 1226          Therapy Assessment/Plan (OT)    Rehab Potential (OT) good, to achieve stated therapy goals  -AR     Criteria for Skilled Therapeutic Interventions Met (OT) yes  -AR     Therapy Frequency (OT) daily  -AR       Row Name 04/11/24 1226          Therapy Plan Review/Discharge Plan (OT)    Anticipated Discharge Disposition (OT) home with assist  -AR       Row Name  04/11/24 1226          Vital Signs    Pre Patient Position Sitting  -AR     Intra Patient Position Standing  -AR     Post Patient Position Sitting  -AR       Row Name 04/11/24 1226          Positioning and Restraints    Pre-Treatment Position bathroom  -AR     Post Treatment Position chair  -AR     In Chair reclined;call light within reach;encouraged to call for assist;exit alarm on;with family/caregiver;legs elevated  cold pack over incision, educated on use of cold for posterior knee pain and issued additional cold pack and cover for posterior knee  -AR               User Key  (r) = Recorded By, (t) = Taken By, (c) = Cosigned By      Initials Name Provider Type    Janelle Peralta, OT Occupational Therapist                   Outcome Measures       Row Name 04/11/24 1229          How much help from another is currently needed...    Putting on and taking off regular lower body clothing? 3  -AR     Bathing (including washing, rinsing, and drying) 3  -AR     Toileting (which includes using toilet bed pan or urinal) 3  -AR     Putting on and taking off regular upper body clothing 3  -AR     Taking care of personal grooming (such as brushing teeth) 3  -AR     Eating meals 3  -AR     AM-PAC 6 Clicks Score (OT) 18  -AR       Row Name 04/11/24 1011 04/11/24 0845       How much help from another person do you currently need...    Turning from your back to your side while in flat bed without using bedrails? 4  -HW 4  -LH    Moving from lying on back to sitting on the side of a flat bed without bedrails? 4  -HW 4  -LH    Moving to and from a bed to a chair (including a wheelchair)? 3  -HW 4  -LH    Standing up from a chair using your arms (e.g., wheelchair, bedside chair)? 3  -HW 4  -LH    Climbing 3-5 steps with a railing? 3  -HW 3  -LH    To walk in hospital room? 3  -HW 3  -LH    AM-PAC 6 Clicks Score (PT) 20  -HW 22  -LH    Highest Level of Mobility Goal 6 --> Walk 10 steps or more  -HW 7 --> Walk 25 feet or more   -      Row Name 04/11/24 1229 04/11/24 1011       Functional Assessment    Outcome Measure Options AM-PAC 6 Clicks Daily Activity (OT)  -AR AM-PAC 6 Clicks Basic Mobility (PT);PADD  -              User Key  (r) = Recorded By, (t) = Taken By, (c) = Cosigned By      Initials Name Provider Type    AR Janelle Sosa, OT Occupational Therapist    HW Tiara Callahan, PT Physical Therapist     Lala Booth, RN Registered Nurse                    Occupational Therapy Education       Title: PT OT SLP Therapies (Done)       Topic: Occupational Therapy (Done)       Point: ADL training (Done)       Description:   Instruct learner(s) on proper safety adaptation and remediation techniques during self care or transfers.   Instruct in proper use of assistive devices.                  Learning Progress Summary             Patient Eager, E,TB,D,H, DU,VU by AR at 4/11/2024 1229   Family Eager, E,TB,D,H, DU,VU by AR at 4/11/2024 1229                         Point: Home exercise program (Done)       Description:   Instruct learner(s) on appropriate technique for monitoring, assisting and/or progressing therapeutic exercises/activities.                  Learning Progress Summary             Patient Eager, E,TB,D,H, DU,VU by AR at 4/11/2024 1229   Family Eager, E,TB,D,H, DU,VU by AR at 4/11/2024 1229                         Point: Precautions (Done)       Description:   Instruct learner(s) on prescribed precautions during self-care and functional transfers.                  Learning Progress Summary             Patient Eager, E,TB,D,H, DU,VU by AR at 4/11/2024 1229   Family Eager, E,TB,D,H, DU,VU by AR at 4/11/2024 1229                         Point: Body mechanics (Done)       Description:   Instruct learner(s) on proper positioning and spine alignment during self-care, functional mobility activities and/or exercises.                  Learning Progress Summary             Patient Eager, E,TB,D,H, DU,VU by AR at 4/11/2024 1229    Family Perlaer, E,TB,D,H, DU,VU by AR at 4/11/2024 1229                                         User Key       Initials Effective Dates Name Provider Type Discipline    AR 07/11/23 -  Janelle Sosa OT Occupational Therapist OT                  OT Recommendation and Plan  Planned Therapy Interventions (OT): adaptive equipment training, BADL retraining, edema control/reduction, IADL retraining, occupation/activity based interventions, patient/caregiver education/training, transfer/mobility retraining  Therapy Frequency (OT): daily  Plan of Care Review  Plan of Care Reviewed With: patient, spouse  Outcome Evaluation: OT educated pt and spouse on ADL retraining, transfer training and home safety. OT issued necessary AE and educated on use. Issued handouts on bathroom safety and educated on use. Reviewed safe car transfer technique and encouraged them to take gait belt home to assist. Recommend DC home with initial family assist as needed.     Time Calculation:   Evaluation Complexity (OT)  Review Occupational Profile/Medical/Therapy History Complexity: brief/low complexity  Assessment, Occupational Performance/Identification of Deficit Complexity: 1-3 performance deficits  Clinical Decision Making Complexity (OT): problem focused assessment/low complexity  Overall Complexity of Evaluation (OT): low complexity     Time Calculation- OT       Row Name 04/11/24 1230 04/11/24 1013          Time Calculation- OT    OT Start Time 1001  -AR --     OT Received On 04/11/24  -AR --     OT Goal Re-Cert Due Date 04/21/24  -AR --        Timed Charges    19432 - Gait Training Minutes  -- 12  -HW     22552 - OT Self Care/Mgmt Minutes 12  -AR --        Untimed Charges    OT Eval/Re-eval Minutes 42  -AR --        Total Minutes    Timed Charges Total Minutes 12  -AR 12  -HW     Untimed Charges Total Minutes 42  -AR --      Total Minutes 54  -AR 12  -HW               User Key  (r) = Recorded By, (t) = Taken By, (c) = Cosigned By       Initials Name Provider Type    Janelle Peralta OT Occupational Therapist    HW Tiara Callahan, MAE Physical Therapist                  Therapy Charges for Today       Code Description Service Date Service Provider Modifiers Qty    80626854734 HC OT SELF CARE/MGMT/TRAIN EA 15 MIN 4/11/2024 Janelle Sosa, OT GO 1    29872690123 HC OT EVAL LOW COMPLEXITY 3 4/11/2024 Janelle Sosa OT GO 1                 Janelle Sosa OT  4/11/2024

## 2024-04-11 NOTE — PLAN OF CARE
Goal Outcome Evaluation:  Plan of Care Reviewed With: patient        Progress: no change  Outcome Evaluation: Pt amb 250' with FWW and CGA. Pt navigated steps without difficulty. No knee buckling or LOB noted. Activity limited by fatigue. HEP and precautions reviewed with pt. Frequent ambulation encouraged. Recommend d/c home with assist and OPPT when medically appropriate. Pt cleared to d/c today from PT standpoint.      Anticipated Discharge Disposition (PT): home with assist, home with outpatient therapy services

## 2024-04-11 NOTE — PROGRESS NOTES
"          Orthopaedic Surgery Progress Note      LOS: 0 days   Patient Care Team:  Millie Loya MD as PCP - General (Internal Medicine)  Tru Pop MD as Consulting Physician (Ophthalmology)  Shelley Soliman MD as Consulting Physician (Dermatology)    POD 1    Subjective     Interval History:   Patient doing well this morning.  Daughters at the bedside.  Postop day #1 from robotic left TKA.  He is doing very well this morning.  Minimal pain.  Denies chest pain or shortness of breath.    Objective     Vital Signs:  Temp (24hrs), Av.2 °F (36.2 °C), Min:92.4 °F (33.6 °C), Max:98.2 °F (36.8 °C)    /75 (BP Location: Right arm, Patient Position: Lying)   Pulse 57   Temp 97.9 °F (36.6 °C) (Oral)   Resp 18   Ht 177.8 cm (70\")   Wt 67.1 kg (148 lb)   SpO2 97%   BMI 21.24 kg/m²     Labs:  Lab Results (last 24 hours)       Procedure Component Value Units Date/Time    POC Glucose Once [[id number]]  (Normal) Collected: 24 0744    Specimen: Blood Updated: 24 0750     Glucose 123 mg/dL     Basic Metabolic Panel [[id number]]  (Abnormal) Collected: 24    Specimen: Blood Updated: 24 0650     Glucose 144 mg/dL      BUN 14 mg/dL      Creatinine 1.24 mg/dL      Sodium 135 mmol/L      Potassium 5.2 mmol/L      Chloride 99 mmol/L      CO2 26.0 mmol/L      Calcium 8.6 mg/dL      BUN/Creatinine Ratio 11.3     Anion Gap 10.0 mmol/L      eGFR 62.2 mL/min/1.73     Narrative:      GFR Normal >60  Chronic Kidney Disease <60  Kidney Failure <15    The GFR formula is only valid for adults with stable renal function between ages 18 and 70.    CBC & Differential [[id number]]  (Abnormal) Collected: 24    Specimen: Blood Updated: 24    Narrative:      The following orders were created for panel order CBC & Differential.  Procedure                               Abnormality         Status                     ---------                               -----------         " ------                     CBC Auto Differential[[id number]]        Abnormal            Final result                 Please view results for these tests on the individual orders.    CBC Auto Differential [[id number]]  (Abnormal) Collected: 04/11/24 0536    Specimen: Blood Updated: 04/11/24 0637     WBC 15.38 10*3/mm3      RBC 4.26 10*6/mm3      Hemoglobin 13.0 g/dL      Hematocrit 38.8 %      MCV 91.1 fL      MCH 30.5 pg      MCHC 33.5 g/dL      RDW 13.6 %      RDW-SD 45.6 fl      MPV 10.2 fL      Platelets 254 10*3/mm3      Neutrophil % 87.7 %      Lymphocyte % 5.7 %      Monocyte % 6.2 %      Eosinophil % 0.0 %      Basophil % 0.1 %      Immature Grans % 0.3 %      Neutrophils, Absolute 13.48 10*3/mm3      Lymphocytes, Absolute 0.88 10*3/mm3      Monocytes, Absolute 0.96 10*3/mm3      Eosinophils, Absolute 0.00 10*3/mm3      Basophils, Absolute 0.02 10*3/mm3      Immature Grans, Absolute 0.04 10*3/mm3      nRBC 0.0 /100 WBC     POC Glucose Once [[id number]]  (Normal) Collected: 04/10/24 1930    Specimen: Blood Updated: 04/10/24 1933     Glucose 121 mg/dL     POC Glucose Once [[id number]]  (Normal) Collected: 04/10/24 1125    Specimen: Blood Updated: 04/10/24 1128     Glucose 87 mg/dL             Physical Exam:  EHL, FHL, gastroc soleus, and tibialis anterior are intact  Toes are pink and warm  Surgical dressing is in place  Palpable dorsalis pedis pulse  Calf is soft and nontender    Postoperative x-ray has been reviewed and looks acceptable    Assessment & Plan     Postoperative day number 1 status post left total knee arthroplasty.    Labs: Creatinine 1.24, hematocrit 39, platelets 254,000    Pain Control: Good overall    PT and OT     DVT prophylaxis: Full dose aspirin daily x 6 weeks beginning this morning    Discharge planning: Anticipate discharge home after a.m. physical therapy.  Prescriptions have been written to the patient's home pharmacy.    Upon discharge, patient will need follow-up with me in 3 weeks'  time.  They will need [facility] for physical therapy.  Standard Exofin Fusion dressing care instructions.  Do not remove.  DME per case management.    Admission Status:  I believe this patient meets INPATIENT status due to the need for care which can only be reasonably provided in a hospital setting such as aggressive/expedited ancillary services and/or consultation services, the necessity for IV medications, close physician monitoring and/or the possible need for procedures.  In such, I feel patient’s risk for adverse outcomes and need for care warrant INPATIENT evaluation and predict the patient’s care encounter to likely last beyond 2 midnights.        Rey Worthy MD  04/11/24  08:16 EDT

## 2024-04-11 NOTE — DISCHARGE SUMMARY
Patient Name: Chente Rich  MRN: [id number]  : 1955  DOS: 2024    Attending: Rey Worthy,*    Primary Care Provider: Millie Loya MD    Date of Admission:.4/10/2024 10:10 AM    Date of Discharge:  2024    Discharge Diagnosis:  Status post left total knee arthroplasty     OA (osteoarthritis) of knee    Hyperlipidemia    Controlled type 2 diabetes mellitus without complication, without long-term current use of insulin      Hospital Course    At admit:  Patient is a pleasant 69 y.o. male presented for scheduled surgery by Dr. Worthy.  He anticipates left total knee replacement today.  His knee has been painful for couple years.  He has occasional swelling of his left knee.  He denies use of assistive device for ambulation or recent falls.     When seen preop he is doing well.  He denies pain or other complaints.  He denies nausea, shortness of breath or chest pain.  No history of DVT or PE.    (Patient subsequently underwent left total knee arthroplasty under spinal anesthesia, tolerated surgery well, was admitted for further management.  Adductor canal block catheter was placed by acute pain service.)     After admit:    Patient was provided pain medications as needed for pain control, along with adductor canal nerve block infusion of Ropivacaine.      Adjustments were made to pain medications to optimize postop pain management. Risks and benefits of opiate medications discussed with patient. LATHAREJI report was reviewed.    He was seen by PT and OT and has progressed well over his stay.    Patient used an IS for atelectasis prophylaxis and aspirin along with mechanicals for DVT prophylaxis.    Home medications were resumed as appropriate, and labs were monitored and remained fairly stable.     With the progress he has made, Mr. Rich is ready for DC home today.      He will have an Infupump ( instructed on it during this admit).    Discussed with patient regarding plan and he  "shows understanding and agreement.    Patient will have PT following discharge.        Procedures Performed  Procedure(s):  TOTAL KNEE ARTHROPLASTY WITH OSWALDO ROBOT - LEFT       Pertinent Test Results:    I reviewed the patient's new clinical results.   Results from last 7 days   Lab Units 24  0536   WBC 10*3/mm3 15.38*   HEMOGLOBIN g/dL 13.0   HEMATOCRIT % 38.8   PLATELETS 10*3/mm3 254     Results from last 7 days   Lab Units 24  0536   SODIUM mmol/L 135*   POTASSIUM mmol/L 5.2   CHLORIDE mmol/L 99   CO2 mmol/L 26.0   BUN mg/dL 14   CREATININE mg/dL 1.24   CALCIUM mg/dL 8.6   GLUCOSE mg/dL 144*     I reviewed the patient's new imaging including images and reports.      Physical therapy  Date of Service: 24  Creation Time: 24     Signed         Goal Outcome Evaluation:  Plan of Care Reviewed With: patient  Progress: no change  Outcome Evaluation: Pt amb 250' with FWW and CGA. Pt navigated steps without difficulty. No knee buckling or LOB noted. Activity limited by fatigue. HEP and precautions reviewed with pt. Frequent ambulation encouraged. Recommend d/c home with assist and OPPT when medically appropriate. Pt cleared to d/c today from PT standpoint.        Anticipated Discharge Disposition (PT): home with assist, home with outpatient therapy services              Discharge Assessment:       Visit Vitals  /71 (BP Location: Right arm, Patient Position: Lying)   Pulse 60   Temp 98.4 °F (36.9 °C) (Oral)   Resp 18   Ht 177.8 cm (70\")   Wt 67.1 kg (148 lb)   SpO2 97%   BMI 21.24 kg/m²     Temp (24hrs), Av.2 °F (36.2 °C), Min:92.4 °F (33.6 °C), Max:98.4 °F (36.9 °C)      General Appearance:    Alert, cooperative, in no acute distress   Lungs:     Clear to auscultation,respirations regular, even and                   unlabored    Heart:    Regular rhythm and normal rate, normal S1 and S2   Abdomen:     Normal bowel sounds, no masses, no organomegaly, soft        non-tender, " non-distended, no guarding, no rebound                 tenderness   Extremities:   CDI dressing surgical knee . ACB cath present, infupump.   Pulses:   Pulses palpable and equal bilaterally   Skin:   No bleeding, bruising or rash   Neurologic:   Cranial nerves 2 - 12 grossly intact, sensation intact, Flexion and dorsiflexion intact bilateral feet.         Discharge Disposition: Home        Discharge Medications        New Medications        Instructions Start Date   acetaminophen 650 MG 8 hr tablet  Commonly known as: TYLENOL   650 mg, Oral, Every 8 Hours      aspirin 325 MG EC tablet   325 mg, Oral, Daily, Begin day after surgery      cefadroxil 500 MG capsule  Commonly known as: DURICEF   500 mg, Oral, 2 Times Daily      docusate sodium 100 MG capsule  Commonly known as: Colace   100 mg, Oral, 2 Times Daily PRN      ibuprofen 400 MG tablet  Commonly known as: ADVIL,MOTRIN   400 mg, Oral, Every 8 Hours PRN      ondansetron 4 MG tablet  Commonly known as: Zofran   4 mg, Oral, Every 8 Hours PRN      oxyCODONE 5 MG immediate release tablet  Commonly known as: ROXICODONE   5 mg, Oral, Every 6 Hours PRN      ropivacaine 0.2 % infusion (INFUSYSTEM)  Commonly known as: NAROPIN   1 mL/hr (2 mg/hr), Peripheral Nerve, Continuous             Changes to Medications        Instructions Start Date   simvastatin 20 MG tablet  Commonly known as: ZOCOR  What changed: when to take this   20 mg, Oral, Daily             Continue These Medications        Instructions Start Date   cetirizine 10 MG tablet  Commonly known as: zyrTEC   10 mg, Oral, Daily      polycarbophil 625 MG tablet tablet   625 mg, Oral, Daily               Discharge Diet: Resume prior    Activity at Discharge:   Weightbearing as tolerated    Future Appointments   Date Time Provider Department Center   4/12/2024  2:00 PM Mayo Curry, PT [facility] None   5/2/2024 10:10 AM Rey Worthy MD [facility]   6/24/2024  9:15 AM Millie Loya MD  [facility]         Dragon disclaimer:  Part of this encounter note is an electronic transcription/translation of spoken language to printed text. The electronic translation of spoken language may permit erroneous, or at times, nonsensical words or phrases to be inadvertently transcribed; Although I have reviewed the note for such errors, some may still exist.       Moshe Wade MD  04/11/24  12:39 EDT

## 2024-04-11 NOTE — PLAN OF CARE
D/P flexion not present at intial assessment due to complete numbness to bilateral ft. Patient ambulates in the room at 2200 hours when sensation was back. Elastic bandage CDI. Pain adequately controlled with PO medications/nerve block agent. Voids spontaneously without difficulty. Bryn cardiac, otherwise VSS on RA. Call light in reach.

## 2024-04-11 NOTE — CASE MANAGEMENT/SOCIAL WORK
Case Management Discharge Note      Final Note: I met with this patient at the bedside regarding his discharge home today. He is inagreement with this plan. A rolling walker referral was made through [facility] for home use. The order is in EPIC. He stated he is set up [facility] program. He has transportation home. He denies having any further discharge planning needs at this time.         Selected Continued Care - Admitted Since 4/10/2024       Destination    No services have been selected for the patient.                Durable Medical Equipment Coordination complete.      Service Provider Selected Services Address Phone Fax Patient Preferred    [facility] [place] [facility] [address] [facility] [id number] [phone] --              Dialysis/Infusion    No services have been selected for the patient.                Home Medical Care    No services have been selected for the patient.                Therapy    No services have been selected for the patient.                Community Resources    No services have been selected for the patient.                Community & DME    No services have been selected for the patient.                         Final Discharge Disposition Code: 01 - home or self-care

## 2024-04-11 NOTE — PLAN OF CARE
Goal Outcome Evaluation:  Plan of Care Reviewed With: patient, spouse           Outcome Evaluation: OT educated pt and spouse on ADL retraining, transfer training and home safety. OT issued necessary AE and educated on use. Issued handouts on bathroom safety and educated on use. Reviewed safe car transfer technique and encouraged them to take gait belt home to assist. Recommend DC home with initial family assist as needed.      Anticipated Discharge Disposition (OT): home with assist

## 2024-04-11 NOTE — THERAPY EVALUATION
Patient Name: Chente Rich  : 1955    MRN: [id number]                              Today's Date: 2024       Admit Date: 4/10/2024    Visit Dx:     ICD-10-CM ICD-9-CM   1. Neuropathy of right lower extremity  G57.91 355.8   2. Primary osteoarthritis of left knee  M17.12 715.16     Patient Active Problem List   Diagnosis    Hyperlipidemia    Controlled type 2 diabetes mellitus without complication, without long-term current use of insulin    Coronary artery calcification    BCC (basal cell carcinoma of skin)    Diverticulosis    Colon polyp    Neuropathy of right lower extremity    OA (osteoarthritis) of knee    SCC (squamous cell carcinoma)     Past Medical History:   Diagnosis Date    Abnormal screening cardiac CT 10/12/2017    Calcium Score 133    Acute diverticulitis 2023    Hosp 2023      BCC (basal cell carcinoma of skin)     Dx (initially)-.  Has occurred on head, chest, back, and right leg.    Colon polyp     Dx 10/17- tubular adenoma ascending colon, no dysplasia    Diabetes mellitus     NO LONGER NEEDS MEDS    Diverticulosis     Dx 10/17 by Colonoscopy- pancolonic    History of cardiovascular stress test 2020    negative stress echo, EF 60%    History of esophagogastroduodenoscopy (EGD) 2023    Normal    History of GI bleed 2023    Hospitalized.  EGD and Colonoscopy normal except diverticulosis    Hyperlipidemia     Neuropathy of right lower extremity     Dx 2022- mild (by EMG)    OA (osteoarthritis) of knee 01/15/2024    SCC (squamous cell carcinoma) 2024    Dx 24- left forehead (Mohs Procedure 3/11/24)      Wears glasses      Past Surgical History:   Procedure Laterality Date    CATARACT EXTRACTION Bilateral 2021    laser (approx date)    COLONOSCOPY N/A 2023    Procedure: COLONOSCOPY;  Surgeon: Brunner, Mark I, MD;  Location: [facility] ENDOSCOPY;  Service: Gastroenterology;  Laterality: N/A;  3 clips placed in the descending colon. 1ml  of endoscopic marker used in descending colon.    ENDOSCOPY N/A 11/20/2023    Procedure: ESOPHAGOGASTRODUODENOSCOPY;  Surgeon: Brunner, Mark I, MD;  Location: [facility] ENDOSCOPY;  Service: Gastroenterology;  Laterality: N/A;    KNEE SURGERY Left 07/29/2022    knee arthroscopy with partial medial menisectomy- JRT    TOTAL KNEE ARTHROPLASTY Left 4/10/2024    Procedure: TOTAL KNEE ARTHROPLASTY WITH OSWALDO ROBOT - LEFT;  Surgeon: Rey Worthy MD;  Location: [facility] OR;  Service: Robotics - Ortho;  Laterality: Left;    WISDOM TOOTH EXTRACTION        General Information       Row Name 04/11/24 0839          Physical Therapy Time and Intention    Document Type evaluation  -     Mode of Treatment physical therapy  -       Row Name 04/11/24 0839          General Information    Patient Profile Reviewed yes  -     Prior Level of Function min assist:;all household mobility;community mobility;gait;transfer;bed mobility;ADL's  -     Existing Precautions/Restrictions fall;other (see comments)  adductor canal nerve cath  -       Row Name 04/11/24 0839          Living Environment    People in Home spouse  -       Row Name 04/11/24 [facility]          Home Main Entrance    Number of Stairs, Main Entrance two  -HW     Stair Railings, Main Entrance none  -       Row Name 04/11/24 0839          Stairs Within Home, Primary    Stairs, Within Home, Primary BR/BA on second level  -     Number of Stairs, Within Home, Primary twelve  -       Row Name 04/11/24 0839          Cognition    Orientation Status (Cognition) oriented x 3  -       Row Name 04/11/24 0839          Safety Issues, Functional Mobility    Safety Issues Affecting Function (Mobility) awareness of need for assistance;insight into deficits/self-awareness  -     Impairments Affecting Function (Mobility) balance;endurance/activity tolerance;pain;range of motion (ROM);strength  -               User Key  (r) = Recorded By, (t) = Taken By, (c) = Cosigned By       Initials Name Provider Type     Tiara Callahan, PT Physical Therapist                   Mobility       Row Name 04/11/24 0949          Bed Mobility    Bed Mobility supine-sit  -HW     Supine-Sit [place] (Bed Mobility) standby assist  -HW     Comment, (Bed Mobility) VC for line management  -       Row Name 04/11/24 0949          Transfers    Comment, (Transfers) Vc for hand placement and encouraging pushing through LLE.  -       Row Name 04/11/24 0949          Sit-Stand Transfer    Sit-Stand [place] (Transfers) contact guard;nonverbal cues (demo/gesture);verbal cues;2 person assist  -     Assistive Device (Sit-Stand Transfers) walker, front-wheeled  -HW       Row Name 04/11/24 0949          Gait/Stairs (Locomotion)    [place] Level (Gait) contact guard;nonverbal cues (demo/gesture);verbal cues;2 person assist  -     Assistive Device (Gait) walker, front-wheeled  -HW     Patient was able to Ambulate yes  -HW     Distance in Feet (Gait) 250  -HW     Deviations/Abnormal Patterns (Gait) bilateral deviations  -HW     Bilateral Gait Deviations forward flexed posture;heel strike decreased  -HW     Left Sided Gait Deviations decreased knee extension  -     [place] Level (Stairs) contact guard;nonverbal cues (demo/gesture);verbal cues;1 person to manage equipment;1 person assist  -     Assistive Device (Stairs) cane, straight  -HW     Handrail Location (Stairs) right side (ascending)  -HW     Number of Steps (Stairs) 12  -HW     Ascending Technique (Stairs) step-to-step  -HW     Descending Technique (Stairs) step-to-step  -HW     Comment, (Gait/Stairs) Pt amb in [place] with step-through gait pattern. Pt demonstrated decreased stride length and slow gait speed. VC for upright posture and active knee extension in stance phase. Good improvement with VC. Pt navigated 12 steps with VC for sequencing. Good recall. No knee buckling or LOB noted. Activity limited by fatigue.  -       Row Name  04/11/24 0949          Mobility    Extremity Weight-bearing Status left lower extremity  -     Left Lower Extremity (Weight-bearing Status) weight-bearing as tolerated (WBAT)  -               User Key  (r) = Recorded By, (t) = Taken By, (c) = Cosigned By      Initials Name Provider Type     Tiara Callahan PT Physical Therapist                   Obj/Interventions       [facility] Name 04/11/24 1002          Range of Motion Comprehensive    General Range of Motion lower extremity range of motion deficits identified  -     Comment, General Range of Motion Surgical knee ROM: 6-98  -[facility] Name 04/11/24 1002          Strength Comprehensive (MMT)    General Manual Muscle Testing (MMT) Assessment lower extremity strength deficits identified  -     Comment, General Manual Muscle Testing (MMT) Assessment Pt able to perform B active ankle DF and SLR  -[facility] Name 04/11/24 1002          Motor Skills    Therapeutic Exercise knee;ankle  -[facility] Name 04/11/24 1002          Knee (Therapeutic Exercise)    Knee (Therapeutic Exercise) isometric exercises;strengthening exercise  -     Knee Isometrics (Therapeutic Exercise) quad sets;left;10 repetitions  -     Knee Strengthening (Therapeutic Exercise) SLR (straight leg raise);SAQ (short arc quad);LAQ (long arc quad);heel slides;left;10 repetitions  -[facility] Name 04/11/24 1002          Ankle (Therapeutic Exercise)    Ankle (Therapeutic Exercise) AROM (active range of motion)  -     Ankle AROM (Therapeutic Exercise) bilateral;dorsiflexion;plantarflexion;10 repetitions  -[facility] Name 04/11/24 1002          Balance    Balance Assessment sitting static balance;sitting dynamic balance;sit to stand dynamic balance;standing static balance;standing dynamic balance  -     Static Sitting Balance standby assist  -     Dynamic Sitting Balance standby assist  -     Position, Sitting Balance sitting edge of bed  -     Static Standing Balance contact guard   -HW     Dynamic Standing Balance contact guard  -     Position/Device Used, Standing Balance supported;walker, rolling  -HW     Balance Interventions sitting;standing;sit to stand;occupation based/functional task  -       Row Name 04/11/24 1002          Sensory Assessment (Somatosensory)    Sensory Assessment (Somatosensory) LE sensation intact  -               User Key  (r) = Recorded By, (t) = Taken By, (c) = Cosigned By      Initials Name Provider Type     Tiara Callahan, PT Physical Therapist                   Goals/Plan       Row Name 04/11/24 1010          Bed Mobility Goal 1 (PT)    Activity/Assistive Device (Bed Mobility Goal 1, PT) sit to supine/supine to sit  -HW     [place] Level/Cues Needed (Bed Mobility Goal 1, PT) modified independence  -HW     Time Frame (Bed Mobility Goal 1, PT) long term goal (LTG);3 days  -       Row Name 04/11/24 1010          Transfer Goal 1 (PT)    Activity/Assistive Device (Transfer Goal 1, PT) sit-to-stand/stand-to-sit  -HW     [place] Level/Cues Needed (Transfer Goal 1, PT) modified independence  -HW     Time Frame (Transfer Goal 1, PT) long term goal (LTG);3 days  -       Row Name 04/11/24 1010          Gait Training Goal 1 (PT)    Activity/Assistive Device (Gait Training Goal 1, PT) gait (walking locomotion)  -HW     [place] Level (Gait Training Goal 1, PT) modified independence  -HW     Distance (Gait Training Goal 1, PT) 500  -HW     Time Frame (Gait Training Goal 1, PT) long term goal (LTG);3 days  -       Row Name 04/11/24 1010          ROM Goal 1 (PT)    ROM Goal 1 (PT) Surgical Knee ROM: 0-105  -HW     Time Frame (ROM Goal 1, PT) long-term goal (LTG);3 days  -       Row Name 04/11/24 1010          Stairs Goal 1 (PT)    Activity/Assistive Device (Stairs Goal 1, PT) ascending stairs;descending stairs  -HW     [place] Level/Cues Needed (Stairs Goal 1, PT) modified independence  -HW     Number of Stairs (Stairs Goal 1, PT) 12  -HW      Time Frame (Stairs Goal 1, PT) long term goal (LTG);3 days  -[facility] Name 04/11/24 1010          Therapy Assessment/Plan (PT)    Planned Therapy Interventions (PT) balance training;bed mobility training;gait training;home exercise program;ROM (range of motion);patient/family education;stair training;strengthening;stretching;transfer training  -               User Key  (r) = Recorded By, (t) = Taken By, (c) = Cosigned By      Initials Name Provider Type     Tiara Callahan, MAE Physical Therapist                   Clinical Impression       [facility] Name 04/11/24 1003          Pain    Pretreatment Pain Rating 3/10  -     Posttreatment Pain Rating 3/10  -     Pain Location - Side/Orientation Left  -     Pain Location generalized  -     Pain Location - knee  -     Pain Intervention(s) Ambulation/increased activity;Repositioned;Elevated;Cold applied  -[facility] Name 04/11/24 1003          Plan of Care Review    Plan of Care Reviewed With patient  -     Progress no change  -     Outcome Evaluation Pt amb 250' with FWW and CGA. Pt navigated steps without difficulty. No knee buckling or LOB noted. Activity limited by fatigue. HEP and precautions reviewed with pt. Frequent ambulation encouraged. Recommend d/c home with assist and OPPT when medically appropriate. Pt cleared to d/c today from PT standpoint.  -[facility] Name 04/11/24 1003          Therapy Assessment/Plan (PT)    Rehab Potential (PT) good, to achieve stated therapy goals  -     Criteria for Skilled Interventions Met (PT) yes;meets criteria;skilled treatment is necessary  -     Therapy Frequency (PT) 2 times/day  -[facility] Name 04/11/24 1003          Vital Signs    Pre Patient Position Supine  -     Intra Patient Position Standing  -     Post Patient Position Sitting  -HW       Row Name 04/11/24 1003          Positioning and Restraints    Pre-Treatment Position in bed  -HW     Post Treatment Position chair  -     In Chair  notified nsg;reclined;sitting;call light within reach;encouraged to call for assist;exit alarm on;with family/caregiver;legs elevated;compression device  ice applied  -               User Key  (r) = Recorded By, (t) = Taken By, (c) = Cosigned By      Initials Name Provider Type     Tiara Callahan PT Physical Therapist                   Outcome Measures       Row Name 04/11/24 1011 04/11/24 0845       How much help from another person do you currently need...    Turning from your back to your side while in flat bed without using bedrails? 4  -HW 4  -LH    Moving from lying on back to sitting on the side of a flat bed without bedrails? 4  -HW 4  -LH    Moving to and from a bed to a chair (including a wheelchair)? 3  - 4  -LH    Standing up from a chair using your arms (e.g., wheelchair, bedside chair)? 3  - 4  -LH    Climbing 3-5 steps with a railing? 3  - 3  -LH    To walk in hospital room? 3  -HW 3  -LH    AM-PAC 6 Clicks Score (PT) 20  - 22  -    Highest Level of Mobility Goal 6 --> Walk 10 steps or more  - 7 --> Walk 25 feet or more  -      Row Name 04/11/24 1011          PADD    Diagnosis 1  -     Gender 2  -     Age Group 1  -     Gait Distance 1  -     Assist Level 1  -     Home Support 3  -     PADD Score 9  -     Patient Preference home with outpatient rehab  -     Prediction by PADD Score directly home (with home health or out-patient rehab)  -       Row Name 04/11/24 1011          Functional Assessment    Outcome Measure Options AM-PAC 6 Clicks Basic Mobility (PT);PADD  -               User Key  (r) = Recorded By, (t) = Taken By, (c) = Cosigned By      Initials Name Provider Type    HW Tiara Callahan, MAE Physical Therapist     Lala Booth, ARLIN Registered Nurse                                 Physical Therapy Education       Title: PT OT SLP Therapies (Done)       Topic: Physical Therapy (Done)       Point: Mobility training (Done)       Learning Progress Summary              Patient Acceptance, E,D, VU,DU by  at 4/11/2024 1011                         Point: Home exercise program (Done)       Learning Progress Summary             Patient Acceptance, E,D, VU,DU by  at 4/11/2024 1011                         Point: Body mechanics (Done)       Learning Progress Summary             Patient Acceptance, E,D, VU,DU by  at 4/11/2024 1011                         Point: Precautions (Done)       Learning Progress Summary             Patient Acceptance, E,D, VU,DU by  at 4/11/2024 1011                                         User Key       Initials Effective Dates Name Provider Type Discipline     12/15/23 -  Tiara Callahan, PT Physical Therapist PT                  PT Recommendation and Plan  Planned Therapy Interventions (PT): balance training, bed mobility training, gait training, home exercise program, ROM (range of motion), patient/family education, stair training, strengthening, stretching, transfer training  Plan of Care Reviewed With: patient  Progress: no change  Outcome Evaluation: Pt amb 250' with FWW and CGA. Pt navigated steps without difficulty. No knee buckling or LOB noted. Activity limited by fatigue. HEP and precautions reviewed with pt. Frequent ambulation encouraged. Recommend d/c home with assist and OPPT when medically appropriate. Pt cleared to d/c today from PT standpoint.     Time Calculation:   PT Evaluation Complexity  History, PT Evaluation Complexity: 1-2 personal factors and/or comorbidities  Examination of Body Systems (PT Eval Complexity): total of 3 or more elements  Clinical Presentation (PT Evaluation Complexity): stable  Clinical Decision Making (PT Evaluation Complexity): low complexity  Overall Complexity (PT Evaluation Complexity): low complexity     PT Charges       Row Name 04/11/24 1013             Time Calculation    Start Time 0817  -      PT Received On 04/11/24  [facility]      PT Goal Re-Cert Due Date 04/21/24  -         Timed Charges    82479 -  PT Therapeutic Exercise Minutes 11  -HW      51903 - Gait Training Minutes  12  -HW         Untimed Charges    PT Eval/Re-eval Minutes 48  -HW         Total Minutes    Timed Charges Total Minutes 23  -HW      Untimed Charges Total Minutes 48  -HW       Total Minutes 71  -HW                User Key  (r) = Recorded By, (t) = Taken By, (c) = Cosigned By      Initials Name Provider Type     Tiara Callahan, PT Physical Therapist                  Therapy Charges for Today       Code Description Service Date Service Provider Modifiers Qty    79807346514 HC PT THER PROC EA 15 MIN 4/11/2024 Tiara Callahan, PT GP 1    70700259244 HC GAIT TRAINING EA 15 MIN 4/11/2024 Tiara Callahan, PT GP 1    50807265462 HC PT EVAL LOW COMPLEXITY 4 4/11/2024 Tiara Callahan, PT GP 1    39967310790 HC PT THER SUPP EA 15 MIN 4/11/2024 Tiara Callahan, PT GP 2            PT G-Codes  Outcome Measure Options: AM-PAC 6 Clicks Basic Mobility (PT), PADD  AM-PAC 6 Clicks Score (PT): 20  PT Discharge Summary  Anticipated Discharge Disposition (PT): home with assist, home with outpatient therapy services    Tiara Callahan PT  4/11/2024

## 2024-04-12 ENCOUNTER — TREATMENT (OUTPATIENT)
Age: 69
End: 2024-04-12
Payer: MEDICARE

## 2024-04-12 ENCOUNTER — TRANSITIONAL CARE MANAGEMENT TELEPHONE ENCOUNTER (OUTPATIENT)
Dept: ORTHOPEDIC SURGERY | Facility: CLINIC | Age: 69
End: 2024-04-12
Payer: MEDICARE

## 2024-04-12 DIAGNOSIS — M25.562 CHRONIC PAIN OF LEFT KNEE: Primary | ICD-10-CM

## 2024-04-12 DIAGNOSIS — G89.29 CHRONIC PAIN OF LEFT KNEE: Primary | ICD-10-CM

## 2024-04-12 PROCEDURE — 97530 THERAPEUTIC ACTIVITIES: CPT | Performed by: PHYSICAL THERAPIST

## 2024-04-12 PROCEDURE — 97161 PT EVAL LOW COMPLEX 20 MIN: CPT | Performed by: PHYSICAL THERAPIST

## 2024-04-12 PROCEDURE — 97110 THERAPEUTIC EXERCISES: CPT | Performed by: PHYSICAL THERAPIST

## 2024-04-12 NOTE — OUTREACH NOTE
Patient: Chente Rich  : 1955  Age/Gender: 69 y.o. male  Surgical Procedure: LT TKA  Surgeon: DR. HERMOSILLO  Surgery Date: 04/10/2024  Discharge Date: 2024    Post-Operative Orthopedic Assessment   Question Patient Response   How have you been doing since discharge? DOING FINE, JUST FINISHED PT   Are you able to eat/drink, ok? [x] Yes     [] No   Are you having any nausea/vomiting? [] Yes     [x] No   Are you having problems with your bowels, or passing gas? []Yes      [x] No  [] No Bowel Movement at this time   Are you able to get around and walk? [x] Yes     [] No   Do you have any pain? [] Yes     [x] No   Pain Rating: At Rest (0-10) 0/10   Pain Rating: While Active (0-10) 3/10   Location of pain LT [facility]   Were your discharge instructions reviewed with you regarding wound care?  [x] Yes     [] No  [] Proper wound care reviewed with patient at this time.   Were your discharge instructions reviewed with you regarding prescribed medications? [x] Yes     [] No  [] Prescription Medication reviewed with patient at this time.     Were your follow up appointments reviewed with you? [x] Yes     [] No  [] Post op appointments reviewed with patient at this time.   Have you completed your first session of Physical Therapy?  [x] Yes     [] No     Discharge Disposition   Were you discharged home, or sent to a rehab facility? [x] Home     [] Rehab Facility  Rehab Location:   Who is your caregiver at this time? WIFE - LATRELL

## 2024-04-14 PROBLEM — Z96.652 S/P TKR (TOTAL KNEE REPLACEMENT), LEFT: Status: ACTIVE | Noted: 2024-04-14

## 2024-04-15 ENCOUNTER — TREATMENT (OUTPATIENT)
Dept: PHYSICAL THERAPY | Facility: CLINIC | Age: 69
End: 2024-04-15
Payer: MEDICARE

## 2024-04-15 ENCOUNTER — TELEPHONE (OUTPATIENT)
Dept: ORTHOPEDIC SURGERY | Facility: CLINIC | Age: 69
End: 2024-04-15
Payer: MEDICARE

## 2024-04-15 DIAGNOSIS — M25.562 CHRONIC PAIN OF LEFT KNEE: Primary | ICD-10-CM

## 2024-04-15 DIAGNOSIS — G89.29 CHRONIC PAIN OF LEFT KNEE: Primary | ICD-10-CM

## 2024-04-15 PROCEDURE — 97110 THERAPEUTIC EXERCISES: CPT | Performed by: PHYSICAL THERAPIST

## 2024-04-15 NOTE — PROGRESS NOTES
PT2U Physical Therapy In-Home Evaluation and Plan of Care    [facility] [place]  [address] [place] [address]  [phone]    Patient: Chente Rich   : 1955  Diagnosis/ICD-10 Code:  Chronic pain of left knee [M25.562, G89.29]  Referring practitioner: Rey Worthy,*    Subjective Evaluation    History of Present Illness  Date of onset: 3/26/2022  Date of surgery: 4/10/2024  Mechanism of injury: Chronic    Subjective comment: Has had chronic knee pain for the past 2 years.  Elected to have his left knee replaced on April 10, 2024.  Returned home on 2024.  Is currently using a cane.  Slept well last night.  Denies calf pain.  No numbness or tingling in the left leg.  No fever or chills.  Patient Occupation: Retired Quality of life: excellent    Pain  Relieving factors: ice, change in position, rest and medications  Aggravating factors: prolonged positioning  Progression: improved    Social Support  Lives in: multiple-level home  Lives with: spouse    Treatments  Previous treatment: physical therapy and injection treatment (L knee arthroscopy; R post ankle pain)  Patient Goals  Patient goals for therapy: decreased pain, decreased edema, improved balance, increased motion, increased strength, independence with ADLs/IADLs and return to sport/leisure activities           Objective          Active Range of Motion   Left Knee   Flexion: 98 degrees   Extension: 0 degrees   Extensor la degrees     Passive Range of Motion   Left Knee   Extension: 5 degrees     Strength/Myotome Testing     Left Knee   Extension: 4-  Quadriceps contraction: good     General Comments     Knee Comments  *         Assessment & Plan       Assessment  Impairments: abnormal or restricted ROM, activity intolerance, impaired balance, impaired physical strength, lacks appropriate home exercise program, pain with function and weight-bearing intolerance   Functional  limitations: carrying objects, lifting, walking, pulling, pushing, uncomfortable because of pain, standing and unable to perform repetitive tasks   Assessment details: Mr. Rich is a very pleasant 69-year-old male that presents to physical therapy status post left total knee arthroplasty performed on April 10, 2024.  Pain catheter in place.  Has no erythema or edema in the left calf region.  Is wearing a knee bandage.  Surgical incision is approximated and shows no signs of infection.  Ambulates using a single-point cane.  He is able to a send and descend stairs using a single-point cane and handrail.  Has full terminal knee extension actively.  Knee flexion active mobility is about 98 degrees.    Had a protracted discussion with the patient and his wife regarding timelines of healing and expectations for return to ADLs/IADLs (included in TA billing below).  Prognosis: good    Goals  Plan Goals: STGs:  1.)  LEFS improved x 1 MCID in 6 weeks.  2.)  Ambulate without an AD without antalgia in 8 weeks.  LTGs:  1.)  Have 5/5 knee extension strength in 10 weeks.  2.)  Be perform all ADLs/iADLs without pain or functional limitations in 12 weeks.      Plan  Therapy options: will be seen for skilled therapy services  Planned modality interventions: thermotherapy (hydrocollator packs) and cryotherapy  Planned therapy interventions: abdominal trunk stabilization, manual therapy, therapeutic activities, stretching, strengthening, neuromuscular re-education, balance/weight-bearing training, functional ROM exercises, gait training, home exercise program, flexibility and joint mobilization  Frequency: 2x week  Duration in visits: 20  Duration in weeks: 12  Treatment plan discussed with: patient and family (wife)    *Mr. Rich will be transitioning his care to our [facility] Outpatient Physical Therapy Office under Delfino Amin DPT.    Manual Therapy:         mins  06202;  Therapeutic Exercise:    16     mins  57132;      Neuromuscular Koffi:        mins  71949;    Therapeutic Activity:     9     mins  42932;     Gait Training:           mins  12609;     Ultrasound:          mins  39144;    Electrical Stimulation:         mins  12063 ( );  Dry Needling          mins self-pay    Timed Treatment:   25   mins   Total Treatment:     55   mins    PT SIGNATURE: Garfield Washington, MAE   DATE TREATMENT INITIATED: 4/14/2024    Initial Certification  Certification Period: 7/13/2024  I certify that the therapy services are furnished while this patient is under my care.  The services outlined above are required by this patient, and will be reviewed every 90 days.     PHYSICIAN: Rey Worthy MD  NPI: [id number]                                      DATE:    Please sign and return via fax to [phone].. Thank you, [facility].

## 2024-04-15 NOTE — ADDENDUM NOTE
Addendum  created 04/15/24 0828 by Deejay Smith CRNA    Clinical Note Signed, Diagnosis association updated

## 2024-04-15 NOTE — PROGRESS NOTES
Physical Therapy Daily Treatment Note  [facility] [place]  [address], [address]  [place] [address]      Patient: Chente Rich   : 1955  Referring practitioner: Rey Worthy,*  Date of Initial Visit: Type: THERAPY  Noted: 2024  Today's Date: 4/15/2024  Patient seen for 2 sessions       Visit Diagnoses:    ICD-10-CM ICD-9-CM   1. Chronic pain of left knee  M25.562 719.46    G89.29 338.29       Subjective Evaluation    History of Present Illness  Mechanism of injury: The patient reported that his knee and foot feel tight and swollen today.  He has been using his ROM tech bike 5 times per day and stated he had some fluid discharge from the anterior knee on Friday and Saturday.  He has contacted Dr. Worthy about this but reported there has been no additional fluid since that time.  He is using his cane for mobility but is able to walk without it.  He feels his home program is going well.    Pain  Current pain ratin  Location: L knee           Objective   See Exercise, Manual, and Modality Logs for complete treatment.       Assessment & Plan       Assessment  Assessment details: The patient had some fluid leakage from his incision over the weekend but there was no evidence of opening today.  Swelling was mild to moderate down the lower extremity, which is expected given the recency of surgery.  He does have a fair amount of bruising extending up the thigh and was advised to continue using ice and elevating regularly.  Active range of motion is excellent for this timeframe and joint mobility was good, with minimal capsular restriction.  Active range of motion exercises were performed extensively in the clinic today with minimal complaints of pain and were added to his HEP.  He was encouraged to walk is much as tolerated.    Plan  Plan details: Continue range of motion exercises, quad strengthening, and manual therapy as needed for improved mobility.          Timed:          Manual Therapy:    0     mins  02254;     Therapeutic Exercise:    53     mins  65127;     Neuromuscular Koffi:    0    mins  71585;    Therapeutic Activity:     0     mins  65011;     Gait Trainin     mins  02226;     Ultrasound:     0     mins  26875;    Ionto                               0    mins   05228  Self Care                       0     mins   11035  Canalith Repos    0     mins 91256      Un-Timed:  Electrical Stimulation:    0     mins  75501 ( );  Dry Needling     0     mins self-pay  Traction     0     mins 11062      Timed Treatment:   53   mins   Total Treatment:     53   mins    Delfino Amin PT  [place] License: [id number]

## 2024-04-15 NOTE — TELEPHONE ENCOUNTER
"Spoke with patient. He said that the compression sleeve he was given after his surgery has gotten dirty with blood. Patient states that he only has occasional blood from his incision and denies any drainage or yellow coming from his incision. Patient states he has been rotating the sleeve around to keep the clean parts over his incision. Patient stated that it is not a material that can be washed or gotten wet. Advised him that we don't have that same material in the office, but we have a brown stockinet in our office that we typically use as a compression sleeve or an ace bandage could be used. Informed him that he should not continue to use the sleeve he currently has to cover his incision because we do not want the incision to get infected. Patient has PT later today and is going to see if they have any recommendations or anything in their office that can be used. If not, advised patient to contact our office and we can have him come in to get some of the brown 4\" stockinet that we have. Patient verbalized understanding and appreciation.   "

## 2024-04-15 NOTE — TELEPHONE ENCOUNTER
Provider: BAY    Caller: JUAN M    Relationship to Patient: SELF    Pharmacy:     Phone Number: [phone]    Reason for Call: PT HAD TKA AND HAS QUESTIONS ABOUT WOUND CARE - PAIN PUMP WENT OFF YESTERDAY AND IT WAS WORKING BUT HE DIDN'T HAVE ANYMORE  - PT LEG AND FOOT AND SWELLING - NOT MUCH PAIN BUT THE SLEEVE THAT WAS GIVEN TO HIM TO WEAR WITH THE ROMTECH IS GETTING GROSS DUE TO SOME DRAINAGE AND BLOOD - STATES ITS LIKE A GAUZY COMPRESSION SLEEVE - PT WANTS TO KNOW IF THERE IS A WAY TO GET A REPLACEMENT OR IF HE IS STUCK WITH THIS ONE

## 2024-04-18 ENCOUNTER — TREATMENT (OUTPATIENT)
Dept: PHYSICAL THERAPY | Facility: CLINIC | Age: 69
End: 2024-04-18
Payer: MEDICARE

## 2024-04-18 DIAGNOSIS — G89.29 CHRONIC PAIN OF LEFT KNEE: Primary | ICD-10-CM

## 2024-04-18 DIAGNOSIS — M25.562 CHRONIC PAIN OF LEFT KNEE: Primary | ICD-10-CM

## 2024-04-18 PROCEDURE — 97140 MANUAL THERAPY 1/> REGIONS: CPT | Performed by: PHYSICAL THERAPIST

## 2024-04-18 PROCEDURE — 97110 THERAPEUTIC EXERCISES: CPT | Performed by: PHYSICAL THERAPIST

## 2024-04-18 NOTE — PROGRESS NOTES
Physical Therapy Daily Treatment Note  [facility]- [place]  [address], [address]  [place] [address]      Patient: Chente Rich   : 1955  Referring practitioner: Rey Worthy,*  Date of Initial Visit: Type: THERAPY  Noted: 2024  Today's Date: 2024  Patient seen for 3 sessions       Visit Diagnoses:    ICD-10-CM ICD-9-CM   1. Chronic pain of left knee  M25.562 719.46    G89.29 338.29       Subjective Evaluation    History of Present Illness  Mechanism of injury: The patient stated that he had more pain than usual in his knee yesterday but noted that it eased up with use of his [employer] tech bike.  He has begun to notice that his knee gets very stiff when sitting for prolonged periods of time.  Additionally, he has had intermittent sharp pain that is short-lived and unaffected by position.  His home exercise program exercises have gone well.    Pain  Current pain rating: 3  Location: L knee         Objective          Active Range of Motion   Left Knee   Flexion: 107 degrees   Extension: 0 degrees     See Exercise, Manual, and Modality Logs for complete treatment.       Assessment & Plan       Assessment  Assessment details: The patient has been experiencing fluctuating levels of pain, that seem to be worse with prolonged positioning and increased activity.  He was reassured that this is normal for a knee replacement and was encouraged to keep the joint moving when possible.  The quad is easily fatigued and resulted in reduced TKE quality by the end of the visit.  His motion is coming along relatively quickly and he will likely benefit from high repetition of low-level quad strengthening exercises for the next couple of weeks.    Plan  Plan details: Continue high reps strengthening of the quads.  Manual therapy to improve range of motion.        Timed:         Manual Therapy:    15     mins  43630;     Therapeutic Exercise:    23     mins  02338;     Neuromuscular Koffi:    0     mins  11444;    Therapeutic Activity:     0     mins  84081;     Gait Trainin     mins  64924;     Ultrasound:     0     mins  96059;    Ionto                               0    mins   34122  Self Care                       0     mins   94478  Canalith Repos    0     mins 66853      Un-Timed:  Electrical Stimulation:    0     mins  09412 ( );  Dry Needling     0     mins self-pay  Traction     0     mins 88397      Timed Treatment:   38   mins   Total Treatment:     38   mins    Delfino Amin, PT  [place] License: [id number]

## 2024-04-22 ENCOUNTER — TREATMENT (OUTPATIENT)
Dept: PHYSICAL THERAPY | Facility: CLINIC | Age: 69
End: 2024-04-22
Payer: MEDICARE

## 2024-04-22 DIAGNOSIS — M25.562 CHRONIC PAIN OF LEFT KNEE: Primary | ICD-10-CM

## 2024-04-22 DIAGNOSIS — G89.29 CHRONIC PAIN OF LEFT KNEE: Primary | ICD-10-CM

## 2024-04-22 PROCEDURE — 97112 NEUROMUSCULAR REEDUCATION: CPT | Performed by: PHYSICAL THERAPIST

## 2024-04-22 PROCEDURE — 97110 THERAPEUTIC EXERCISES: CPT | Performed by: PHYSICAL THERAPIST

## 2024-04-22 PROCEDURE — 97140 MANUAL THERAPY 1/> REGIONS: CPT | Performed by: PHYSICAL THERAPIST

## 2024-04-22 NOTE — PROGRESS NOTES
Physical Therapy Daily Treatment Note  [facility]- [place]  [address], [address]  [place] [address]      Patient: Chente Rich   : 1955  Referring practitioner: Rey Worthy,*  Date of Initial Visit: Type: THERAPY  Noted: 2024  Today's Date: 2024  Patient seen for 4 sessions       Visit Diagnoses:    ICD-10-CM ICD-9-CM   1. Chronic pain of left knee  M25.562 719.46    G89.29 338.29       Subjective Evaluation    History of Present Illness  Mechanism of injury: The patient reported that he was very active yesterday and had an increase in pain at night and again this morning.  He tried to manage the swelling in his thigh with use of a compression sleeve above his knee, but reported that it increased tenderness to touch in the quads and hamstrings and he was unable to sit with the pressure in the thigh.  He feels that his motion has regressed.    Pain  Current pain ratin  Location: L knee         Objective          Active Range of Motion   Left Knee   Flexion: 115 degrees   Extension: 0 degrees       See Exercise, Manual, and Modality Logs for complete treatment.       Assessment & Plan       Assessment  Assessment details: The patient demonstrated tightness in the hamstrings and quads upon presentation to PT, but responded very quickly to manual interventions and displayed 0/115 degrees of left knee mobility by the end of the visit.  He had increased pain in the knee after being active yesterday, but was reassured this was expected.  He had worsened tenderness to palpation in the thigh after use of a compression sleeve above the knee this week and was discouraged from this, as he has pitting edema and likely trapped the fluid.  He was shown self effleurage techniques to perform instead.  Quad strengthening exercises were continued today and tolerated well with minimal complaints of pain.    Plan  Plan details: Continue quad strengthening and range of motion  exercises.        Timed:         Manual Therapy:    15     mins  22441;     Therapeutic Exercise:    30     mins  44532;     Neuromuscular Koffi:    15    mins  13533;    Therapeutic Activity:     0     mins  32575;     Gait Trainin     mins  37662;     Ultrasound:     0     mins  12836;    Ionto                               0    mins   49768  Self Care                       0     mins   44338  Canalith Repos    0     mins 04903      Un-Timed:  Electrical Stimulation:    0     mins  03659 ( );  Dry Needling     0     mins self-pay  Traction     0     mins 99548      Timed Treatment:   60   mins   Total Treatment:     60   mins    Delfino Amin, PT  [place] License: [id number]

## 2024-04-25 ENCOUNTER — TREATMENT (OUTPATIENT)
Dept: PHYSICAL THERAPY | Facility: CLINIC | Age: 69
End: 2024-04-25
Payer: MEDICARE

## 2024-04-25 DIAGNOSIS — M25.562 CHRONIC PAIN OF LEFT KNEE: Primary | ICD-10-CM

## 2024-04-25 DIAGNOSIS — G89.29 CHRONIC PAIN OF LEFT KNEE: Primary | ICD-10-CM

## 2024-04-25 PROCEDURE — 97140 MANUAL THERAPY 1/> REGIONS: CPT | Performed by: PHYSICAL THERAPIST

## 2024-04-25 PROCEDURE — 97110 THERAPEUTIC EXERCISES: CPT | Performed by: PHYSICAL THERAPIST

## 2024-04-25 NOTE — PROGRESS NOTES
Physical Therapy Daily Treatment Note  [facility] [place]  [address], [address]  [place] [address]      Patient: Chente Rich   : 1955  Referring practitioner: Rey Worthy,*  Date of Initial Visit: Type: THERAPY  Noted: 2024  Today's Date: 2024  Patient seen for 5 sessions       Visit Diagnoses:    ICD-10-CM ICD-9-CM   1. Chronic pain of left knee  M25.562 719.46    G89.29 338.29       Subjective Evaluation    History of Present Illness  Mechanism of injury: The patient reported that his knee felt much better today.  He was able to mow a small portion of his yard on Tuesday but fatigued easily.  He has been using his ROM tech bike on an active setting and feels that is helping.    Pain  Current pain ratin  Location: L knee         Objective   See Exercise, Manual, and Modality Logs for complete treatment.       Assessment & Plan       Assessment  Assessment details: The patient had less stiffness and irritability in the knee today but range of motion was largely unchanged.  He is ahead of progress for this timeframe in regards to mobility, pain, and quad activation, and is tolerating daily activity quite well.  He should continue to see improvement in pain and activity tolerance with strengthening of the quads.  He was easily fatigued with quad strengthening exercises today, which is expected for this timeframe, and should be the emphasis of treatment moving forward.    Plan  Plan details: Continue quad strengthening and active range of motion exercises as tolerated.          Timed:         Manual Therapy:    15     mins  06719;     Therapeutic Exercise:    25     mins  23519;     Neuromuscular Koffi:    0    mins  49335;    Therapeutic Activity:     0     mins  80445;     Gait Trainin     mins  24221;     Ultrasound:     0     mins  59460;    Ionto                               0    mins   32884  Self Care                       0     mins   47020  [employer]  Repos    0     mins 00438      Un-Timed:  Electrical Stimulation:    0     mins  54369 ( );  Dry Needling     0     mins self-pay  Traction     0     mins 24977      Timed Treatment:   40   mins   Total Treatment:     60   mins    Delfino Amin, PT  [place] License: [id number]

## 2024-04-29 ENCOUNTER — TREATMENT (OUTPATIENT)
Dept: PHYSICAL THERAPY | Facility: CLINIC | Age: 69
End: 2024-04-29
Payer: MEDICARE

## 2024-04-29 DIAGNOSIS — M25.562 CHRONIC PAIN OF LEFT KNEE: Primary | ICD-10-CM

## 2024-04-29 DIAGNOSIS — G89.29 CHRONIC PAIN OF LEFT KNEE: Primary | ICD-10-CM

## 2024-04-29 PROCEDURE — 97140 MANUAL THERAPY 1/> REGIONS: CPT | Performed by: PHYSICAL THERAPIST

## 2024-04-29 PROCEDURE — 97110 THERAPEUTIC EXERCISES: CPT | Performed by: PHYSICAL THERAPIST

## 2024-04-29 NOTE — PROGRESS NOTES
Physical Therapy Daily Treatment Note  [facility] [place] [facility] [place]  [address], [address]  [place] [address]      Patient: Chente Rich   : 1955  Referring practitioner: Rey Worthy,*  Date of Initial Visit: Type: THERAPY  Noted: 2024  Today's Date: 2024  Patient seen for 6 sessions       Visit Diagnoses:    ICD-10-CM ICD-9-CM   1. Chronic pain of left knee  M25.562 719.46    G89.29 338.29       Subjective Evaluation    History of Present Illness  Mechanism of injury: The patient reported that his knee has been getting fatigued by the end of the day, particularly with use of his [email or web address] tech bike and performance of his HEP.  He believes his range of motion is improving.    Pain  Current pain ratin  Location: L knee           Objective          Active Range of Motion     Right Knee   Flexion: 119 degrees       See Exercise, Manual, and Modality Logs for complete treatment.       Assessment & Plan       Assessment  Assessment details: Left knee active range of motion continues to improve quickly and joint mobility was very good.  He is limited primarily by muscle tightness and was advised to stretch frequently throughout the day.  His quad is firing much better but will need to get stronger in order to improve tolerance to walking, stairs, and squatting.  He will benefit from progression of his quad strengthening exercises next visit.    Plan  Plan details: Progress quad strengthening exercises on HEP.  Continue manual therapy.          Timed:         Manual Therapy:    15     mins  74761;     Therapeutic Exercise:    25     mins  63125;     Neuromuscular Koffi:    0    mins  52196;    Therapeutic Activity:     0     mins  82791;     Gait Trainin     mins  63527;     Ultrasound:     0     mins  65538;    Ionto                               0    mins   52442  Self Care                       0     mins   96881  Canalith Repos    0     mins 74817      Un-Timed:  Electrical  Stimulation:    0     mins  09509 ( );  Dry Needling     0     mins self-pay  Traction     0     mins 60696      Timed Treatment:   40   mins   Total Treatment:     60   mins    Delfino Amin, PT  [place] License: [id number]

## 2024-05-01 ENCOUNTER — TREATMENT (OUTPATIENT)
Dept: PHYSICAL THERAPY | Facility: CLINIC | Age: 69
End: 2024-05-01
Payer: MEDICARE

## 2024-05-01 DIAGNOSIS — G89.29 CHRONIC PAIN OF LEFT KNEE: Primary | ICD-10-CM

## 2024-05-01 DIAGNOSIS — M25.562 CHRONIC PAIN OF LEFT KNEE: Primary | ICD-10-CM

## 2024-05-01 PROCEDURE — 97110 THERAPEUTIC EXERCISES: CPT | Performed by: PHYSICAL THERAPIST

## 2024-05-01 PROCEDURE — 97140 MANUAL THERAPY 1/> REGIONS: CPT | Performed by: PHYSICAL THERAPIST

## 2024-05-01 NOTE — PROGRESS NOTES
Physical Therapy Daily Treatment Note  [facility] [place]  [address], [address]  [place] [address]      Patient: Cehnte Rich   : 1955  Referring practitioner: Rey Worthy,*  Date of Initial Visit: Type: THERAPY  Noted: 2024  Today's Date: 2024  Patient seen for 7 sessions       Visit Diagnoses:    ICD-10-CM ICD-9-CM   1. Chronic pain of left knee  M25.562 719.46    G89.29 338.29       Subjective Evaluation    History of Present Illness  Mechanism of injury: The patient reported that he was cramping in the quad following exercise last visit.  He felt stiff this morning but reported pain was mild.  He feels his mobility is coming along nicely.  He will be done with his ROM tech bike this week.  He will see Dr. Worthy tomorrow.    Pain  Current pain ratin  Location: L knee           Objective          Active Range of Motion   Left Knee   Flexion: 120 degrees   Extension: 0 degrees       See Exercise, Manual, and Modality Logs for complete treatment.       Assessment & Plan       Assessment  Assessment details: Active range of motion of the left knee is excellent given his timeframe since surgery, and he achieved 0/120 for the first time today.  The quads and hamstrings remain easily fatigable, and he will benefit from ongoing strengthening with emphasis on functional positions and high repetition.  He denied increase in pain in the knee with exercise today but fatigue led to worsening gait pattern by the end of the visit.  He was advised to continue frequent stretching, as I feel he can exceed mobility expectation with ongoing intervention.    Plan  Plan details: Continue quad and hamstring strengthening.  Range of motion exercises and manual therapy to improve mobility.          Timed:         Manual Therapy:    15     mins  77028;     Therapeutic Exercise:    40     mins  75687;     Neuromuscular Koffi:    0    mins  63107;    Therapeutic Activity:     0     mins   10186;     Gait Trainin     mins  49067;     Ultrasound:     0     mins  51543;    Ionto                               0    mins   64092  Self Care                       0     mins   41751  Canalith Repos    0     mins 69047      Un-Timed:  Electrical Stimulation:    0     mins  55136 ( );  Dry Needling     0     mins self-pay  Traction     0     mins 72613      Timed Treatment:   55   mins   Total Treatment:     55   mins    Delfino Amin PT  [place] License: [id number]

## 2024-05-02 ENCOUNTER — OFFICE VISIT (OUTPATIENT)
Dept: ORTHOPEDIC SURGERY | Facility: CLINIC | Age: 69
End: 2024-05-02
Payer: MEDICARE

## 2024-05-02 VITALS — TEMPERATURE: 97.5 F

## 2024-05-02 DIAGNOSIS — Z96.652 STATUS POST TOTAL LEFT KNEE REPLACEMENT: Primary | ICD-10-CM

## 2024-05-02 PROCEDURE — 99024 POSTOP FOLLOW-UP VISIT: CPT | Performed by: ORTHOPAEDIC SURGERY

## 2024-05-02 NOTE — PROGRESS NOTES
[facility] Clinic Note        Subjective     Post-op (3 weeks s/p TOTAL KNEE ARTHROPLASTY WITH OSWALDO ROBOT - LEFT (DOS 4/10/24))       HPI    Chente Rich is a 69 y.o. male.  Patient is here today now 3 weeks out from robot-assisted left TKA on 4/10/2024.  Patient is doing great overall.  He is walking with no cane.  He mowed his lawn 2 weeks after surgery.  He walks 30 minutes yesterday.  Major complaints are that he has some swelling after prolonged periods of activity but overall is doing quite well.  Denies chest pain or shortness of breath.  He is here with his wife today.          Objective      Physical Exam  Temp 97.5 °F (36.4 °C)     There is no height or weight on file to calculate BMI.        Ortho Exam      Lower Extremity:     Inspection and Palpation:      Left knee:  Calf:  Soft and non tender  Pulses:  2+  Ecchymosis:  None  Warmth:  Appropriate  Incision:  Clean, dry, and intact.  Healing appropriately  Assistive device: None    ROM:  Left:  Extension: 0    flexion: 120      Functional Testing:  Left:  Straight Leg Raise: 5/5       Imaging Reviewed and Interpreted:  Imaging Results (Last 24 Hours)       Procedure Component Value Units Date/Time    XR Knee 3+ View With [place] Left [[id number]] Resulted: 05/02/24 1057     Updated: 05/02/24 1058    Narrative:      Knee X-ray    Indication: status-post TKA    Study:  AP, Lateral, and Sunrise views of Left knee    Comparison: Left knee 4/10/2024    Findings:  No signs of acute fracture are visualized  No signs of loosening are appreciated  Components are well aligned    Impression:  Status post Left cemented total knee arthroplasty. No signs   of loosening or fracture.                Assessment    Assessment:  1. Status post total left knee replacement        Plan:  Status post left TKA--patient is doing wonderful.  He needs to continue to work on strengthening.  I told him that he can continue to perform activity as tolerated  provided he has no setback with regards to pain or swelling.  The swelling will be his indicator that he is doing too much.  Patient has a very high pain threshold.  Continue aspirin.  See him back in 3 weeks for repeat clinical check.  Do not want him doing too much that would require significant quad strength as this should be coming around in the next 3 weeks as well.      Rey Worthy MD  05/02/24  17:50 EDT      Dictated Utilizing Dragon Dictation.

## 2024-05-06 ENCOUNTER — TREATMENT (OUTPATIENT)
Dept: PHYSICAL THERAPY | Facility: CLINIC | Age: 69
End: 2024-05-06
Payer: MEDICARE

## 2024-05-06 DIAGNOSIS — M25.562 CHRONIC PAIN OF LEFT KNEE: Primary | ICD-10-CM

## 2024-05-06 DIAGNOSIS — G89.29 CHRONIC PAIN OF LEFT KNEE: Primary | ICD-10-CM

## 2024-05-06 PROCEDURE — 97140 MANUAL THERAPY 1/> REGIONS: CPT | Performed by: PHYSICAL THERAPIST

## 2024-05-06 PROCEDURE — 97112 NEUROMUSCULAR REEDUCATION: CPT | Performed by: PHYSICAL THERAPIST

## 2024-05-06 PROCEDURE — 97110 THERAPEUTIC EXERCISES: CPT | Performed by: PHYSICAL THERAPIST

## 2024-05-13 ENCOUNTER — TREATMENT (OUTPATIENT)
Dept: PHYSICAL THERAPY | Facility: CLINIC | Age: 69
End: 2024-05-13
Payer: MEDICARE

## 2024-05-13 DIAGNOSIS — M25.562 CHRONIC PAIN OF LEFT KNEE: Primary | ICD-10-CM

## 2024-05-13 DIAGNOSIS — G89.29 CHRONIC PAIN OF LEFT KNEE: Primary | ICD-10-CM

## 2024-05-13 PROCEDURE — 97140 MANUAL THERAPY 1/> REGIONS: CPT | Performed by: PHYSICAL THERAPIST

## 2024-05-13 PROCEDURE — 97110 THERAPEUTIC EXERCISES: CPT | Performed by: PHYSICAL THERAPIST

## 2024-05-13 NOTE — PROGRESS NOTES
Physical Therapy Daily Treatment Note  [facility]- [place]  [address], [address]  [place] [address]      Patient: Chente Rich   : 1955  Referring practitioner: Rey Worthy,*  Date of Initial Visit: Type: THERAPY  Noted: 2024  Today's Date: 2024  Patient seen for 9 sessions       Visit Diagnoses:    ICD-10-CM ICD-9-CM   1. Chronic pain of left knee  M25.562 719.46    G89.29 338.29       Subjective Evaluation    History of Present Illness  Mechanism of injury: The patient has been very active in the last couple of days with yard work and independent stationary biking.  As a result, he reported that he has had more pain in the left knee.  He has been diligent with his home exercise program exercises and feels they are going well.  He continues to report difficulty with repeated squatting.    Pain  Current pain rating: 3  Location: L knee           Objective          Active Range of Motion   Left Knee   Flexion: 111 degrees   Extension: 0 degrees     See Exercise, Manual, and Modality Logs for complete treatment.       Assessment & Plan       Assessment  Assessment details: The patient had more pain in the left knee than usual today, secondary to increased activity over the weekend.  He is still very early out of a knee replacement and was cautioned against overuse at this stage.  Active range of motion continues to improve and he reached 120 degrees of flexion with relative ease after manual therapy.  He will need to continue to build quad strength in order to improve activity tolerance.    Plan  Plan details: Continue strengthening of the quads through full range of motion.          Timed:         Manual Therapy:    15     mins  76237;     Therapeutic Exercise:    25     mins  58358;     Neuromuscular Koffi:    0    mins  70020;    Therapeutic Activity:     0     mins  82905;     Gait Trainin     mins  85483;     Ultrasound:     0     mins  73557;    Ionto                                0    mins   85174  Self Care                       0     mins   04206  Canalith Repos    0     mins 70746      Un-Timed:  Electrical Stimulation:    0     mins  41878 ( );  Dry Needling     0     mins self-pay  Traction     0     mins 93856      Timed Treatment:   40   mins   Total Treatment:     60   mins    Delfino Amin, PT  [place] License: [id number]

## 2024-05-16 ENCOUNTER — TREATMENT (OUTPATIENT)
Dept: PHYSICAL THERAPY | Facility: CLINIC | Age: 69
End: 2024-05-16
Payer: MEDICARE

## 2024-05-16 DIAGNOSIS — G89.29 CHRONIC PAIN OF LEFT KNEE: Primary | ICD-10-CM

## 2024-05-16 DIAGNOSIS — M25.562 CHRONIC PAIN OF LEFT KNEE: Primary | ICD-10-CM

## 2024-05-16 PROCEDURE — 97140 MANUAL THERAPY 1/> REGIONS: CPT | Performed by: PHYSICAL THERAPIST

## 2024-05-16 PROCEDURE — 97110 THERAPEUTIC EXERCISES: CPT | Performed by: PHYSICAL THERAPIST

## 2024-05-16 NOTE — PROGRESS NOTES
Physical Therapy Daily Treatment Note  [facility] [place]  [address]  [place] [address]      Patient: Chente Rich   : 1955  Referring practitioner: Rey Worthy,*  Date of Initial Visit: Type: THERAPY  Noted: 2024  Today's Date: 2024  Patient seen for 10 sessions       Visit Diagnoses:    ICD-10-CM ICD-9-CM   1. Chronic pain of left knee  M25.562 719.46    G89.29 338.29       Subjective Evaluation    History of Present Illness  Mechanism of injury: The patient reported that he was sore following his last visit.  He noted swelling into the next day.  He went to the tennis club and perform squats, knee extensions, and hamstring curls and had additional swelling from this as well.  He has resumed NSAID use to help with swelling.    Pain  Current pain ratin  Location: L knee         Objective   See Exercise, Manual, and Modality Logs for complete treatment.       Assessment & Plan       Assessment  Assessment details: The patient experienced more swelling than usual this week, likely due to overuse, and was encouraged to be more gradual in his return to recreational exercise and yard work.  Range of motion continues to improve nicely but quad activation in TKE has been unchanged in the last couple of weeks.  He will need to continue focusing on more controlled TKE exercises rather than larger strengthening activities such as squats and knee extensions at the gym.  He responded very well to these exercises today and reported decreased feelings of stiffness after his visit.  Clinical progress remains excellent.    Plan  Plan details: Continue strengthening of the quads in TKE.  Mobility exercises as tolerated.          Timed:         Manual Therapy:    10     mins  22609;     Therapeutic Exercise:    30     mins  42025;     Neuromuscular Koffi:    0    mins  07513;    Therapeutic Activity:     0     mins  90828;     Gait Trainin     mins  26600;      Ultrasound:     0     mins  57334;    Ionto                               0    mins   28832  Self Care                       0     mins   26152  Canalith Repos    0     mins 76336      Un-Timed:  Electrical Stimulation:    0     mins  98181 ( );  Dry Needling     0     mins self-pay  Traction     0     mins 44962      Timed Treatment:   40   mins   Total Treatment:     60   mins    Delfino Amin, PT  [place] License: [id number]

## 2024-05-20 ENCOUNTER — TREATMENT (OUTPATIENT)
Dept: PHYSICAL THERAPY | Facility: CLINIC | Age: 69
End: 2024-05-20
Payer: MEDICARE

## 2024-05-20 DIAGNOSIS — M25.562 CHRONIC PAIN OF LEFT KNEE: Primary | ICD-10-CM

## 2024-05-20 DIAGNOSIS — G89.29 CHRONIC PAIN OF LEFT KNEE: Primary | ICD-10-CM

## 2024-05-20 PROCEDURE — 97140 MANUAL THERAPY 1/> REGIONS: CPT | Performed by: PHYSICAL THERAPIST

## 2024-05-20 PROCEDURE — 97110 THERAPEUTIC EXERCISES: CPT | Performed by: PHYSICAL THERAPIST

## 2024-05-20 NOTE — PROGRESS NOTES
Physical Therapy Daily Treatment Note  [facility] [place]  [address], [address]  [place] [address]      Patient: Chente Rich   : 1955  Referring practitioner: Rey Worthy,*  Date of Initial Visit: Type: THERAPY  Noted: 2024  Today's Date: 2024  Patient seen for 11 sessions       Visit Diagnoses:    ICD-10-CM ICD-9-CM   1. Chronic pain of left knee  M25.562 719.46    G89.29 338.29       Subjective Evaluation    History of Present Illness  Mechanism of injury: The patient stated that he discontinued NSAID use this week and after reading about potential side effects.  He has been busy today with yard work and chores and reported he tolerated it well. He was ab le to walk reciprocally down the stairs for the first time this weekend.    Pain  Current pain ratin  Location: L knee           Objective          Active Range of Motion   Left Knee   Flexion: 118 degrees       See Exercise, Manual, and Modality Logs for complete treatment.       Assessment & Plan       Assessment  Assessment details: Knee active range of motion is excellent but normal gait and stair use remains limited by insufficient quad activation in terminal knee extension.  He continues to exhibit a small lag with straight leg raise and will benefit from focusing on this at home.  TKE exercises were continued in the clinic today and were tolerated well at high repetition.  He may benefit from NMES if quad control has not improved in the next week or so.  Overall, he is doing very well and should continue to see improved tolerance to activity as he gets stronger.    Plan  Plan details: Continue TKE exercises for quad activation.  Manual therapy as needed          Timed:         Manual Therapy:    10     mins  43516;     Therapeutic Exercise:    28     mins  95337;     Neuromuscular Koffi:    0    mins  44345;    Therapeutic Activity:     0     mins  90257;     Gait Trainin     mins  26731;      Ultrasound:     0     mins  22786;    Ionto                               0    mins   05114  Self Care                       0     mins   35631  Canalith Repos    0     mins 55228      Un-Timed:  Electrical Stimulation:    0     mins  56810 ( );  Dry Needling     0     mins self-pay  Traction     0     mins 61367      Timed Treatment:   38   mins   Total Treatment:     60   mins    Delfino Amin, PT  [place] License: [id number]

## 2024-05-23 ENCOUNTER — OFFICE VISIT (OUTPATIENT)
Dept: ORTHOPEDIC SURGERY | Facility: CLINIC | Age: 69
End: 2024-05-23
Payer: MEDICARE

## 2024-05-23 ENCOUNTER — TREATMENT (OUTPATIENT)
Dept: PHYSICAL THERAPY | Facility: CLINIC | Age: 69
End: 2024-05-23
Payer: MEDICARE

## 2024-05-23 DIAGNOSIS — G89.29 CHRONIC PAIN OF LEFT KNEE: Primary | ICD-10-CM

## 2024-05-23 DIAGNOSIS — M25.60 RANGE OF MOTION DEFICIT: ICD-10-CM

## 2024-05-23 DIAGNOSIS — Z96.652 STATUS POST TOTAL LEFT KNEE REPLACEMENT: Primary | ICD-10-CM

## 2024-05-23 DIAGNOSIS — R53.1 WEAKNESS: ICD-10-CM

## 2024-05-23 DIAGNOSIS — M25.562 CHRONIC PAIN OF LEFT KNEE: Primary | ICD-10-CM

## 2024-05-23 PROCEDURE — 99024 POSTOP FOLLOW-UP VISIT: CPT | Performed by: ORTHOPAEDIC SURGERY

## 2024-05-23 NOTE — PROGRESS NOTES
[facility]        Subjective     Follow-up (3 week f/u --  6 weeks s/p TOTAL KNEE ARTHROPLASTY WITH OSWALDO ROBOT - LEFT (DOS 4/10/24))       AIMEE    Chente Rich is a 69 y.o. male.  Patient is here today now 6 weeks out from robot-assisted cemented left TKA on 4/10/2024.  Patient is doing very well overall.  Has not been released from physical therapy yet.  He is seeing [place] at [facility].  He is having an issue with swelling with increased activity.  He says his knee swells when he walks more than 30 minutes.          Objective      Physical Exam  There were no vitals taken for this visit.    There is no height or weight on file to calculate BMI.        Ortho Exam    Lower Extremity:     Inspection and Palpation:      Left knee:  Calf:  Soft and non tender  Effusion:  None  Pulses:  2+  Warmth:  None  Incision:  Healed     ROM:  Left:  Extension:0    Flexion:120      Deformities/Malalignments/Discrepancies:    Left:  none    Functional Testing:  Left:  Straight Leg Raise: 5/5  Patella Tracking:  Normal      Imaging Reviewed and Interpreted:  Imaging Results (Last 24 Hours)       ** No results found for the last 24 hours. **              Assessment    Assessment:  1. Status post total left knee replacement        Plan:  Status post left TKA--patient is doing great overall.  Follow-up in 6 weeks.  Continue indefinite antibiotic prophylaxis.  Will keep an eye on his swelling.  I told him to ease back into pickleball and jogging as symptoms allow.  Needs to add a compression sleeve to discourage knee swelling.      Rey Worthy MD  05/23/24  17:47 EDT      Dictated Utilizing Dragon Dictation.

## 2024-05-25 NOTE — PROGRESS NOTES
Physical Therapy Daily Treatment Note  [facility] [place]  [address], [address]  [place] [address]      Patient: Chente Rich   : 1955  Referring practitioner: Rey Worthy,*  Date of Initial Visit: Type: THERAPY  Noted: 2024  Today's Date: 2024  Patient seen for 12 sessions       Visit Diagnoses:    ICD-10-CM ICD-9-CM   1. Chronic pain of left knee  M25.562 719.46    G89.29 338.29   2. Weakness  R53.1 780.79   3. Range of motion deficit  M25.60 719.50       Subjective Evaluation    History of Present Illness  Mechanism of injury: The patient saw Dr. Worthy this morning and was released for jogging and pickleball.  He feels he has been able to activate his quad more effectively in terminal knee extension since his last visit.  Overall, he continues to feel he is improving.    Pain  Current pain ratin  Location: Left knee           Objective          Active Range of Motion   Left Knee   Flexion: 120 degrees   Extension: 0 degrees   Extensor la degrees       See Exercise, Manual, and Modality Logs for complete treatment.       Assessment & Plan       Assessment  Assessment details: Extensor lag with straight leg raise was significantly improved today and the patient demonstrated improved VMO contraction and TKE.  He was encouraged to continue working on this at home, as I expect increased fluidity of gait and decrease swelling as his motor control of the quad improves.  Range of motion remains excellent and joint mobilizations were performed minimally today.  He tolerated high repetition of quad strengthening exercises, but will need to continue building functional quad strength through squatting activities as well.    Plan  Plan details: Continue strengthening of the quads and terminal knee extension and through functional flexion.          Timed:         Manual Therapy:    10     mins  29857;     Therapeutic Exercise:    20     mins  46976;     Neuromuscular  Koffi:    0    mins  77109;    Therapeutic Activity:     0     mins  83192;     Gait Trainin     mins  28535;     Ultrasound:     0     mins  71486;    Ionto                               0    mins   22370  Self Care                       0     mins   05532  Canalith Repos    0     mins 81788      Un-Timed:  Electrical Stimulation:    0     mins  39922 ( );  Dry Needling     0     mins self-pay  Traction     0     mins 52762      Timed Treatment:   30   mins   Total Treatment:     60   mins    Delfino Amin, PT  [place] License: [id number]

## 2024-05-28 ENCOUNTER — TREATMENT (OUTPATIENT)
Dept: PHYSICAL THERAPY | Facility: CLINIC | Age: 69
End: 2024-05-28
Payer: MEDICARE

## 2024-05-28 DIAGNOSIS — M25.60 RANGE OF MOTION DEFICIT: ICD-10-CM

## 2024-05-28 DIAGNOSIS — R53.1 WEAKNESS: ICD-10-CM

## 2024-05-28 DIAGNOSIS — M25.562 CHRONIC PAIN OF LEFT KNEE: Primary | ICD-10-CM

## 2024-05-28 DIAGNOSIS — G89.29 CHRONIC PAIN OF LEFT KNEE: Primary | ICD-10-CM

## 2024-05-28 PROCEDURE — 97140 MANUAL THERAPY 1/> REGIONS: CPT | Performed by: PHYSICAL THERAPIST

## 2024-05-28 PROCEDURE — 97110 THERAPEUTIC EXERCISES: CPT | Performed by: PHYSICAL THERAPIST

## 2024-05-28 NOTE — PROGRESS NOTES
Physical Therapy Daily Treatment Note  [facility] [place]  [address]  [place] [address]      Patient: Chente Rich   : 1955  Referring practitioner: Rey Worthy,*  Date of Initial Visit: Type: THERAPY  Noted: 2024  Today's Date: 2024  Patient seen for 13 sessions       Visit Diagnoses:    ICD-10-CM ICD-9-CM   1. Chronic pain of left knee  M25.562 719.46    G89.29 338.29   2. Weakness  R53.1 780.79   3. Range of motion deficit  M25.60 719.50       Subjective Evaluation    History of Present Illness  Mechanism of injury: The pt reported that his knee was sore today. He tried to run and perform lateral bounding yesterday in addition to biking, squats, and lateral step downs. He is interested in returning to pickleball in the next week. He continues to report swelling with activity.     Pain  Current pain rating: 3  Location: L knee         Objective   See Exercise, Manual, and Modality Logs for complete treatment.       Assessment & Plan       Assessment  Assessment details: The patient has returned to high-level recreational exercise, but continues to experience swelling and discomfort afterwards.  He has begun to use a compression sleeve on the knee, which I feel will help, but he was encouraged to take days off of exercise as needed to manage swelling.  He is doing very well at this time and I feel that progressing to quickly with his independent exercise may set him back in regards to pain and function.  He continues to have some difficulty activating the VMO at terminal knee extension, so exercises were performed to encourage this.  He is getting cocontraction of the hamstrings with quad setting and will benefit from continued stretching of the hamstrings throughout the day.  Balance and stabilization exercises were performed for the first time today and were tolerated well, though he was fatigued and sore by the end of the visit.  He will benefit from a day  [FreeTextEntry1] : TRANSCRIPTION OF PRE-TX VITALS ONLY CARE TRANSFERRED TO HT FOR PRE-TX CHECK VERIFICATION AND DESCENT. DUE TO ERROR WITH THE DATE OF THE DOCUMENT, PRE-TX VITALS CAN BE VIEWED UNDER THE VITALS TAB  PT ARRVIED AT HBOT SUITE AMBULATORY  PT IS A&Ox3  PT TX ORDER VERIFIED  PT DENIES ANY PAIN TODAY  PT VITALS WITHIN NORMAL LIMITS FOR HBOT (PRE TX-VITALS CAN BE VIEWED IN VITALS TAB) PT CONTRINDICATION AND CHECKLIST VERIFIED WITH CHT AND HT  PT DECENTED TO 2.4 JOSE @RATE OF 2.2 PSI/MIN W/O INCIDENT  PT RESTING COMFORTABLY AT DEPTH WITH CHEST RISE AND FALL OBSERVED THROUGH OUT TX.  PT TOLERATED AIR BRAKES WELL PT ACENTED TO SURACE W/O INCIDENT PT VITALS WITHIN NORMAL LIMITS POST HBOT PT HAD DRESSING CHANGE POST TX PT EXITED HBOT SUITE SAFELY W/O INCIENT PT TOLERATED HBOT WELL     of rest from strengthening and focus on stretching.    Plan  Plan details: Continue TKE exercises and balance and proprioception activities.  Reduce frequency to 1 time per week.  Consider discharge if doing well in 1 week.        Timed:         Manual Therapy:    8     mins  47151;     Therapeutic Exercise:    30     mins  54192;     Neuromuscular Koffi:    0    mins  20892;    Therapeutic Activity:     0     mins  67538;     Gait Trainin     mins  09761;     Ultrasound:     0     mins  57412;    Ionto                               0    mins   48548  Self Care                       0     mins   63589  Canalith Repos    0     mins 57041      Un-Timed:  Electrical Stimulation:    0     mins  83702 ( );  Dry Needling     0     mins self-pay  Traction     0     mins 36441      Timed Treatment:   38   mins   Total Treatment:     55   mins    Delfino Amin PT  [place] License: [id number]

## 2024-06-03 ENCOUNTER — TREATMENT (OUTPATIENT)
Dept: PHYSICAL THERAPY | Facility: CLINIC | Age: 69
End: 2024-06-03
Payer: MEDICARE

## 2024-06-03 DIAGNOSIS — G89.29 CHRONIC PAIN OF LEFT KNEE: Primary | ICD-10-CM

## 2024-06-03 DIAGNOSIS — M25.60 RANGE OF MOTION DEFICIT: ICD-10-CM

## 2024-06-03 DIAGNOSIS — R53.1 WEAKNESS: ICD-10-CM

## 2024-06-03 DIAGNOSIS — M25.562 CHRONIC PAIN OF LEFT KNEE: Primary | ICD-10-CM

## 2024-06-03 NOTE — PROGRESS NOTES
"Physical Therapy Daily Treatment Note  [facility] [place]  [address]  [place] [address]      Patient: Chente Rich   : 1955  Referring practitioner: Rey Worthy,*  Date of Initial Visit: Type: THERAPY  Noted: 2024  Today's Date: 6/3/2024  Patient seen for 14 sessions       Visit Diagnoses:    ICD-10-CM ICD-9-CM   1. Chronic pain of left knee  M25.562 719.46    G89.29 338.29   2. Weakness  R53.1 780.79   3. Range of motion deficit  M25.60 719.50       Subjective Evaluation    History of Present Illness  Mechanism of injury: The patient reported that he has been active over the last week and has been able to return to pickleball and treadmill use.  He feels aerobically deconditioned.  He denied pain in the left knee.  He has been consistent with his home exercise program and feels that is going well.  He reported that his knee felt \"wonky\" today.    Pain  Current pain ratin  Location: Left knee         Objective          Active Range of Motion   Left Knee   Flexion: 120 degrees   Extension: 0 degrees   Extensor la degrees     Additional Active Range of Motion Details  SLR performed with 2# weight      See Exercise, Manual, and Modality Logs for complete treatment.       Assessment & Plan       Assessment  Assessment details: The patient reported stiffness upon presentation but responded very well to biking and light stretching, achieving 0/120 degrees active range of motion of the left knee by the end of the visit.  Quad activation is coming along nicely and he was able to perform straight leg raises with a 2 pound ankle weight for the first time.  Functionally, he is walking well, able to navigate stairs reciprocally, and has been able to return to pickleball.  The quads will need to continue to get stronger to improve tolerance to activity and to reduce discomfort in the knee, but I expect he will be able to do this independently.  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  He was prescribed a final HEP for lower extremity strengthening and proprioception and should do well with independent management moving forward.    Goals  Plan Goals: STGs:  1.)  LEFS improved x 1 MCID in 6 weeks. Met  2.)  Ambulate without an AD without antalgia in 8 weeks. Met  LTGs:  1.)  Have 5/5 knee extension strength in 10 weeks. Met  2.)  Be perform all ADLs/iADLs without pain or functional limitations in 12 weeks. Met    Plan  Plan details: Pt to attempt independent management and call for f/u as needed. If no additional visits are scheduled in 30 days this will serve as a d/c note.            Timed:         Manual Therapy:    8     mins  17644;     Therapeutic Exercise:    32     mins  24141;     Neuromuscular Koffi:    0    mins  72009;    Therapeutic Activity:     0     mins  17359;     Gait Trainin     mins  47750;     Ultrasound:     0     mins  11370;    Ionto                               0    mins   05351  Self Care                       0     mins   93129  Canalith Repos    0     mins 12752      Un-Timed:  Electrical Stimulation:    0     mins  09638 ( );  Dry Needling     0     mins self-pay  Traction     0     mins 30938      Timed Treatment:   40   mins   Total Treatment:     40   mins    MAE Eugene License: [id number]

## 2024-06-24 ENCOUNTER — OFFICE VISIT (OUTPATIENT)
Dept: INTERNAL MEDICINE | Facility: CLINIC | Age: 69
End: 2024-06-24
Payer: MEDICARE

## 2024-06-24 VITALS
TEMPERATURE: 97.8 F | HEIGHT: 70 IN | DIASTOLIC BLOOD PRESSURE: 78 MMHG | BODY MASS INDEX: 21.4 KG/M2 | SYSTOLIC BLOOD PRESSURE: 132 MMHG | WEIGHT: 149.5 LBS | HEART RATE: 62 BPM

## 2024-06-24 DIAGNOSIS — R79.89 ELEVATED SERUM CREATININE: ICD-10-CM

## 2024-06-24 DIAGNOSIS — I25.10 CORONARY ARTERY CALCIFICATION: ICD-10-CM

## 2024-06-24 DIAGNOSIS — Z00.00 ENCOUNTER FOR HEALTH MAINTENANCE EXAMINATION IN ADULT: ICD-10-CM

## 2024-06-24 DIAGNOSIS — Z00.00 MEDICARE ANNUAL WELLNESS VISIT, SUBSEQUENT: Primary | ICD-10-CM

## 2024-06-24 DIAGNOSIS — E78.5 DYSLIPIDEMIA: ICD-10-CM

## 2024-06-24 DIAGNOSIS — E11.9 CONTROLLED TYPE 2 DIABETES MELLITUS WITHOUT COMPLICATION, WITHOUT LONG-TERM CURRENT USE OF INSULIN: ICD-10-CM

## 2024-06-24 DIAGNOSIS — K63.5 POLYP OF COLON, UNSPECIFIED PART OF COLON, UNSPECIFIED TYPE: ICD-10-CM

## 2024-06-24 DIAGNOSIS — E27.8 ADRENAL NODULE: ICD-10-CM

## 2024-06-24 DIAGNOSIS — I25.84 CORONARY ARTERY CALCIFICATION: ICD-10-CM

## 2024-06-24 DIAGNOSIS — Z12.5 SCREENING FOR PROSTATE CANCER: ICD-10-CM

## 2024-06-24 LAB
ALBUMIN SERPL-MCNC: 4.2 G/DL (ref 3.5–5.2)
ALBUMIN/CREATININE RATIO, URINE: NORMAL
ALBUMIN/GLOB SERPL: 1.8 G/DL
ALP SERPL-CCNC: 78 U/L (ref 39–117)
ALT SERPL W P-5'-P-CCNC: 17 U/L (ref 1–41)
ANION GAP SERPL CALCULATED.3IONS-SCNC: 12.3 MMOL/L (ref 5–15)
AST SERPL-CCNC: 26 U/L (ref 1–40)
BILIRUB BLD-MCNC: NEGATIVE MG/DL
BILIRUB SERPL-MCNC: 0.4 MG/DL (ref 0–1.2)
BUN SERPL-MCNC: 14 MG/DL (ref 8–23)
BUN/CREAT SERPL: 12.2 (ref 7–25)
CALCIUM SPEC-SCNC: 9 MG/DL (ref 8.6–10.5)
CHLORIDE SERPL-SCNC: 95 MMOL/L (ref 98–107)
CLARITY, POC: CLEAR
CO2 SERPL-SCNC: 25.7 MMOL/L (ref 22–29)
COLOR UR: YELLOW
CREAT SERPL-MCNC: 1.15 MG/DL (ref 0.76–1.27)
EGFRCR SERPLBLD CKD-EPI 2021: 68 ML/MIN/1.73
EXPIRATION DATE: ABNORMAL
EXPIRATION DATE: NORMAL
EXPIRATION DATE: NORMAL
GLOBULIN UR ELPH-MCNC: 2.3 GM/DL
GLUCOSE SERPL-MCNC: 114 MG/DL (ref 65–99)
GLUCOSE UR STRIP-MCNC: NEGATIVE MG/DL
HBA1C MFR BLD: 5.8 % (ref 4.5–5.7)
KETONES UR QL: NEGATIVE
LEUKOCYTE EST, POC: NEGATIVE
Lab: ABNORMAL
Lab: NORMAL
Lab: NORMAL
NITRITE UR-MCNC: NEGATIVE MG/ML
PH UR: 6 [PH] (ref 5–8)
POC CREATININE URINE: 50
POC MICROALBUMIN URINE: 10
POTASSIUM SERPL-SCNC: 4 MMOL/L (ref 3.5–5.2)
PROT SERPL-MCNC: 6.5 G/DL (ref 6–8.5)
PROT UR STRIP-MCNC: NEGATIVE MG/DL
PSA SERPL-MCNC: 3.63 NG/ML (ref 0–4)
RBC # UR STRIP: NEGATIVE /UL
SODIUM SERPL-SCNC: 133 MMOL/L (ref 136–145)
SP GR UR: 1.01 (ref 1–1.03)
UROBILINOGEN UR QL: NORMAL

## 2024-06-24 PROCEDURE — 1159F MED LIST DOCD IN RCRD: CPT | Performed by: INTERNAL MEDICINE

## 2024-06-24 PROCEDURE — 1126F AMNT PAIN NOTED NONE PRSNT: CPT | Performed by: INTERNAL MEDICINE

## 2024-06-24 PROCEDURE — 3044F HG A1C LEVEL LT 7.0%: CPT | Performed by: INTERNAL MEDICINE

## 2024-06-24 PROCEDURE — 3062F POS MACROALBUMINURIA REV: CPT | Performed by: INTERNAL MEDICINE

## 2024-06-24 PROCEDURE — G0439 PPPS, SUBSEQ VISIT: HCPCS | Performed by: INTERNAL MEDICINE

## 2024-06-24 PROCEDURE — 82044 UR ALBUMIN SEMIQUANTITATIVE: CPT | Performed by: INTERNAL MEDICINE

## 2024-06-24 PROCEDURE — 1160F RVW MEDS BY RX/DR IN RCRD: CPT | Performed by: INTERNAL MEDICINE

## 2024-06-24 PROCEDURE — 81003 URINALYSIS AUTO W/O SCOPE: CPT | Performed by: INTERNAL MEDICINE

## 2024-06-24 PROCEDURE — 1170F FXNL STATUS ASSESSED: CPT | Performed by: INTERNAL MEDICINE

## 2024-06-24 PROCEDURE — G0103 PSA SCREENING: HCPCS | Performed by: INTERNAL MEDICINE

## 2024-06-24 PROCEDURE — 36415 COLL VENOUS BLD VENIPUNCTURE: CPT | Performed by: INTERNAL MEDICINE

## 2024-06-24 PROCEDURE — 80053 COMPREHEN METABOLIC PANEL: CPT | Performed by: INTERNAL MEDICINE

## 2024-06-24 PROCEDURE — 83036 HEMOGLOBIN GLYCOSYLATED A1C: CPT | Performed by: INTERNAL MEDICINE

## 2024-06-24 PROCEDURE — 99397 PER PM REEVAL EST PAT 65+ YR: CPT | Performed by: INTERNAL MEDICINE

## 2024-06-24 RX ORDER — SIMVASTATIN 20 MG
20 TABLET ORAL NIGHTLY
Qty: 90 TABLET | Refills: 3 | Status: SHIPPED | OUTPATIENT
Start: 2024-06-24

## 2024-06-24 NOTE — PROGRESS NOTES
Subjective     Chief Complaint:  Physical Exam.    History of Present Illness    History obtained from the patient.    A Left Adrenal Nodule, likely an adenoma, which was noted incidentally on CT abdomen on 11/20/2023.      The patient has been seeing Dr. Ma for Chronic Left Knee Pain.  He had arthroscopic surgery on 7/29/2022.  He has had gel injections and then Cortisone injections (last 5/18/2023).  On 4/10/2024, he had a left TKR, and has recovered well.  He is done with PT.  He reports some left knee pain, swelling, and stiffness.  He denies swelling and stiffness of the knee.  He takes Ibuprofen as needed.  He denies low back pain and neck pain, but has had popping in his neck since his surgery.  This is improving.     The patient has been diagnosed with RLE Neuropathy (mild), stable on no medication.  He reports stable right lower extremity numbness and decreased sensation, but no pain or tingling.  He last saw Dr. Warren on 3/9/2023 and was told to follow-up as needed.    Cardiac Follow-Up:  The patient is here for a follow-up visit.  His Diabetes has been stable.  Medication: Simvastatin.  His Hyperlipidemia has been stable.  LDL goal < 70. Last  LDL was 64, TG 47.  Medication: Simvastatin.  Side Effects: None.     Comorbid Illness: Coronary Artery Calcification.  Takes Aspirin 81 mg daily.      Procedures: On 10/12/2017, Cardiac CT scan showed a Calcium Score of 133. On 6/19/20, he had a negative stress echo, EF 60%     Interval Events: Hemoglobin A1c on 3/5/2024 was 6.6.  Creatinine is 1.52.  There were no medication changes.  Last Hemoglobin A1c on 3/28/2024 was 7.1 (pre-op).  Creatinine was 1.10 (improved).  He has been off his Metformin since 6/20/2021 due to the elevated Creatinine (initially 1.55).  He does not check his blood sugar or blood pressure at home.  His last Ophthalmology visit was 5/7/2024 ([facility]), no retinopathy.  He does check his feet daily.       Symptoms:  Denies chest pain, shortness of breath, OLIVEIRA, orthopnea, PND, palpitations, syncope, lower extremity edema, claudication, lightheadedness, and dizziness.    Associated Symptoms: No significant weight change in the past 6 months.  He reports daily episodes of cramping of his hands intermittently after eating.  He takes Ibuprofen.  Denies fatigue, headache, polydipsia, polyuria, generalized myalgias/arthralgias, visual impairment, memory loss, and concentration issues.   Reports stable mild numbness, but no no tingling, of his right third through fifth toes.  No foot pain or ulcer.         Lifestyle: The patient follows a diverse and healthy diet.  He walks once per week, plays pickle ball 2-3 times per week, and does strength training twice per week.  Tobacco Use: Never a smoker.    Colon Polyp Follow-Up: The patient is here for follow-up of colon polyps, which have been stable.    Comorbid Illness: Diverticulosis (hospitalized November 2023).  Interval Events:  Last Colonoscopy on 11/20/2023 showed: Diverticulosis in the entire examined colon. Focal diverticulitis in the descending colon, with oozing, adherent clot, and ulcerated diverticular rim. Banded x 2 (failed). Clip was placed across the ulcerated rim for hemostasis. Clip was placed for radiographic marking proximal to the diverticulum. Tattooed proximal to the diverticulum. One 2 mm inflammatory polyp in the descending colon adjacent to bleeding diverticulum.  Pathology c/w: Colonic mucosa with nonspecific surface erosions   Symptoms: Denies abdominal pain, diarrhea, constipation, hematochezia, melena, and changes in stool.    Medication: FiberCon daily.    Chente Rich is a 69 y.o. male who presents for an Annual Physical.      PMH, PSH, SocHx, FamHx, Allergies, and Medications: Reviewed and updated.    Outpatient Medications Prior to Visit   Medication Sig Dispense Refill    acetaminophen (TYLENOL) 650 MG 8 hr tablet Take 1 tablet by mouth Every 8  (Eight) Hours. 90 tablet 0    aspirin 325 MG EC tablet Take 1 tablet by mouth Daily. Begin day after surgery 42 tablet 0    cetirizine (zyrTEC) 10 MG tablet Take 1 tablet by mouth Daily.      ibuprofen (ADVIL,MOTRIN) 400 MG tablet Take 1 tablet by mouth Every 8 (Eight) Hours As Needed for Mild Pain or Moderate Pain. 90 tablet 1    polycarbophil (calcium polycarbophil) 625 MG tablet tablet Take 1 tablet by mouth Daily.      docusate sodium (Colace) 100 MG capsule Take 1 capsule by mouth 2 (Two) Times a Day As Needed for Constipation. 60 capsule 0    ondansetron (Zofran) 4 MG tablet Take 1 tablet by mouth Every 8 (Eight) Hours As Needed for Nausea or Vomiting. 15 tablet 1    simvastatin (ZOCOR) 20 MG tablet Take 1 tablet by mouth Daily. (Patient taking differently: Take 1 tablet by mouth Every Night.) 90 tablet 3    oxyCODONE (ROXICODONE) 5 MG immediate release tablet Take 1 tablet by mouth Every 6 (Six) Hours As Needed for Severe Pain. (Patient not taking: Reported on 6/24/2024) 30 tablet 0     No facility-administered medications prior to visit.       Immunization History   Administered Date(s) Administered    ABRYSVO (RSV, 60+ or pregnant women 32-36 wks) 09/12/2023    COVID-19 (PFIZER) BIVALENT 12+YRS 10/03/2022    COVID-19 (PFIZER) Purple Cap Monovalent 02/24/2021, 03/17/2021, 09/18/2021    COVID-19 F23 (PFIZER) 12YRS+ (COMIRNATY) 09/22/2023    Covid-19 (Pfizer) Gray Cap Monovalent 07/13/2022    Flu Vaccine Quad PF >36MO 10/30/2017    Flu Vaccine Split Quad 09/27/2019    Fluad Quad 65+ 09/26/2020    Fluzone High Dose =>65 Years ([employer] ONLY) 11/08/2018, 10/15/2020    Fluzone High-Dose 65+yrs 09/18/2021, 10/01/2022, 10/07/2023    Fluzone Quad >6mos (Multi-dose) 10/25/2016    Hepatitis A 11/08/2018, 11/15/2019    Pneumococcal Conjugate 13-Valent (PCV13) 05/08/2018    Pneumococcal Polysaccharide (PPSV23) 05/28/2020    Shingrix 10/18/2021, 01/18/2022    Tdap 10/20/2017         Patient Active Problem List   Diagnosis     Hyperlipidemia    Controlled type 2 diabetes mellitus without complication, without long-term current use of insulin    Coronary artery calcification    BCC (basal cell carcinoma of skin)    Diverticulosis    Colon polyp    Neuropathy of right lower extremity    OA (osteoarthritis) of knee    SCC (squamous cell carcinoma)    S/P TKR (total knee replacement), left       Health Habits:  Dental Exam. up to date  Eye Exam. up to date  Hearing Loss:  No  Exercise: 5 times/week.  Current exercise activities include: team sports (pickleball), walking, and strength training  Diet: Healthy  Multivitamin: No    Safe Driving:  Yes  Seat Belt:  Yes  Bike Helmet:  N/A  Skin Screening:  Yes  Sunscreen: Yes  SBE / ARVIN: Yes  Sexual Activity:  Yes  Birth Control:  Menopause  STD Prevention:  N/A    Last Pap: N/A  Last Mammogram:  N/A  Last DEXA Scan: N/A  Last Colonoscopy: 11/20/2023, results as above  Last PSA: 6/19/2023 (3.01).    Social:    Social History     Socioeconomic History    Marital status:     Number of children: 2   Tobacco Use    Smoking status: Never     Passive exposure: Never    Smokeless tobacco: Never   Vaping Use    Vaping status: Never Used   Substance and Sexual Activity    Alcohol use: Not Currently     Comment: 1 drink (beer) daily    Drug use: Never    Sexual activity: Yes     Partners: Female     Birth control/protection: Post-menopausal         Current Medical Providers:    Millie Loya MD (Internal Medicine / Pediatrics)    The [facility] providers who are involved in the care of this patient are listed above.         Review of Systems   Constitutional:  Negative for appetite change, chills, fatigue, fever and unexpected weight change.         No night sweats.     HENT:  Negative for congestion, ear discharge, ear pain, facial swelling, hearing loss, nosebleeds, postnasal drip, rhinorrhea, sinus pressure, sinus pain, sneezing, sore throat, tinnitus, trouble swallowing and voice change.          Reports mild snoring.   Eyes:  Positive for itching (improved with Zyrtec). Negative for photophobia, pain, discharge, redness and visual disturbance.   Respiratory:  Negative for apnea, cough, chest tightness, shortness of breath and wheezing.         No chest congestion.  No hemoptysis.    Cardiovascular:  Negative for chest pain, palpitations and leg swelling.        No orthopnea, OLIVEIRA, or PND.  No claudication or syncope.   Gastrointestinal:  Positive for abdominal distention (bloating with exercise). Negative for abdominal pain, blood in stool, constipation, diarrhea, nausea, rectal pain and vomiting.        No melena, heartburn, odynophagia, dysphagia, early satiety, belching, or bloating.   Endocrine: Negative for cold intolerance, heat intolerance, polydipsia, polyphagia and polyuria.        No hair loss or dry skin.    Genitourinary:  Negative for decreased urine volume, difficulty urinating, dysuria, flank pain, frequency, hematuria, penile discharge, scrotal swelling, testicular pain and urgency.        Has nocturia, up every 2-3 hours, not new  No incomplete emptying or incontinence.  No erectile dysfunction.   Musculoskeletal:  Positive for joint swelling. Negative for arthralgias, back pain, gait problem, myalgias, neck pain and neck stiffness.        Has left knee joint stiffness.   Skin:  Negative for rash.        No new skin lesions or changes in skin lesions.     Neurological:  Negative for dizziness, tremors, speech difficulty, weakness, light-headedness, numbness and headaches.        No tingling. No memory loss. No decreased concentration.   Hematological:  Negative for adenopathy. Does not bruise/bleed easily.   Psychiatric/Behavioral:  Negative for confusion, self-injury, sleep disturbance and suicidal ideas. The patient is not nervous/anxious.         No depression.           Objective     Vitals:    06/24/24 0914   BP: 132/78   BP Location: Right arm   Patient Position: Sitting   Cuff  "Size: Adult   Pulse: 62   Temp: 97.8 °F (36.6 °C)   TempSrc: Infrared   Weight: 67.8 kg (149 lb 8 oz)   Height: 177.8 cm (70\")       Body mass index is 21.45 kg/m².    Physical Exam  Vitals and nursing note reviewed.   Constitutional:       Appearance: Normal appearance. He is well-developed and normal weight.   HENT:      Head: Normocephalic and atraumatic.      Right Ear: Tympanic membrane, ear canal and external ear normal.      Left Ear: Tympanic membrane, ear canal and external ear normal.      Mouth/Throat:      Mouth: Mucous membranes are moist. No oral lesions.      Pharynx: Oropharynx is clear.      Comments: Tonsils normal.  Eyes:      Extraocular Movements: Extraocular movements intact.      Conjunctiva/sclera: Conjunctivae normal.      Pupils: Pupils are equal, round, and reactive to light.   Neck:      Thyroid: No thyroid mass or thyromegaly.      Vascular: No carotid bruit.   Cardiovascular:      Rate and Rhythm: Normal rate and regular rhythm.      Pulses: Normal pulses.      Heart sounds: No murmur heard.     No friction rub. No gallop.   Pulmonary:      Effort: Pulmonary effort is normal.      Breath sounds: Normal breath sounds.   Abdominal:      General: Bowel sounds are normal. There is no distension or abdominal bruit.      Palpations: Abdomen is soft. There is no hepatomegaly, splenomegaly or mass.      Tenderness: There is no abdominal tenderness.   Genitourinary:     Penis: Normal.       Testes:         Right: Mass, tenderness or swelling not present.         Left: Mass, tenderness or swelling not present.      Prostate: Normal.      Rectum: Normal.   Musculoskeletal:         General: Normal range of motion.      Cervical back: Normal range of motion and neck supple.      Right lower leg: No edema.      Left lower leg: No edema.   Feet:      Right foot:      Skin integrity: No ulcer, blister, skin breakdown, callus or dry skin.      Left foot:      Skin integrity: No ulcer, blister, skin " breakdown, callus or dry skin.   Lymphadenopathy:      Cervical: No cervical adenopathy.      Upper Body:      Right upper body: No supraclavicular adenopathy.      Left upper body: No supraclavicular adenopathy.      Lower Body: No right inguinal adenopathy. No left inguinal adenopathy.   Skin:     Findings: No lesion or rash.      Comments: No atypical skin lesions.   Neurological:      Mental Status: He is alert.      Cranial Nerves: No cranial nerve deficit.      Motor: Motor function is intact. No abnormal muscle tone.      Coordination: Coordination normal.      Gait: Gait normal.      Deep Tendon Reflexes: Reflexes are normal and symmetric.   Psychiatric:         Mood and Affect: Mood normal.         PHQ-2 Depression Screening  Little interest or pleasure in doing things? 0-->not at all   Feeling down, depressed, or hopeless? 0-->not at all   PHQ-2 Total Score 0         Counseling was given to patient for the following topics: appropriate exercise, healthy eating habits, disease prevention, risk factors for cancer, importance of self testicular exam, importance of immunizations, including risks and benefits, sun safety, seatbelt use, and safe driving. Also discussed the importance of regular dental and vision care, as well recommendation for a yearly screening skin exam after age 40.  Written information provided to patient on these topics and other health maintenance issues.    Results for orders placed or performed in visit on 06/24/24   POC Microalbumin    Specimen: Urine   Result Value Ref Range    Microalbumin, Urine 10     Creatinine, Urine 50     Lot Number [id number]     Expiration Date 4/30/25     Albumin/Creatinine Ratio, Urine     POC Urinalysis Dipstick, Automated    Specimen: Urine   Result Value Ref Range    Color Yellow Yellow, Straw, Dark Yellow, Kerri    Clarity, UA Clear Clear    Specific Gravity  1.010 1.005 - 1.030    pH, Urine 6.0 5.0 - 8.0    Leukocytes Negative Negative    Nitrite,  UA Negative Negative    Protein, POC Negative Negative mg/dL    Glucose, UA Negative Negative mg/dL    Ketones, UA Negative Negative    Urobilinogen, UA Normal Normal, 0.2 E.U./dL    Bilirubin Negative Negative    Blood, UA Negative Negative    Lot Number [id number]     Expiration Date 3/31/25    POC Glycosylated Hemoglobin (Hb A1C)    Specimen: Blood   Result Value Ref Range    Hemoglobin A1C 5.8 (A) 4.5 - 5.7 %    Lot Number [id number]     Expiration Date 3/18/26          Diagnoses and all orders for this visit:    1. Medicare annual wellness visit, subsequent (Primary)    2. Encounter for health maintenance examination in adult  -     Comprehensive Metabolic Panel    3. Controlled type 2 diabetes mellitus without complication, without long-term current use of insulin  -     POC Microalbumin  -     POC Urinalysis Dipstick, Automated  -     POC Glycosylated Hemoglobin (Hb A1C)  -     Comprehensive Metabolic Panel   Continue current medication(s) as noted in the history of present illness.    4. Dyslipidemia  -     simvastatin (ZOCOR) 20 MG tablet; Take 1 tablet by mouth Every Night.  Dispense: 90 tablet; Refill: 3- REFILL  -     Comprehensive Metabolic Panel   Continue current medication(s) as noted in the history of present illness.    5. Coronary artery calcification  -     Comprehensive Metabolic Panel   Continue current medication(s) as noted in the history of present illness.    6. Polyp of colon, unspecified part of colon, unspecified type   Colonoscopy up-to-date.    7. Adrenal nodule  -     CT Abdomen Adrenals With & Without Contrast; Future    8. Elevated serum creatinine  -     Cancel: Creatinine Serum (kidney function) GFR component-duplicate order    9. Screening for prostate cancer  -     PSA Screen    I recommended COVID-19 bivalent vaccine and ?RSV at the pharmacy, September 2024.  I also recommended a yearly Influenza vaccine in our office or at the pharmacy.      BMI is within normal parameters. No  other follow-up for BMI required.            Return in about 6 months (around 12/24/2024) for Recheck Diabetes, fasting.

## 2024-06-24 NOTE — PATIENT INSTRUCTIONS
I recommend COVID-19 bivalent vaccine and ? RSV at the pharmacy, September 2024.  I also recommend a yearly Influenza vaccine in our office or at the pharmacy.    Health Maintenance, Male  Adopting a healthy lifestyle and getting preventive care are important in promoting health and wellness. Ask your health care provider about:  The right schedule for you to have regular tests and exams.  Things you can do on your own to prevent diseases and keep yourself healthy.  What should I know about diet, weight, and exercise?  Eat a healthy diet    Eat a diet that includes plenty of vegetables, fruits, low-fat dairy products, and lean protein.  Do not eat a lot of foods that are high in solid fats, added sugars, or sodium.  Maintain a healthy weight  Body mass index (BMI) is a measurement that can be used to identify possible weight problems. It estimates body fat based on height and weight. Your health care provider can help determine your BMI and help you achieve or maintain a healthy weight.  Get regular exercise  Get regular exercise. This is one of the most important things you can do for your health. Most adults should:  Exercise for at least 150 minutes each week. The exercise should increase your heart rate and make you sweat (moderate-intensity exercise).  Do strengthening exercises at least twice a week. This is in addition to the moderate-intensity exercise.  Spend less time sitting. Even light physical activity can be beneficial.  Watch cholesterol and blood lipids  Have your blood tested for lipids and cholesterol at 20 years of age, then have this test every 5 years.  You may need to have your cholesterol levels checked more often if:  Your lipid or cholesterol levels are high.  You are older than 40 years of age.  You are at high risk for heart disease.  What should I know about cancer screening?  Many types of cancers can be detected early and may often be prevented. Depending on your health history and  family history, you may need to have cancer screening at various ages. This may include screening for:  Colorectal cancer.  Prostate cancer.  Skin cancer.  Lung cancer.  What should I know about heart disease, diabetes, and high blood pressure?  Blood pressure and heart disease  High blood pressure causes heart disease and increases the risk of stroke. This is more likely to develop in people who have high blood pressure readings or are overweight.  Talk with your health care provider about your target blood pressure readings.  Have your blood pressure checked:  Every 3-5 years if you are 18-39 years of age.  Every year if you are 40 years old or older.  If you are between the ages of 65 and 75 and are a current or former smoker, ask your health care provider if you should have a one-time screening for abdominal aortic aneurysm (AAA).  Diabetes  Have regular diabetes screenings. This checks your fasting blood sugar level. Have the screening done:  Once every three years after age 45 if you are at a normal weight and have a low risk for diabetes.  More often and at a younger age if you are overweight or have a high risk for diabetes.  What should I know about preventing infection?  Hepatitis B  If you have a higher risk for hepatitis B, you should be screened for this virus. Talk with your health care provider to find out if you are at risk for hepatitis B infection.  Hepatitis C  Blood testing is recommended for:  Everyone born from 1945 through 1965.  Anyone with known risk factors for hepatitis C.  Sexually transmitted infections (STIs)  You should be screened each year for STIs, including gonorrhea and chlamydia, if:  You are sexually active and are younger than 24 years of age.  You are older than 24 years of age and your health care provider tells you that you are at risk for this type of infection.  Your sexual activity has changed since you were last screened, and you are at increased risk for chlamydia or  gonorrhea. Ask your health care provider if you are at risk.  Ask your health care provider about whether you are at high risk for HIV. Your health care provider may recommend a prescription medicine to help prevent HIV infection. If you choose to take medicine to prevent HIV, you should first get tested for HIV. You should then be tested every 3 months for as long as you are taking the medicine.  Follow these instructions at home:  Alcohol use  Do not drink alcohol if your health care provider tells you not to drink.  If you drink alcohol:  Limit how much you have to 0-2 drinks a day.  Know how much alcohol is in your drink. In the [place] one drink equals one 12 oz bottle of beer (355 mL), one 5 oz glass of wine (148 mL), or one 1½ oz glass of hard liquor (44 mL).  Lifestyle  Do not use any products that contain nicotine or tobacco. These products include cigarettes, chewing tobacco, and vaping devices, such as e-cigarettes. If you need help quitting, ask your health care provider.  Do not use street drugs.  Do not share needles.  Ask your health care provider for help if you need support or information about quitting drugs.  General instructions  Schedule regular health, dental, and eye exams.  Stay current with your vaccines.  Tell your health care provider if:  You often feel depressed.  You have ever been abused or do not feel safe at home.  Summary  Adopting a healthy lifestyle and getting preventive care are important in promoting health and wellness.  Follow your health care provider's instructions about healthy diet, exercising, and getting tested or screened for diseases.  Follow your health care provider's instructions on monitoring your cholesterol and blood pressure.  This information is not intended to replace advice given to you by your health care provider. Make sure you discuss any questions you have with your health care provider.  Document Revised: 05/09/2022 Document Reviewed: 05/09/2022  Eric  Patient Education © 2024 [employer]

## 2024-06-24 NOTE — PROGRESS NOTES
The ABCs of the Annual Wellness Visit  Subsequent Medicare Wellness Visit    Subjective      Chente Rich is a 69 y.o. male who presents for a Subsequent Medicare Wellness Visit.    The following portions of the patient's history were reviewed and   updated as appropriate: allergies, current medications, past family history, past medical history, past social history, past surgical history, and problem list.    Compared to one year ago, the patient feels his physical   health is better.    Compared to one year ago, the patient feels his mental   health is better.    Recent Hospitalizations:  He was admitted within the past 365 days at [facility].       Current Medical Providers:  Patient Care Team:  Millie Loya MD as PCP - General (Internal Medicine)  Tru Pop MD as Consulting Physician (Ophthalmology)  Shelley Soliman MD as Consulting Physician (Dermatology)    Outpatient Medications Prior to Visit   Medication Sig Dispense Refill    acetaminophen (TYLENOL) 650 MG 8 hr tablet Take 1 tablet by mouth Every 8 (Eight) Hours. 90 tablet 0    aspirin 325 MG EC tablet Take 1 tablet by mouth Daily. Begin day after surgery 42 tablet 0    cetirizine (zyrTEC) 10 MG tablet Take 1 tablet by mouth Daily.      ibuprofen (ADVIL,MOTRIN) 400 MG tablet Take 1 tablet by mouth Every 8 (Eight) Hours As Needed for Mild Pain or Moderate Pain. 90 tablet 1    polycarbophil (calcium polycarbophil) 625 MG tablet tablet Take 1 tablet by mouth Daily.      docusate sodium (Colace) 100 MG capsule Take 1 capsule by mouth 2 (Two) Times a Day As Needed for Constipation. 60 capsule 0    ondansetron (Zofran) 4 MG tablet Take 1 tablet by mouth Every 8 (Eight) Hours As Needed for Nausea or Vomiting. 15 tablet 1    simvastatin (ZOCOR) 20 MG tablet Take 1 tablet by mouth Daily. (Patient taking differently: Take 1 tablet by mouth Every Night.) 90 tablet 3    oxyCODONE (ROXICODONE) 5 MG immediate release tablet Take 1 tablet by  "mouth Every 6 (Six) Hours As Needed for Severe Pain. (Patient not taking: Reported on 6/24/2024) 30 tablet 0     No facility-administered medications prior to visit.       No opioid medication identified on active medication list. I have reviewed chart for other potential  high risk medication/s and harmful drug interactions in the elderly.        Aspirin is on active medication list. Aspirin use is indicated based on review of current medical condition/s. Pros and cons of this therapy have been discussed today. Benefits of this medication outweigh potential harm.  Patient has been encouraged to continue taking this medication.  .      Patient Active Problem List   Diagnosis    Hyperlipidemia    Controlled type 2 diabetes mellitus without complication, without long-term current use of insulin    Coronary artery calcification    BCC (basal cell carcinoma of skin)    Diverticulosis    Colon polyp    Neuropathy of right lower extremity    OA (osteoarthritis) of knee    SCC (squamous cell carcinoma)    S/P TKR (total knee replacement), left     Advance Care Planning   Advance Care Planning     Advance Directive is on file.  ACP discussion was held with the patient during this visit. Patient has an advance directive in EMR which is still valid.   ACP information on the AVS declined by the patient.       Objective    Vitals:    06/24/24 0914   BP: 132/78   BP Location: Right arm   Patient Position: Sitting   Cuff Size: Adult   Pulse: 62   Temp: 97.8 °F (36.6 °C)   TempSrc: Infrared   Weight: 67.8 kg (149 lb 8 oz)   Height: 177.8 cm (70\")     Estimated body mass index is 21.45 kg/m² as calculated from the following:    Height as of this encounter: 177.8 cm (70\").    Weight as of this encounter: 67.8 kg (149 lb 8 oz).    BMI is within normal parameters. No other follow-up for BMI required.    Finger Rub Hearing{Test (right ear):passed  Finger Rub Hearing{Test (left ear):passed      Does the patient have evidence of " cognitive impairment?   No    Lab Results   Component Value Date    HGBA1C 5.8 (A) 2024    HGBA1C 7.10 (H) 2024          HEALTH RISK ASSESSMENT    Smoking Status:  Social History     Tobacco Use   Smoking Status Never    Passive exposure: Never   Smokeless Tobacco Never     Alcohol Consumption:  Social History     Substance and Sexual Activity   Alcohol Use Not Currently    Comment: 1 drink (beer) daily     Fall Risk Screen:    SARITHA Fall Risk Assessment was completed, and patient is at LOW risk for falls.Assessment completed on:2024    Depression Screenin/24/2024     9:22 AM   PHQ-2/PHQ-9 Depression Screening   Little Interest or Pleasure in Doing Things 0-->not at all   Feeling Down, Depressed or Hopeless 0-->not at all   PHQ-9: Brief Depression Severity Measure Score 0       Health Habits and Functional and Cognitive Screenin/24/2024     9:20 AM   Functional & Cognitive Status   Do you have difficulty preparing food and eating? No   Do you have difficulty bathing yourself, getting dressed or grooming yourself? No   Do you have difficulty using the toilet? No   Do you have difficulty moving around from place to place? No   Do you have trouble with steps or getting out of a bed or a chair? No   Current Diet Well Balanced Diet   Dental Exam Up to date   Eye Exam Up to date   Exercise (times per week) 5 times per week   Current Exercises Include Walking   Do you need help using the phone?  No   Are you deaf or do you have serious difficulty hearing?  No   Do you need help to go to places out of walking distance? No   Do you need help shopping? No   Do you need help preparing meals?  No   Do you need help with housework?  No   Do you need help with laundry? No   Do you need help taking your medications? No   Do you need help managing money? No   Do you ever drive or ride in a car without wearing a seat belt? No   Have you felt unusual stress, anger or loneliness in the last month?  No   Who do you live with? Spouse   If you need help, do you have trouble finding someone available to you? No   Have you been bothered in the last four weeks by sexual problems? No   Do you have difficulty concentrating, remembering or making decisions? No       Age-appropriate Screening Schedule:  Refer to the list below for future screening recommendations based on patient's age, sex and/or medical conditions. Orders for these recommended tests are listed in the plan section. The patient has been provided with a written plan.    Health Maintenance   Topic Date Due    COVID-19 Vaccine (7 - 2023-24 season) 01/22/2024    DIABETIC FOOT EXAM  06/19/2024    INFLUENZA VACCINE  08/01/2024    HEMOGLOBIN A1C  12/24/2024    LIPID PANEL  03/05/2025    DIABETIC EYE EXAM  05/07/2025    PROSTATE CANCER SCREENING  06/24/2025    ANNUAL WELLNESS VISIT  06/24/2025    URINE MICROALBUMIN  06/24/2025    TDAP/TD VACCINES (2 - Td or Tdap) 10/20/2027    COLORECTAL CANCER SCREENING  11/20/2033    HEPATITIS C SCREENING  Completed    RSV Vaccine - Adults  Completed    Pneumococcal Vaccine 65+  Completed    ZOSTER VACCINE  Completed                  CMS Preventative Services Quick Reference  Risk Factors Identified During Encounter:    Immunizations Discussed/Encouraged: Influenza, COVID19, and RSV (Respiratory Syncytial Virus)  Dental Screening Recommended  Vision Screening Recommended    The above risks/problems have been discussed with the patient.  Pertinent information has been shared with the patient in the After Visit Summary.    Diagnoses and all orders for this visit:    1. Medicare annual wellness visit, subsequent (Primary)    2. Encounter for health maintenance examination in adult  -     Comprehensive Metabolic Panel; Future  -     Comprehensive Metabolic Panel    3. Controlled type 2 diabetes mellitus without complication, without long-term current use of insulin  -     POC Microalbumin  -     POC Urinalysis Dipstick,  Automated  -     POC Glycosylated Hemoglobin (Hb A1C)  -     Comprehensive Metabolic Panel; Future  -     Comprehensive Metabolic Panel    4. Dyslipidemia  -     simvastatin (ZOCOR) 20 MG tablet; Take 1 tablet by mouth Every Night.  Dispense: 90 tablet; Refill: 3  -     Comprehensive Metabolic Panel; Future  -     Comprehensive Metabolic Panel    5. Coronary artery calcification  -     Comprehensive Metabolic Panel; Future  -     Comprehensive Metabolic Panel    6. Polyp of colon, unspecified part of colon, unspecified type    7. Adrenal nodule  -     CT Abdomen Adrenals With & Without Contrast; Future    8. Elevated serum creatinine  -     Cancel: Creatinine Serum (kidney function) GFR component    9. Screening for prostate cancer  -     PSA Screen; Future  -     PSA Screen        Follow Up:   Next Medicare Wellness visit to be scheduled in 1 year.      An After Visit Summary and PPPS were made available to the patient.

## 2024-06-25 ENCOUNTER — TELEPHONE (OUTPATIENT)
Dept: INTERNAL MEDICINE | Facility: CLINIC | Age: 69
End: 2024-06-25
Payer: MEDICARE

## 2024-06-25 NOTE — TELEPHONE ENCOUNTER
Call patient please.    His labs look good with the exception of a slightly low sodium level.  This could be the cause of his hand cramping, but it was not really that low.  I would recommend an ultrasound of the arteries of his upper extremities to further evaluate the cramping.  If he is agreeable, please send the message back to me and I will enter an order.

## 2024-06-26 NOTE — TELEPHONE ENCOUNTER
Called patient and read providers message, good verb given.  He stated that he had been actively cutting salt out of his diet. He would like to first try increasing his sodium levels to see if this helps his cramping. If he continues to have symptoms or they worsen he will call back and discuss further evaluation.   Patient had no further questions at this time. He is scheduled for his 6 month follow up in December.

## 2024-07-03 ENCOUNTER — HOSPITAL ENCOUNTER (OUTPATIENT)
Facility: HOSPITAL | Age: 69
Discharge: HOME | End: 2024-07-03
Admitting: INTERNAL MEDICINE
Payer: MEDICARE

## 2024-07-03 DIAGNOSIS — E27.8 ADRENAL NODULE: ICD-10-CM

## 2024-07-03 PROCEDURE — 74170 CT ABD WO CNTRST FLWD CNTRST: CPT

## 2024-07-03 PROCEDURE — 25510000001 IOPAMIDOL PER 1 ML: Performed by: INTERNAL MEDICINE

## 2024-07-03 RX ADMIN — IOPAMIDOL 95 ML: 755 INJECTION, SOLUTION INTRAVENOUS at 10:35

## 2024-07-18 ENCOUNTER — OFFICE VISIT (OUTPATIENT)
Dept: ORTHOPEDIC SURGERY | Facility: CLINIC | Age: 69
End: 2024-07-18
Payer: MEDICARE

## 2024-07-18 VITALS
DIASTOLIC BLOOD PRESSURE: 74 MMHG | BODY MASS INDEX: 20.76 KG/M2 | HEIGHT: 70 IN | SYSTOLIC BLOOD PRESSURE: 132 MMHG | WEIGHT: 145 LBS

## 2024-07-18 DIAGNOSIS — Z96.652 STATUS POST TOTAL LEFT KNEE REPLACEMENT: Primary | ICD-10-CM

## 2024-07-18 RX ORDER — CEPHALEXIN 500 MG/1
CAPSULE ORAL
COMMUNITY
Start: 2024-07-11

## 2024-07-18 NOTE — PROGRESS NOTES
"    [facility] Note        Subjective     Post-op (8 week follow up -3 months s/p TOTAL KNEE ARTHROPLASTY WITH OSWALDO ROBOT - LEFT (DOS 4/10/24/)       AIMEE    Chente Rich is a 69 y.o. male.  Patient is here today now 3 months out from robot-assisted left TKA on 4/10/2024.  Doing well overall.  Having little bit of quadricep pain.  He is able to play pickle ball.  He has done little bit of jogging.  Wishes he would have run the bluegrass 10,000 but was talked out of it by family.          Objective      Physical Exam  /74   Ht 177.8 cm (70\")   Wt 65.8 kg (145 lb)   BMI 20.81 kg/m²     Body mass index is 20.81 kg/m².        Ortho Exam    Lower Extremity:     Inspection and Palpation:      Left knee:  Calf:  Soft and non tender  Effusion:  None  Pulses:  2+  Warmth:  None  Incision:  Healed     ROM:  Left:  Extension:5    Flexion:130      Deformities/Malalignments/Discrepancies:    Left:  none    Functional Testing:  Left:  Straight Leg Raise: 5/5  Patella Tracking:  Normal      Imaging Reviewed and Interpreted:  Imaging Results (Last 24 Hours)       ** No results found for the last 24 hours. **              Assessment    Assessment:  1. Status post total left knee replacement        Plan:  Status post left TKA--patient is doing great overall.  Lacks a little bit of extension which she should continue to work on.  Endpoint is soft.  Follow-up in 3 months.  Continue indefinite antibiotic prophylaxis.      Rey Worthy MD  07/18/24  17:33 EDT      Dictated Utilizing Dragon Dictation.  "

## 2024-10-22 ENCOUNTER — OFFICE VISIT (OUTPATIENT)
Dept: ORTHOPEDIC SURGERY | Facility: CLINIC | Age: 69
End: 2024-10-22
Payer: MEDICARE

## 2024-10-22 VITALS
SYSTOLIC BLOOD PRESSURE: 122 MMHG | DIASTOLIC BLOOD PRESSURE: 76 MMHG | WEIGHT: 148 LBS | HEIGHT: 70 IN | BODY MASS INDEX: 21.19 KG/M2

## 2024-10-22 DIAGNOSIS — Z96.652 STATUS POST TOTAL LEFT KNEE REPLACEMENT: Primary | ICD-10-CM

## 2024-10-22 NOTE — PROGRESS NOTES
[facility] Note        Subjective     CC: Follow-up (3 month recheck- 6 months s/p TOTAL KNEE ARTHROPLASTY WITH OSWALDO ROBOT - LEFT (DOS 4/10/24/))      AIMEE    Chente Rich is a 69 y.o. male.  Patient is here today now 6 months out from robot-assisted left TKA on 4/10/2024.  Patient is doing great overall.  His complaints at his last visit were that he was having some quadricep pain.  He says that has gotten better.  He complains of a little bit of stiffness after prolonged periods of sitting but he does everything he wants to do including jogging and playing pickle ball.    Overall, patient's symptoms are as of    ROS:    Constiutional:Pt denies fever, chills, nausea, or vomiting.  MSK:as above        Objective      Past Medical History  Past Medical History:   Diagnosis Date    Abnormal screening cardiac CT 10/12/2017    Calcium Score 133    Acute diverticulitis 11/19/2023    Hosp 11/2023      BCC (basal cell carcinoma of skin)     Dx (initially)-2010.  Has occurred on head, chest, back, and right leg.    Colon polyp     Dx 10/17- tubular adenoma ascending colon, no dysplasia    Diabetes mellitus     NO LONGER NEEDS MEDS    Diverticulosis     Dx 10/17 by Colonoscopy- pancolonic    History of cardiovascular stress test 06/19/2020    negative stress echo, EF 60%    History of esophagogastroduodenoscopy (EGD) 11/20/2023    Normal    History of GI bleed 11/18/2023    Hospitalized.  EGD and Colonoscopy normal except diverticulosis    Hyperlipidemia     Neuropathy of right lower extremity     Dx 12/2022- mild (by EMG)    OA (osteoarthritis) of knee 01/15/2024    SCC (squamous cell carcinoma) 03/05/2024    Dx 2/28/24- left forehead (Mohs Procedure 3/11/24)      Wears glasses      Social History     Socioeconomic History    Marital status:     Number of children: 2   Tobacco Use    Smoking status: Never     Passive exposure: Never    Smokeless tobacco: Never   Vaping Use    Vaping status:  "Never Used   Substance and Sexual Activity    Alcohol use: Not Currently     Comment: 1 drink (beer) daily    Drug use: Never    Sexual activity: Yes     Partners: Female     Birth control/protection: Post-menopausal          Physical Exam  /76   Ht 177.8 cm (70\")   Wt 67.1 kg (148 lb)   BMI 21.24 kg/m²     Body mass index is 21.24 kg/m².    Patient is well nourished and well developed.        Ortho Exam    Lower Extremity:     Inspection and Palpation:      Left knee:  Calf:  Soft and non tender  Effusion:  None  Pulses:  2+  Warmth:  None  Incision:  Healed     ROM:  Left:  Extension:0    Flexion:120      Deformities/Malalignments/Discrepancies:    Left:  none    Functional Testing:  Left:  Straight Leg Raise: 5/5  Patella Tracking:  Normal      Imaging/Labs/EMG Reviewed and Interpreted:  Imaging Results (Last 24 Hours)       ** No results found for the last 24 hours. **              Assessment    Assessment:  1. Status post total left knee replacement        Plan:  Recommend over the counter anti-inflammatories for pain and/or swelling  Status post left TKA--patient is doing great overall.  Continue indefinite antibiotic prophylaxis.  Follow-up in 6 months with an x-ray or sooner if necessary.      Rey Worthy MD  10/22/24  10:11 EDT      Dictated Utilizing Dragon Dictation.  "

## 2024-12-12 ENCOUNTER — OFFICE VISIT (OUTPATIENT)
Dept: INTERNAL MEDICINE | Facility: CLINIC | Age: 69
End: 2024-12-12
Payer: MEDICARE

## 2024-12-12 VITALS
SYSTOLIC BLOOD PRESSURE: 128 MMHG | HEART RATE: 64 BPM | TEMPERATURE: 97.9 F | BODY MASS INDEX: 20.95 KG/M2 | RESPIRATION RATE: 17 BRPM | WEIGHT: 146 LBS | DIASTOLIC BLOOD PRESSURE: 76 MMHG

## 2024-12-12 DIAGNOSIS — D35.02 ADENOMA OF LEFT ADRENAL GLAND: ICD-10-CM

## 2024-12-12 DIAGNOSIS — K63.5 POLYP OF COLON, UNSPECIFIED PART OF COLON, UNSPECIFIED TYPE: ICD-10-CM

## 2024-12-12 DIAGNOSIS — G57.91 NEUROPATHY OF RIGHT LOWER EXTREMITY: ICD-10-CM

## 2024-12-12 DIAGNOSIS — E78.5 DYSLIPIDEMIA: ICD-10-CM

## 2024-12-12 DIAGNOSIS — E11.9 CONTROLLED TYPE 2 DIABETES MELLITUS WITHOUT COMPLICATION, WITHOUT LONG-TERM CURRENT USE OF INSULIN: Primary | ICD-10-CM

## 2024-12-12 DIAGNOSIS — I25.10 CORONARY ARTERY CALCIFICATION: ICD-10-CM

## 2024-12-12 DIAGNOSIS — M17.12 PRIMARY OSTEOARTHRITIS OF LEFT KNEE: ICD-10-CM

## 2024-12-12 LAB
ALBUMIN SERPL-MCNC: 4 G/DL (ref 3.5–5.2)
ALBUMIN/GLOB SERPL: 1.3 G/DL
ALP SERPL-CCNC: 59 U/L (ref 39–117)
ALT SERPL W P-5'-P-CCNC: 15 U/L (ref 1–41)
ANION GAP SERPL CALCULATED.3IONS-SCNC: 11.6 MMOL/L (ref 5–15)
AST SERPL-CCNC: 29 U/L (ref 1–40)
BASOPHILS # BLD AUTO: 0.05 10*3/MM3 (ref 0–0.2)
BASOPHILS NFR BLD AUTO: 1.1 % (ref 0–1.5)
BILIRUB SERPL-MCNC: 0.4 MG/DL (ref 0–1.2)
BUN SERPL-MCNC: 16 MG/DL (ref 8–23)
BUN/CREAT SERPL: 12.8 (ref 7–25)
CALCIUM SPEC-SCNC: 9.7 MG/DL (ref 8.6–10.5)
CHLORIDE SERPL-SCNC: 99 MMOL/L (ref 98–107)
CHOLEST SERPL-MCNC: 169 MG/DL (ref 0–200)
CO2 SERPL-SCNC: 26.4 MMOL/L (ref 22–29)
CREAT SERPL-MCNC: 1.25 MG/DL (ref 0.76–1.27)
DEPRECATED RDW RBC AUTO: 40.4 FL (ref 37–54)
EGFRCR SERPLBLD CKD-EPI 2021: 61.6 ML/MIN/1.73
EOSINOPHIL # BLD AUTO: 0.22 10*3/MM3 (ref 0–0.4)
EOSINOPHIL NFR BLD AUTO: 4.8 % (ref 0.3–6.2)
ERYTHROCYTE [DISTWIDTH] IN BLOOD BY AUTOMATED COUNT: 12.2 % (ref 12.3–15.4)
EXPIRATION DATE: ABNORMAL
GLOBULIN UR ELPH-MCNC: 3.1 GM/DL
GLUCOSE SERPL-MCNC: 124 MG/DL (ref 65–99)
HBA1C MFR BLD: 6.2 % (ref 4.5–5.7)
HCT VFR BLD AUTO: 40.4 % (ref 37.5–51)
HDLC SERPL-MCNC: 81 MG/DL (ref 40–60)
HGB BLD-MCNC: 14 G/DL (ref 13–17.7)
IMM GRANULOCYTES # BLD AUTO: 0.01 10*3/MM3 (ref 0–0.05)
IMM GRANULOCYTES NFR BLD AUTO: 0.2 % (ref 0–0.5)
LDLC SERPL CALC-MCNC: 78 MG/DL (ref 0–100)
LDLC/HDLC SERPL: 0.96 {RATIO}
LYMPHOCYTES # BLD AUTO: 1.33 10*3/MM3 (ref 0.7–3.1)
LYMPHOCYTES NFR BLD AUTO: 28.8 % (ref 19.6–45.3)
Lab: ABNORMAL
MCH RBC QN AUTO: 31.7 PG (ref 26.6–33)
MCHC RBC AUTO-ENTMCNC: 34.7 G/DL (ref 31.5–35.7)
MCV RBC AUTO: 91.6 FL (ref 79–97)
MONOCYTES # BLD AUTO: 0.44 10*3/MM3 (ref 0.1–0.9)
MONOCYTES NFR BLD AUTO: 9.5 % (ref 5–12)
NEUTROPHILS NFR BLD AUTO: 2.57 10*3/MM3 (ref 1.7–7)
NEUTROPHILS NFR BLD AUTO: 55.6 % (ref 42.7–76)
NRBC BLD AUTO-RTO: 0 /100 WBC (ref 0–0.2)
PLATELET # BLD AUTO: 283 10*3/MM3 (ref 140–450)
PMV BLD AUTO: 9.7 FL (ref 6–12)
POTASSIUM SERPL-SCNC: 4.7 MMOL/L (ref 3.5–5.2)
PROT SERPL-MCNC: 7.1 G/DL (ref 6–8.5)
RBC # BLD AUTO: 4.41 10*6/MM3 (ref 4.14–5.8)
SODIUM SERPL-SCNC: 137 MMOL/L (ref 136–145)
TRIGL SERPL-MCNC: 50 MG/DL (ref 0–150)
TSH SERPL DL<=0.05 MIU/L-ACNC: 1.15 UIU/ML (ref 0.27–4.2)
VLDLC SERPL-MCNC: 10 MG/DL (ref 5–40)
WBC NRBC COR # BLD AUTO: 4.62 10*3/MM3 (ref 3.4–10.8)

## 2024-12-12 PROCEDURE — 36415 COLL VENOUS BLD VENIPUNCTURE: CPT | Performed by: INTERNAL MEDICINE

## 2024-12-12 PROCEDURE — 3044F HG A1C LEVEL LT 7.0%: CPT | Performed by: INTERNAL MEDICINE

## 2024-12-12 PROCEDURE — 1126F AMNT PAIN NOTED NONE PRSNT: CPT | Performed by: INTERNAL MEDICINE

## 2024-12-12 PROCEDURE — 84443 ASSAY THYROID STIM HORMONE: CPT | Performed by: INTERNAL MEDICINE

## 2024-12-12 PROCEDURE — 80053 COMPREHEN METABOLIC PANEL: CPT | Performed by: INTERNAL MEDICINE

## 2024-12-12 PROCEDURE — 99214 OFFICE O/P EST MOD 30 MIN: CPT | Performed by: INTERNAL MEDICINE

## 2024-12-12 PROCEDURE — 83036 HEMOGLOBIN GLYCOSYLATED A1C: CPT | Performed by: INTERNAL MEDICINE

## 2024-12-12 PROCEDURE — 85025 COMPLETE CBC W/AUTO DIFF WBC: CPT | Performed by: INTERNAL MEDICINE

## 2024-12-12 PROCEDURE — 1159F MED LIST DOCD IN RCRD: CPT | Performed by: INTERNAL MEDICINE

## 2024-12-12 PROCEDURE — 1111F DSCHRG MED/CURRENT MED MERGE: CPT | Performed by: INTERNAL MEDICINE

## 2024-12-12 PROCEDURE — 1160F RVW MEDS BY RX/DR IN RCRD: CPT | Performed by: INTERNAL MEDICINE

## 2024-12-12 PROCEDURE — 80061 LIPID PANEL: CPT | Performed by: INTERNAL MEDICINE

## 2024-12-12 PROCEDURE — G2211 COMPLEX E/M VISIT ADD ON: HCPCS | Performed by: INTERNAL MEDICINE

## 2024-12-12 NOTE — PROGRESS NOTES
Subjective       Chente Rich is a 69 y.o. male.     Chief Complaint   Patient presents with    Diabetes     6-month follow-up, fasting    Hyperlipidemia       History obtained from the patient.      History of Present Illness     The patient has a history of BCC and SCC of the skin.  He states he was diagnosed with another SCC (mid occiput) last week and will be having surgery in March 2025.    The patient has A Left Adrenal Nodule, likely an adenoma, which was noted incidentally on CT abdomen on 11/20/2023.  This was read as a 12 mm stable left adrenal nodule, consistent with adenoma, on Adrenal CT on 7/3/2024.     The patient has been seeing Dr. Ma for Chronic Left Knee Pain.  He had arthroscopic surgery on 7/29/2022.  He has had gel injections and then Cortisone injections (last 5/18/2023).  On 4/10/2024, he had a left TKR, and has recovered well.  He is done with PT.  He reports some left knee stiffness in the a.m until he starts moving around.  He denies knee pain or swelling.  He takes Ibuprofen as needed.  Last visit was 10/22/2024 and he will follow-up in April 2025.     The patient has been diagnosed with RLE Neuropathy (mild), stable on no medication.  He reports stable right lower extremity numbness and decreased sensation, but no pain or tingling.  He last saw Dr. Warren on 3/9/2023 and was told to follow-up as needed.     Cardiac Follow-Up:  The patient is here for a follow-up visit.  His Diabetes has been stable.  Medication: Simvastatin.  His Hyperlipidemia has been stable.  LDL goal < 70. Last  LDL was 64, TG 47.  Medication: Simvastatin.  Side Effects: None.  Metformin was discontinued 6/30/2021 due to elevated Creatinine.     Comorbid Illness: Coronary Artery Calcification.  Takes Aspirin 81 mg daily.      Procedures: On 10/12/2017, Cardiac CT scan showed a Calcium Score of 133. On 6/19/20, he had a negative stress echo, EF 60%     Interval Events: Hemoglobin A1c on 6/20/2024 was 5.8.   Creatinine was normal (1.15).  There were no medication changes.  He does not check his blood sugar or blood pressure at home.  His last Ophthalmology visit was 5/7/2024 ([facility]), no retinopathy.  He does check his feet daily.       Symptoms: Denies chest pain, shortness of breath, OLIVEIRA, orthopnea, PND, palpitations, syncope, lower extremity edema, claudication, lightheadedness, and dizziness.    Associated Symptoms: No significant weight change in the past 6 months.   Denies fatigue, headache, polydipsia, polyuria, generalized myalgias/arthralgias, visual impairment, memory loss, and concentration issues.   Reports stable mild numbness, but no no tingling, of his right third through fifth toes.  No foot pain or ulcer.         Lifestyle: The patient follows a diverse and healthy diet.  He walks once per week, plays pickle ball 2-3 times per week, and does strength training twice per week.  He has also started playing tennis once weekly.  Tobacco Use: Never a smoker.     Colon Polyp Follow-Up: The patient is here for follow-up of colon polyps, which have been stable.    Comorbid Illness: Diverticulosis (hospitalized November 2023).  Interval Events:  Last Colonoscopy on 11/20/2023 showed: Diverticulosis in the entire examined colon. Focal diverticulitis in the descending colon, with oozing, adherent clot, and ulcerated diverticular rim. Banded x 2 (failed). Clip was placed across the ulcerated rim for hemostasis. Clip was placed for radiographic marking proximal to the diverticulum. Tattooed proximal to the diverticulum. One 2 mm inflammatory polyp in the descending colon adjacent to bleeding diverticulum.  Pathology c/w: Colonic mucosa with nonspecific surface erosions.  Symptoms: Denies abdominal pain, diarrhea, constipation, hematochezia, melena, and changes in stool.    Medication: FiberCon daily.    Current Outpatient Medications on File Prior to Visit   Medication Sig Dispense Refill    acetaminophen  (TYLENOL) 650 MG 8 hr tablet Take 1 tablet by mouth Every 8 (Eight) Hours. 90 tablet 0    aspirin 325 MG EC tablet Take 1 tablet by mouth Daily. Begin day after surgery 42 tablet 0    cetirizine (zyrTEC) 10 MG tablet Take 1 tablet by mouth Daily.      ibuprofen (ADVIL,MOTRIN) 400 MG tablet Take 1 tablet by mouth Every 8 (Eight) Hours As Needed for Mild Pain or Moderate Pain. 90 tablet 1    polycarbophil (calcium polycarbophil) 625 MG tablet tablet Take 1 tablet by mouth Daily.      simvastatin (ZOCOR) 20 MG tablet Take 1 tablet by mouth Every Night. 90 tablet 3    [DISCONTINUED] cephalexin (KEFLEX) 500 MG capsule TAKE 4 CAPSULES BY MOUTH 1 HOUR BEFORE DENTAL APPOINTMENT (Patient not taking: Reported on 12/12/2024)       No current facility-administered medications on file prior to visit.       Current outpatient and discharge medications have been reconciled for the patient.  Reviewed by: Millie Loya MD        The following portions of the patient's history were reviewed and updated as appropriate: allergies, current medications, past family history, past medical history, past social history, past surgical history, and problem list.    Review of Systems   Constitutional:  Negative for fatigue and unexpected weight change.   Eyes:  Negative for visual disturbance.   Respiratory:  Negative for cough, shortness of breath and wheezing.    Cardiovascular:  Negative for chest pain, palpitations and leg swelling.        No OLIVEIRA, orthopnea, or claudication.   Gastrointestinal:  Negative for abdominal pain, blood in stool, constipation, diarrhea, nausea and vomiting.        Denies melena.   Endocrine: Negative for polydipsia, polyphagia and polyuria.   Musculoskeletal:  Negative for arthralgias and myalgias.   Neurological:  Negative for dizziness, syncope, light-headedness and headaches.        No memory issues.   Psychiatric/Behavioral:  Negative for decreased concentration.          Objective       Blood pressure  128/76, pulse 64, temperature 97.9 °F (36.6 °C), resp. rate 17, weight 66.2 kg (146 lb).  Body mass index is 20.95 kg/m².      Physical Exam  Vitals and nursing note reviewed.   Constitutional:       Appearance: He is well-developed and normal weight.   Neck:      Thyroid: No thyroid mass or thyromegaly.      Vascular: No carotid bruit.   Cardiovascular:      Rate and Rhythm: Normal rate and regular rhythm.      Pulses: Normal pulses.      Heart sounds: Normal heart sounds. No murmur heard.  Pulmonary:      Effort: Pulmonary effort is normal.      Breath sounds: Normal breath sounds.   Musculoskeletal:      Right lower leg: No edema.      Left lower leg: No edema.   Neurological:      Mental Status: He is alert.   Psychiatric:         Mood and Affect: Mood normal.       Results for orders placed or performed in visit on 12/12/24   POC Glycosylated Hemoglobin (Hb A1C)    Collection Time: 12/12/24 12:57 PM    Specimen: Blood   Result Value Ref Range    Hemoglobin A1C 6.2 (A) 4.5 - 5.7 %    Lot Number [id number]     Expiration Date 08/08/2026          Assessment / Plan:  Diagnoses and all orders for this visit:    1. Controlled type 2 diabetes mellitus without complication, without long-term current use of insulin (Primary)  -     POC Glycosylated Hemoglobin (Hb A1C)  -     Lipid Panel  -     Comprehensive Metabolic Panel  -     TSH  -     CBC & Differential   Continue current medication(s) as noted in the history of present illness.    2. Dyslipidemia  -     Lipid Panel  -     Comprehensive Metabolic Panel  -     TSH  -     CBC & Differential   Continue current medication(s) as noted in the history of present illness.    3. Coronary artery calcification  -     Lipid Panel  -     Comprehensive Metabolic Panel  -     TSH  -     CBC & Differential   Continue current medication(s) as noted in the history of present illness.    4. Polyp of colon, unspecified part of colon, unspecified type   Colonoscopy up-to-date.    5.  Primary osteoarthritis of left knee   Continue current medication(s) as noted in the history of present illness.    6. Neuropathy of right lower extremity   Stable, no medication.    7. Adenoma of left adrenal gland   Monitor        BMI is within normal parameters. No other follow-up for BMI required.        Return in about 6 months (around 6/12/2025) for schedule Subseq Medicare Wellness Exam and Annual physical, fasting (same appt).

## 2025-04-24 ENCOUNTER — OFFICE VISIT (OUTPATIENT)
Dept: ORTHOPEDIC SURGERY | Facility: CLINIC | Age: 70
End: 2025-04-24
Payer: MEDICARE

## 2025-04-24 VITALS
DIASTOLIC BLOOD PRESSURE: 76 MMHG | BODY MASS INDEX: 21.98 KG/M2 | HEIGHT: 70 IN | SYSTOLIC BLOOD PRESSURE: 130 MMHG | WEIGHT: 153.5 LBS

## 2025-04-24 DIAGNOSIS — Z96.652 STATUS POST TOTAL LEFT KNEE REPLACEMENT: Primary | ICD-10-CM

## 2025-04-24 DIAGNOSIS — M54.2 CERVICALGIA: ICD-10-CM

## 2025-04-24 ASSESSMENT — KOOS JR
KOOS JR SCORE: 76.33
KOOS JR SCORE: 4

## 2025-04-24 NOTE — PROGRESS NOTES
[facility] [place] [facility] Note        Subjective     CC: No chief complaint on file.  Status post left TKA, neck pain    HPI    Chente Rich is a 70 y.o. male.  Patient is 70-year-old male well-known to me.  Underwent robot-assisted left TKA on 4/10/2024.  He is doing fairly well overall.  Complains of clicking in the knee.  Is not back to running but is playing pickle ball.  Has some stiffness in the knee.  He is also complaining of neck pain and stiffness.  No radicular type symptoms in the arms or legs.  Denies clumsiness or bowel or bladder disturbance.    Overall, patient's symptoms are as above    ROS:    Constiutional:Pt denies fever, chills, nausea, or vomiting.  MSK:as above        Objective      Past Medical History  Past Medical History:   Diagnosis Date    Abnormal screening cardiac CT 10/12/2017    Calcium Score 133    Acute diverticulitis 11/19/2023    Hosp 11/2023      Adenoma of left adrenal gland 12/12/2024    Dx 11/2023.  12 mm, stable on Adrenal CT 7/20/2024.      BCC (basal cell carcinoma of skin)     Dx (initially)-2010.  Has occurred on head, chest, back, and right leg.    Colon polyp     Dx 10/17- tubular adenoma ascending colon, no dysplasia    Diabetes mellitus     NO LONGER NEEDS MEDS    Diverticulosis     Dx 10/17 by Colonoscopy- pancolonic    History of cardiovascular stress test 06/19/2020    negative stress echo, EF 60%    History of esophagogastroduodenoscopy (EGD) 11/20/2023    Normal    History of GI bleed 11/18/2023    Hospitalized.  EGD and Colonoscopy normal except diverticulosis    Hyperlipidemia     Neuropathy of right lower extremity     Dx 12/2022- mild (by EMG)    OA (osteoarthritis) of knee 01/15/2024    SCC (squamous cell carcinoma) 03/05/2024    Dx 2/28/24- left forehead (Mohs Procedure 3/11/24)      Wears glasses      Social History     Socioeconomic History    Marital status:     Number of children: 2   Tobacco Use    Smoking status: Never     Passive  "exposure: Never    Smokeless tobacco: Never   Vaping Use    Vaping status: Never Used   Substance and Sexual Activity    Alcohol use: Not Currently     Comment: 1 drink (beer) daily    Drug use: Never    Sexual activity: Yes     Partners: Female     Birth control/protection: Post-menopausal          Physical Exam  /76   Ht 177.8 cm (70\")   Wt 69.6 kg (153 lb 8 oz)   BMI 22.02 kg/m²     Body mass index is 22.02 kg/m².    Patient is well nourished and well developed.        Ortho Exam    Lower Extremity:     Inspection and Palpation:      Left knee:  Calf:  Soft and non tender  Effusion:  None  Pulses:  2+  Warmth:  None  Incision:  Healed     ROM:  Left:  Extension:0    Flexion:120      Deformities/Malalignments/Discrepancies:    Left:  none    Functional Testing:  Left:  Straight Leg Raise: 5/5  Patella Tracking:  Normal        Patient has some limitation in cervical range of motion.  He has good upper extremity strength.  Tender in the midline of the neck to palpation    Imaging/Labs/EMG Reviewed and Interpreted:  Imaging Results (Last 24 Hours)       Procedure Component Value Units Date/Time    XR Knee 3+ View With Shopiere Left [[id number]] Resulted: 04/24/25 0957     Updated: 04/24/25 0957    Narrative:      Knee X-ray    Indication: status-post TKA    Study:  AP, Lateral, and Sunrise views of Left knee    Comparison: Left knee 5/2/2024    Findings:  No signs of acute fracture are visualized  No signs of loosening are appreciated  Components are well aligned    Impression:  Status post Left cemented total knee arthroplasty. No signs   of loosening or fracture.                Assessment    Assessment:  1. Status post total left knee replacement    2. Cervicalgia        Plan:  Recommend over the counter anti-inflammatories for pain and/or swelling  Status post left TKA--continue indefinite antibiotic prophylaxis.  Follow-up in a year with x-rays or sooner if necessary.  Cervicalgia--physical therapy will be " ordered.  We we will facilitate this today.  He will see Delfino.  Check him back as needed.      Rey Worthy MD  04/24/25  10:04 EDT      Dictated Utilizing Dragon Dictation.

## 2025-04-29 ENCOUNTER — TELEPHONE (OUTPATIENT)
Dept: ORTHOPEDIC SURGERY | Facility: CLINIC | Age: 70
End: 2025-04-29
Payer: MEDICARE

## 2025-04-29 RX ORDER — CLINDAMYCIN HYDROCHLORIDE 150 MG/1
150 CAPSULE ORAL
Qty: 1 CAPSULE | Refills: 0 | Status: SHIPPED | OUTPATIENT
Start: 2025-04-29 | End: 2025-04-29

## 2025-04-29 NOTE — TELEPHONE ENCOUNTER
Patient calling to request medication for dental procedure.     Amoxicillin 500 MG 4 tablets sent to Sasha at [facility].     Please advise.

## 2025-04-29 NOTE — TELEPHONE ENCOUNTER
Called and spoke to patient because they requested Amoxicillin but he is allergic to Penicillin. Patient states that he did not request that and the last time he used Clindamycin and would like to have it again for his dental procedure.     He would like it sent to [facility].

## 2025-04-30 ENCOUNTER — TELEPHONE (OUTPATIENT)
Dept: ORTHOPEDIC SURGERY | Facility: CLINIC | Age: 70
End: 2025-04-30
Payer: MEDICARE

## 2025-04-30 NOTE — TELEPHONE ENCOUNTER
Spoke with Sasha at [facility] to clarify that the prescription for clindamycin should be for 4 tablets to be taken 1 hour before procedure.

## 2025-04-30 NOTE — TELEPHONE ENCOUNTER
Lang from [facility] pharmacy called stating that they are needing some clarifications on the directions that are on the patients Clindamycin medication     Please advise, thank you

## 2025-05-05 ENCOUNTER — TREATMENT (OUTPATIENT)
Dept: PHYSICAL THERAPY | Facility: CLINIC | Age: 70
End: 2025-05-05
Payer: MEDICARE

## 2025-05-05 DIAGNOSIS — M54.2 PAIN, NECK: Primary | ICD-10-CM

## 2025-05-05 DIAGNOSIS — M24.80 CREPITUS OF CERVICAL SPINE: ICD-10-CM

## 2025-05-05 PROCEDURE — 97110 THERAPEUTIC EXERCISES: CPT | Performed by: PHYSICAL THERAPIST

## 2025-05-05 PROCEDURE — 97161 PT EVAL LOW COMPLEX 20 MIN: CPT | Performed by: PHYSICAL THERAPIST

## 2025-05-05 PROCEDURE — 97535 SELF CARE MNGMENT TRAINING: CPT | Performed by: PHYSICAL THERAPIST

## 2025-05-05 NOTE — PROGRESS NOTES
Physical Therapy Initial Evaluation and Plan of Care  [facility] [place]  [address] [address]        Patient: Chente Rich   : 1955  Diagnosis/ICD-10 Code:  Pain, neck [M54.2]  Referring practitioner: Rey Worthy,*  Date of Initial Visit: 2025  Today's Date: 2025  Patient seen for 1 sessions         Visit Diagnoses:    ICD-10-CM ICD-9-CM   1. Pain, neck  M54.2 723.1   2. Crepitus of cervical spine  M24.80 719.68         Subjective Questionnaire: NDI:     Subjective Evaluation    History of Present Illness  Mechanism of injury: The pt reported a 1 year history of neck popping and clicking near the base of the neck. He began noticing this after having a knee replacement in the spring. He reported improvement in neck ROM following the surgery but the popping and clicking began at the same time.     He is concerned that the popping and clicking may indicate something is wrong with the spine. He denied any numbness and tingling of the hands. He is not having any locking of the spine.     The symptoms do not stop him from doing anything but he stated he notices the popping with most activities through the day. Symptoms are consistent through the day. He has not attempted any independent interventions.     Pain  No pain reported  Current pain ratin  Location: Neck    Social Support  Lives in: multiple-level home  Lives with: spouse    Diagnostic Tests  No diagnostic tests performed    Treatments  No previous or current treatments  Patient Goals  Patient goal: reduced popping and clicking           Objective          Postural Observations    Additional Postural Observation Details  Slight flattening of cervical spine, Min FHP    Palpation   Left   No palpable tenderness to the levator scapulae, suboccipitals and upper trapezius.   Hypertonic in the suboccipitals.     Right   No palpable tenderness to the levator scapulae, suboccipitals and  upper trapezius.   Hypertonic in the suboccipitals.     Tenderness   Cervical Spine   No tenderness in the facet joint and spinous process.     Additional Tenderness Details  Crepitus noted in upper cervical spine with flexion rotation test bilaterally -reproduction of concurrent sign    Moderate hypomobility C1 on C2 bilaterally - UPA    Neurological Testing     Sensation   Cervical/Thoracic   Left   Intact: light touch    Right   Intact: light touch    Active Range of Motion     Additional Active Range of Motion Details  CROM:   Ext 50 deg  Flex 42 deg  LR 50 deg  RR 48 deg   LSB 15 deg  RR 25 deg      Passive Range of Motion   Cervical/Thoracic Spine   Normal passive range of motion          Assessment & Plan       Assessment  Impairments: abnormal muscle firing, abnormal or restricted ROM, activity intolerance, impaired physical strength, lacks appropriate home exercise program and pain with function   Functional limitations: carrying objects, sleeping, uncomfortable because of pain, reaching overhead and unable to perform repetitive tasks   Assessment details: The patient is a 71 yo male who presented to PT with evolving characteristics of neck popping and clicking with low complexity. Signs and symptoms are consistent with crepitus of the upper cervical spine.  Symptoms were reproduced in the clinic with flexion rotation test bilaterally and crepitus was felt at endrange rotation on both the left and right lateral masses.  It is likely an arthritic change that has caused this, though he may be experiencing some general capsular and muscle tightness that is contributing to the sensation.  He was prescribed snags into rotation and extension along with a cervical retraction with upper cervical nodding to improve mobility.  So long as he is not having pain, neurologic symptoms in the upper extremity, or range of motion loss, I am not concerned about the crepitus itself, outside of it being an annoyance.  I hope to  see a reduction in crepitus with mobility exercises, but he may benefit from imaging of the cervical spine if these do not improve over the next couple of months.    Prognosis: good    Goals  Plan Goals: Short Term Goals (4 weeks):     1. The patient will be independent and compliant with initial HEP.     2.  The patient will report a 50% reduction in the frequency of popping and clicking in the neck.    3.  The patient will not experience popping and clicking in the cervical spine with a flexion rotation test.    Long term goals (8 weeks):    1.  The patient will be compliant with a progressed home program for independent management.    2.  The patient will report a 75% improvement in the frequency of popping and clicking in the neck.    Plan  Therapy options: will be seen for skilled therapy services  Planned modality interventions: cryotherapy, electrical stimulation/Russian stimulation, iontophoresis, TENS, thermotherapy (hydrocollator packs) and traction  Planned therapy interventions: ADL retraining, body mechanics training, functional ROM exercises, home exercise program, joint mobilization, manual therapy, neuromuscular re-education, postural training, soft tissue mobilization, strengthening, stretching and therapeutic activities  Frequency: 1x week  Duration in visits: 8  Duration in weeks: 8  Treatment plan discussed with: patient  Plan details: The patient will likely benefit from NMED/TE for strengthening and stretching of cervical musculature and improvement of posture. MT will be utilized to improve CROM and to dec pain. Modalities will be used as needed for pain modulation.  Dry needling as indicated.        History # of Personal Factors and/or Comorbidities: LOW (0)  Examination of Body System(s): # of elements: LOW (1-2)  Clinical Presentation: EVOLVING  Clinical Decision Making: LOW     Timed:         Manual Therapy:    0     mins  40923;     Therapeutic Exercise:    10     mins  36614;      Neuromuscular Koffi:    0    mins  99766;    Therapeutic Activity:     0     mins  62557;     Gait Trainin     mins  69658;     Ultrasound:     0     mins  53508;    Ionto                               0    mins   52332  Self Care                       13     mins   70850      Un-Timed:  Canalith Repos    0     mins 66287  Group Therapy    0     mins 20653  Electrical Stimulation:    0     mins  33364 ( );  Dry Needling     0     mins self-pay  Traction     0     mins 88831  Low Eval     10     Mins  88985  Mod Eval     0     Mins  19033  High Eval                       0     Mins  31538        Timed Treatment:   23   mins   Total Treatment:     33   mins          PT: Delfino Amin PT     License Number: [id number]  Electronically signed by Delfino Amin PT, 25, 9:11 AM EDT    Initial Certification  Certification Period: 8/3/2025  I certify that the therapy services are furnished while this patient is under my care.  The services outlined above are required by this patient, and will be reviewed every 90 days.     PHYSICIAN: ________________________________________________________  Rey Worthy MD        DATE: ____________________________________________________________    Please sign and return via fax to [phone]. Thank you, [facility].

## 2025-05-20 ENCOUNTER — TREATMENT (OUTPATIENT)
Dept: PHYSICAL THERAPY | Facility: CLINIC | Age: 70
End: 2025-05-20
Payer: MEDICARE

## 2025-05-20 DIAGNOSIS — M24.80 CREPITUS OF CERVICAL SPINE: ICD-10-CM

## 2025-05-20 DIAGNOSIS — M54.2 PAIN, NECK: Primary | ICD-10-CM

## 2025-05-20 PROCEDURE — 97140 MANUAL THERAPY 1/> REGIONS: CPT | Performed by: PHYSICAL THERAPIST

## 2025-05-20 PROCEDURE — 97110 THERAPEUTIC EXERCISES: CPT | Performed by: PHYSICAL THERAPIST

## 2025-05-20 PROCEDURE — G0283 ELEC STIM OTHER THAN WOUND: HCPCS | Performed by: PHYSICAL THERAPIST

## 2025-05-20 NOTE — PROGRESS NOTES
Physical Therapy Daily Treatment Note  [facility] [place]  [address] , [facility] [address]  [place] [address]      Patient: Chente Rich   : 1955  Referring practitioner: Rey Worthy,*  Date of Initial Visit: Type: THERAPY  Noted: 2025  Today's Date: 2025  Patient seen for 2 sessions       Visit Diagnoses:    ICD-10-CM ICD-9-CM   1. Pain, neck  M54.2 723.1   2. Crepitus of cervical spine  M24.80 719.68       Subjective Evaluation    History of Present Illness  Mechanism of injury: The patient reported he tolerated his initial home exercise program well and feels his neck stiffness has decreased.  He has been using heat 2 times a day and believes this reduces popping for a short period of time.  Overall he is doing better.    Pain  Current pain ratin  Location: Neck         Objective   See Exercise, Manual, and Modality Logs for complete treatment.       Assessment & Plan       Assessment  Assessment details: The patient responded well to initial HEP interventions and use of heat and saw a reduction in neck stiffness and complaints of popping and clicking.  ASTM was performed at the suboccipital region to reduce muscle tightness and was tolerated relatively well, but he did have some discomfort with electric stimulation.  He was prescribed a number of postural reeducation exercises to perform independently, but should be able to manage this condition on his own moving forward.    Plan  Plan details: Pt to attempt independent management and call for f/u as needed. If no additional visits are scheduled in 30 days this will serve as a d/c note.            Timed:         Manual Therapy:    15     mins  77421;     Therapeutic Exercise:    23     mins  99851;     Neuromuscular Koffi:    0    mins  00612;    Therapeutic Activity:     0     mins  97720;     Gait Trainin     mins  56138;     Ultrasound:     0     mins  31352;    Ionto                               0    mins    19881  Self Care                       0     mins   45326      Un-Timed:  Canalith Repos    0     mins 03100  Group Therapy    0     mins 89198  Electrical Stimulation:    5     mins  55598 ( );  Dry Needling     0     mins self-pay  Traction     0     mins 29343      Timed Treatment:   38   mins   Total Treatment:     60   mins    Delfino Amin, PT  [place] License: [id number]

## 2025-06-25 ENCOUNTER — OFFICE VISIT (OUTPATIENT)
Dept: INTERNAL MEDICINE | Facility: CLINIC | Age: 70
End: 2025-06-25
Payer: MEDICARE

## 2025-06-25 VITALS
TEMPERATURE: 98 F | HEART RATE: 60 BPM | RESPIRATION RATE: 16 BRPM | BODY MASS INDEX: 21.83 KG/M2 | WEIGHT: 147.4 LBS | SYSTOLIC BLOOD PRESSURE: 128 MMHG | HEIGHT: 69 IN | DIASTOLIC BLOOD PRESSURE: 84 MMHG

## 2025-06-25 DIAGNOSIS — R31.29 MICROSCOPIC HEMATURIA: ICD-10-CM

## 2025-06-25 DIAGNOSIS — Z00.00 ENCOUNTER FOR HEALTH MAINTENANCE EXAMINATION IN ADULT: ICD-10-CM

## 2025-06-25 DIAGNOSIS — E78.5 DYSLIPIDEMIA: ICD-10-CM

## 2025-06-25 DIAGNOSIS — K63.5 POLYP OF COLON, UNSPECIFIED PART OF COLON, UNSPECIFIED TYPE: ICD-10-CM

## 2025-06-25 DIAGNOSIS — Z12.5 SCREENING FOR PROSTATE CANCER: ICD-10-CM

## 2025-06-25 DIAGNOSIS — Z00.00 MEDICARE ANNUAL WELLNESS VISIT, SUBSEQUENT: Primary | ICD-10-CM

## 2025-06-25 DIAGNOSIS — E11.9 TYPE 2 DIABETES MELLITUS WITHOUT COMPLICATION, WITHOUT LONG-TERM CURRENT USE OF INSULIN: ICD-10-CM

## 2025-06-25 DIAGNOSIS — I25.10 CORONARY ARTERY CALCIFICATION: ICD-10-CM

## 2025-06-25 LAB
ALBUMIN SERPL-MCNC: 4.3 G/DL (ref 3.5–5.2)
ALBUMIN/GLOB SERPL: 1.5 G/DL
ALP SERPL-CCNC: 55 U/L (ref 39–117)
ALT SERPL W P-5'-P-CCNC: 15 U/L (ref 1–41)
ANION GAP SERPL CALCULATED.3IONS-SCNC: 12 MMOL/L (ref 5–15)
AST SERPL-CCNC: 34 U/L (ref 1–40)
BACTERIA UR QL AUTO: NORMAL /HPF
BASOPHILS # BLD AUTO: 0.04 10*3/MM3 (ref 0–0.2)
BASOPHILS NFR BLD AUTO: 0.7 % (ref 0–1.5)
BILIRUB BLD-MCNC: NEGATIVE MG/DL
BILIRUB SERPL-MCNC: 0.3 MG/DL (ref 0–1.2)
BUN SERPL-MCNC: 16 MG/DL (ref 8–23)
BUN/CREAT SERPL: 10.7 (ref 7–25)
CALCIUM SPEC-SCNC: 9.4 MG/DL (ref 8.6–10.5)
CHLORIDE SERPL-SCNC: 99 MMOL/L (ref 98–107)
CHOLEST SERPL-MCNC: 162 MG/DL (ref 0–200)
CLARITY, POC: CLEAR
CO2 SERPL-SCNC: 25 MMOL/L (ref 22–29)
COLOR UR: YELLOW
CREAT SERPL-MCNC: 1.49 MG/DL (ref 0.76–1.27)
DEPRECATED RDW RBC AUTO: 44.6 FL (ref 37–54)
EGFRCR SERPLBLD CKD-EPI 2021: 49.6 ML/MIN/1.73
EOSINOPHIL # BLD AUTO: 0.23 10*3/MM3 (ref 0–0.4)
EOSINOPHIL NFR BLD AUTO: 4 % (ref 0.3–6.2)
ERYTHROCYTE [DISTWIDTH] IN BLOOD BY AUTOMATED COUNT: 12.9 % (ref 12.3–15.4)
EXPIRATION DATE: ABNORMAL
EXPIRATION DATE: ABNORMAL
EXPIRATION DATE: NORMAL
GLOBULIN UR ELPH-MCNC: 2.9 GM/DL
GLUCOSE SERPL-MCNC: 116 MG/DL (ref 65–99)
GLUCOSE UR STRIP-MCNC: NEGATIVE MG/DL
HBA1C MFR BLD: 6.3 % (ref 4.5–5.7)
HCT VFR BLD AUTO: 42.9 % (ref 37.5–51)
HDLC SERPL-MCNC: 68 MG/DL (ref 40–60)
HGB BLD-MCNC: 14 G/DL (ref 13–17.7)
HYALINE CASTS UR QL AUTO: NORMAL /LPF
IMM GRANULOCYTES # BLD AUTO: 0.01 10*3/MM3 (ref 0–0.05)
IMM GRANULOCYTES NFR BLD AUTO: 0.2 % (ref 0–0.5)
KETONES UR QL: NEGATIVE
LDLC SERPL CALC-MCNC: 84 MG/DL (ref 0–100)
LDLC/HDLC SERPL: 1.24 {RATIO}
LEUKOCYTE EST, POC: NEGATIVE
LYMPHOCYTES # BLD AUTO: 1.56 10*3/MM3 (ref 0.7–3.1)
LYMPHOCYTES NFR BLD AUTO: 27.4 % (ref 19.6–45.3)
Lab: ABNORMAL
Lab: ABNORMAL
Lab: NORMAL
MCH RBC QN AUTO: 31.2 PG (ref 26.6–33)
MCHC RBC AUTO-ENTMCNC: 32.6 G/DL (ref 31.5–35.7)
MCV RBC AUTO: 95.5 FL (ref 79–97)
MONOCYTES # BLD AUTO: 0.55 10*3/MM3 (ref 0.1–0.9)
MONOCYTES NFR BLD AUTO: 9.7 % (ref 5–12)
NEUTROPHILS NFR BLD AUTO: 3.3 10*3/MM3 (ref 1.7–7)
NEUTROPHILS NFR BLD AUTO: 58 % (ref 42.7–76)
NITRITE UR-MCNC: NEGATIVE MG/ML
NRBC BLD AUTO-RTO: 0 /100 WBC (ref 0–0.2)
PH UR: 6.5 [PH] (ref 5–8)
PLATELET # BLD AUTO: 300 10*3/MM3 (ref 140–450)
PMV BLD AUTO: 9.8 FL (ref 6–12)
POC ALBUMIN, URINE: 30 MG/L
POC CREATININE, URINE: 50 MG/DL
POC URINE ALB/CREA RATIO: NORMAL
POTASSIUM SERPL-SCNC: 4.5 MMOL/L (ref 3.5–5.2)
PROT SERPL-MCNC: 7.2 G/DL (ref 6–8.5)
PROT UR STRIP-MCNC: NEGATIVE MG/DL
PSA SERPL-MCNC: 4.99 NG/ML (ref 0–4)
RBC # BLD AUTO: 4.49 10*6/MM3 (ref 4.14–5.8)
RBC # UR STRIP: ABNORMAL /UL
RBC # UR STRIP: NORMAL /HPF
REF LAB TEST METHOD: NORMAL
SODIUM SERPL-SCNC: 136 MMOL/L (ref 136–145)
SP GR UR: 1.01 (ref 1–1.03)
SQUAMOUS #/AREA URNS HPF: NORMAL /HPF
TRIGL SERPL-MCNC: 47 MG/DL (ref 0–150)
TSH SERPL DL<=0.05 MIU/L-ACNC: 1.35 UIU/ML (ref 0.27–4.2)
UROBILINOGEN UR QL: NORMAL
VLDLC SERPL-MCNC: 10 MG/DL (ref 5–40)
WBC # UR STRIP: NORMAL /HPF
WBC NRBC COR # BLD AUTO: 5.69 10*3/MM3 (ref 3.4–10.8)

## 2025-06-25 PROCEDURE — 80053 COMPREHEN METABOLIC PANEL: CPT | Performed by: INTERNAL MEDICINE

## 2025-06-25 PROCEDURE — 84443 ASSAY THYROID STIM HORMONE: CPT | Performed by: INTERNAL MEDICINE

## 2025-06-25 PROCEDURE — G0103 PSA SCREENING: HCPCS | Performed by: INTERNAL MEDICINE

## 2025-06-25 PROCEDURE — 87086 URINE CULTURE/COLONY COUNT: CPT | Performed by: INTERNAL MEDICINE

## 2025-06-25 PROCEDURE — 81015 MICROSCOPIC EXAM OF URINE: CPT | Performed by: INTERNAL MEDICINE

## 2025-06-25 PROCEDURE — 85025 COMPLETE CBC W/AUTO DIFF WBC: CPT | Performed by: INTERNAL MEDICINE

## 2025-06-25 PROCEDURE — 80061 LIPID PANEL: CPT | Performed by: INTERNAL MEDICINE

## 2025-06-25 RX ORDER — FLUOROURACIL 2 G/40G
1 CREAM TOPICAL TAKE AS DIRECTED
COMMUNITY
Start: 2025-06-16

## 2025-06-25 RX ORDER — ASPIRIN 81 MG/1
81 TABLET ORAL DAILY
COMMUNITY

## 2025-06-25 NOTE — PROGRESS NOTES
Subjective     Chief Complaint:  Physical Exam.    Chief Complaint   Patient presents with    Medicare Wellness-subsequent    Annual Exam    Diabetes     6-month follow-up    Hyperlipidemia    Coronary Artery Calcification       History of Present Illness    History obtained from the patient.    The patient has A Left Adrenal Nodule, likely an adenoma, which was noted incidentally on CT abdomen on 11/20/2023.  This was read as a 12 mm stable left adrenal nodule, consistent with adenoma, on Adrenal CT on 7/3/2024.     The patient has been diagnosed with RLE Neuropathy (mild), on no medication.  He reports overall improved right lower extremity numbness, but no pain or tingling, usually when he has right hip.  He last saw Dr. Warren on 3/9/2023 and was told to follow-up as needed.     Cardiac Follow-Up:  The patient is here for a follow-up visit.  His Diabetes has been stable.  Medication: Simvastatin.  His Hyperlipidemia has been stable.  LDL goal < 70. Last  LDL was 70, TG 50.  Medication: Simvastatin.  Side Effects: None.  Metformin was discontinued 6/30/2021 due to elevated Creatinine.     Comorbid Illness: Coronary Artery Calcification.  Takes Aspirin 81 mg daily.      Procedures: On 10/12/2017, Cardiac CT scan showed a Calcium Score of 133. On 6/19/20, he had a negative stress echo, EF 60%     Interval Events: Hemoglobin A1c on 12/12/2024 was 6.2.  There were no medication changes.  He does not check his blood sugar or blood pressure at home.  His last Ophthalmology visit was 5/21/2025, no retinopathy per patient report ([facility].  He does check his feet daily.       Symptoms: Denies chest pain, shortness of breath, OLIVEIRA, orthopnea, PND, palpitations, syncope, lower extremity edema, claudication, lightheadedness, and dizziness.    Associated Symptoms: No significant weight change in the past 6 months.  Denies fatigue, headache, polydipsia, polyuria, generalized myalgias/arthralgias, visual  impairment, memory loss, and concentration issues.   Reports stable mild numbness, but no no tingling, of his right third through fifth toes.  No foot pain or ulcer.         Lifestyle: The patient follows a diverse and healthy diet.  He exercises 6 times per week (strength training, pickleball, tennis, and walking).    Tobacco Use: Never a smoker.     Colon Polyp Follow-Up: The patient is here for follow-up of colon polyps, which have been stable.    Comorbid Illness: Diverticulosis (hospitalized November 2023).  Interval Events:  Last Colonoscopy on 11/20/2023 showed: Diverticulosis in the entire examined colon. Focal diverticulitis in the descending colon, with oozing, adherent clot, and ulcerated diverticular rim. Banded x 2 (failed). Clip was placed across the ulcerated rim for hemostasis. Clip was placed for radiographic marking proximal to the diverticulum. Tattooed proximal to the diverticulum. One 2 mm inflammatory polyp in the descending colon adjacent to bleeding diverticulum.  Pathology c/w: Colonic mucosa with nonspecific surface erosions.  Symptoms: Denies abdominal pain, diarrhea, constipation, hematochezia, melena, and changes in stool.    Medication: Benfiber daily.       Chente Rich is a 70 y.o. male who presents for an Annual Physical.      PMH, PSH, SocHx, FamHx, Allergies, and Medications: Reviewed and updated.    Outpatient Medications Prior to Visit   Medication Sig Dispense Refill    acetaminophen (TYLENOL) 650 MG 8 hr tablet Take 1 tablet by mouth Every 8 (Eight) Hours. 90 tablet 0    aspirin 81 MG EC tablet Take 1 tablet by mouth Daily.      cetirizine (zyrTEC) 10 MG tablet Take 1 tablet by mouth Daily.      Efudex 5 % cream Apply 1 Application topically to the appropriate area as directed Take As Directed. 1 application BID x 2 weeks,  2 times per year      ibuprofen (ADVIL,MOTRIN) 400 MG tablet Take 1 tablet by mouth Every 8 (Eight) Hours As Needed for Mild Pain or Moderate Pain. 90  tablet 1    simvastatin (ZOCOR) 20 MG tablet Take 1 tablet by mouth Every Night. 90 tablet 3    Wheat Dextrin (BENEFIBER PO) Take  by mouth.      aspirin 325 MG EC tablet Take 1 tablet by mouth Daily. Begin day after surgery (Patient taking differently: Take 81 mg by mouth Daily. Begin day after surgery) 42 tablet 0    polycarbophil (calcium polycarbophil) 625 MG tablet tablet Take 1 tablet by mouth Daily.       No facility-administered medications prior to visit.       Immunization History   Administered Date(s) Administered    ABRYSVO (RSV, 60+ or pregnant women 32-36 wks) 09/12/2023    COVID-19 (PFIZER) 12YRS+ (COMIRNATY) 09/22/2023, 10/18/2024    COVID-19 (PFIZER) BIVALENT 12+YRS 10/03/2022    COVID-19 (PFIZER) Purple Cap Monovalent 02/24/2021, 03/17/2021, 09/18/2021    Covid-19 (Pfizer) Gray Cap Monovalent 07/13/2022    Flu Vaccine Quad PF >36MO 10/30/2017    Flu Vaccine Split Quad 09/27/2019    Fluad Quad 65+ 09/26/2020    Fluzone High-Dose 65+YRS 11/08/2018, 10/15/2020, 10/18/2024    Fluzone High-Dose 65+yrs 09/18/2021, 10/01/2022, 10/07/2023    Fluzone Quad >6mos (Multi-dose) 10/25/2016    Hepatitis A 11/08/2018, 11/15/2019    Pneumococcal Conjugate 13-Valent (PCV13) 05/08/2018    Pneumococcal Polysaccharide (PPSV23) 05/28/2020    Shingrix 10/18/2021, 01/18/2022    Tdap 10/20/2017         Patient Active Problem List   Diagnosis    Hyperlipidemia    Diabetes mellitus    Coronary artery calcification    BCC (basal cell carcinoma of skin)    Diverticulosis    Colon polyp    Neuropathy of right lower extremity    OA (osteoarthritis) of knee    SCC (squamous cell carcinoma)    S/P TKR (total knee replacement), left    Adenoma of left adrenal gland       Health Habits:  Dental Exam. up to date  Eye Exam. up to date  Hearing Loss:  No  Exercise: 6 times/week.  Current exercise activities include: team sports (pickleball and tennis), walking, and strength training  Diet: Health  Multivitamin: No    Safe Driving:   "Yes  Seat Belt:  Yes  Bike Helmet:  N/A  Skin Screening:  Yes  Sunscreen: Yes  SBE / ARVIN: Yes  Sexual Activity:  Yes  Birth Control:  Menopause  STD Prevention:  N/A    Last Pap: N/A.  Last Mammogram: N/A.  Last DEXA Scan: N/A.  Last Colonoscopy: 11/20/2023 showed: Diverticulosis in the entire examined colon. Focal diverticulitis in the descending colon. One 2 mm inflammatory polyp in the descending colon.  Last PSA: 6/20/2024 (3.63)    Social:    Social History     Socioeconomic History    Marital status:     Number of children: 2   Tobacco Use    Smoking status: Never     Passive exposure: Never    Smokeless tobacco: Never   Vaping Use    Vaping status: Never Used   Substance and Sexual Activity    Alcohol use: Yes     Comment: 1 drink (beer) daily    Drug use: Never    Sexual activity: Yes     Partners: Female     Birth control/protection: Post-menopausal         Current Medical Providers:    Millie Loya MD (Internal Medicine / Pediatrics)    The [facility] providers who are involved in the care of this patient are listed above.         Review of Systems   Constitutional:  Negative for appetite change, chills, fatigue, fever and unexpected weight change.         No night sweats.  He feels like he \"is drinking all over\" x 6 months.   HENT:  Negative for congestion, ear discharge, ear pain, facial swelling, hearing loss, nosebleeds, postnasal drip, rhinorrhea, sinus pressure, sinus pain, sneezing, sore throat, tinnitus, trouble swallowing and voice change.         Denies snoring.   Eyes:  Negative for photophobia, pain, discharge, redness, itching and visual disturbance.   Respiratory:  Negative for cough, chest tightness, shortness of breath and wheezing.         No chest congestion.  No hemoptysis.    Cardiovascular:  Negative for chest pain, palpitations and leg swelling.        No orthopnea, OLIVEIRA, or PND.  No claudication or syncope.   Gastrointestinal:  Negative for abdominal distention, abdominal " "pain, blood in stool, constipation, diarrhea, nausea and vomiting.        Has bloating from fiber supplement. No melena, heartburn, odynophagia, dysphagia, early satiety, or belching.   Endocrine: Negative for cold intolerance, heat intolerance, polydipsia, polyphagia and polyuria.        No hair loss or dry skin.    Genitourinary:  Negative for decreased urine volume, difficulty urinating, dysuria, flank pain, frequency, hematuria, penile discharge, scrotal swelling, testicular pain and urgency.        No nocturia, incomplete emptying, or incontinence.  No erectile dysfunction.   Musculoskeletal:  Negative for arthralgias, back pain, gait problem, joint swelling, myalgias, neck pain and neck stiffness.        No joint stiffness.   Skin:  Negative for rash.        No new skin lesions or changes in skin lesions.     Neurological:  Positive for numbness. Negative for dizziness, tremors, speech difficulty, weakness, light-headedness and headaches.        No tingling. No memory loss. No decreased concentration.   Hematological:  Negative for adenopathy. Bruises/bleeds easily (bruises easily).   Psychiatric/Behavioral:  Negative for confusion, self-injury, sleep disturbance and suicidal ideas. The patient is not nervous/anxious.         No depression.           Objective     Vitals:    06/25/25 0905   BP: 128/84   BP Location: Right arm   Patient Position: Sitting   Cuff Size: Adult   Pulse: 60   Resp: 16   Temp: 98 °F (36.7 °C)   TempSrc: Infrared   Weight: 66.9 kg (147 lb 6.4 oz)   Height: 176 cm (69.29\")   PainSc: 0-No pain       Body mass index is 21.58 kg/m².    Physical Exam  Vitals and nursing note reviewed.   Constitutional:       Appearance: Normal appearance. He is well-developed and normal weight.   HENT:      Head: Normocephalic and atraumatic.      Right Ear: Tympanic membrane, ear canal and external ear normal.      Left Ear: Tympanic membrane, ear canal and external ear normal.      Mouth/Throat:      " Mouth: Mucous membranes are moist. No oral lesions.      Pharynx: Oropharynx is clear.      Comments: Tonsils normal.  Eyes:      Extraocular Movements: Extraocular movements intact.      Conjunctiva/sclera: Conjunctivae normal.      Pupils: Pupils are equal, round, and reactive to light.   Neck:      Thyroid: No thyroid mass or thyromegaly.      Vascular: No carotid bruit.   Cardiovascular:      Rate and Rhythm: Normal rate and regular rhythm.      Pulses: Normal pulses.      Heart sounds: No murmur heard.     No friction rub. No gallop.   Pulmonary:      Effort: Pulmonary effort is normal.      Breath sounds: Normal breath sounds.   Abdominal:      General: Bowel sounds are normal. There is no distension or abdominal bruit.      Palpations: Abdomen is soft. There is no hepatomegaly, splenomegaly or mass.      Tenderness: There is no abdominal tenderness.   Genitourinary:     Penis: Normal.       Testes:         Right: Mass, tenderness or swelling not present.         Left: Mass, tenderness or swelling not present.      Prostate: Normal.      Rectum: Normal.   Musculoskeletal:         General: Normal range of motion.      Cervical back: Normal range of motion and neck supple.      Right lower leg: No edema.      Left lower leg: No edema.   Feet:      Right foot:      Skin integrity: No ulcer, blister, skin breakdown, callus or dry skin.      Left foot:      Skin integrity: No ulcer, blister, skin breakdown, callus or dry skin.   Lymphadenopathy:      Cervical: No cervical adenopathy.      Upper Body:      Right upper body: No supraclavicular adenopathy.      Left upper body: No supraclavicular adenopathy.      Lower Body: No right inguinal adenopathy. No left inguinal adenopathy.   Skin:     Findings: No lesion or rash.      Comments: No atypical skin lesions.   Neurological:      Mental Status: He is alert.      Cranial Nerves: No cranial nerve deficit.      Motor: Motor function is intact. No abnormal muscle  tone.      Coordination: Coordination normal.      Gait: Gait normal.      Deep Tendon Reflexes: Reflexes are normal and symmetric.   Psychiatric:         Mood and Affect: Mood normal.         PHQ-2 Depression Screening  Little interest or pleasure in doing things? Not at all   Feeling down, depressed, or hopeless? Not at all   PHQ-2 Total Score 0           Counseling was given to patient for the following topics: appropriate exercise, healthy eating habits, disease prevention, risk factors for cancer, importance of self testicular exam, importance of immunizations, including risks and benefits, sun safety, seatbelt use, and safe driving. Also discussed the importance of regular dental and vision care, as well recommendation for a yearly screening skin exam after age 40.  Written information provided to patient on these topics and other health maintenance issues.    Results for orders placed or performed in visit on 06/25/25   POC Glycosylated Hemoglobin (Hb A1C)    Collection Time: 06/25/25  9:56 AM    Specimen: Blood   Result Value Ref Range    Hemoglobin A1C 6.3 (A) 4.5 - 5.7 %    Lot Number [id number]     Expiration Date 02/21/2027    POC Urinalysis Dipstick, Automated    Collection Time: 06/25/25  9:57 AM    Specimen: Urine   Result Value Ref Range    Color Yellow Yellow, Straw, Dark Yellow, Kerri    Clarity, UA Clear Clear    Specific Gravity  1.015 1.005 - 1.030    pH, Urine 6.5 5.0 - 8.0    Leukocytes Negative Negative    Nitrite, UA Negative Negative    Protein, POC Negative Negative mg/dL    Glucose, UA Negative Negative mg/dL    Ketones, UA Negative Negative    Urobilinogen, UA Normal Normal, 0.2 E.U./dL    Bilirubin Negative Negative    Blood, UA 50 Sudeep/ul (A) Negative    Lot Number [id number]     Expiration Date 03/31/2026    POC Albumin/Creatinine Ratio Urine    Collection Time: 06/25/25  9:57 AM    Specimen: Urine   Result Value Ref Range    POC ALBUMIN, URINE 30 mg/L    POC CREATININE, URINE 50 mg/dL     POC Urine Albumin Creatinine Ratio  <30    Lot Number [id number]     Expiration Date 04/30/2026          Diagnoses and all orders for this visit:    1. Medicare annual wellness visit, subsequent (Primary)    2. Encounter for health maintenance examination in adult    3. Type 2 diabetes mellitus without complication, without long-term current use of insulin  -     POC Glycosylated Hemoglobin (Hb A1C)  -     POC Urinalysis Dipstick, Automated  -     POC Albumin/Creatinine Ratio Urine  -     Lipid Panel  -     Comprehensive Metabolic Panel  -     TSH  -     CBC & Differential   Continue current medication(s) as noted in the history of present illness.    4. Dyslipidemia  -     Lipid Panel  -     Comprehensive Metabolic Panel  -     TSH  -     CBC & Differential   Continue current medication(s) as noted in the history of present illness.    5. Coronary artery calcification  -     Lipid Panel  -     Comprehensive Metabolic Panel  -     TSH  -     CBC & Differential   Continue current medication(s) as noted in the history of present illness.    6. Polyp of colon, unspecified part of colon, unspecified type   Colonoscopy up-to-date.    7. Microscopic hematuria  -     NON-GYN CYTOLOGY, [facility] (MAGALI,[facility],RANDY)  -     Urine Culture - Urine, Urine, Clean Catch  -     Urinalysis, Microscopic Only - Urine, Clean Catch    8. Screening for prostate cancer  -     PSA Screen      I recommend the COVID-19 bivalent vaccine and Influenza vaccine, in office or at the pharmacy, as we discussed.   BMI is within normal parameters. No other follow-up for BMI required.            Return in about 6 months (around 12/25/2025) for Recheck Diabetes, fasting.

## 2025-06-25 NOTE — PROGRESS NOTES
Subjective   The ABCs of the Annual Wellness Visit  Medicare Wellness Visit      Chente Rich is a 70 y.o. patient who presents for a Medicare Wellness Visit.    The following portions of the patient's history were reviewed and   updated as appropriate: allergies, current medications, past family history, past medical history, past social history, past surgical history, and problem list.    Compared to one year ago, the patient's physical   health is better.  Compared to one year ago, the patient's mental   health is the same.    Recent Hospitalizations:  He was not admitted to the hospital during the last year.     Current Medical Providers:  Patient Care Team:  Millie Loya MD as PCP - General (Internal Medicine)  Tru Pop MD as Consulting Physician (Ophthalmology)  Shelley Soliman MD as Consulting Physician (Dermatology)  Rey Worthy MD as Consulting Physician (Orthopedic Surgery)    Outpatient Medications Prior to Visit   Medication Sig Dispense Refill    acetaminophen (TYLENOL) 650 MG 8 hr tablet Take 1 tablet by mouth Every 8 (Eight) Hours. 90 tablet 0    aspirin 325 MG EC tablet Take 1 tablet by mouth Daily. Begin day after surgery (Patient taking differently: Take 81 mg by mouth Daily. Begin day after surgery) 42 tablet 0    cetirizine (zyrTEC) 10 MG tablet Take 1 tablet by mouth Daily.      ibuprofen (ADVIL,MOTRIN) 400 MG tablet Take 1 tablet by mouth Every 8 (Eight) Hours As Needed for Mild Pain or Moderate Pain. 90 tablet 1    simvastatin (ZOCOR) 20 MG tablet Take 1 tablet by mouth Every Night. 90 tablet 3    Wheat Dextrin (BENEFIBER PO) Take  by mouth.      polycarbophil (calcium polycarbophil) 625 MG tablet tablet Take 1 tablet by mouth Daily.       No facility-administered medications prior to visit.     No opioid medication identified on active medication list. I have reviewed chart for other potential  high risk medication/s and harmful drug interactions in  "the elderly.      Aspirin is on active medication list. Aspirin use is indicated based on review of current medical condition/s. Pros and cons of this therapy have been discussed today. Benefits of this medication outweigh potential harm.  Patient has been encouraged to continue taking this medication.  .      Patient Active Problem List   Diagnosis    Hyperlipidemia    Controlled type 2 diabetes mellitus without complication, without long-term current use of insulin    Coronary artery calcification    BCC (basal cell carcinoma of skin)    Diverticulosis    Colon polyp    Neuropathy of right lower extremity    OA (osteoarthritis) of knee    SCC (squamous cell carcinoma)    S/P TKR (total knee replacement), left    Adenoma of left adrenal gland     Advance Care Planning Advance Directive is on file.  ACP discussion was held with the patient during this visit. Patient has an advance directive in EMR which is still valid.     ACP information on the AVS declined by the patient.            Objective   Vitals:    06/25/25 0905   BP: 128/84   BP Location: Right arm   Patient Position: Sitting   Cuff Size: Adult   Resp: 16   Temp: 98 °F (36.7 °C)   TempSrc: Infrared   Weight: 66.9 kg (147 lb 6.4 oz)   Height: 176 cm (69.29\")   PainSc: 0-No pain       Estimated body mass index is 21.58 kg/m² as calculated from the following:    Height as of this encounter: 176 cm (69.29\").    Weight as of this encounter: 66.9 kg (147 lb 6.4 oz).    BMI is within normal parameters. No other follow-up for BMI required.           Does the patient have evidence of cognitive impairment? No   Finger Rub Hearing Test (right ear):passed  Finger Rub Hearing Test (left ear):passed                                                                                              Health  Risk Assessment    Smoking Status:  Social History     Tobacco Use   Smoking Status Never    Passive exposure: Never   Smokeless Tobacco Never     Alcohol " Consumption:  Social History     Substance and Sexual Activity   Alcohol Use Not Currently    Comment: 1 drink (beer) daily       Fall Risk Screen  SARITHA Fall Risk Assessment was completed, and patient is at LOW risk for falls.Assessment completed on:2025    Depression Screening   Little interest or pleasure in doing things? Not at all   Feeling down, depressed, or hopeless? Not at all   PHQ-2 Total Score 0      Health Habits and Functional and Cognitive Screenin/25/2025     9:06 AM   Functional & Cognitive Status   Do you have difficulty preparing food and eating? No   Do you have difficulty bathing yourself, getting dressed or grooming yourself? No   Do you have difficulty using the toilet? No   Do you have difficulty moving around from place to place? No   Do you have trouble with steps or getting out of a bed or a chair? No   Current Diet Well Balanced Diet   Dental Exam Up to date   Eye Exam Up to date   Exercise (times per week) 7 times per week   Current Exercises Include Walking;Tennis   Do you need help using the phone?  No   Are you deaf or do you have serious difficulty hearing?  No   Do you need help to go to places out of walking distance? No   Do you need help shopping? No   Do you need help preparing meals?  No   Do you need help with housework?  No   Do you need help with laundry? No   Do you need help taking your medications? No   Do you need help managing money? No   Do you ever drive or ride in a car without wearing a seat belt? No   Have you felt unusual fatigue (could be tiredness), stress, anger or loneliness in the last month? No   Who do you live with? Spouse   If you need help, do you have trouble finding someone available to you? No   Have you been bothered in the last four weeks by sexual problems? No   Do you have difficulty concentrating, remembering or making decisions? No           Age-appropriate Screening Schedule:  Refer to the list below for future screening  recommendations based on patient's age, sex and/or medical conditions. Orders for these recommended tests are listed in the plan section. The patient has been provided with a written plan.    Health Maintenance List  Health Maintenance   Topic Date Due    URINE MICROALBUMIN-CREATININE RATIO (uACR)  Never done    COVID-19 Vaccine (8 - 2024-25 season) 04/18/2025    HEMOGLOBIN A1C  06/12/2025    PROSTATE CANCER SCREENING  06/24/2025    ANNUAL WELLNESS VISIT  06/24/2025    INFLUENZA VACCINE  07/01/2025    LIPID PANEL  12/12/2025    DIABETIC EYE EXAM  05/21/2026    DIABETIC FOOT EXAM  06/25/2026    TDAP/TD VACCINES (2 - Td or Tdap) 10/20/2027    COLORECTAL CANCER SCREENING  11/20/2033    HEPATITIS C SCREENING  Completed    Pneumococcal Vaccine 50+  Completed    ZOSTER VACCINE  Completed                                                                                                                                                CMS Preventative Services Quick Reference  Risk Factors Identified During Encounter  Immunizations Discussed/Encouraged: Influenza and COVID19  Dental Screening Recommended  Vision Screening Recommended    The above risks/problems have been discussed with the patient.  Pertinent information has been shared with the patient in the After Visit Summary.  An After Visit Summary and PPPS were made available to the patient.    Follow Up:   Next Medicare Wellness visit to be scheduled in 1 year.     Assessment & Plan         Follow Up:   No follow-ups on file.

## 2025-06-25 NOTE — PATIENT INSTRUCTIONS
I recommend the COVID-19 bivalent vaccine and Influenza vaccine, in office or at the pharmacy, as we discussed.     Health Maintenance, Male  Adopting a healthy lifestyle and getting preventive care are important in promoting health and wellness. Ask your health care provider about:  The right schedule for you to have regular tests and exams.  Things you can do on your own to prevent diseases and keep yourself healthy.  What should I know about diet, weight, and exercise?  Eat a healthy diet    Eat a diet that includes plenty of vegetables, fruits, low-fat dairy products, and lean protein.  Do not eat a lot of foods that are high in solid fats, added sugars, or sodium.  Maintain a healthy weight  Body mass index (BMI) is a measurement that can be used to identify possible weight problems. It estimates body fat based on height and weight. Your health care provider can help determine your BMI and help you achieve or maintain a healthy weight.  Get regular exercise  Get regular exercise. This is one of the most important things you can do for your health. Most adults should:  Exercise for at least 150 minutes each week. The exercise should increase your heart rate and make you sweat (moderate-intensity exercise).  Do strengthening exercises at least twice a week. This is in addition to the moderate-intensity exercise.  Spend less time sitting. Even light physical activity can be beneficial.  Watch cholesterol and blood lipids  Have your blood tested for lipids and cholesterol at 20 years of age, then have this test every 5 years.  You may need to have your cholesterol levels checked more often if:  Your lipid or cholesterol levels are high.  You are older than 40 years of age.  You are at high risk for heart disease.  What should I know about cancer screening?  Many types of cancers can be detected early and may often be prevented. Depending on your health history and family history, you may need to have cancer  screening at various ages. This may include screening for:  Colorectal cancer.  Prostate cancer.  Skin cancer.  Lung cancer.  What should I know about heart disease, diabetes, and high blood pressure?  Blood pressure and heart disease  High blood pressure causes heart disease and increases the risk of stroke. This is more likely to develop in people who have high blood pressure readings or are overweight.  Talk with your health care provider about your target blood pressure readings.  Have your blood pressure checked:  Every 3-5 years if you are 18-39 years of age.  Every year if you are 40 years old or older.  If you are between the ages of 65 and 75 and are a current or former smoker, ask your health care provider if you should have a one-time screening for abdominal aortic aneurysm (AAA).  Diabetes  Have regular diabetes screenings. This checks your fasting blood sugar level. Have the screening done:  Once every three years after age 45 if you are at a normal weight and have a low risk for diabetes.  More often and at a younger age if you are overweight or have a high risk for diabetes.  What should I know about preventing infection?  Hepatitis B  If you have a higher risk for hepatitis B, you should be screened for this virus. Talk with your health care provider to find out if you are at risk for hepatitis B infection.  Hepatitis C  Blood testing is recommended for:  Everyone born from 1945 through 1965.  Anyone with known risk factors for hepatitis C.  Sexually transmitted infections (STIs)  You should be screened each year for STIs, including gonorrhea and chlamydia, if:  You are sexually active and are younger than 24 years of age.  You are older than 24 years of age and your health care provider tells you that you are at risk for this type of infection.  Your sexual activity has changed since you were last screened, and you are at increased risk for chlamydia or gonorrhea. Ask your health care provider if  you are at risk.  Ask your health care provider about whether you are at high risk for HIV. Your health care provider may recommend a prescription medicine to help prevent HIV infection. If you choose to take medicine to prevent HIV, you should first get tested for HIV. You should then be tested every 3 months for as long as you are taking the medicine.  Follow these instructions at home:  Alcohol use  Do not drink alcohol if your health care provider tells you not to drink.  If you drink alcohol:  Limit how much you have to 0-2 drinks a day.  Know how much alcohol is in your drink. In the [place] one drink equals one 12 oz bottle of beer (355 mL), one 5 oz glass of wine (148 mL), or one 1½ oz glass of hard liquor (44 mL).  Lifestyle  Do not use any products that contain nicotine or tobacco. These products include cigarettes, chewing tobacco, and vaping devices, such as e-cigarettes. If you need help quitting, ask your health care provider.  Do not use street drugs.  Do not share needles.  Ask your health care provider for help if you need support or information about quitting drugs.  General instructions  Schedule regular health, dental, and eye exams.  Stay current with your vaccines.  Tell your health care provider if:  You often feel depressed.  You have ever been abused or do not feel safe at home.  Summary  Adopting a healthy lifestyle and getting preventive care are important in promoting health and wellness.  Follow your health care provider's instructions about healthy diet, exercising, and getting tested or screened for diseases.  Follow your health care provider's instructions on monitoring your cholesterol and blood pressure.  This information is not intended to replace advice given to you by your health care provider. Make sure you discuss any questions you have with your health care provider.  Document Revised: 05/09/2022 Document Reviewed: 05/09/2022  Elsevier Patient Education © 2024 [employer]

## 2025-06-26 ENCOUNTER — TELEPHONE (OUTPATIENT)
Dept: INTERNAL MEDICINE | Facility: CLINIC | Age: 70
End: 2025-06-26
Payer: MEDICARE

## 2025-06-26 ENCOUNTER — RESULTS FOLLOW-UP (OUTPATIENT)
Dept: INTERNAL MEDICINE | Facility: CLINIC | Age: 70
End: 2025-06-26
Payer: MEDICARE

## 2025-06-26 DIAGNOSIS — R79.89 ELEVATED SERUM CREATININE: ICD-10-CM

## 2025-06-26 DIAGNOSIS — R97.20 ELEVATED PSA: Primary | ICD-10-CM

## 2025-06-26 DIAGNOSIS — E78.5 DYSLIPIDEMIA: ICD-10-CM

## 2025-06-26 LAB
BACTERIA SPEC AEROBE CULT: NO GROWTH
REF LAB TEST METHOD: NORMAL

## 2025-06-26 RX ORDER — SIMVASTATIN 20 MG
20 TABLET ORAL NIGHTLY
Qty: 90 TABLET | Refills: 1 | Status: SHIPPED | OUTPATIENT
Start: 2025-06-26

## 2025-06-26 NOTE — TELEPHONE ENCOUNTER
Call patient please.    His PSA (prostate screening test) is mildly elevated, but fairly normal for age.    In addition, his creatinine (kidney test) is elevated again.    I would recommend repeating both of these tests in 2 weeks.  If he is agreeable, please send a message back to me and I will enter orders.  He can stop by our lab to have these done.  He does not need to fast and he does not need an appointment.    I will also send a lab letter.

## 2025-06-26 NOTE — LETTER
Chente Rich  [address] [facility] [place] [address]    June 27, 2025     Dear Tejinder Rich:    Below are the results from your recent visit:    Resulted Orders   POC Glycosylated Hemoglobin (Hb A1C)   Result Value Ref Range    Hemoglobin A1C 6.3 (A) 4.5 - 5.7 %    Lot Number [id number]     Expiration Date 02/21/2027    POC Urinalysis Dipstick, Automated   Result Value Ref Range    Color Yellow Yellow, Straw, Dark Yellow, Kerri    Clarity, UA Clear Clear    Specific Gravity  1.015 1.005 - 1.030    pH, Urine 6.5 5.0 - 8.0    Leukocytes Negative Negative    Nitrite, UA Negative Negative    Protein, POC Negative Negative mg/dL    Glucose, UA Negative Negative mg/dL    Ketones, UA Negative Negative    Urobilinogen, UA Normal Normal, 0.2 E.U./dL    Bilirubin Negative Negative    Blood, UA 50 Sudeep/ul (A) Negative    Lot Number [id number]     Expiration Date 03/31/2026    POC Albumin/Creatinine Ratio Urine   Result Value Ref Range    POC ALBUMIN, URINE 30 mg/L    POC CREATININE, URINE 50 mg/dL    POC Urine Albumin Creatinine Ratio  <30    Lot Number [id number]     Expiration Date 04/30/2026    Lipid Panel   Result Value Ref Range    Total Cholesterol 162 0 - 200 mg/dL    Triglycerides 47 0 - 150 mg/dL    HDL Cholesterol 68 (H) 40 - 60 mg/dL    LDL Cholesterol  84 0 - 100 mg/dL    VLDL Cholesterol 10 5 - 40 mg/dL    LDL/HDL Ratio 1.24    Comprehensive Metabolic Panel   Result Value Ref Range    Glucose 116 (H) 65 - 99 mg/dL    BUN 16.0 8.0 - 23.0 mg/dL    Creatinine 1.49 (H) 0.76 - 1.27 mg/dL    Sodium 136 136 - 145 mmol/L    Potassium 4.5 3.5 - 5.2 mmol/L    Chloride 99 98 - 107 mmol/L    CO2 25.0 22.0 - 29.0 mmol/L    Calcium 9.4 8.6 - 10.5 mg/dL    Total Protein 7.2 6.0 - 8.5 g/dL    Albumin 4.3 3.5 - 5.2 g/dL    ALT (SGPT) 15 1 - 41 U/L    AST (SGOT) 34 1 - 40 U/L    Alkaline Phosphatase 55 39 - 117 U/L    Total Bilirubin 0.3 0.0 - 1.2 mg/dL    Globulin 2.9 gm/dL    A/G Ratio 1.5 g/dL    BUN/Creatinine Ratio 10.7 7.0  - 25.0    Anion Gap 12.0 5.0 - 15.0 mmol/L    eGFR 49.6 (L) >60.0 mL/min/1.73   TSH   Result Value Ref Range    TSH 1.350 0.270 - 4.200 uIU/mL   PSA Screen   Result Value Ref Range    PSA 4.990 (H) 0.000 - 4.000 ng/mL   CBC Auto Differential   Result Value Ref Range    WBC 5.69 3.40 - 10.80 10*3/mm3    RBC 4.49 4.14 - 5.80 10*6/mm3    Hemoglobin 14.0 13.0 - 17.7 g/dL    Hematocrit 42.9 37.5 - 51.0 %    MCV 95.5 79.0 - 97.0 fL    MCH 31.2 26.6 - 33.0 pg    MCHC 32.6 31.5 - 35.7 g/dL    RDW 12.9 12.3 - 15.4 %    RDW-SD 44.6 37.0 - 54.0 fl    MPV 9.8 6.0 - 12.0 fL    Platelets 300 140 - 450 10*3/mm3    Neutrophil % 58.0 42.7 - 76.0 %    Lymphocyte % 27.4 19.6 - 45.3 %    Monocyte % 9.7 5.0 - 12.0 %    Eosinophil % 4.0 0.3 - 6.2 %    Basophil % 0.7 0.0 - 1.5 %    Immature Grans % 0.2 0.0 - 0.5 %    Neutrophils, Absolute 3.30 1.70 - 7.00 10*3/mm3    Lymphocytes, Absolute 1.56 0.70 - 3.10 10*3/mm3    Monocytes, Absolute 0.55 0.10 - 0.90 10*3/mm3    Eosinophils, Absolute 0.23 0.00 - 0.40 10*3/mm3    Basophils, Absolute 0.04 0.00 - 0.20 10*3/mm3    Immature Grans, Absolute 0.01 0.00 - 0.05 10*3/mm3    nRBC 0.0 0.0 - 0.2 /100 WBC   [facility] (MAGALI,COR,MAD,RANDY)   Result Value Ref Range    Reference Lab Report       [facility]  [address] [place] [address]  Phone: [phone] or [phone]  Fax: [phone]  Martir William M.D., Medical Director    PATIENT NAME                                    LABORATORY NO.  179   JUAN M LEE                               [id number]  [id number]                                  AGE                  SEX      SSN            CLIENT REF #  [facility]            70        1955                           [id number]  [address] [facility]                    REQUESTING M.D.         ATTENDING M.D..         [facility]  [place] [address]                     RICARDO OBANDO  DATE COLLECTED          DATE  RECEIVED           DATE REPORTED  06/25/2025 06/25/2025 06/26/2025    DIAGNOSIS:  URINE, CLEAN CATCH:  Negative for high-grade urothelial carcinoma    MICROSCOPIC DESCRIPTION:  Urothelial cells with degenerating forms, squamous cells and acute inflammatory  cells  present.    Professional interpretation rendered by Kaitlin Dai D.O., FOUZIA at [employer], [employer], [address] [place] [address].    CLINICAL HISTORY:  Type 2 diabetes mellitus without complication  Dyslipidemia  Coronary artery calcification  Polyp of colon  Microscopic hematuria    SPECIMENS SUBMITTED:  URINE, CLEAN CATCH    GROSS SPECIMEN DESCRIPTION:  50ccs of clear yellow fluid without visible sediment received in fixative  ThinPrep slides prepared.    CYTOTECHNOLOGIST:         TERE COX, (ASCP)                       REVIEWED, DIAGNOSED AND ELECTRONICALLY  SIGNED BY:    Kaitlin Dai D.O., [email or web address].  CPT CODES:  22890     Urine Culture - Urine, Urine, Clean Catch   Result Value Ref Range    Urine Culture No growth    Urinalysis, Microscopic Only - Urine, Clean Catch   Result Value Ref Range    RBC, UA 0-2 None Seen, 0-2 /HPF    WBC, UA 0-2 None Seen, 0-2 /HPF    Bacteria, UA None Seen None Seen /HPF    Squamous Epithelial Cells, UA None Seen None Seen, 0-2 /HPF    Hyaline Casts, UA None Seen None Seen /LPF    Methodology Automated Microscopy        The test results show that your PSA (prostate cancer screening test) was mildly elevated.  I recommend a repeat in 2 weeks, as was discussed on the phone.      In addition, your in office urinalysis was positive for blood, but urine microscopy was negative for blood.  Your urine culture was also negative.  Urine cytology did not show any cancer cells, but there were some reactive cells.  You may need to see a urologist for this, but I would like to wait on the results of your repeat PSA test.    Creatinine (a kidney test) is elevated, but still fairly normal for age.   I recommend a repeat test in 2 weeks, as was discussed on the phone..    Otherwise, labs look good.  Please continue your current medication and plan.     If you have any questions or concerns, please don't hesitate to call.         Sincerely,        Millie Loya MD

## 2025-06-26 NOTE — TELEPHONE ENCOUNTER
Attempt to call patient, no answer    Left vm for patient to return call      PLEASE RELAY:     His PSA (prostate screening test) is mildly elevated, but fairly normal for age.    In addition, his creatinine (kidney test) is elevated again.    I would recommend repeating both of these tests in 2 weeks.  If he is agreeable, please send a message back to me and I will enter orders.  He can stop by our lab to have these done.  He does not need to fast and he does not need an appointment.    I will also send a lab letter.

## 2025-06-26 NOTE — TELEPHONE ENCOUNTER
Name: Chente Rich      Relationship: Self      Best Callback Number: [phone]       HUB PROVIDED THE RELAY MESSAGE FROM THE OFFICE      PATIENT: VOICED UNDERSTANDING AND HAS NO FURTHER QUESTIONS AT THIS TIME    ADDITIONAL INFORMATION:PATIENT WILL COME IN TO REPEAT LABS IN 2 WEEKS

## 2025-07-09 ENCOUNTER — LAB (OUTPATIENT)
Dept: INTERNAL MEDICINE | Facility: CLINIC | Age: 70
End: 2025-07-09
Payer: MEDICARE

## 2025-07-09 DIAGNOSIS — R79.89 ELEVATED SERUM CREATININE: ICD-10-CM

## 2025-07-09 DIAGNOSIS — R97.20 ELEVATED PSA: ICD-10-CM

## 2025-07-09 LAB
CREAT SERPL-MCNC: 1.18 MG/DL (ref 0.76–1.27)
EGFRCR SERPLBLD CKD-EPI 2021: 65.6 ML/MIN/1.73
PSA SERPL-MCNC: 4.18 NG/ML (ref 0–4)

## 2025-07-09 PROCEDURE — 84153 ASSAY OF PSA TOTAL: CPT | Performed by: INTERNAL MEDICINE

## 2025-07-09 PROCEDURE — 82565 ASSAY OF CREATININE: CPT | Performed by: INTERNAL MEDICINE

## 2025-07-17 ENCOUNTER — TELEPHONE (OUTPATIENT)
Dept: INTERNAL MEDICINE | Facility: CLINIC | Age: 70
End: 2025-07-17
Payer: MEDICARE

## 2025-07-17 DIAGNOSIS — R97.20 ELEVATED PSA: Primary | ICD-10-CM

## 2025-07-17 NOTE — TELEPHONE ENCOUNTER
Call patient please.    His creatinine (kidney test) has normalized.  However, his repeat (diagnostic) PSA test was still mildly elevated.  I would recommend a referral to Urology to have this further evaluated.  If he is agreeable, please send the message back to me and I will enter an order.

## 2025-08-19 ENCOUNTER — OFFICE VISIT (OUTPATIENT)
Dept: UROLOGY | Facility: CLINIC | Age: 70
End: 2025-08-19
Payer: MEDICARE

## 2025-08-19 VITALS — WEIGHT: 148 LBS | BODY MASS INDEX: 21.92 KG/M2 | HEIGHT: 69 IN

## 2025-08-19 DIAGNOSIS — R97.20 ELEVATED PSA: Primary | ICD-10-CM

## 2025-08-19 PROCEDURE — 1159F MED LIST DOCD IN RCRD: CPT | Performed by: UROLOGY

## 2025-08-19 PROCEDURE — 99204 OFFICE O/P NEW MOD 45 MIN: CPT | Performed by: UROLOGY

## 2025-08-19 PROCEDURE — 1160F RVW MEDS BY RX/DR IN RCRD: CPT | Performed by: UROLOGY

## 2025-08-22 PROBLEM — R97.20 ELEVATED PSA: Status: ACTIVE | Noted: 2025-08-22

## (undated) DEVICE — LUBE JELLY FOIL PACK 1.4 OZ: Brand: MEDLINE INDUSTRIES, INC.

## (undated) DEVICE — SYS CLS SKIN PREMIERPRO EXOFINFUSION 22CM

## (undated) DEVICE — PAD,ARMBOARD,CONV,FOAM,2X8X20",12PR/CS: Brand: MEDLINE

## (undated) DEVICE — Device

## (undated) DEVICE — INTENDED FOR TISSUE SEPARATION, AND OTHER PROCEDURES THAT REQUIRE A SHARP SURGICAL BLADE TO PUNCTURE OR CUT.: Brand: BARD-PARKER ® STAINLESS STEEL BLADES

## (undated) DEVICE — DRP ROBOTIC ROSA BX/20

## (undated) DEVICE — SAFELINER SUCTION CANISTER 1000CC: Brand: DEROYAL

## (undated) DEVICE — THE CARR-LOCKE INJECTION NEEDLE IS A SINGLE USE, DISPOSABLE, FLEXIBLE SHEATH INJECTION NEEDLE USED FOR THE INJECTION OF VARIOUS TYPES OF MEDIA THROUGH FLEXIBLE ENDOSCOPES.

## (undated) DEVICE — ANTIBACTERIAL UNDYED BRAIDED (POLYGLACTIN 910), SYNTHETIC ABSORBABLE SUTURE: Brand: COATED VICRYL

## (undated) DEVICE — DRSNG PAD ABD 8X10IN STRL

## (undated) DEVICE — TBG PENCL TELESCP MEGADYNE SMOKE EVAC 10FT

## (undated) DEVICE — DELFI MATCHING LIMB PROTECTION SLEEVES (MLPS) HELP PROTECT THE PATIENT’S LIMB FROM POSSIBLE WRINKLING, PINCHING AND SHEARING OF SKIN AND SOFT TISSUES OF THE LIMB.EACH DELFI MATCHING LIMB PROTECTION SLEEVE IS INTENDED FOR USE WITH A SPECIFIC DELFI TOURNIQUET CUFF. THIS SLEEVE IS SPECIFICALLY FOR THIGH.: Brand: MATCHING LIMB PROTECTION SLEEVES - VARI-FIT

## (undated) DEVICE — BLANKT WARM UPPR/BDY ARM/OUT 57X196CM

## (undated) DEVICE — SOL IRR H2O BTL 1000ML STRL

## (undated) DEVICE — SUT MONOCRYL PLS ANTIB UND 3/0  PS1 27IN

## (undated) DEVICE — INTRO ACCSR BLNT TP

## (undated) DEVICE — CONTAINER,SPECIMEN,OR STERILE,4OZ: Brand: MEDLINE

## (undated) DEVICE — SCRW HEX PERSONA FML 2.5X25MM PK/2
Type: IMPLANTABLE DEVICE | Site: KNEE | Status: NON-FUNCTIONAL
Removed: 2024-04-10

## (undated) DEVICE — PATIENT RETURN ELECTRODE, SINGLE-USE, CONTACT QUALITY MONITORING, ADULT, WITH 9FT CORD, FOR PATIENTS WEIGING OVER 33LBS. (15KG): Brand: MEGADYNE

## (undated) DEVICE — FIRST STEP BEDSIDE ADD WATER KIT - RESEALABLE STAND-UP POUCH, ENDOSCOPIC CLEANING PAD - 1 POUCH: Brand: FIRST STEP BEDSIDE ADD WATER KIT - RESEALABLE STAND-UP POUCH, ENDOSCOPIC CLEANIN

## (undated) DEVICE — TUBING, SUCTION, 1/4" X 10', STRAIGHT: Brand: MEDLINE

## (undated) DEVICE — DRSNG SURESITE WNDW 4X4.5

## (undated) DEVICE — ADAPT CLN LUM OLYMP AIR/H20

## (undated) DEVICE — GRADUATE CONTN 1000ML

## (undated) DEVICE — THE BITE BLOCK MAXI, LATEX FREE STRAP IS USED TO PROTECT THE ENDOSCOPE INSERTION TUBE FROM BEING BITTEN BY THE PATIENT.

## (undated) DEVICE — MULTIPLE BAND LIGATOR: Brand: SPEEDBAND SUPERVIEW SUPER 7

## (undated) DEVICE — UNDERCAST PADDING: Brand: DEROYAL

## (undated) DEVICE — HYBRID CO2 TUBING/CAP SET FOR OLYMPUS® SCOPES & CO2 SOURCE: Brand: ERBE

## (undated) DEVICE — SYR LUERLOK 50ML

## (undated) DEVICE — SYR LUERLOK 30CC

## (undated) DEVICE — GLV SURG PREMIERPRO MIC LTX PF SZ8 BRN

## (undated) DEVICE — INTENDED FOR TISSUE SEPARATION, AND OTHER PROCEDURES THAT REQUIRE A SHARP SURGICAL BLADE TO PUNCTURE OR CUT.: Brand: BARD-PARKER ® CARBON RIB-BACK BLADES

## (undated) DEVICE — BNDG ELAS W/CLIP 6IN 10YD LF STRL

## (undated) DEVICE — 450 ML BOTTLE OF 0.05% CHLORHEXIDINE GLUCONATE IN 99.95% STERILE WATER FOR IRRIGATION, USP AND APPLICATOR.: Brand: IRRISEPT ANTIMICROBIAL WOUND LAVAGE

## (undated) DEVICE — SOLIDIFIER LIQ PREMISORB 1500CC

## (undated) DEVICE — STRYKER PERFORMANCE SERIES SAGITTAL BLADE: Brand: STRYKER PERFORMANCE SERIES

## (undated) DEVICE — SKN PREP SPNG STKS PVP PNT STR: Brand: MEDLINE INDUSTRIES, INC.

## (undated) DEVICE — ST LINER SAFECAP GRN RED CP STRL

## (undated) DEVICE — CONTN GRAD MEAS TRIANG 32OZ BLK

## (undated) DEVICE — KT ORCA ORCAPOD DISP STRL

## (undated) DEVICE — PK KN TOTL 10

## (undated) DEVICE — TRAP FLD MINIVAC MEGADYNE 100ML